# Patient Record
Sex: FEMALE | Employment: UNEMPLOYED | URBAN - METROPOLITAN AREA
[De-identification: names, ages, dates, MRNs, and addresses within clinical notes are randomized per-mention and may not be internally consistent; named-entity substitution may affect disease eponyms.]

---

## 2017-08-14 PROBLEM — N17.9 AKI (ACUTE KIDNEY INJURY) (HCC): Status: ACTIVE | Noted: 2017-08-14

## 2019-04-11 ENCOUNTER — HOSPITAL ENCOUNTER (EMERGENCY)
Age: 39
Discharge: HOME | End: 2019-04-12
Attending: EMERGENCY MEDICINE
Payer: COMMERCIAL

## 2019-04-11 VITALS
RESPIRATION RATE: 16 BRPM | TEMPERATURE: 98 F | DIASTOLIC BLOOD PRESSURE: 95 MMHG | SYSTOLIC BLOOD PRESSURE: 168 MMHG | BODY MASS INDEX: 22.97 KG/M2 | WEIGHT: 117 LBS | OXYGEN SATURATION: 95 % | HEIGHT: 60 IN | HEART RATE: 85 BPM

## 2019-04-11 DIAGNOSIS — R00.2 PALPITATIONS: Primary | ICD-10-CM

## 2019-04-11 DIAGNOSIS — R20.2 PARESTHESIAS: ICD-10-CM

## 2019-04-11 LAB
ALBUMIN SERPL-MCNC: 4.4 G/DL (ref 3.5–5.2)
ALP BLD-CCNC: 156 U/L (ref 35–104)
ALT SERPL-CCNC: 32 U/L (ref 0–32)
ANION GAP SERPL CALCULATED.3IONS-SCNC: 10 MMOL/L (ref 7–16)
AST SERPL-CCNC: 63 U/L (ref 0–31)
BACTERIA: ABNORMAL /HPF
BASOPHILS ABSOLUTE: 0.03 E9/L (ref 0–0.2)
BASOPHILS RELATIVE PERCENT: 0.6 % (ref 0–2)
BILIRUB SERPL-MCNC: 0.7 MG/DL (ref 0–1.2)
BILIRUBIN URINE: NEGATIVE
BLOOD, URINE: ABNORMAL
BUN BLDV-MCNC: 11 MG/DL (ref 6–20)
CALCIUM SERPL-MCNC: 9 MG/DL (ref 8.6–10.2)
CHLORIDE BLD-SCNC: 102 MMOL/L (ref 98–107)
CLARITY: CLEAR
CO2: 26 MMOL/L (ref 22–29)
COLOR: YELLOW
CREAT SERPL-MCNC: 0.5 MG/DL (ref 0.5–1)
EOSINOPHILS ABSOLUTE: 0.06 E9/L (ref 0.05–0.5)
EOSINOPHILS RELATIVE PERCENT: 1.1 % (ref 0–6)
GFR AFRICAN AMERICAN: >60
GFR NON-AFRICAN AMERICAN: >60 ML/MIN/1.73
GLUCOSE BLD-MCNC: 103 MG/DL (ref 74–99)
GLUCOSE URINE: NEGATIVE MG/DL
HCG, URINE, POC: NEGATIVE
HCT VFR BLD CALC: 35.6 % (ref 34–48)
HEMOGLOBIN: 11.6 G/DL (ref 11.5–15.5)
IMMATURE GRANULOCYTES #: 0.02 E9/L
IMMATURE GRANULOCYTES %: 0.4 % (ref 0–5)
KETONES, URINE: NEGATIVE MG/DL
LACTIC ACID: 0.9 MMOL/L (ref 0.5–2.2)
LEUKOCYTE ESTERASE, URINE: NEGATIVE
LYMPHOCYTES ABSOLUTE: 1.38 E9/L (ref 1.5–4)
LYMPHOCYTES RELATIVE PERCENT: 25.6 % (ref 20–42)
Lab: NORMAL
MCH RBC QN AUTO: 29.4 PG (ref 26–35)
MCHC RBC AUTO-ENTMCNC: 32.6 % (ref 32–34.5)
MCV RBC AUTO: 90.1 FL (ref 80–99.9)
MONOCYTES ABSOLUTE: 0.74 E9/L (ref 0.1–0.95)
MONOCYTES RELATIVE PERCENT: 13.7 % (ref 2–12)
NEGATIVE QC PASS/FAIL: NORMAL
NEUTROPHILS ABSOLUTE: 3.16 E9/L (ref 1.8–7.3)
NEUTROPHILS RELATIVE PERCENT: 58.6 % (ref 43–80)
NITRITE, URINE: NEGATIVE
PDW BLD-RTO: 18.6 FL (ref 11.5–15)
PH UA: 6 (ref 5–9)
PLATELET # BLD: 187 E9/L (ref 130–450)
PMV BLD AUTO: 10.6 FL (ref 7–12)
POSITIVE QC PASS/FAIL: NORMAL
POTASSIUM SERPL-SCNC: 3.6 MMOL/L (ref 3.5–5)
PROTEIN UA: ABNORMAL MG/DL
RBC # BLD: 3.95 E12/L (ref 3.5–5.5)
RBC UA: ABNORMAL /HPF (ref 0–2)
SODIUM BLD-SCNC: 138 MMOL/L (ref 132–146)
SPECIFIC GRAVITY UA: >=1.03 (ref 1–1.03)
TOTAL PROTEIN: 7.9 G/DL (ref 6.4–8.3)
TROPONIN: <0.01 NG/ML (ref 0–0.03)
TSH SERPL DL<=0.05 MIU/L-ACNC: 1 UIU/ML (ref 0.27–4.2)
UROBILINOGEN, URINE: 0.2 E.U./DL
WBC # BLD: 5.4 E9/L (ref 4.5–11.5)
WBC UA: ABNORMAL /HPF (ref 0–5)

## 2019-04-11 PROCEDURE — 36415 COLL VENOUS BLD VENIPUNCTURE: CPT

## 2019-04-11 PROCEDURE — 81001 URINALYSIS AUTO W/SCOPE: CPT

## 2019-04-11 PROCEDURE — 84443 ASSAY THYROID STIM HORMONE: CPT

## 2019-04-11 PROCEDURE — 96374 THER/PROPH/DIAG INJ IV PUSH: CPT

## 2019-04-11 PROCEDURE — 83605 ASSAY OF LACTIC ACID: CPT

## 2019-04-11 PROCEDURE — 93005 ELECTROCARDIOGRAM TRACING: CPT | Performed by: PHYSICIAN ASSISTANT

## 2019-04-11 PROCEDURE — 99284 EMERGENCY DEPT VISIT MOD MDM: CPT

## 2019-04-11 PROCEDURE — 2580000003 HC RX 258: Performed by: EMERGENCY MEDICINE

## 2019-04-11 PROCEDURE — 85025 COMPLETE CBC W/AUTO DIFF WBC: CPT

## 2019-04-11 PROCEDURE — 2500000003 HC RX 250 WO HCPCS: Performed by: EMERGENCY MEDICINE

## 2019-04-11 PROCEDURE — 80053 COMPREHEN METABOLIC PANEL: CPT

## 2019-04-11 PROCEDURE — 84484 ASSAY OF TROPONIN QUANT: CPT

## 2019-04-11 RX ORDER — 0.9 % SODIUM CHLORIDE 0.9 %
500 INTRAVENOUS SOLUTION INTRAVENOUS ONCE
Status: COMPLETED | OUTPATIENT
Start: 2019-04-11 | End: 2019-04-11

## 2019-04-11 RX ORDER — LABETALOL HYDROCHLORIDE 5 MG/ML
10 INJECTION, SOLUTION INTRAVENOUS ONCE
Status: COMPLETED | OUTPATIENT
Start: 2019-04-11 | End: 2019-04-11

## 2019-04-11 RX ADMIN — LABETALOL HYDROCHLORIDE 10 MG: 5 INJECTION INTRAVENOUS at 20:42

## 2019-04-11 RX ADMIN — SODIUM CHLORIDE 500 ML: 9 INJECTION, SOLUTION INTRAVENOUS at 20:38

## 2019-04-11 NOTE — ED PROVIDER NOTES
Department of Emergency Medicine   ED  Provider Note  Admit Date/RoomTime: 4/11/2019  6:22 PM  ED Room: [facility]YS              4/11/19  8:29 PM      HISTORY OF PRESENT ILLNESS:  (Nurses Notes Reviewed)    Chief Complaint:   Dizziness (starting on 4/6 patient has been having episodes of feeling as if she is going to pass out.)      Source of history provided by:  patient. History/Exam Limitations: none. Golden Guidry is a 44 y.o. old female presenting to the emergency department for complaints of sudden onset Lightheaded and Palpitations which began several day(s) prior to arrival.  Since recognized her symptoms have been resolved. She has associated symptoms of headaches, weakness, numbness, tingling and nausea and denies any no other pertinent symptoms. The symptoms are aggravated by none. The patient has a past history of: No other pertinent PMH. There has been no history of recent trauma. Patient reports 2 episodes the 1st on the 6th and the 2nd yesterday of sudden onset headedness, palpitations, bilateral hand and foot numbness, tingling, weakness. She reports these episodes last no longer than 8 minutes and resolve on their own. Denies any other symptoms or history. She denies any symptoms since yesterday, at the time she was afraid to drive herself here as the 2nd episode happened in the car that she waited till today for her family member to drive her here. Vertebrobasilar CVA Symptoms:    Vertigo: no (0). Diplopia: no (0). Decreased Vision: no (0). Oscillopsia: no (0). Ataxia: no (0). Precipitated by moving one upper extremity with  Decreased BP (subclavian steal syndrome):       no (0). Total:    0.          Review of Systems:   Pertinent positives and negatives are stated within HPI, all other systems reviewed and are negative.          --------------------------------------------- PAST HISTORY ---------------------------------------------  Past Medical History:  has a past medical history of Heart burn and Hypertension. Past Surgical History:  has no past surgical history on file. Social History:  reports that she has been smoking. She has been smoking about 0.50 packs per day. She does not have any smokeless tobacco history on file. She reports that she does not drink alcohol or use drugs. Family History: family history is not on file. The patients home medications have been reviewed. Allergies: Patient has no known allergies. ---------------------------------------------------PHYSICAL EXAM--------------------------------------    Constitutional/General: Alert and oriented x3, well appearing, non toxic in NAD  Head: Normocephalic and atraumatic  Eyes: PERRL, EOMI  Mouth: Oropharynx clear, handling secretions, no trismus  Neck: Supple, full ROM, non tender to palpation in the midline, no stridor, no crepitus, no meningeal signs  Pulmonary: Lungs clear to auscultation bilaterally, no wheezes, rales, or rhonchi. Not in respiratory distress  Cardiovascular:  Tachycardic rate. Regular rhythm. No murmurs, gallops, or rubs. 2+ distal pulses  Chest: no chest wall tenderness  Abdomen: Soft. Non tender. Non distended. +BS. No rebound, guarding, or rigidity. No pulsatile masses appreciated. Musculoskeletal: Moves all extremities x 4. Warm and well perfused, no clubbing, cyanosis, or edema. Capillary refill <3 seconds  Skin: warm and dry. No rashes. Neurologic: GCS 15, CN 2-12 grossly intact, no focal deficits, symmetric strength 5/5 in the upper and lower extremities bilaterally. No ataxia. Normal finger to nose. Psych: Normal Affect        -------------------------------------------------- RESULTS -------------------------------------------------  I have personally reviewed all laboratory and imaging results for this patient. Results are listed below.      LABS:  Results for orders placed or performed during the hospital encounter of 04/11/19   CBC Auto Differential   Result Value Ref Range    WBC 5.4 4.5 - 11.5 E9/L    RBC 3.95 3.50 - 5.50 E12/L    Hemoglobin 11.6 11.5 - 15.5 g/dL    Hematocrit 35.6 34.0 - 48.0 %    MCV 90.1 80.0 - 99.9 fL    MCH 29.4 26.0 - 35.0 pg    MCHC 32.6 32.0 - 34.5 %    RDW 18.6 (H) 11.5 - 15.0 fL    Platelets 265 271 - 629 E9/L    MPV 10.6 7.0 - 12.0 fL    Neutrophils % 58.6 43.0 - 80.0 %    Immature Granulocytes % 0.4 0.0 - 5.0 %    Lymphocytes % 25.6 20.0 - 42.0 %    Monocytes % 13.7 (H) 2.0 - 12.0 %    Eosinophils % 1.1 0.0 - 6.0 %    Basophils % 0.6 0.0 - 2.0 %    Neutrophils # 3.16 1.80 - 7.30 E9/L    Immature Granulocytes # 0.02 E9/L    Lymphocytes # 1.38 (L) 1.50 - 4.00 E9/L    Monocytes # 0.74 0.10 - 0.95 E9/L    Eosinophils # 0.06 0.05 - 0.50 E9/L    Basophils # 0.03 0.00 - 0.20 E9/L   Comprehensive Metabolic Panel   Result Value Ref Range    Sodium 138 132 - 146 mmol/L    Potassium 3.6 3.5 - 5.0 mmol/L    Chloride 102 98 - 107 mmol/L    CO2 26 22 - 29 mmol/L    Anion Gap 10 7 - 16 mmol/L    Glucose 103 (H) 74 - 99 mg/dL    BUN 11 6 - 20 mg/dL    CREATININE 0.5 0.5 - 1.0 mg/dL    GFR Non-African American >60 >=60 mL/min/1.73    GFR African American >60     Calcium 9.0 8.6 - 10.2 mg/dL    Total Protein 7.9 6.4 - 8.3 g/dL    Alb 4.4 3.5 - 5.2 g/dL    Total Bilirubin 0.7 0.0 - 1.2 mg/dL    Alkaline Phosphatase 156 (H) 35 - 104 U/L    ALT 32 0 - 32 U/L    AST 63 (H) 0 - 31 U/L   Lactic Acid, Plasma   Result Value Ref Range    Lactic Acid 0.9 0.5 - 2.2 mmol/L   Troponin   Result Value Ref Range    Troponin <0.01 0.00 - 0.03 ng/mL   Urinalysis   Result Value Ref Range    Color, UA Yellow Straw/Yellow    Clarity, UA Clear Clear    Glucose, Ur Negative Negative mg/dL    Bilirubin Urine Negative Negative    Ketones, Urine Negative Negative mg/dL    Specific Gravity, UA >=1.030 1.005 - 1.030    Blood, Urine LARGE (A) Negative    pH, UA 6.0 5.0 - 9.0    Protein, UA TRACE Negative mg/dL    Urobilinogen, Urine 0.2 <2.0 E.U./dL    Nitrite, Urine Negative Negative    Leukocyte Esterase, Urine Negative Negative   Microscopic Urinalysis   Result Value Ref Range    WBC, UA 1-3 0 - 5 /HPF    RBC, UA 5-10 (A) 0 - 2 /HPF    Bacteria, UA FEW (A) /HPF   TSH without Reflex   Result Value Ref Range    TSH 0.996 0.270 - 4.200 uIU/mL   POC Pregnancy Urine Qual   Result Value Ref Range    HCG, Urine, POC Negative Negative    Lot Number [id number]     Positive QC Pass/Fail Pass     Negative QC Pass/Fail Pass        RADIOLOGY:  Interpreted by Radiologist.  No orders to display         EKG: This EKG is signed by emergency department physician. Rate: 84  Rhythm: Sinus  Interpretation: No acute changes. Comparison: stable as compared to patient's most recent EKG 8/14/17              ------------------------- NURSING NOTES AND VITALS REVIEWED ---------------------------   The nursing notes within the ED encounter and vital signs as below have been reviewed by myself. BP (!) 168/95   Pulse 85   Temp 98 °F (36.7 °C)   Resp 16   Ht 5' (1.524 m)   Wt 117 lb (53.1 kg)   SpO2 95%   BMI 22.85 kg/m²   Oxygen Saturation Interpretation: Normal    The patients available past medical records and past encounters were reviewed. ------------------------------ ED COURSE/MEDICAL DECISION MAKING----------------------  Medications   labetalol (NORMODYNE;TRANDATE) injection 10 mg (10 mg Intravenous Given 4/11/19 2042)   0.9 % sodium chloride bolus (0 mLs Intravenous Stopped 4/11/19 2121)             Medical Decision Making:    Patient presents for 2 episodes over the last week of palpitations associated with lightheadedness, bilateral hand and foot numbness and generalized weakness. No arrhythmias on EKG, or on monitor during ED stay, normal labs including TSH. Patient is too follow up with pcp and cardiology for further evaluation or return if symptoms worsen.  She does report her pcp is currently attempting to control her bp and just increased her lisinopril to bid last week.         This patient's ED course included: re-evaluation prior to disposition, IV medications, cardiac monitoring, continuous pulse oximetry, complex medical decision making and emergency management and a personal history and physicial eaxmination    This patient has remained hemodynamically stable during their ED course. Re-Evaluations:             Re-evaluation. Patients symptoms are improving        Counseling: The emergency provider has spoken with the patient and discussed todays results, in addition to providing specific details for the plan of care and counseling regarding the diagnosis and prognosis. Questions are answered at this time and they are agreeable with the plan.       --------------------------------- IMPRESSION AND DISPOSITION ---------------------------------    IMPRESSION  1. Palpitations    2.  Paresthesias        DISPOSITION  Disposition: Discharge to home  Patient condition is stable           Taye Miller DO  Resident  04/11/19 8682

## 2019-04-11 NOTE — ED NOTES
FIRST PROVIDER CONTACT ASSESSMENT NOTE      Department of Emergency Medicine   4/11/19  5:28 PM    Chief Complaint: Other      History of Present Illness:    Gerald Ramirez is a 44 y.o. female who presents to the ED by private car for blood pressure issues. States in the past 2 weeks has had 2 episodes of palpitations, near syncope, dizziness, arm numbness. States currently she feels shakey  Focused Screening Exam:  Constitutional:  Alert, appears stated age and is in no distress. *ALLERGIES*     Patient has no known allergies.      ED Triage Vitals [04/11/19 1703]   BP Temp Temp src Pulse Resp SpO2 Height Weight   -- -- -- 117 -- 98 % -- --        Initial Plan of Care:  Initiate Treatment-Testing, Proceed toTreatment Area When Bed Available for ED Attending/MLP to Continue Care    -----------------END OF FIRST PROVIDER CONTACT ASSESSMENT NOTE--------------  Electronically signed by LILIAN Danielle   DD: 4/11/19         LILIAN Danielle  04/11/19 3803

## 2019-04-12 LAB
EKG ATRIAL RATE: 84 BPM
EKG P AXIS: 55 DEGREES
EKG P-R INTERVAL: 120 MS
EKG Q-T INTERVAL: 358 MS
EKG QRS DURATION: 78 MS
EKG QTC CALCULATION (BAZETT): 423 MS
EKG R AXIS: 57 DEGREES
EKG T AXIS: 91 DEGREES
EKG VENTRICULAR RATE: 84 BPM

## 2020-02-25 ENCOUNTER — HOSPITAL ENCOUNTER (OUTPATIENT)
Age: 40
Discharge: HOME | End: 2020-02-25
Payer: COMMERCIAL

## 2020-02-25 LAB
ALBUMIN SERPL-MCNC: 4.4 G/DL (ref 3.5–5.2)
ALP BLD-CCNC: 144 U/L (ref 35–104)
ALT SERPL-CCNC: 29 U/L (ref 0–32)
ANION GAP SERPL CALCULATED.3IONS-SCNC: 13 MMOL/L (ref 7–16)
AST SERPL-CCNC: 47 U/L (ref 0–31)
BILIRUB SERPL-MCNC: 0.3 MG/DL (ref 0–1.2)
BUN BLDV-MCNC: 6 MG/DL (ref 6–20)
CALCIUM SERPL-MCNC: 8.9 MG/DL (ref 8.6–10.2)
CHLORIDE BLD-SCNC: 105 MMOL/L (ref 98–107)
CHOLESTEROL, TOTAL: 199 MG/DL (ref 0–199)
CO2: 27 MMOL/L (ref 22–29)
CREAT SERPL-MCNC: 0.4 MG/DL (ref 0.5–1)
GFR AFRICAN AMERICAN: >60
GFR NON-AFRICAN AMERICAN: >60 ML/MIN/1.73
GLUCOSE BLD-MCNC: 85 MG/DL (ref 74–99)
HCT VFR BLD CALC: 36 % (ref 34–48)
HDLC SERPL-MCNC: 97 MG/DL
HEMOGLOBIN: 11 G/DL (ref 11.5–15.5)
LDL CHOLESTEROL CALCULATED: 62 MG/DL (ref 0–99)
MCH RBC QN AUTO: 27.2 PG (ref 26–35)
MCHC RBC AUTO-ENTMCNC: 30.6 % (ref 32–34.5)
MCV RBC AUTO: 88.9 FL (ref 80–99.9)
PDW BLD-RTO: 19.7 FL (ref 11.5–15)
PLATELET # BLD: 189 E9/L (ref 130–450)
PMV BLD AUTO: 10 FL (ref 7–12)
POTASSIUM SERPL-SCNC: 3.4 MMOL/L (ref 3.5–5)
RBC # BLD: 4.05 E12/L (ref 3.5–5.5)
SODIUM BLD-SCNC: 145 MMOL/L (ref 132–146)
TOTAL PROTEIN: 7.5 G/DL (ref 6.4–8.3)
TRIGL SERPL-MCNC: 201 MG/DL (ref 0–149)
VITAMIN D 25-HYDROXY: 40 NG/ML (ref 30–100)
VLDLC SERPL CALC-MCNC: 40 MG/DL
WBC # BLD: 4.3 E9/L (ref 4.5–11.5)

## 2020-02-25 PROCEDURE — 80061 LIPID PANEL: CPT

## 2020-02-25 PROCEDURE — 85027 COMPLETE CBC AUTOMATED: CPT

## 2020-02-25 PROCEDURE — 36415 COLL VENOUS BLD VENIPUNCTURE: CPT

## 2020-02-25 PROCEDURE — 82306 VITAMIN D 25 HYDROXY: CPT

## 2020-02-25 PROCEDURE — 80053 COMPREHEN METABOLIC PANEL: CPT

## 2020-05-13 ENCOUNTER — HOSPITAL ENCOUNTER (OUTPATIENT)
Age: 40
Discharge: HOME | End: 2020-05-15
Payer: COMMERCIAL

## 2020-05-13 ENCOUNTER — NURSE ONLY (OUTPATIENT)
Dept: PRIMARY CARE CLINIC | Age: 40
End: 2020-05-13

## 2020-05-13 PROCEDURE — U0003 INFECTIOUS AGENT DETECTION BY NUCLEIC ACID (DNA OR RNA); SEVERE ACUTE RESPIRATORY SYNDROME CORONAVIRUS 2 (SARS-COV-2) (CORONAVIRUS DISEASE [COVID-19]), AMPLIFIED PROBE TECHNIQUE, MAKING USE OF HIGH THROUGHPUT TECHNOLOGIES AS DESCRIBED BY CMS-2020-01-R: HCPCS

## 2020-05-16 LAB
SARS-COV-2: NOT DETECTED
SOURCE: NORMAL

## 2020-09-29 ENCOUNTER — HOSPITAL ENCOUNTER (OUTPATIENT)
Age: 40
Discharge: HOME | End: 2020-10-01
Payer: COMMERCIAL

## 2020-09-29 PROCEDURE — U0003 INFECTIOUS AGENT DETECTION BY NUCLEIC ACID (DNA OR RNA); SEVERE ACUTE RESPIRATORY SYNDROME CORONAVIRUS 2 (SARS-COV-2) (CORONAVIRUS DISEASE [COVID-19]), AMPLIFIED PROBE TECHNIQUE, MAKING USE OF HIGH THROUGHPUT TECHNOLOGIES AS DESCRIBED BY CMS-2020-01-R: HCPCS

## 2020-10-01 LAB
SARS-COV-2: NOT DETECTED
SOURCE: NORMAL

## 2020-10-08 ENCOUNTER — HOSPITAL ENCOUNTER (OUTPATIENT)
Age: 40
Discharge: HOME | End: 2020-10-10
Payer: COMMERCIAL

## 2020-10-08 PROCEDURE — U0003 INFECTIOUS AGENT DETECTION BY NUCLEIC ACID (DNA OR RNA); SEVERE ACUTE RESPIRATORY SYNDROME CORONAVIRUS 2 (SARS-COV-2) (CORONAVIRUS DISEASE [COVID-19]), AMPLIFIED PROBE TECHNIQUE, MAKING USE OF HIGH THROUGHPUT TECHNOLOGIES AS DESCRIBED BY CMS-2020-01-R: HCPCS

## 2020-10-10 LAB
SARS-COV-2: NOT DETECTED
SOURCE: NORMAL

## 2020-10-13 ENCOUNTER — HOSPITAL ENCOUNTER (OUTPATIENT)
Age: 40
Discharge: HOME | End: 2020-10-15
Payer: COMMERCIAL

## 2020-10-13 PROCEDURE — U0003 INFECTIOUS AGENT DETECTION BY NUCLEIC ACID (DNA OR RNA); SEVERE ACUTE RESPIRATORY SYNDROME CORONAVIRUS 2 (SARS-COV-2) (CORONAVIRUS DISEASE [COVID-19]), AMPLIFIED PROBE TECHNIQUE, MAKING USE OF HIGH THROUGHPUT TECHNOLOGIES AS DESCRIBED BY CMS-2020-01-R: HCPCS

## 2020-10-15 LAB
SARS-COV-2: NOT DETECTED
SOURCE: NORMAL

## 2020-10-17 ENCOUNTER — HOSPITAL ENCOUNTER (OUTPATIENT)
Age: 40
Discharge: HOME | End: 2020-10-19
Payer: COMMERCIAL

## 2020-10-17 PROCEDURE — U0003 INFECTIOUS AGENT DETECTION BY NUCLEIC ACID (DNA OR RNA); SEVERE ACUTE RESPIRATORY SYNDROME CORONAVIRUS 2 (SARS-COV-2) (CORONAVIRUS DISEASE [COVID-19]), AMPLIFIED PROBE TECHNIQUE, MAKING USE OF HIGH THROUGHPUT TECHNOLOGIES AS DESCRIBED BY CMS-2020-01-R: HCPCS

## 2020-10-18 LAB
SARS-COV-2: NOT DETECTED
SOURCE: NORMAL

## 2020-10-21 ENCOUNTER — HOSPITAL ENCOUNTER (OUTPATIENT)
Age: 40
Discharge: HOME | End: 2020-10-23
Payer: COMMERCIAL

## 2020-10-21 PROCEDURE — U0003 INFECTIOUS AGENT DETECTION BY NUCLEIC ACID (DNA OR RNA); SEVERE ACUTE RESPIRATORY SYNDROME CORONAVIRUS 2 (SARS-COV-2) (CORONAVIRUS DISEASE [COVID-19]), AMPLIFIED PROBE TECHNIQUE, MAKING USE OF HIGH THROUGHPUT TECHNOLOGIES AS DESCRIBED BY CMS-2020-01-R: HCPCS

## 2020-10-23 LAB
SARS-COV-2: NOT DETECTED
SOURCE: NORMAL

## 2020-10-29 ENCOUNTER — HOSPITAL ENCOUNTER (OUTPATIENT)
Age: 40
Discharge: HOME | End: 2020-10-31
Payer: COMMERCIAL

## 2020-10-29 ENCOUNTER — NURSE ONLY (OUTPATIENT)
Dept: PRIMARY CARE CLINIC | Age: 40
End: 2020-10-29

## 2020-10-29 PROCEDURE — U0003 INFECTIOUS AGENT DETECTION BY NUCLEIC ACID (DNA OR RNA); SEVERE ACUTE RESPIRATORY SYNDROME CORONAVIRUS 2 (SARS-COV-2) (CORONAVIRUS DISEASE [COVID-19]), AMPLIFIED PROBE TECHNIQUE, MAKING USE OF HIGH THROUGHPUT TECHNOLOGIES AS DESCRIBED BY CMS-2020-01-R: HCPCS

## 2020-10-31 LAB
SARS-COV-2: NOT DETECTED
SOURCE: NORMAL

## 2020-11-07 LAB
SARS-COV-2: NOT DETECTED
SOURCE: NORMAL

## 2020-11-12 ENCOUNTER — NURSE ONLY (OUTPATIENT)
Dept: PRIMARY CARE CLINIC | Age: 40
End: 2020-11-12

## 2020-11-12 DIAGNOSIS — Z20.822 EXPOSURE TO COVID-19 VIRUS: ICD-10-CM

## 2020-11-13 LAB — SARS-COV-2, PCR: NOT DETECTED

## 2020-11-20 LAB
SARS-COV-2: NOT DETECTED
SOURCE: NORMAL

## 2020-11-24 LAB
SARS-COV-2: NOT DETECTED
SOURCE: NORMAL

## 2020-11-28 LAB
SARS-COV-2: NOT DETECTED
SOURCE: NORMAL

## 2020-12-02 LAB
SARS-COV-2: NOT DETECTED
SOURCE: NORMAL

## 2020-12-06 LAB
SARS-COV-2: NOT DETECTED
SOURCE: NORMAL

## 2020-12-08 LAB
SARS-COV-2: NOT DETECTED
SOURCE: NORMAL

## 2020-12-13 LAB
SARS-COV-2: NOT DETECTED
SOURCE: NORMAL

## 2020-12-16 LAB
SARS-COV-2: NOT DETECTED
SOURCE: NORMAL

## 2020-12-20 LAB
SARS-COV-2: NOT DETECTED
SOURCE: NORMAL

## 2020-12-24 LAB
SARS-COV-2: NOT DETECTED
SOURCE: NORMAL

## 2020-12-28 ENCOUNTER — NURSE ONLY (OUTPATIENT)
Dept: PRIMARY CARE CLINIC | Age: 40
End: 2020-12-28

## 2020-12-28 DIAGNOSIS — Z20.822 SUSPECTED COVID-19 VIRUS INFECTION: ICD-10-CM

## 2020-12-30 LAB
SARS-COV-2: NOT DETECTED
SOURCE: NORMAL

## 2021-01-01 LAB
SARS-COV-2: NOT DETECTED
SOURCE: NORMAL

## 2021-01-06 LAB
SARS-COV-2: NOT DETECTED
SOURCE: NORMAL

## 2021-01-11 LAB
SARS-COV-2: NOT DETECTED
SOURCE: NORMAL

## 2021-01-13 LAB
SARS-COV-2: NOT DETECTED
SOURCE: NORMAL

## 2021-01-16 LAB — SOURCE: NORMAL

## 2021-01-17 LAB — SARS-COV-2, PCR: NOT DETECTED

## 2021-01-20 LAB
SARS-COV-2: NOT DETECTED
SOURCE: NORMAL

## 2021-01-24 LAB
SARS-COV-2: NOT DETECTED
SOURCE: NORMAL

## 2021-01-27 LAB
SARS-COV-2: NOT DETECTED
SOURCE: NORMAL

## 2021-01-31 LAB
SARS-COV-2: NOT DETECTED
SOURCE: NORMAL

## 2021-02-03 LAB
SARS-COV-2: NOT DETECTED
SOURCE: NORMAL

## 2021-02-10 LAB
SARS-COV-2: NOT DETECTED
SOURCE: NORMAL

## 2021-02-14 LAB
SARS-COV-2: NOT DETECTED
SOURCE: NORMAL

## 2021-02-17 LAB
SARS-COV-2: NOT DETECTED
SOURCE: NORMAL

## 2021-02-21 LAB
SARS-COV-2: NOT DETECTED
SOURCE: NORMAL

## 2021-02-24 LAB
SARS-COV-2: NOT DETECTED
SOURCE: NORMAL

## 2021-02-28 LAB
SARS-COV-2: NOT DETECTED
SOURCE: NORMAL

## 2021-03-03 LAB
SARS-COV-2: NOT DETECTED
SOURCE: NORMAL

## 2021-03-10 LAB
SARS-COV-2: NOT DETECTED
SOURCE: NORMAL

## 2021-03-16 LAB
SARS-COV-2: NOT DETECTED
SOURCE: NORMAL

## 2021-03-17 LAB
SARS-COV-2: NOT DETECTED
SOURCE: NORMAL

## 2021-03-22 LAB
SARS-COV-2: NOT DETECTED
SOURCE: NORMAL

## 2021-03-24 LAB
SARS-COV-2: NOT DETECTED
SOURCE: NORMAL

## 2021-03-29 LAB
SARS-COV-2: NOT DETECTED
SOURCE: NORMAL

## 2021-04-01 LAB
SARS-COV-2: NORMAL
SOURCE: NORMAL

## 2021-04-03 LAB
SARS-COV-2: NOT DETECTED
SOURCE: NORMAL

## 2021-04-09 LAB
SARS-COV-2: NORMAL
SOURCE: NORMAL

## 2021-04-11 LAB
SARS-COV-2: NOT DETECTED
SOURCE: NORMAL

## 2024-02-12 ENCOUNTER — HOSPITAL ENCOUNTER (INPATIENT)
Age: 44
LOS: 6 days | Discharge: HOME | DRG: 280 | End: 2024-02-18
Attending: EMERGENCY MEDICINE | Admitting: INTERNAL MEDICINE
Payer: COMMERCIAL

## 2024-02-12 ENCOUNTER — APPOINTMENT (OUTPATIENT)
Dept: CT IMAGING | Age: 44
DRG: 280 | End: 2024-02-12
Payer: COMMERCIAL

## 2024-02-12 ENCOUNTER — APPOINTMENT (OUTPATIENT)
Dept: ULTRASOUND IMAGING | Age: 44
DRG: 280 | End: 2024-02-12
Payer: COMMERCIAL

## 2024-02-12 DIAGNOSIS — D64.9 ANEMIA, UNSPECIFIED TYPE: ICD-10-CM

## 2024-02-12 DIAGNOSIS — E83.42 HYPOMAGNESEMIA: ICD-10-CM

## 2024-02-12 DIAGNOSIS — K70.31 ALCOHOLIC CIRRHOSIS OF LIVER WITH ASCITES (HCC): Primary | ICD-10-CM

## 2024-02-12 DIAGNOSIS — E87.6 HYPOKALEMIA: ICD-10-CM

## 2024-02-12 DIAGNOSIS — R17 ELEVATED BILIRUBIN: ICD-10-CM

## 2024-02-12 DIAGNOSIS — R79.89 ABNORMAL LFTS: ICD-10-CM

## 2024-02-12 DIAGNOSIS — F10.10 CHRONIC ALCOHOL ABUSE: ICD-10-CM

## 2024-02-12 PROBLEM — F10.139 ALCOHOL ABUSE WITH WITHDRAWAL (HCC): Status: ACTIVE | Noted: 2024-02-12

## 2024-02-12 PROBLEM — K74.60 CIRRHOSIS OF LIVER (HCC): Status: ACTIVE | Noted: 2024-02-12

## 2024-02-12 PROBLEM — N30.00 ACUTE CYSTITIS: Status: ACTIVE | Noted: 2024-02-12

## 2024-02-12 LAB
ALBUMIN SERPL-MCNC: 3.4 G/DL (ref 3.5–5.2)
ALP SERPL-CCNC: 269 U/L (ref 35–104)
ALT SERPL-CCNC: 45 U/L (ref 0–32)
AMMONIA PLAS-SCNC: 43 UMOL/L (ref 11–51)
AMPHET UR QL SCN: NEGATIVE
ANION GAP SERPL CALCULATED.3IONS-SCNC: 16 MMOL/L (ref 7–16)
AST SERPL-CCNC: 219 U/L (ref 0–31)
ATYPICAL LYMPHOCYTE ABSOLUTE COUNT: 0.16 K/UL (ref 0–0.46)
ATYPICAL LYMPHOCYTES: 2 % (ref 0–4)
BACTERIA URNS QL MICRO: ABNORMAL
BARBITURATES UR QL SCN: NEGATIVE
BASOPHILS # BLD: 0 K/UL (ref 0–0.2)
BASOPHILS NFR BLD: 0 % (ref 0–2)
BENZODIAZ UR QL: NEGATIVE
BILIRUB SERPL-MCNC: 9 MG/DL (ref 0–1.2)
BILIRUB UR QL STRIP: ABNORMAL
BUN SERPL-MCNC: 13 MG/DL (ref 6–20)
BUPRENORPHINE UR QL: NEGATIVE
CALCIUM SERPL-MCNC: 7 MG/DL (ref 8.6–10.2)
CANNABINOIDS UR QL SCN: POSITIVE
CHLORIDE SERPL-SCNC: 96 MMOL/L (ref 98–107)
CLARITY UR: ABNORMAL
CO2 SERPL-SCNC: 23 MMOL/L (ref 22–29)
COCAINE UR QL SCN: NEGATIVE
COLOR UR: ABNORMAL
CREAT SERPL-MCNC: 0.7 MG/DL (ref 0.5–1)
EOSINOPHIL # BLD: 0 K/UL (ref 0.05–0.5)
EOSINOPHILS RELATIVE PERCENT: 0 % (ref 0–6)
EPI CELLS #/AREA URNS HPF: ABNORMAL /HPF
ERYTHROCYTE [DISTWIDTH] IN BLOOD BY AUTOMATED COUNT: 21.2 % (ref 11.5–15)
ETHANOLAMINE SERPL-MCNC: 69 MG/DL
FENTANYL UR QL: NEGATIVE
GFR SERPL CREATININE-BSD FRML MDRD: >60 ML/MIN/1.73M2
GLUCOSE SERPL-MCNC: 101 MG/DL (ref 74–99)
GLUCOSE UR STRIP-MCNC: 100 MG/DL
HCG, URINE, POC: NEGATIVE
HCT VFR BLD AUTO: 22.4 % (ref 34–48)
HGB BLD-MCNC: 8.4 G/DL (ref 11.5–15.5)
HGB UR QL STRIP.AUTO: NEGATIVE
INR PPP: 1.5
KETONES UR STRIP-MCNC: ABNORMAL MG/DL
LACTATE BLDV-SCNC: 2.8 MMOL/L (ref 0.5–2.2)
LEUKOCYTE ESTERASE UR QL STRIP: ABNORMAL
LIPASE SERPL-CCNC: 54 U/L (ref 13–60)
LYMPHOCYTES NFR BLD: 0.47 K/UL (ref 1.5–4)
LYMPHOCYTES RELATIVE PERCENT: 5 % (ref 20–42)
Lab: NORMAL
MAGNESIUM SERPL-MCNC: 0.8 MG/DL (ref 1.6–2.6)
MCH RBC QN AUTO: 38.4 PG (ref 26–35)
MCHC RBC AUTO-ENTMCNC: 37.5 G/DL (ref 32–34.5)
MCV RBC AUTO: 102.3 FL (ref 80–99.9)
METHADONE UR QL: NEGATIVE
MONOCYTES NFR BLD: 0.16 K/UL (ref 0.1–0.95)
MONOCYTES NFR BLD: 2 % (ref 2–12)
NEGATIVE QC PASS/FAIL: NORMAL
NEUTROPHILS NFR BLD: 91 % (ref 43–80)
NEUTS SEG NFR BLD: 8.21 K/UL (ref 1.8–7.3)
NITRITE UR QL STRIP: POSITIVE
OPIATES UR QL SCN: NEGATIVE
OXYCODONE UR QL SCN: NEGATIVE
PCP UR QL SCN: NEGATIVE
PH UR STRIP: 5.5 [PH] (ref 5–9)
PLATELET # BLD AUTO: 78 K/UL (ref 130–450)
PLATELET CONFIRMATION: NORMAL
PMV BLD AUTO: 11 FL (ref 7–12)
POSITIVE QC PASS/FAIL: NORMAL
POTASSIUM SERPL-SCNC: 3 MMOL/L (ref 3.5–5)
PROT SERPL-MCNC: 7.7 G/DL (ref 6.4–8.3)
PROT UR STRIP-MCNC: 30 MG/DL
PROTHROMBIN TIME: 16.8 SEC (ref 9.3–12.4)
RBC # BLD AUTO: 2.19 M/UL (ref 3.5–5.5)
RBC # BLD: ABNORMAL 10*6/UL
RBC #/AREA URNS HPF: ABNORMAL /HPF
SODIUM SERPL-SCNC: 135 MMOL/L (ref 132–146)
SP GR UR STRIP: 1.01 (ref 1–1.03)
TEST INFORMATION: ABNORMAL
UROBILINOGEN UR STRIP-ACNC: 4 EU/DL (ref 0–1)
WBC #/AREA URNS HPF: ABNORMAL /HPF
WBC OTHER # BLD: 9 K/UL (ref 4.5–11.5)

## 2024-02-12 PROCEDURE — 83690 ASSAY OF LIPASE: CPT

## 2024-02-12 PROCEDURE — 80307 DRUG TEST PRSMV CHEM ANLYZR: CPT

## 2024-02-12 PROCEDURE — 76705 ECHO EXAM OF ABDOMEN: CPT

## 2024-02-12 PROCEDURE — 6370000000 HC RX 637 (ALT 250 FOR IP): Performed by: NURSE PRACTITIONER

## 2024-02-12 PROCEDURE — 96365 THER/PROPH/DIAG IV INF INIT: CPT

## 2024-02-12 PROCEDURE — 2580000003 HC RX 258: Performed by: NURSE PRACTITIONER

## 2024-02-12 PROCEDURE — 6360000002 HC RX W HCPCS: Performed by: NURSE PRACTITIONER

## 2024-02-12 PROCEDURE — 6360000002 HC RX W HCPCS: Performed by: EMERGENCY MEDICINE

## 2024-02-12 PROCEDURE — 85610 PROTHROMBIN TIME: CPT

## 2024-02-12 PROCEDURE — 96366 THER/PROPH/DIAG IV INF ADDON: CPT

## 2024-02-12 PROCEDURE — 74177 CT ABD & PELVIS W/CONTRAST: CPT

## 2024-02-12 PROCEDURE — 80053 COMPREHEN METABOLIC PANEL: CPT

## 2024-02-12 PROCEDURE — 87088 URINE BACTERIA CULTURE: CPT

## 2024-02-12 PROCEDURE — 2060000000 HC ICU INTERMEDIATE R&B

## 2024-02-12 PROCEDURE — 82140 ASSAY OF AMMONIA: CPT

## 2024-02-12 PROCEDURE — 83735 ASSAY OF MAGNESIUM: CPT

## 2024-02-12 PROCEDURE — 81001 URINALYSIS AUTO W/SCOPE: CPT

## 2024-02-12 PROCEDURE — 85025 COMPLETE CBC W/AUTO DIFF WBC: CPT

## 2024-02-12 PROCEDURE — 99223 1ST HOSP IP/OBS HIGH 75: CPT | Performed by: INTERNAL MEDICINE

## 2024-02-12 PROCEDURE — 6360000004 HC RX CONTRAST MEDICATION: Performed by: RADIOLOGY

## 2024-02-12 PROCEDURE — 2580000003 HC RX 258: Performed by: EMERGENCY MEDICINE

## 2024-02-12 PROCEDURE — 87086 URINE CULTURE/COLONY COUNT: CPT

## 2024-02-12 PROCEDURE — 96375 TX/PRO/DX INJ NEW DRUG ADDON: CPT

## 2024-02-12 PROCEDURE — 6370000000 HC RX 637 (ALT 250 FOR IP): Performed by: EMERGENCY MEDICINE

## 2024-02-12 PROCEDURE — G0480 DRUG TEST DEF 1-7 CLASSES: HCPCS

## 2024-02-12 PROCEDURE — 83605 ASSAY OF LACTIC ACID: CPT

## 2024-02-12 PROCEDURE — 99285 EMERGENCY DEPT VISIT HI MDM: CPT

## 2024-02-12 PROCEDURE — APPSS60 APP SPLIT SHARED TIME 46-60 MINUTES: Performed by: NURSE PRACTITIONER

## 2024-02-12 RX ORDER — LORAZEPAM 1 MG/1
3 TABLET ORAL
Status: DISCONTINUED | OUTPATIENT
Start: 2024-02-12 | End: 2024-02-18 | Stop reason: HOSPADM

## 2024-02-12 RX ORDER — SODIUM CHLORIDE 0.9 % (FLUSH) 0.9 %
5-40 SYRINGE (ML) INJECTION PRN
Status: DISCONTINUED | OUTPATIENT
Start: 2024-02-12 | End: 2024-02-18 | Stop reason: HOSPADM

## 2024-02-12 RX ORDER — M-VIT,TX,IRON,MINS/CALC/FOLIC 27MG-0.4MG
1 TABLET ORAL DAILY
Status: DISCONTINUED | OUTPATIENT
Start: 2024-02-12 | End: 2024-02-18 | Stop reason: HOSPADM

## 2024-02-12 RX ORDER — LORAZEPAM 2 MG/ML
2 INJECTION INTRAMUSCULAR
Status: DISCONTINUED | OUTPATIENT
Start: 2024-02-12 | End: 2024-02-18 | Stop reason: HOSPADM

## 2024-02-12 RX ORDER — NICOTINE 21 MG/24HR
1 PATCH, TRANSDERMAL 24 HOURS TRANSDERMAL DAILY
Status: DISCONTINUED | OUTPATIENT
Start: 2024-02-12 | End: 2024-02-18 | Stop reason: HOSPADM

## 2024-02-12 RX ORDER — ACETAMINOPHEN 650 MG/1
650 SUPPOSITORY RECTAL EVERY 6 HOURS PRN
Status: DISCONTINUED | OUTPATIENT
Start: 2024-02-12 | End: 2024-02-18 | Stop reason: HOSPADM

## 2024-02-12 RX ORDER — SODIUM CHLORIDE 9 MG/ML
INJECTION, SOLUTION INTRAVENOUS CONTINUOUS
Status: ACTIVE | OUTPATIENT
Start: 2024-02-12 | End: 2024-02-14

## 2024-02-12 RX ORDER — MAGNESIUM SULFATE IN WATER 40 MG/ML
2000 INJECTION, SOLUTION INTRAVENOUS PRN
Status: DISCONTINUED | OUTPATIENT
Start: 2024-02-12 | End: 2024-02-18 | Stop reason: HOSPADM

## 2024-02-12 RX ORDER — ONDANSETRON 2 MG/ML
4 INJECTION INTRAMUSCULAR; INTRAVENOUS EVERY 6 HOURS PRN
Status: DISCONTINUED | OUTPATIENT
Start: 2024-02-12 | End: 2024-02-18 | Stop reason: HOSPADM

## 2024-02-12 RX ORDER — LANOLIN ALCOHOL/MO/W.PET/CERES
100 CREAM (GRAM) TOPICAL DAILY
Status: DISCONTINUED | OUTPATIENT
Start: 2024-02-12 | End: 2024-02-18 | Stop reason: HOSPADM

## 2024-02-12 RX ORDER — POTASSIUM CHLORIDE 20 MEQ/1
40 TABLET, EXTENDED RELEASE ORAL PRN
Status: DISCONTINUED | OUTPATIENT
Start: 2024-02-12 | End: 2024-02-18 | Stop reason: HOSPADM

## 2024-02-12 RX ORDER — LORAZEPAM 1 MG/1
1 TABLET ORAL
Status: DISCONTINUED | OUTPATIENT
Start: 2024-02-12 | End: 2024-02-18 | Stop reason: HOSPADM

## 2024-02-12 RX ORDER — SODIUM CHLORIDE 9 MG/ML
INJECTION, SOLUTION INTRAVENOUS PRN
Status: DISCONTINUED | OUTPATIENT
Start: 2024-02-12 | End: 2024-02-18 | Stop reason: HOSPADM

## 2024-02-12 RX ORDER — LORAZEPAM 1 MG/1
2 TABLET ORAL
Status: DISCONTINUED | OUTPATIENT
Start: 2024-02-12 | End: 2024-02-18 | Stop reason: HOSPADM

## 2024-02-12 RX ORDER — POTASSIUM CHLORIDE 7.45 MG/ML
10 INJECTION INTRAVENOUS ONCE
Status: COMPLETED | OUTPATIENT
Start: 2024-02-12 | End: 2024-02-12

## 2024-02-12 RX ORDER — ONDANSETRON 2 MG/ML
4 INJECTION INTRAMUSCULAR; INTRAVENOUS ONCE
Status: COMPLETED | OUTPATIENT
Start: 2024-02-12 | End: 2024-02-12

## 2024-02-12 RX ORDER — LORAZEPAM 2 MG/ML
1 INJECTION INTRAMUSCULAR
Status: DISCONTINUED | OUTPATIENT
Start: 2024-02-12 | End: 2024-02-18 | Stop reason: HOSPADM

## 2024-02-12 RX ORDER — LORAZEPAM 1 MG/1
4 TABLET ORAL
Status: DISCONTINUED | OUTPATIENT
Start: 2024-02-12 | End: 2024-02-18 | Stop reason: HOSPADM

## 2024-02-12 RX ORDER — MAGNESIUM SULFATE IN WATER 40 MG/ML
4000 INJECTION, SOLUTION INTRAVENOUS ONCE
Status: COMPLETED | OUTPATIENT
Start: 2024-02-12 | End: 2024-02-12

## 2024-02-12 RX ORDER — POTASSIUM CHLORIDE 20 MEQ/1
40 TABLET, EXTENDED RELEASE ORAL ONCE
Status: COMPLETED | OUTPATIENT
Start: 2024-02-12 | End: 2024-02-12

## 2024-02-12 RX ORDER — POLYETHYLENE GLYCOL 3350 17 G/17G
17 POWDER, FOR SOLUTION ORAL DAILY PRN
Status: DISCONTINUED | OUTPATIENT
Start: 2024-02-12 | End: 2024-02-18 | Stop reason: HOSPADM

## 2024-02-12 RX ORDER — SODIUM CHLORIDE 0.9 % (FLUSH) 0.9 %
5-40 SYRINGE (ML) INJECTION EVERY 12 HOURS SCHEDULED
Status: DISCONTINUED | OUTPATIENT
Start: 2024-02-12 | End: 2024-02-18 | Stop reason: HOSPADM

## 2024-02-12 RX ORDER — 0.9 % SODIUM CHLORIDE 0.9 %
1000 INTRAVENOUS SOLUTION INTRAVENOUS ONCE
Status: COMPLETED | OUTPATIENT
Start: 2024-02-12 | End: 2024-02-12

## 2024-02-12 RX ORDER — ACETAMINOPHEN 325 MG/1
650 TABLET ORAL EVERY 6 HOURS PRN
Status: DISCONTINUED | OUTPATIENT
Start: 2024-02-12 | End: 2024-02-18 | Stop reason: HOSPADM

## 2024-02-12 RX ORDER — LORAZEPAM 2 MG/ML
3 INJECTION INTRAMUSCULAR
Status: DISCONTINUED | OUTPATIENT
Start: 2024-02-12 | End: 2024-02-18 | Stop reason: HOSPADM

## 2024-02-12 RX ORDER — POTASSIUM CHLORIDE 7.45 MG/ML
10 INJECTION INTRAVENOUS PRN
Status: DISCONTINUED | OUTPATIENT
Start: 2024-02-12 | End: 2024-02-18 | Stop reason: HOSPADM

## 2024-02-12 RX ORDER — ONDANSETRON 4 MG/1
4 TABLET, ORALLY DISINTEGRATING ORAL EVERY 8 HOURS PRN
Status: DISCONTINUED | OUTPATIENT
Start: 2024-02-12 | End: 2024-02-18 | Stop reason: HOSPADM

## 2024-02-12 RX ORDER — LORAZEPAM 2 MG/ML
4 INJECTION INTRAMUSCULAR
Status: DISCONTINUED | OUTPATIENT
Start: 2024-02-12 | End: 2024-02-18 | Stop reason: HOSPADM

## 2024-02-12 RX ADMIN — LORAZEPAM 3 MG: 2 INJECTION INTRAMUSCULAR; INTRAVENOUS at 21:17

## 2024-02-12 RX ADMIN — SODIUM CHLORIDE 1000 ML: 9 INJECTION, SOLUTION INTRAVENOUS at 16:35

## 2024-02-12 RX ADMIN — ONDANSETRON 4 MG: 2 INJECTION INTRAMUSCULAR; INTRAVENOUS at 16:36

## 2024-02-12 RX ADMIN — IOPAMIDOL 75 ML: 755 INJECTION, SOLUTION INTRAVENOUS at 17:17

## 2024-02-12 RX ADMIN — SODIUM CHLORIDE: 9 INJECTION, SOLUTION INTRAVENOUS at 20:57

## 2024-02-12 RX ADMIN — MAGNESIUM SULFATE HEPTAHYDRATE 4000 MG: 40 INJECTION, SOLUTION INTRAVENOUS at 19:51

## 2024-02-12 RX ADMIN — LORAZEPAM 3 MG: 2 INJECTION INTRAMUSCULAR; INTRAVENOUS at 22:38

## 2024-02-12 RX ADMIN — Medication 10 ML: at 22:39

## 2024-02-12 RX ADMIN — WATER 1000 MG: 1 INJECTION INTRAMUSCULAR; INTRAVENOUS; SUBCUTANEOUS at 19:40

## 2024-02-12 RX ADMIN — Medication 1 TABLET: at 21:17

## 2024-02-12 RX ADMIN — POTASSIUM CHLORIDE 40 MEQ: 1500 TABLET, EXTENDED RELEASE ORAL at 17:44

## 2024-02-12 RX ADMIN — Medication 100 MG: at 21:17

## 2024-02-12 RX ADMIN — POTASSIUM CHLORIDE 10 MEQ: 7.46 INJECTION, SOLUTION INTRAVENOUS at 17:45

## 2024-02-12 ASSESSMENT — PAIN DESCRIPTION - LOCATION: LOCATION: ABDOMEN

## 2024-02-12 ASSESSMENT — LIFESTYLE VARIABLES
HOW MANY STANDARD DRINKS CONTAINING ALCOHOL DO YOU HAVE ON A TYPICAL DAY: 7 TO 9
HOW OFTEN DO YOU HAVE A DRINK CONTAINING ALCOHOL: 4 OR MORE TIMES A WEEK

## 2024-02-12 ASSESSMENT — PAIN DESCRIPTION - FREQUENCY: FREQUENCY: CONTINUOUS

## 2024-02-12 ASSESSMENT — PAIN SCALES - GENERAL
PAINLEVEL_OUTOF10: 0
PAINLEVEL_OUTOF10: 7

## 2024-02-12 ASSESSMENT — PAIN DESCRIPTION - ORIENTATION: ORIENTATION: RIGHT

## 2024-02-12 ASSESSMENT — PAIN - FUNCTIONAL ASSESSMENT
PAIN_FUNCTIONAL_ASSESSMENT: NONE - DENIES PAIN
PAIN_FUNCTIONAL_ASSESSMENT: 0-10

## 2024-02-12 ASSESSMENT — PAIN DESCRIPTION - PAIN TYPE: TYPE: ACUTE PAIN

## 2024-02-12 ASSESSMENT — PAIN DESCRIPTION - DESCRIPTORS: DESCRIPTORS: SHARP

## 2024-02-12 NOTE — ED NOTES
Department of Emergency Medicine  FIRST PROVIDER TRIAGE NOTE             Independent MLP           2/12/24  2:13 PM EST    Date of Encounter: 2/12/24   MRN: [id number]      HPI: Jaylene Kapoor is a 43 y.o. female who presents to the ED for Abdominal Pain (Right sided pain through abdomen. Swelling to abdomen. +emesis. Dr. Lee sent in. Jaundice to sclera.)       ROS: Negative for cp or sob.    PE: Gen Appearance/Constitutional: alert  HEENT: NC/NT. PERRLA,  Airway patent.     Initial Plan of Care: All treatment areas with department are currently occupied. Plan to order/Initiate the following while awaiting opening in ED.  Initiate Treatment-Testing, Proceed toTreatment Area When Bed Available for ED Attending/MLP to Continue Care    Electronically signed by ROBERTO CARLOS Cheung CNP   DD: 2/12/24      Cristhian Rosales APRN - CNP  02/12/24 0296

## 2024-02-12 NOTE — ED PROVIDER NOTES
BMI 24.41 kg/m²     Jaylene Kapoor is a 43 y.o. female who presents to the ED for chronic heavy alcohol abuse for many years and noticing yellowing of her eyes and sclera for the past 2 weeks.  She is also noted poor appetite intermittent nausea and vomiting  for the past 2 weeks, as well.no blood in her vomitus no diarrhea.  She denies any bloating.  She reports some mild right-sided right upper quadrant abdominal pain.  No previous abdominal surgeries.  No fevers or chills no flank pain no chest pain or shortness of breath.      Physical exam findings: Positive scleral icterus bilaterally.  Mild right mid and upper abdominal tenderness but no Claudio sign no McBurney's point tenderness.  Stable vital signs no fever.      Differential diagnosis (includes but not limited to):  Cirrhosis alcohol abuse liver failure cholecystitis cholangitis biliary mass ascites      Chronic conditions:  has a past medical history of Heart burn and Hypertension.          ED Course Summary:(labs and imaging reviewed, interventions, reassessment, consults,shared decision making with patient, disposition)    POCT pregnancy used to determine pregnancy status of the patient. CBC is ordered to evaluate for any signs of infection or inflammation by obtaining a WBC count, or any signs of acute anemia by interpreting hemoglobin. CMP for any electrolyte imbalances, kidney function, hepatic injury or any elevations in anion gap. Urinalysis to evaluate for a UTI. Lipase to evaluate for pancreatitis. Lactic acid as a marker of hypoperfusion or ischemia. Hepatic function panel to assess for hepatitis, liver failure or other biliary disease. CT abdomen for, but without limitation to, ureterolithiasis, nephrolithiasis, constipation, hollow organ perforation, small bowel obstruction, bowel ischemia, pneumoperitoneum, diverticulitis, cholecystitis, appendicitis, intra-abdominal abscess, or malignancy. RUQ U/S to rule out cholecystitis or other biliary

## 2024-02-13 ENCOUNTER — ANESTHESIA EVENT (OUTPATIENT)
Dept: ENDOSCOPY | Age: 44
DRG: 280 | End: 2024-02-13
Payer: COMMERCIAL

## 2024-02-13 LAB
ALBUMIN SERPL-MCNC: 2.9 G/DL (ref 3.5–5.2)
ALP SERPL-CCNC: 221 U/L (ref 35–104)
ALT SERPL-CCNC: 39 U/L (ref 0–32)
ANION GAP SERPL CALCULATED.3IONS-SCNC: 12 MMOL/L (ref 7–16)
AST SERPL-CCNC: 195 U/L (ref 0–31)
BASOPHILS # BLD: 0.03 K/UL (ref 0–0.2)
BASOPHILS NFR BLD: 0 % (ref 0–2)
BILIRUB SERPL-MCNC: 7.9 MG/DL (ref 0–1.2)
BUN SERPL-MCNC: 11 MG/DL (ref 6–20)
CA-I BLD-SCNC: 0.91 MMOL/L (ref 1.15–1.33)
CALCIUM SERPL-MCNC: 6.5 MG/DL (ref 8.6–10.2)
CHLORIDE SERPL-SCNC: 99 MMOL/L (ref 98–107)
CO2 SERPL-SCNC: 22 MMOL/L (ref 22–29)
CREAT SERPL-MCNC: 0.8 MG/DL (ref 0.5–1)
EOSINOPHIL # BLD: 0.11 K/UL (ref 0.05–0.5)
EOSINOPHILS RELATIVE PERCENT: 2 % (ref 0–6)
ERYTHROCYTE [DISTWIDTH] IN BLOOD BY AUTOMATED COUNT: 21.6 % (ref 11.5–15)
FERRITIN SERPL-MCNC: 1342 NG/ML
GFR SERPL CREATININE-BSD FRML MDRD: >60 ML/MIN/1.73M2
GLUCOSE SERPL-MCNC: 124 MG/DL (ref 74–99)
HBA1C MFR BLD: 4.4 % (ref 4–5.6)
HCT VFR BLD AUTO: 16.7 % (ref 34–48)
HCT VFR BLD AUTO: 18.2 % (ref 34–48)
HCT VFR BLD AUTO: 24.1 % (ref 34–48)
HGB BLD-MCNC: 6.3 G/DL (ref 11.5–15.5)
HGB BLD-MCNC: 6.7 G/DL (ref 11.5–15.5)
HGB BLD-MCNC: 8.6 G/DL (ref 11.5–15.5)
IMM GRANULOCYTES # BLD AUTO: 0.07 K/UL (ref 0–0.58)
IMM GRANULOCYTES NFR BLD: 1 % (ref 0–5)
IRON SATN MFR SERPL: NORMAL % (ref 15–50)
IRON SERPL-MCNC: 138 UG/DL (ref 37–145)
LACTATE BLDV-SCNC: 1.3 MMOL/L (ref 0.5–2.2)
LYMPHOCYTES NFR BLD: 1.44 K/UL (ref 1.5–4)
LYMPHOCYTES RELATIVE PERCENT: 20 % (ref 20–42)
MAGNESIUM SERPL-MCNC: 1.7 MG/DL (ref 1.6–2.6)
MCH RBC QN AUTO: 38.1 PG (ref 26–35)
MCHC RBC AUTO-ENTMCNC: 36.8 G/DL (ref 32–34.5)
MCV RBC AUTO: 103.4 FL (ref 80–99.9)
MONOCYTES NFR BLD: 0.71 K/UL (ref 0.1–0.95)
MONOCYTES NFR BLD: 10 % (ref 2–12)
NEUTROPHILS NFR BLD: 67 % (ref 43–80)
NEUTS SEG NFR BLD: 4.71 K/UL (ref 1.8–7.3)
PHOSPHATE SERPL-MCNC: 1 MG/DL (ref 2.5–4.5)
PLATELET CONFIRMATION: NORMAL
PLATELET, FLUORESCENCE: 63 K/UL (ref 130–450)
PMV BLD AUTO: 11.3 FL (ref 7–12)
POTASSIUM SERPL-SCNC: 3.2 MMOL/L (ref 3.5–5)
PROT SERPL-MCNC: 6.3 G/DL (ref 6.4–8.3)
RBC # BLD AUTO: 1.76 M/UL (ref 3.5–5.5)
RBC # BLD: ABNORMAL 10*6/UL
SODIUM SERPL-SCNC: 133 MMOL/L (ref 132–146)
TIBC SERPL-MCNC: NORMAL UG/DL (ref 250–450)
TSH SERPL DL<=0.05 MIU/L-ACNC: 3.6 UIU/ML (ref 0.27–4.2)
VIT B12 SERPL-MCNC: 869 PG/ML (ref 211–946)
WBC OTHER # BLD: 7.1 K/UL (ref 4.5–11.5)

## 2024-02-13 PROCEDURE — 82103 ALPHA-1-ANTITRYPSIN TOTAL: CPT

## 2024-02-13 PROCEDURE — 80053 COMPREHEN METABOLIC PANEL: CPT

## 2024-02-13 PROCEDURE — 99233 SBSQ HOSP IP/OBS HIGH 50: CPT | Performed by: INTERNAL MEDICINE

## 2024-02-13 PROCEDURE — 36415 COLL VENOUS BLD VENIPUNCTURE: CPT

## 2024-02-13 PROCEDURE — 82728 ASSAY OF FERRITIN: CPT

## 2024-02-13 PROCEDURE — 84520 ASSAY OF UREA NITROGEN: CPT

## 2024-02-13 PROCEDURE — 82607 VITAMIN B-12: CPT

## 2024-02-13 PROCEDURE — 84460 ALANINE AMINO (ALT) (SGPT): CPT

## 2024-02-13 PROCEDURE — 86255 FLUORESCENT ANTIBODY SCREEN: CPT

## 2024-02-13 PROCEDURE — 86901 BLOOD TYPING SEROLOGIC RH(D): CPT

## 2024-02-13 PROCEDURE — 80074 ACUTE HEPATITIS PANEL: CPT

## 2024-02-13 PROCEDURE — 2580000003 HC RX 258: Performed by: INTERNAL MEDICINE

## 2024-02-13 PROCEDURE — 36430 TRANSFUSION BLD/BLD COMPNT: CPT

## 2024-02-13 PROCEDURE — 83605 ASSAY OF LACTIC ACID: CPT

## 2024-02-13 PROCEDURE — 86038 ANTINUCLEAR ANTIBODIES: CPT

## 2024-02-13 PROCEDURE — 86850 RBC ANTIBODY SCREEN: CPT

## 2024-02-13 PROCEDURE — 83550 IRON BINDING TEST: CPT

## 2024-02-13 PROCEDURE — 83883 ASSAY NEPHELOMETRY NOT SPEC: CPT

## 2024-02-13 PROCEDURE — C9113 INJ PANTOPRAZOLE SODIUM, VIA: HCPCS | Performed by: INTERNAL MEDICINE

## 2024-02-13 PROCEDURE — 86900 BLOOD TYPING SEROLOGIC ABO: CPT

## 2024-02-13 PROCEDURE — 83735 ASSAY OF MAGNESIUM: CPT

## 2024-02-13 PROCEDURE — P9016 RBC LEUKOCYTES REDUCED: HCPCS

## 2024-02-13 PROCEDURE — HZ2ZZZZ DETOXIFICATION SERVICES FOR SUBSTANCE ABUSE TREATMENT: ICD-10-PCS | Performed by: INTERNAL MEDICINE

## 2024-02-13 PROCEDURE — 84100 ASSAY OF PHOSPHORUS: CPT

## 2024-02-13 PROCEDURE — 85018 HEMOGLOBIN: CPT

## 2024-02-13 PROCEDURE — 6370000000 HC RX 637 (ALT 250 FOR IP): Performed by: NURSE PRACTITIONER

## 2024-02-13 PROCEDURE — 85014 HEMATOCRIT: CPT

## 2024-02-13 PROCEDURE — 86923 COMPATIBILITY TEST ELECTRIC: CPT

## 2024-02-13 PROCEDURE — 82977 ASSAY OF GGT: CPT

## 2024-02-13 PROCEDURE — 82330 ASSAY OF CALCIUM: CPT

## 2024-02-13 PROCEDURE — 6360000002 HC RX W HCPCS: Performed by: NURSE PRACTITIONER

## 2024-02-13 PROCEDURE — 82787 IGG 1 2 3 OR 4 EACH: CPT

## 2024-02-13 PROCEDURE — 85025 COMPLETE CBC W/AUTO DIFF WBC: CPT

## 2024-02-13 PROCEDURE — 82784 ASSAY IGA/IGD/IGG/IGM EACH: CPT

## 2024-02-13 PROCEDURE — 2500000003 HC RX 250 WO HCPCS: Performed by: INTERNAL MEDICINE

## 2024-02-13 PROCEDURE — 86039 ANTINUCLEAR ANTIBODIES (ANA): CPT

## 2024-02-13 PROCEDURE — 84443 ASSAY THYROID STIM HORMONE: CPT

## 2024-02-13 PROCEDURE — 2060000000 HC ICU INTERMEDIATE R&B

## 2024-02-13 PROCEDURE — 2580000003 HC RX 258: Performed by: NURSE PRACTITIONER

## 2024-02-13 PROCEDURE — 6360000002 HC RX W HCPCS: Performed by: INTERNAL MEDICINE

## 2024-02-13 PROCEDURE — 83036 HEMOGLOBIN GLYCOSYLATED A1C: CPT

## 2024-02-13 PROCEDURE — 83540 ASSAY OF IRON: CPT

## 2024-02-13 PROCEDURE — 84450 TRANSFERASE (AST) (SGOT): CPT

## 2024-02-13 RX ORDER — SODIUM CHLORIDE 9 MG/ML
INJECTION, SOLUTION INTRAVENOUS PRN
Status: DISCONTINUED | OUTPATIENT
Start: 2024-02-13 | End: 2024-02-18 | Stop reason: HOSPADM

## 2024-02-13 RX ORDER — PANTOPRAZOLE SODIUM 40 MG/10ML
40 INJECTION, POWDER, LYOPHILIZED, FOR SOLUTION INTRAVENOUS
Status: DISCONTINUED | OUTPATIENT
Start: 2024-02-13 | End: 2024-02-18 | Stop reason: HOSPADM

## 2024-02-13 RX ADMIN — LORAZEPAM 1 MG: 2 INJECTION INTRAMUSCULAR; INTRAVENOUS at 17:06

## 2024-02-13 RX ADMIN — LORAZEPAM 1 MG: 2 INJECTION INTRAMUSCULAR; INTRAVENOUS at 09:32

## 2024-02-13 RX ADMIN — SODIUM CHLORIDE: 9 INJECTION, SOLUTION INTRAVENOUS at 22:33

## 2024-02-13 RX ADMIN — LORAZEPAM 1 MG: 2 INJECTION INTRAMUSCULAR; INTRAVENOUS at 20:12

## 2024-02-13 RX ADMIN — ONDANSETRON 4 MG: 2 INJECTION INTRAMUSCULAR; INTRAVENOUS at 09:31

## 2024-02-13 RX ADMIN — LORAZEPAM 3 MG: 2 INJECTION INTRAMUSCULAR; INTRAVENOUS at 01:59

## 2024-02-13 RX ADMIN — LORAZEPAM 2 MG: 2 INJECTION INTRAMUSCULAR; INTRAVENOUS at 22:29

## 2024-02-13 RX ADMIN — Medication 1 TABLET: at 09:31

## 2024-02-13 RX ADMIN — LORAZEPAM 2 MG: 2 INJECTION INTRAMUSCULAR; INTRAVENOUS at 07:01

## 2024-02-13 RX ADMIN — POTASSIUM PHOSPHATE, MONOBASIC POTASSIUM PHOSPHATE, DIBASIC 30 MMOL: 224; 236 INJECTION, SOLUTION, CONCENTRATE INTRAVENOUS at 19:56

## 2024-02-13 RX ADMIN — Medication 10 ML: at 20:12

## 2024-02-13 RX ADMIN — Medication 100 MG: at 09:31

## 2024-02-13 RX ADMIN — PANTOPRAZOLE SODIUM 40 MG: 40 INJECTION, POWDER, FOR SOLUTION INTRAVENOUS at 16:55

## 2024-02-13 RX ADMIN — WATER 1000 MG: 1 INJECTION INTRAMUSCULAR; INTRAVENOUS; SUBCUTANEOUS at 20:12

## 2024-02-13 NOTE — CARE COORDINATION
Transition of Care-Patient was off the unit-spoke to her significant other Pierre for initial assessment. Patient is independent, lives in a one story home with her significant other Pierre. PCP is Dr. Logan Lee III, preferred pharmacy is [facility] . [facility] to see patient tomorrow. GI consulted, await plan. ELOS 48-72 hrs.    Bhavya TRIMBLE, RN  [facility]

## 2024-02-13 NOTE — ACP (ADVANCE CARE PLANNING)
Advance Care Planning   Healthcare Decision Maker:    Primary Decision Maker: Katty Joshua - [place] - [phone]    Secondary Decision Maker: Pierre Lo - Laura - [phone]    Click here to complete Healthcare Decision Makers including selection of the Healthcare Decision Maker Relationship (ie \"Primary\").

## 2024-02-13 NOTE — H&P
infiltration.  Recommend correlation with   liver function tests.      Portal hypertension manifesting as collateral vessels and trace amount of   ascites.         US ABDOMEN LIMITED   Final Result   Fatty infiltration of the liver.      Cholecystectomy.  Normal common bile duct.             EKG: NA    ASSESSMENT:      Principal Problem:    Cirrhosis of liver (HCC)  Active Problems:    Acute cystitis    Hypomagnesemia    Hypokalemia    Alcohol abuse with withdrawal (HCC)  Resolved Problems:    * No resolved hospital problems. *      PLAN:    1.  Cirrhosis of liver with ascites-new.  Likely alcoholic.  Patient drinks 2 pints of liquor daily.ammonia 43, albumin 3.4, alkaline phos 269, ALT 45, , total bili 9, PT 16.8.  CT scan showing remodeling of the liver.  Check hepatitis panel.  Consult GI for input.  2.  Acute cystitis-Rocephin given.  Urine culture pending.  Continue Rocephin for now.  3.  Hypokalemia-potassium 3.0.  Given 50 mEq in ED.  Monitor and replace as needed.  4.  Hypomagnesemia-magnesium 0.8.  Given 4 g IV in ED.  Monitor and replace as needed.  5.  Alcohol abuse with withdrawal-alcohol level 69.  Drinks 2 pints of liquor daily.  CIWA scale with Ativan ordered.  6.  HTN-monitor BP.  Continue BP medications.  7.  Lactic acidosis-LA 2.8.  Likely due to dehydration due to N/V and alcohol abuse. Given 1 L saline bolus in ED. Continue saline at 75/hour for gentle hydration due to liver status.     Code Status: Full code  DVT prophylaxis: Bleeding risk- SCDs    48 minutes or more spent reviewing patient chart, assessing patient, discussing plan of care with patient and family, discussing plan of care with collaborating physician, and documentation.         NOTE: This report was transcribed using voice recognition software. Every effort was made to ensure accuracy; however, inadvertent computerized transcription errors may be present.  Electronically signed by ROBERTO CARLOS Roberts CNP on 2/12/2024

## 2024-02-13 NOTE — CONSULTS
discuss rehab options   Continue to monitor    Note: This report was completed utilizing computer voice recognition software. Every effort has been made to ensure accuracy, however; inadvertent computerized transcription errors may be present.     Thank you very much for your consultation. We will follow closely with you.    Discussed with Dr. Simms  Treatment plan developed by Dr. Mendez Damian, APRN-NP-C 2/13/2024 1:52 PM for Dr. Simms

## 2024-02-13 NOTE — ED NOTES
..ED to Inpatient Handoff Report    Notified Subha that electronic handoff available and patient ready for transport to room 615.    Safety Risks: None identified    Patient in Restraints: no    Constant Observer or Patient : no    Telemetry Monitoring Ordered: Yes          Order to transfer to unit without monitor: NO    Last MEWS: 2 Time completed: 1941    Deterioration Index: 21.33    Vitals:    02/12/24 1812 02/12/24 1936 02/12/24 1937 02/12/24 1941   BP: 129/81 118/72 118/72 118/72   Pulse: 89 (!) 106 (!) 106 (!) 102   Resp: 18 16  14   Temp:  98.8 °F (37.1 °C)     TempSrc:  Oral     SpO2: 98% 97%  97%   Weight:       Height:           Opportunity for questions and clarification was provided.

## 2024-02-14 ENCOUNTER — ANESTHESIA (OUTPATIENT)
Dept: ENDOSCOPY | Age: 44
DRG: 280 | End: 2024-02-14
Payer: COMMERCIAL

## 2024-02-14 LAB
ABO/RH: NORMAL
ALBUMIN SERPL-MCNC: 2.8 G/DL (ref 3.5–5.2)
ALP SERPL-CCNC: 216 U/L (ref 35–104)
ALT SERPL-CCNC: 35 U/L (ref 0–32)
AMMONIA PLAS-SCNC: 64 UMOL/L (ref 11–51)
ANION GAP SERPL CALCULATED.3IONS-SCNC: 14 MMOL/L (ref 7–16)
ANTIBODY SCREEN: NEGATIVE
ARM BAND NUMBER: NORMAL
AST SERPL-CCNC: 167 U/L (ref 0–31)
BASOPHILS # BLD: 0.04 K/UL (ref 0–0.2)
BASOPHILS NFR BLD: 1 % (ref 0–2)
BILIRUB DIRECT SERPL-MCNC: 5.8 MG/DL (ref 0–0.3)
BILIRUB INDIRECT SERPL-MCNC: 1.8 MG/DL (ref 0–1)
BILIRUB SERPL-MCNC: 7.6 MG/DL (ref 0–1.2)
BILIRUB SERPL-MCNC: 7.6 MG/DL (ref 0–1.2)
BLOOD BANK BLOOD PRODUCT EXPIRATION DATE: NORMAL
BLOOD BANK DISPENSE STATUS: NORMAL
BLOOD BANK ISBT PRODUCT BLOOD TYPE: 5100
BLOOD BANK PRODUCT CODE: NORMAL
BLOOD BANK SAMPLE EXPIRATION: NORMAL
BLOOD BANK UNIT TYPE AND RH: NORMAL
BPU ID: NORMAL
BUN SERPL-MCNC: 7 MG/DL (ref 6–20)
CA-I BLD-SCNC: 0.91 MMOL/L (ref 1.15–1.33)
CALCIUM SERPL-MCNC: 6.3 MG/DL (ref 8.6–10.2)
CHLORIDE SERPL-SCNC: 105 MMOL/L (ref 98–107)
CHOLEST SERPL-MCNC: 131 MG/DL
CO2 SERPL-SCNC: 19 MMOL/L (ref 22–29)
COMPONENT: NORMAL
CREAT SERPL-MCNC: 0.6 MG/DL (ref 0.5–1)
CROSSMATCH RESULT: NORMAL
EOSINOPHIL # BLD: 0.14 K/UL (ref 0.05–0.5)
EOSINOPHILS RELATIVE PERCENT: 2 % (ref 0–6)
ERYTHROCYTE [DISTWIDTH] IN BLOOD BY AUTOMATED COUNT: 22.9 % (ref 11.5–15)
GFR SERPL CREATININE-BSD FRML MDRD: >60 ML/MIN/1.73M2
GLUCOSE SERPL-MCNC: 86 MG/DL (ref 74–99)
HAV IGM SERPL QL IA: NONREACTIVE
HBV CORE IGM SERPL QL IA: NONREACTIVE
HBV SURFACE AG SERPL QL IA: NONREACTIVE
HCT VFR BLD AUTO: 22.5 % (ref 34–48)
HCT VFR BLD AUTO: 23.5 % (ref 34–48)
HCT VFR BLD AUTO: 23.9 % (ref 34–48)
HCV AB SERPL QL IA: NONREACTIVE
HDLC SERPL-MCNC: 14 MG/DL
HGB BLD-MCNC: 8 G/DL (ref 11.5–15.5)
HGB BLD-MCNC: 8.4 G/DL (ref 11.5–15.5)
HGB BLD-MCNC: 8.6 G/DL (ref 11.5–15.5)
IGG SERPL-MCNC: 1100 MG/DL (ref 700–1600)
IGM SERPL-MCNC: 203 MG/DL (ref 40–230)
IMM GRANULOCYTES # BLD AUTO: 0.06 K/UL (ref 0–0.58)
IMM GRANULOCYTES NFR BLD: 1 % (ref 0–5)
INR PPP: 1.9
LDLC SERPL CALC-MCNC: 88 MG/DL
LIPASE SERPL-CCNC: 77 U/L (ref 13–60)
LYMPHOCYTES NFR BLD: 1.62 K/UL (ref 1.5–4)
LYMPHOCYTES RELATIVE PERCENT: 21 % (ref 20–42)
MAGNESIUM SERPL-MCNC: 0.8 MG/DL (ref 1.6–2.6)
MCH RBC QN AUTO: 34.7 PG (ref 26–35)
MCHC RBC AUTO-ENTMCNC: 35.1 G/DL (ref 32–34.5)
MCV RBC AUTO: 98.8 FL (ref 80–99.9)
MONOCYTES NFR BLD: 0.69 K/UL (ref 0.1–0.95)
MONOCYTES NFR BLD: 9 % (ref 2–12)
NEUTROPHILS NFR BLD: 67 % (ref 43–80)
NEUTS SEG NFR BLD: 5.09 K/UL (ref 1.8–7.3)
PHOSPHATE SERPL-MCNC: 2.9 MG/DL (ref 2.5–4.5)
PLATELET # BLD AUTO: 63 K/UL (ref 130–450)
PLATELET CONFIRMATION: NORMAL
PMV BLD AUTO: 10.9 FL (ref 7–12)
POTASSIUM SERPL-SCNC: 3.7 MMOL/L (ref 3.5–5)
PROT SERPL-MCNC: 6 G/DL (ref 6.4–8.3)
PROTHROMBIN TIME: 20.4 SEC (ref 9.3–12.4)
RBC # BLD AUTO: 2.42 M/UL (ref 3.5–5.5)
RBC # BLD: ABNORMAL 10*6/UL
SMA IGG SER-ACNC: NEGATIVE
SODIUM SERPL-SCNC: 138 MMOL/L (ref 132–146)
TRANSFUSION STATUS: NORMAL
TRIGL SERPL-MCNC: 144 MG/DL
UNIT DIVISION: 0
UNIT ISSUE DATE/TIME: NORMAL
VLDLC SERPL CALC-MCNC: 29 MG/DL
WBC OTHER # BLD: 7.6 K/UL (ref 4.5–11.5)

## 2024-02-14 PROCEDURE — 85018 HEMOGLOBIN: CPT

## 2024-02-14 PROCEDURE — 82330 ASSAY OF CALCIUM: CPT

## 2024-02-14 PROCEDURE — 83735 ASSAY OF MAGNESIUM: CPT

## 2024-02-14 PROCEDURE — 82248 BILIRUBIN DIRECT: CPT

## 2024-02-14 PROCEDURE — 2500000003 HC RX 250 WO HCPCS: Performed by: INTERNAL MEDICINE

## 2024-02-14 PROCEDURE — 2060000000 HC ICU INTERMEDIATE R&B

## 2024-02-14 PROCEDURE — 2580000003 HC RX 258: Performed by: NURSE PRACTITIONER

## 2024-02-14 PROCEDURE — 85025 COMPLETE CBC W/AUTO DIFF WBC: CPT

## 2024-02-14 PROCEDURE — 6370000000 HC RX 637 (ALT 250 FOR IP): Performed by: NURSE PRACTITIONER

## 2024-02-14 PROCEDURE — 83690 ASSAY OF LIPASE: CPT

## 2024-02-14 PROCEDURE — 80053 COMPREHEN METABOLIC PANEL: CPT

## 2024-02-14 PROCEDURE — 80061 LIPID PANEL: CPT

## 2024-02-14 PROCEDURE — C9113 INJ PANTOPRAZOLE SODIUM, VIA: HCPCS | Performed by: INTERNAL MEDICINE

## 2024-02-14 PROCEDURE — 36415 COLL VENOUS BLD VENIPUNCTURE: CPT

## 2024-02-14 PROCEDURE — 85610 PROTHROMBIN TIME: CPT

## 2024-02-14 PROCEDURE — 82140 ASSAY OF AMMONIA: CPT

## 2024-02-14 PROCEDURE — 6360000002 HC RX W HCPCS: Performed by: NURSE PRACTITIONER

## 2024-02-14 PROCEDURE — 6360000002 HC RX W HCPCS: Performed by: INTERNAL MEDICINE

## 2024-02-14 PROCEDURE — 85014 HEMATOCRIT: CPT

## 2024-02-14 PROCEDURE — 99232 SBSQ HOSP IP/OBS MODERATE 35: CPT | Performed by: INTERNAL MEDICINE

## 2024-02-14 PROCEDURE — 84100 ASSAY OF PHOSPHORUS: CPT

## 2024-02-14 RX ORDER — CALCIUM GLUCONATE 20 MG/ML
1000 INJECTION, SOLUTION INTRAVENOUS ONCE
Status: COMPLETED | OUTPATIENT
Start: 2024-02-14 | End: 2024-02-14

## 2024-02-14 RX ADMIN — LORAZEPAM 1 MG: 2 INJECTION INTRAMUSCULAR; INTRAVENOUS at 19:37

## 2024-02-14 RX ADMIN — MAGNESIUM SULFATE HEPTAHYDRATE 2000 MG: 40 INJECTION, SOLUTION INTRAVENOUS at 06:20

## 2024-02-14 RX ADMIN — LORAZEPAM 1 MG: 2 INJECTION INTRAMUSCULAR; INTRAVENOUS at 13:39

## 2024-02-14 RX ADMIN — WATER 1000 MG: 1 INJECTION INTRAMUSCULAR; INTRAVENOUS; SUBCUTANEOUS at 19:36

## 2024-02-14 RX ADMIN — LORAZEPAM 1 MG: 2 INJECTION INTRAMUSCULAR; INTRAVENOUS at 03:07

## 2024-02-14 RX ADMIN — Medication 100 MG: at 09:51

## 2024-02-14 RX ADMIN — PANTOPRAZOLE SODIUM 40 MG: 40 INJECTION, POWDER, FOR SOLUTION INTRAVENOUS at 05:31

## 2024-02-14 RX ADMIN — MAGNESIUM SULFATE HEPTAHYDRATE 2000 MG: 40 INJECTION, SOLUTION INTRAVENOUS at 08:00

## 2024-02-14 RX ADMIN — PANTOPRAZOLE SODIUM 40 MG: 40 INJECTION, POWDER, FOR SOLUTION INTRAVENOUS at 15:21

## 2024-02-14 RX ADMIN — LORAZEPAM 2 MG: 2 INJECTION INTRAMUSCULAR; INTRAVENOUS at 21:42

## 2024-02-14 RX ADMIN — Medication 1 TABLET: at 09:51

## 2024-02-14 RX ADMIN — LORAZEPAM 1 MG: 2 INJECTION INTRAMUSCULAR; INTRAVENOUS at 09:51

## 2024-02-14 RX ADMIN — LORAZEPAM 1 MG: 2 INJECTION INTRAMUSCULAR; INTRAVENOUS at 08:47

## 2024-02-14 RX ADMIN — Medication 10 ML: at 19:37

## 2024-02-14 RX ADMIN — Medication 10 ML: at 08:49

## 2024-02-14 RX ADMIN — CALCIUM GLUCONATE 1000 MG: 20 INJECTION, SOLUTION INTRAVENOUS at 10:00

## 2024-02-14 ASSESSMENT — PAIN SCALES - GENERAL: PAINLEVEL_OUTOF10: 0

## 2024-02-14 NOTE — ANESTHESIA PRE PROCEDURE
smoker          Patient did not smoke on day of surgery.                 Cardiovascular:    (+) hypertension:        Rhythm: regular                      Neuro/Psych:   (+) psychiatric history:             ROS comment: Alcohol abuse with withdrawal GI/Hepatic/Renal:   (+) GERD:, liver disease (cirrhosis):         ROS comment: Acute alcoholic hepatitis with cirrhosis.      Elevated LFTs and bilirubin    ascites .   Endo/Other:    (+) blood dyscrasia: anemia and thrombocytopenia:., electrolyte abnormalities.                 Abdominal:             Vascular: negative vascular ROS.         Other Findings:             Anesthesia Plan      MAC     ASA 4       Induction: intravenous.    MIPS: Prophylactic antiemetics administered.  Anesthetic plan and risks discussed with patient.      Plan discussed with CRNA.                  CARRIE DAVIDSON, DO   2/16/2024

## 2024-02-14 NOTE — CARE COORDINATION
Transition of Care-Plan for EGD 2/16. GI following. [facility] met with patient, provided information. Anticipate discharge in the next 48-72hr. Plan is for home, family to transport.    Bhavya TRIMBLE, RN  [facility]

## 2024-02-15 LAB
A1AT SERPL-MCNC: 200 MG/DL (ref 90–200)
ALBUMIN SERPL-MCNC: 2.6 G/DL (ref 3.5–5.2)
ALP SERPL-CCNC: 175 U/L (ref 35–104)
ALT SERPL-CCNC: 33 U/L (ref 0–32)
AMMONIA PLAS-SCNC: 76 UMOL/L (ref 11–51)
ANA PAT SER IF-IMP: ABNORMAL
ANA SER QL IA: POSITIVE
ANA TITER: ABNORMAL
ANION GAP SERPL CALCULATED.3IONS-SCNC: 8 MMOL/L (ref 7–16)
AST SERPL-CCNC: 134 U/L (ref 0–31)
ATYPICAL LYMPHOCYTE ABSOLUTE COUNT: 0.26 K/UL (ref 0–0.46)
ATYPICAL LYMPHOCYTES: 4 % (ref 0–4)
BASOPHILS # BLD: 0 K/UL (ref 0–0.2)
BASOPHILS NFR BLD: 0 % (ref 0–2)
BILIRUB SERPL-MCNC: 7.4 MG/DL (ref 0–1.2)
BUN SERPL-MCNC: 7 MG/DL (ref 6–20)
CALCIUM SERPL-MCNC: 6.6 MG/DL (ref 8.6–10.2)
CHLORIDE SERPL-SCNC: 107 MMOL/L (ref 98–107)
CO2 SERPL-SCNC: 21 MMOL/L (ref 22–29)
CREAT SERPL-MCNC: 0.6 MG/DL (ref 0.5–1)
EOSINOPHIL # BLD: 0.2 K/UL (ref 0.05–0.5)
EOSINOPHILS RELATIVE PERCENT: 3 % (ref 0–6)
ERYTHROCYTE [DISTWIDTH] IN BLOOD BY AUTOMATED COUNT: 23.8 % (ref 11.5–15)
GFR SERPL CREATININE-BSD FRML MDRD: >60 ML/MIN/1.73M2
GLUCOSE SERPL-MCNC: 96 MG/DL (ref 74–99)
HCT VFR BLD AUTO: 22.1 % (ref 34–48)
HCT VFR BLD AUTO: 22.1 % (ref 34–48)
HCT VFR BLD AUTO: 24.1 % (ref 34–48)
HCT VFR BLD AUTO: 24.6 % (ref 34–48)
HGB BLD-MCNC: 7.9 G/DL (ref 11.5–15.5)
HGB BLD-MCNC: 8 G/DL (ref 11.5–15.5)
HGB BLD-MCNC: 8.5 G/DL (ref 11.5–15.5)
HGB BLD-MCNC: 8.6 G/DL (ref 11.5–15.5)
INR PPP: 2.1
LYMPHOCYTES NFR BLD: 0.93 K/UL (ref 1.5–4)
LYMPHOCYTES RELATIVE PERCENT: 12 % (ref 20–42)
MAGNESIUM SERPL-MCNC: 0.9 MG/DL (ref 1.6–2.6)
MCH RBC QN AUTO: 35.4 PG (ref 26–35)
MCHC RBC AUTO-ENTMCNC: 35.7 G/DL (ref 32–34.5)
MCV RBC AUTO: 99.1 FL (ref 80–99.9)
MICROORGANISM SPEC CULT: ABNORMAL
MITOCHONDRIA AB SER QL: NEGATIVE
MONOCYTES NFR BLD: 0.26 K/UL (ref 0.1–0.95)
MONOCYTES NFR BLD: 4 % (ref 2–12)
NEUTROPHILS NFR BLD: 77 % (ref 43–80)
NEUTS SEG NFR BLD: 5.82 K/UL (ref 1.8–7.3)
PHOSPHATE SERPL-MCNC: 1.6 MG/DL (ref 2.5–4.5)
PLATELET CONFIRMATION: NORMAL
PLATELET, FLUORESCENCE: 72 K/UL (ref 130–450)
PMV BLD AUTO: 11.3 FL (ref 7–12)
POTASSIUM SERPL-SCNC: 3.6 MMOL/L (ref 3.5–5)
PROMYELOCYTES ABSOLUTE COUNT: 0.13 K/UL
PROMYELOCYTES: 2 %
PROT SERPL-MCNC: 5.6 G/DL (ref 6.4–8.3)
PROTHROMBIN TIME: 22.8 SEC (ref 9.3–12.4)
RBC # BLD AUTO: 2.23 M/UL (ref 3.5–5.5)
RBC # BLD: ABNORMAL 10*6/UL
SODIUM SERPL-SCNC: 136 MMOL/L (ref 132–146)
SPECIMEN DESCRIPTION: ABNORMAL
WBC OTHER # BLD: 7.6 K/UL (ref 4.5–11.5)

## 2024-02-15 PROCEDURE — 2060000000 HC ICU INTERMEDIATE R&B

## 2024-02-15 PROCEDURE — 82140 ASSAY OF AMMONIA: CPT

## 2024-02-15 PROCEDURE — 2580000003 HC RX 258: Performed by: INTERNAL MEDICINE

## 2024-02-15 PROCEDURE — 85613 RUSSELL VIPER VENOM DILUTED: CPT

## 2024-02-15 PROCEDURE — 6370000000 HC RX 637 (ALT 250 FOR IP): Performed by: NURSE PRACTITIONER

## 2024-02-15 PROCEDURE — 6360000002 HC RX W HCPCS: Performed by: NURSE PRACTITIONER

## 2024-02-15 PROCEDURE — 85610 PROTHROMBIN TIME: CPT

## 2024-02-15 PROCEDURE — 83735 ASSAY OF MAGNESIUM: CPT

## 2024-02-15 PROCEDURE — 6360000002 HC RX W HCPCS: Performed by: INTERNAL MEDICINE

## 2024-02-15 PROCEDURE — 36415 COLL VENOUS BLD VENIPUNCTURE: CPT

## 2024-02-15 PROCEDURE — 85025 COMPLETE CBC W/AUTO DIFF WBC: CPT

## 2024-02-15 PROCEDURE — C9113 INJ PANTOPRAZOLE SODIUM, VIA: HCPCS | Performed by: INTERNAL MEDICINE

## 2024-02-15 PROCEDURE — 85018 HEMOGLOBIN: CPT

## 2024-02-15 PROCEDURE — 85014 HEMATOCRIT: CPT

## 2024-02-15 PROCEDURE — 80053 COMPREHEN METABOLIC PANEL: CPT

## 2024-02-15 PROCEDURE — 2580000003 HC RX 258: Performed by: NURSE PRACTITIONER

## 2024-02-15 PROCEDURE — 2500000003 HC RX 250 WO HCPCS: Performed by: INTERNAL MEDICINE

## 2024-02-15 PROCEDURE — 99232 SBSQ HOSP IP/OBS MODERATE 35: CPT | Performed by: INTERNAL MEDICINE

## 2024-02-15 PROCEDURE — 84100 ASSAY OF PHOSPHORUS: CPT

## 2024-02-15 RX ORDER — MAGNESIUM SULFATE IN WATER 40 MG/ML
4000 INJECTION, SOLUTION INTRAVENOUS ONCE
Status: COMPLETED | OUTPATIENT
Start: 2024-02-15 | End: 2024-02-15

## 2024-02-15 RX ORDER — LANOLIN ALCOHOL/MO/W.PET/CERES
400 CREAM (GRAM) TOPICAL DAILY
Status: DISCONTINUED | OUTPATIENT
Start: 2024-02-16 | End: 2024-02-18 | Stop reason: HOSPADM

## 2024-02-15 RX ORDER — LACTULOSE 10 G/15ML
20 SOLUTION ORAL 3 TIMES DAILY
Status: DISCONTINUED | OUTPATIENT
Start: 2024-02-15 | End: 2024-02-18 | Stop reason: HOSPADM

## 2024-02-15 RX ADMIN — PANTOPRAZOLE SODIUM 40 MG: 40 INJECTION, POWDER, FOR SOLUTION INTRAVENOUS at 06:39

## 2024-02-15 RX ADMIN — LORAZEPAM 1 MG: 2 INJECTION INTRAMUSCULAR; INTRAVENOUS at 18:38

## 2024-02-15 RX ADMIN — Medication 100 MG: at 09:19

## 2024-02-15 RX ADMIN — Medication 10 ML: at 20:15

## 2024-02-15 RX ADMIN — WATER 1000 MG: 1 INJECTION INTRAMUSCULAR; INTRAVENOUS; SUBCUTANEOUS at 18:38

## 2024-02-15 RX ADMIN — LORAZEPAM 1 MG: 2 INJECTION INTRAMUSCULAR; INTRAVENOUS at 09:19

## 2024-02-15 RX ADMIN — Medication 1 TABLET: at 09:19

## 2024-02-15 RX ADMIN — POTASSIUM PHOSPHATE, MONOBASIC AND POTASSIUM PHOSPHATE, DIBASIC 30 MMOL: 224; 236 INJECTION, SOLUTION, CONCENTRATE INTRAVENOUS at 12:30

## 2024-02-15 RX ADMIN — LORAZEPAM 1 MG: 1 TABLET ORAL at 23:59

## 2024-02-15 RX ADMIN — LORAZEPAM 1 MG: 2 INJECTION INTRAMUSCULAR; INTRAVENOUS at 14:26

## 2024-02-15 RX ADMIN — LORAZEPAM 1 MG: 2 INJECTION INTRAMUSCULAR; INTRAVENOUS at 12:00

## 2024-02-15 RX ADMIN — ACETAMINOPHEN 650 MG: 325 TABLET ORAL at 09:19

## 2024-02-15 RX ADMIN — LORAZEPAM 2 MG: 2 INJECTION INTRAMUSCULAR; INTRAVENOUS at 05:01

## 2024-02-15 RX ADMIN — LACTULOSE 20 G: 10 SOLUTION ORAL at 14:30

## 2024-02-15 RX ADMIN — PANTOPRAZOLE SODIUM 40 MG: 40 INJECTION, POWDER, FOR SOLUTION INTRAVENOUS at 15:27

## 2024-02-15 RX ADMIN — LACTULOSE 20 G: 10 SOLUTION ORAL at 11:53

## 2024-02-15 RX ADMIN — Medication 10 ML: at 12:19

## 2024-02-15 RX ADMIN — MAGNESIUM SULFATE IN WATER 4000 MG: 40 INJECTION, SOLUTION INTRAVENOUS at 10:08

## 2024-02-15 RX ADMIN — LORAZEPAM 1 MG: 2 INJECTION INTRAMUSCULAR; INTRAVENOUS at 06:46

## 2024-02-15 RX ADMIN — MAGNESIUM SULFATE HEPTAHYDRATE 2000 MG: 40 INJECTION, SOLUTION INTRAVENOUS at 09:55

## 2024-02-15 RX ADMIN — SODIUM CHLORIDE, PRESERVATIVE FREE 10 ML: 5 INJECTION INTRAVENOUS at 06:39

## 2024-02-15 RX ADMIN — LORAZEPAM 1 MG: 2 INJECTION INTRAMUSCULAR; INTRAVENOUS at 20:15

## 2024-02-15 RX ADMIN — LORAZEPAM 1 MG: 2 INJECTION INTRAMUSCULAR; INTRAVENOUS at 01:00

## 2024-02-15 RX ADMIN — LORAZEPAM 2 MG: 2 INJECTION INTRAMUSCULAR; INTRAVENOUS at 17:28

## 2024-02-15 ASSESSMENT — PAIN SCALES - GENERAL: PAINLEVEL_OUTOF10: 7

## 2024-02-15 ASSESSMENT — PAIN DESCRIPTION - LOCATION: LOCATION: HEAD

## 2024-02-15 NOTE — CARE COORDINATION
Transition of Care-EGD tomorrow-anticipate discharge afterwards. No anticipated needs-she was seen by [facility], resource information provided. No plans for inpatient rehab at this time.    Bhavya TRIMBLE, RN  [facility]

## 2024-02-16 LAB
ALBUMIN SERPL-MCNC: 2.8 G/DL (ref 3.5–5.2)
ALP SERPL-CCNC: 186 U/L (ref 35–104)
ALT SERPL-CCNC: 36 U/L (ref 0–32)
AMMONIA PLAS-SCNC: 45 UMOL/L (ref 11–51)
ANION GAP SERPL CALCULATED.3IONS-SCNC: 11 MMOL/L (ref 7–16)
AST SERPL-CCNC: 135 U/L (ref 0–31)
BASOPHILS # BLD: 0.04 K/UL (ref 0–0.2)
BASOPHILS NFR BLD: 1 % (ref 0–2)
BILIRUB SERPL-MCNC: 8.6 MG/DL (ref 0–1.2)
BUN SERPL-MCNC: 8 MG/DL (ref 6–20)
CALCIUM SERPL-MCNC: 6.8 MG/DL (ref 8.6–10.2)
CHLORIDE SERPL-SCNC: 106 MMOL/L (ref 98–107)
CO2 SERPL-SCNC: 20 MMOL/L (ref 22–29)
CREAT SERPL-MCNC: 0.6 MG/DL (ref 0.5–1)
DILUTE RUSSELL VIPER VENOM TIME: NEGATIVE
EOSINOPHIL # BLD: 0.14 K/UL (ref 0.05–0.5)
EOSINOPHILS RELATIVE PERCENT: 2 % (ref 0–6)
ERYTHROCYTE [DISTWIDTH] IN BLOOD BY AUTOMATED COUNT: 24.6 % (ref 11.5–15)
GFR SERPL CREATININE-BSD FRML MDRD: >60 ML/MIN/1.73M2
GLUCOSE SERPL-MCNC: 99 MG/DL (ref 74–99)
HCT VFR BLD AUTO: 23.8 % (ref 34–48)
HGB BLD-MCNC: 8.3 G/DL (ref 11.5–15.5)
IMM GRANULOCYTES # BLD AUTO: 0.04 K/UL (ref 0–0.58)
IMM GRANULOCYTES NFR BLD: 1 % (ref 0–5)
INR PPP: 1.8
LYMPHOCYTES NFR BLD: 1.63 K/UL (ref 1.5–4)
LYMPHOCYTES RELATIVE PERCENT: 20 % (ref 20–42)
MAGNESIUM SERPL-MCNC: 1.5 MG/DL (ref 1.6–2.6)
MCH RBC QN AUTO: 34.9 PG (ref 26–35)
MCHC RBC AUTO-ENTMCNC: 34.9 G/DL (ref 32–34.5)
MCV RBC AUTO: 100 FL (ref 80–99.9)
MONOCYTES NFR BLD: 0.82 K/UL (ref 0.1–0.95)
MONOCYTES NFR BLD: 10 % (ref 2–12)
NEUTROPHILS NFR BLD: 68 % (ref 43–80)
NEUTS SEG NFR BLD: 5.61 K/UL (ref 1.8–7.3)
PHOSPHATE SERPL-MCNC: 2.1 MG/DL (ref 2.5–4.5)
PLATELET CONFIRMATION: NORMAL
PLATELET ESTIMATE: ABNORMAL
PLATELET, FLUORESCENCE: 88 K/UL (ref 130–450)
PMV BLD AUTO: 11.3 FL (ref 7–12)
POTASSIUM SERPL-SCNC: 4.5 MMOL/L (ref 3.5–5)
PROT SERPL-MCNC: 6.1 G/DL (ref 6.4–8.3)
PROTHROMBIN TIME: 19.8 SEC (ref 9.3–12.4)
RBC # BLD AUTO: 2.38 M/UL (ref 3.5–5.5)
SODIUM SERPL-SCNC: 137 MMOL/L (ref 132–146)
WBC OTHER # BLD: 8.3 K/UL (ref 4.5–11.5)

## 2024-02-16 PROCEDURE — 6360000002 HC RX W HCPCS: Performed by: NURSE PRACTITIONER

## 2024-02-16 PROCEDURE — 6360000002 HC RX W HCPCS: Performed by: NURSE ANESTHETIST, CERTIFIED REGISTERED

## 2024-02-16 PROCEDURE — 3700000001 HC ADD 15 MINUTES (ANESTHESIA): Performed by: INTERNAL MEDICINE

## 2024-02-16 PROCEDURE — 2580000003 HC RX 258: Performed by: NURSE ANESTHETIST, CERTIFIED REGISTERED

## 2024-02-16 PROCEDURE — 3700000000 HC ANESTHESIA ATTENDED CARE: Performed by: INTERNAL MEDICINE

## 2024-02-16 PROCEDURE — 84100 ASSAY OF PHOSPHORUS: CPT

## 2024-02-16 PROCEDURE — C9113 INJ PANTOPRAZOLE SODIUM, VIA: HCPCS | Performed by: INTERNAL MEDICINE

## 2024-02-16 PROCEDURE — 99232 SBSQ HOSP IP/OBS MODERATE 35: CPT | Performed by: INTERNAL MEDICINE

## 2024-02-16 PROCEDURE — 6370000000 HC RX 637 (ALT 250 FOR IP): Performed by: NURSE PRACTITIONER

## 2024-02-16 PROCEDURE — 85025 COMPLETE CBC W/AUTO DIFF WBC: CPT

## 2024-02-16 PROCEDURE — 82140 ASSAY OF AMMONIA: CPT

## 2024-02-16 PROCEDURE — 0DJ08ZZ INSPECTION OF UPPER INTESTINAL TRACT, VIA NATURAL OR ARTIFICIAL OPENING ENDOSCOPIC: ICD-10-PCS | Performed by: INTERNAL MEDICINE

## 2024-02-16 PROCEDURE — 85610 PROTHROMBIN TIME: CPT

## 2024-02-16 PROCEDURE — 2580000003 HC RX 258: Performed by: INTERNAL MEDICINE

## 2024-02-16 PROCEDURE — 2060000000 HC ICU INTERMEDIATE R&B

## 2024-02-16 PROCEDURE — 83735 ASSAY OF MAGNESIUM: CPT

## 2024-02-16 PROCEDURE — 2709999900 HC NON-CHARGEABLE SUPPLY: Performed by: INTERNAL MEDICINE

## 2024-02-16 PROCEDURE — 3609017100 HC EGD: Performed by: INTERNAL MEDICINE

## 2024-02-16 PROCEDURE — 2580000003 HC RX 258: Performed by: NURSE PRACTITIONER

## 2024-02-16 PROCEDURE — 6370000000 HC RX 637 (ALT 250 FOR IP): Performed by: INTERNAL MEDICINE

## 2024-02-16 PROCEDURE — 7100000000 HC PACU RECOVERY - FIRST 15 MIN: Performed by: INTERNAL MEDICINE

## 2024-02-16 PROCEDURE — 80053 COMPREHEN METABOLIC PANEL: CPT

## 2024-02-16 PROCEDURE — 6360000002 HC RX W HCPCS: Performed by: INTERNAL MEDICINE

## 2024-02-16 PROCEDURE — 2500000003 HC RX 250 WO HCPCS: Performed by: INTERNAL MEDICINE

## 2024-02-16 PROCEDURE — 7100000001 HC PACU RECOVERY - ADDTL 15 MIN: Performed by: INTERNAL MEDICINE

## 2024-02-16 RX ORDER — PROPOFOL 10 MG/ML
INJECTION, EMULSION INTRAVENOUS PRN
Status: DISCONTINUED | OUTPATIENT
Start: 2024-02-16 | End: 2024-02-16 | Stop reason: SDUPTHER

## 2024-02-16 RX ORDER — SODIUM CHLORIDE 9 MG/ML
INJECTION, SOLUTION INTRAVENOUS CONTINUOUS PRN
Status: DISCONTINUED | OUTPATIENT
Start: 2024-02-16 | End: 2024-02-16 | Stop reason: SDUPTHER

## 2024-02-16 RX ORDER — MAGNESIUM SULFATE IN WATER 40 MG/ML
2000 INJECTION, SOLUTION INTRAVENOUS ONCE
Status: COMPLETED | OUTPATIENT
Start: 2024-02-16 | End: 2024-02-16

## 2024-02-16 RX ADMIN — ONDANSETRON 4 MG: 4 TABLET, ORALLY DISINTEGRATING ORAL at 23:14

## 2024-02-16 RX ADMIN — LORAZEPAM 1 MG: 1 TABLET ORAL at 10:17

## 2024-02-16 RX ADMIN — Medication 10 ML: at 10:19

## 2024-02-16 RX ADMIN — LORAZEPAM 1 MG: 1 TABLET ORAL at 14:12

## 2024-02-16 RX ADMIN — Medication 100 MG: at 08:33

## 2024-02-16 RX ADMIN — LORAZEPAM 1 MG: 1 TABLET ORAL at 08:31

## 2024-02-16 RX ADMIN — PROPOFOL 150 MG: 10 INJECTION, EMULSION INTRAVENOUS at 12:29

## 2024-02-16 RX ADMIN — PANTOPRAZOLE SODIUM 40 MG: 40 INJECTION, POWDER, FOR SOLUTION INTRAVENOUS at 08:36

## 2024-02-16 RX ADMIN — LORAZEPAM 1 MG: 1 TABLET ORAL at 18:12

## 2024-02-16 RX ADMIN — PANTOPRAZOLE SODIUM 40 MG: 40 INJECTION, POWDER, FOR SOLUTION INTRAVENOUS at 16:28

## 2024-02-16 RX ADMIN — Medication 400 MG: at 10:13

## 2024-02-16 RX ADMIN — WATER 1000 MG: 1 INJECTION INTRAMUSCULAR; INTRAVENOUS; SUBCUTANEOUS at 18:13

## 2024-02-16 RX ADMIN — Medication 10 ML: at 22:06

## 2024-02-16 RX ADMIN — MAGNESIUM SULFATE HEPTAHYDRATE 2000 MG: 40 INJECTION, SOLUTION INTRAVENOUS at 08:44

## 2024-02-16 RX ADMIN — LORAZEPAM 1 MG: 1 TABLET ORAL at 03:38

## 2024-02-16 RX ADMIN — Medication 1 TABLET: at 08:33

## 2024-02-16 RX ADMIN — SODIUM PHOSPHATE, MONOBASIC, MONOHYDRATE AND SODIUM PHOSPHATE, DIBASIC, ANHYDROUS 15 MMOL: 142; 276 INJECTION, SOLUTION INTRAVENOUS at 10:16

## 2024-02-16 RX ADMIN — LORAZEPAM 2 MG: 2 INJECTION INTRAMUSCULAR; INTRAVENOUS at 11:37

## 2024-02-16 RX ADMIN — LACTULOSE 20 G: 10 SOLUTION ORAL at 22:06

## 2024-02-16 RX ADMIN — LACTULOSE 20 G: 10 SOLUTION ORAL at 14:12

## 2024-02-16 RX ADMIN — SODIUM CHLORIDE: 9 INJECTION, SOLUTION INTRAVENOUS at 12:27

## 2024-02-16 RX ADMIN — LORAZEPAM 1 MG: 1 TABLET ORAL at 23:12

## 2024-02-16 RX ADMIN — LORAZEPAM 1 MG: 1 TABLET ORAL at 16:28

## 2024-02-16 ASSESSMENT — PAIN - FUNCTIONAL ASSESSMENT
PAIN_FUNCTIONAL_ASSESSMENT: 0-10
PAIN_FUNCTIONAL_ASSESSMENT: 0-10

## 2024-02-16 NOTE — CARE COORDINATION
Social work / Discharge Planning:         Per IDR, possible EGD today.   Patient is NPO.     [facility] planning to see patient this afternoon.     Electronically signed by MARIUSZ Roberson on 2/16/2024 at 10:28 AM

## 2024-02-16 NOTE — PROCEDURES
Procedure:    Esophagogastroduodenoscopy    Indication:    Cirrhosis  Anemia    Consent:   Informed consent was obtained from the patient including and not limited to risk of perforation, aspiration of gastric contents or teeth, bleeding, infection, dental breakage, ileus, need for surgery, or worst case death.    Sedation  Endoscope was advanced easily through mouth to second portion of duodenum    Oropharynx:    views are limited but grossly normal.    Esophagus:   Mucosa is normal. No appreciable gastric varices    Stomach:   Antrum is with mild erythema    Gastric body views were limited    Retroflexed views showed normal fundus and cardia, limited in some areas given food, however no varices noted. PHG was present    Copious amount of food throughout the stomach, I was able to move some however unable to suction/wash.     Duodenum: Bulb is normal.     Second portion of duodenum is normal.  No fresh of old blood.    EBL - none    IMPRESSION AND PLAN:   1.  Copious solid food in the stomach - unable to wash and suction, it did limit views of mucosa, however no fresh or old blood noted on exam   2. No appreciable GEV  3. PHG in fundus and cardia    Ok to advance diet. BID PPI, May consider repeat EGD if anemia worsens or overt bleeding occurs. I  would likely recommend a repeat as outpt given the lack of visibility from solid food. Stool antigen to be ordered for H.pylori. Gi will continue to monitor while in the hospital.     Dwayne Quinones,   2/16/2024  12:26 PM

## 2024-02-16 NOTE — CARE COORDINATION
[facility] Support Note    Name: Jaylene Kapoor  Date: 2/16/2024    Chief Complaint   Patient presents with    Abdominal Pain     Right sided pain through abdomen. Swelling to abdomen. +emesis. Dr. Lee sent in. Jaundice to sclera.       [facility] met with patient.  [x] Support and education provided  [x] Resources provided   [] Treatment referral:   [] Other:   [] Patient declined peer recovery services     Referred By: PARIS MARTIN    Notes:     Signed: Susy Graves, 2/16/2024

## 2024-02-17 LAB
ALBUMIN SERPL-MCNC: 2.5 G/DL (ref 3.5–5.2)
ALP SERPL-CCNC: 150 U/L (ref 35–104)
ALT SERPL-CCNC: 34 U/L (ref 0–32)
ANION GAP SERPL CALCULATED.3IONS-SCNC: 8 MMOL/L (ref 7–16)
AST SERPL-CCNC: 120 U/L (ref 0–31)
BASOPHILS # BLD: 0 K/UL (ref 0–0.2)
BASOPHILS NFR BLD: 0 % (ref 0–2)
BILIRUB SERPL-MCNC: 8.2 MG/DL (ref 0–1.2)
BUN SERPL-MCNC: 8 MG/DL (ref 6–20)
CALCIUM SERPL-MCNC: 7.6 MG/DL (ref 8.6–10.2)
CHLORIDE SERPL-SCNC: 109 MMOL/L (ref 98–107)
CO2 SERPL-SCNC: 18 MMOL/L (ref 22–29)
CREAT SERPL-MCNC: 0.6 MG/DL (ref 0.5–1)
EOSINOPHIL # BLD: 0.07 K/UL (ref 0.05–0.5)
EOSINOPHILS RELATIVE PERCENT: 1 % (ref 0–6)
ERYTHROCYTE [DISTWIDTH] IN BLOOD BY AUTOMATED COUNT: 25 % (ref 11.5–15)
GFR SERPL CREATININE-BSD FRML MDRD: >60 ML/MIN/1.73M2
GLUCOSE SERPL-MCNC: 96 MG/DL (ref 74–99)
HCT VFR BLD AUTO: 20.9 % (ref 34–48)
HGB BLD-MCNC: 7.4 G/DL (ref 11.5–15.5)
IGG4 SER-MCNC: 76 MG/DL (ref 1–123)
LYMPHOCYTES NFR BLD: 1.24 K/UL (ref 1.5–4)
LYMPHOCYTES RELATIVE PERCENT: 16 % (ref 20–42)
MAGNESIUM SERPL-MCNC: 1.5 MG/DL (ref 1.6–2.6)
MCH RBC QN AUTO: 36.8 PG (ref 26–35)
MCHC RBC AUTO-ENTMCNC: 35.4 G/DL (ref 32–34.5)
MCV RBC AUTO: 104 FL (ref 80–99.9)
MONOCYTES NFR BLD: 0.89 K/UL (ref 0.1–0.95)
MONOCYTES NFR BLD: 11 % (ref 2–12)
NEUTROPHILS NFR BLD: 72 % (ref 43–80)
NEUTS SEG NFR BLD: 5.7 K/UL (ref 1.8–7.3)
PHOSPHATE SERPL-MCNC: 2 MG/DL (ref 2.5–4.5)
PLATELET CONFIRMATION: NORMAL
PLATELET, FLUORESCENCE: 91 K/UL (ref 130–450)
PMV BLD AUTO: 11.6 FL (ref 7–12)
POTASSIUM SERPL-SCNC: 4.1 MMOL/L (ref 3.5–5)
PROT SERPL-MCNC: 5.4 G/DL (ref 6.4–8.3)
RBC # BLD AUTO: 2.01 M/UL (ref 3.5–5.5)
RBC # BLD: ABNORMAL 10*6/UL
SODIUM SERPL-SCNC: 135 MMOL/L (ref 132–146)
WBC OTHER # BLD: 7.9 K/UL (ref 4.5–11.5)

## 2024-02-17 PROCEDURE — 99231 SBSQ HOSP IP/OBS SF/LOW 25: CPT | Performed by: INTERNAL MEDICINE

## 2024-02-17 PROCEDURE — 80053 COMPREHEN METABOLIC PANEL: CPT

## 2024-02-17 PROCEDURE — 2060000000 HC ICU INTERMEDIATE R&B

## 2024-02-17 PROCEDURE — 84100 ASSAY OF PHOSPHORUS: CPT

## 2024-02-17 PROCEDURE — 83735 ASSAY OF MAGNESIUM: CPT

## 2024-02-17 PROCEDURE — 36415 COLL VENOUS BLD VENIPUNCTURE: CPT

## 2024-02-17 PROCEDURE — 6360000002 HC RX W HCPCS: Performed by: NURSE PRACTITIONER

## 2024-02-17 PROCEDURE — 6360000002 HC RX W HCPCS: Performed by: INTERNAL MEDICINE

## 2024-02-17 PROCEDURE — C9113 INJ PANTOPRAZOLE SODIUM, VIA: HCPCS | Performed by: INTERNAL MEDICINE

## 2024-02-17 PROCEDURE — 85025 COMPLETE CBC W/AUTO DIFF WBC: CPT

## 2024-02-17 PROCEDURE — 6370000000 HC RX 637 (ALT 250 FOR IP): Performed by: INTERNAL MEDICINE

## 2024-02-17 PROCEDURE — 30233N1 TRANSFUSION OF NONAUTOLOGOUS RED BLOOD CELLS INTO PERIPHERAL VEIN, PERCUTANEOUS APPROACH: ICD-10-PCS | Performed by: INTERNAL MEDICINE

## 2024-02-17 PROCEDURE — 6370000000 HC RX 637 (ALT 250 FOR IP): Performed by: NURSE PRACTITIONER

## 2024-02-17 PROCEDURE — 2580000003 HC RX 258: Performed by: NURSE PRACTITIONER

## 2024-02-17 RX ORDER — MAGNESIUM SULFATE 1 G/100ML
1000 INJECTION INTRAVENOUS ONCE
Status: COMPLETED | OUTPATIENT
Start: 2024-02-17 | End: 2024-02-17

## 2024-02-17 RX ADMIN — Medication 1 TABLET: at 10:21

## 2024-02-17 RX ADMIN — POTASSIUM & SODIUM PHOSPHATES POWDER PACK 280-160-250 MG 250 MG: 280-160-250 PACK at 17:00

## 2024-02-17 RX ADMIN — Medication 400 MG: at 10:21

## 2024-02-17 RX ADMIN — Medication 10 ML: at 10:34

## 2024-02-17 RX ADMIN — LORAZEPAM 2 MG: 2 INJECTION INTRAMUSCULAR; INTRAVENOUS at 18:54

## 2024-02-17 RX ADMIN — PANTOPRAZOLE SODIUM 40 MG: 40 INJECTION, POWDER, FOR SOLUTION INTRAVENOUS at 17:44

## 2024-02-17 RX ADMIN — Medication 100 MG: at 10:21

## 2024-02-17 RX ADMIN — LACTULOSE 20 G: 10 SOLUTION ORAL at 10:20

## 2024-02-17 RX ADMIN — LORAZEPAM 2 MG: 2 INJECTION INTRAMUSCULAR; INTRAVENOUS at 20:00

## 2024-02-17 RX ADMIN — MAGNESIUM SULFATE HEPTAHYDRATE 1000 MG: 1 INJECTION, SOLUTION INTRAVENOUS at 10:21

## 2024-02-17 RX ADMIN — LACTULOSE 20 G: 10 SOLUTION ORAL at 19:44

## 2024-02-17 RX ADMIN — POTASSIUM & SODIUM PHOSPHATES POWDER PACK 280-160-250 MG 250 MG: 280-160-250 PACK at 19:44

## 2024-02-17 RX ADMIN — LORAZEPAM 1 MG: 2 INJECTION INTRAMUSCULAR; INTRAVENOUS at 10:23

## 2024-02-17 RX ADMIN — PANTOPRAZOLE SODIUM 40 MG: 40 INJECTION, POWDER, FOR SOLUTION INTRAVENOUS at 05:54

## 2024-02-17 ASSESSMENT — PAIN SCALES - GENERAL: PAINLEVEL_OUTOF10: 0

## 2024-02-17 NOTE — PLAN OF CARE
Problem: Discharge Planning  Goal: Discharge to home or other facility with appropriate resources  Outcome: Progressing     Problem: Safety - Adult  Goal: Free from fall injury  Outcome: Progressing     Problem: Skin/Tissue Integrity  Goal: Absence of new skin breakdown  Description: 1.  Monitor for areas of redness and/or skin breakdown  2.  Assess vascular access sites hourly  3.  Every 4-6 hours minimum:  Change oxygen saturation probe site  4.  Every 4-6 hours:  If on nasal continuous positive airway pressure, respiratory therapy assess nares and determine need for appliance change or resting period.  Outcome: Progressing     Problem: Confusion  Goal: Confusion, delirium, dementia, or psychosis is improved or at baseline  Description: INTERVENTIONS:  1. Assess for possible contributors to thought disturbance, including medications, impaired vision or hearing, underlying metabolic abnormalities, dehydration, psychiatric diagnoses, and notify attending LIP  2. [place] high risk fall precautions, as indicated  3. Provide frequent short contacts to provide reality reorientation, refocusing and direction  4. Decrease environmental stimuli, including noise as appropriate  5. Monitor and intervene to maintain adequate nutrition, hydration, elimination, sleep and activity  6. If unable to ensure safety without constant attention obtain sitter and review sitter guidelines with assigned personnel  7. Initiate Psychosocial CNS and Spiritual Care consult, as indicated  Outcome: Progressing     Problem: Pain  Goal: Verbalizes/displays adequate comfort level or baseline comfort level  Outcome: Progressing

## 2024-02-18 VITALS
DIASTOLIC BLOOD PRESSURE: 76 MMHG | WEIGHT: 147.71 LBS | TEMPERATURE: 98.2 F | HEIGHT: 60 IN | RESPIRATION RATE: 18 BRPM | BODY MASS INDEX: 29 KG/M2 | SYSTOLIC BLOOD PRESSURE: 116 MMHG | OXYGEN SATURATION: 97 % | HEART RATE: 81 BPM

## 2024-02-18 LAB
ALBUMIN SERPL-MCNC: 2.4 G/DL (ref 3.5–5.2)
ALP SERPL-CCNC: 154 U/L (ref 35–104)
ALT SERPL-CCNC: 36 U/L (ref 0–32)
ANION GAP SERPL CALCULATED.3IONS-SCNC: 10 MMOL/L (ref 7–16)
AST SERPL-CCNC: 111 U/L (ref 0–31)
BASOPHILS # BLD: 0 K/UL (ref 0–0.2)
BASOPHILS NFR BLD: 0 % (ref 0–2)
BILIRUB SERPL-MCNC: 9.1 MG/DL (ref 0–1.2)
BUN SERPL-MCNC: 9 MG/DL (ref 6–20)
CALCIUM SERPL-MCNC: 7.7 MG/DL (ref 8.6–10.2)
CHLORIDE SERPL-SCNC: 105 MMOL/L (ref 98–107)
CO2 SERPL-SCNC: 19 MMOL/L (ref 22–29)
CREAT SERPL-MCNC: 0.6 MG/DL (ref 0.5–1)
EOSINOPHIL # BLD: 0.13 K/UL (ref 0.05–0.5)
EOSINOPHILS RELATIVE PERCENT: 2 % (ref 0–6)
ERYTHROCYTE [DISTWIDTH] IN BLOOD BY AUTOMATED COUNT: 25.1 % (ref 11.5–15)
GFR SERPL CREATININE-BSD FRML MDRD: >60 ML/MIN/1.73M2
GLUCOSE SERPL-MCNC: 98 MG/DL (ref 74–99)
HCT VFR BLD AUTO: 22.4 % (ref 34–48)
HGB BLD-MCNC: 7.7 G/DL (ref 11.5–15.5)
INR PPP: 1.8
LYMPHOCYTES NFR BLD: 0.98 K/UL (ref 1.5–4)
LYMPHOCYTES RELATIVE PERCENT: 13 % (ref 20–42)
MAGNESIUM SERPL-MCNC: 1.5 MG/DL (ref 1.6–2.6)
MCH RBC QN AUTO: 36.2 PG (ref 26–35)
MCHC RBC AUTO-ENTMCNC: 34.4 G/DL (ref 32–34.5)
MCV RBC AUTO: 105.2 FL (ref 80–99.9)
MONOCYTES NFR BLD: 0.52 K/UL (ref 0.1–0.95)
MONOCYTES NFR BLD: 7 % (ref 2–12)
NEUTROPHILS NFR BLD: 78 % (ref 43–80)
NEUTS SEG NFR BLD: 5.87 K/UL (ref 1.8–7.3)
PHOSPHATE SERPL-MCNC: 2.5 MG/DL (ref 2.5–4.5)
PLATELET, FLUORESCENCE: 102 K/UL (ref 130–450)
PMV BLD AUTO: 11.1 FL (ref 7–12)
POTASSIUM SERPL-SCNC: 4.6 MMOL/L (ref 3.5–5)
PROT SERPL-MCNC: 5.4 G/DL (ref 6.4–8.3)
PROTHROMBIN TIME: 20.5 SEC (ref 9.3–12.4)
RBC # BLD AUTO: 2.13 M/UL (ref 3.5–5.5)
RBC # BLD: ABNORMAL 10*6/UL
SODIUM SERPL-SCNC: 134 MMOL/L (ref 132–146)
WBC OTHER # BLD: 7.5 K/UL (ref 4.5–11.5)

## 2024-02-18 PROCEDURE — 6360000002 HC RX W HCPCS: Performed by: INTERNAL MEDICINE

## 2024-02-18 PROCEDURE — 85610 PROTHROMBIN TIME: CPT

## 2024-02-18 PROCEDURE — C9113 INJ PANTOPRAZOLE SODIUM, VIA: HCPCS | Performed by: INTERNAL MEDICINE

## 2024-02-18 PROCEDURE — 6370000000 HC RX 637 (ALT 250 FOR IP): Performed by: NURSE PRACTITIONER

## 2024-02-18 PROCEDURE — 83735 ASSAY OF MAGNESIUM: CPT

## 2024-02-18 PROCEDURE — 80053 COMPREHEN METABOLIC PANEL: CPT

## 2024-02-18 PROCEDURE — 2580000003 HC RX 258: Performed by: NURSE PRACTITIONER

## 2024-02-18 PROCEDURE — 99239 HOSP IP/OBS DSCHRG MGMT >30: CPT | Performed by: INTERNAL MEDICINE

## 2024-02-18 PROCEDURE — 6370000000 HC RX 637 (ALT 250 FOR IP): Performed by: INTERNAL MEDICINE

## 2024-02-18 PROCEDURE — 84100 ASSAY OF PHOSPHORUS: CPT

## 2024-02-18 PROCEDURE — 85025 COMPLETE CBC W/AUTO DIFF WBC: CPT

## 2024-02-18 PROCEDURE — 36415 COLL VENOUS BLD VENIPUNCTURE: CPT

## 2024-02-18 RX ORDER — PREDNISONE 20 MG/1
TABLET ORAL
Qty: 29 TABLET | Refills: 0 | Status: SHIPPED | OUTPATIENT
Start: 2024-02-19 | End: 2024-03-19

## 2024-02-18 RX ORDER — LACTULOSE 10 G/15ML
20 SOLUTION ORAL 3 TIMES DAILY
Qty: 2700 ML | Refills: 2 | Status: SHIPPED | OUTPATIENT
Start: 2024-02-18 | End: 2024-03-19

## 2024-02-18 RX ORDER — MAGNESIUM SULFATE IN WATER 40 MG/ML
2000 INJECTION, SOLUTION INTRAVENOUS ONCE
Status: COMPLETED | OUTPATIENT
Start: 2024-02-18 | End: 2024-02-18

## 2024-02-18 RX ORDER — PANTOPRAZOLE SODIUM 40 MG/1
40 TABLET, DELAYED RELEASE ORAL
Qty: 60 TABLET | Refills: 1 | Status: SHIPPED | OUTPATIENT
Start: 2024-02-18

## 2024-02-18 RX ADMIN — Medication 100 MG: at 10:52

## 2024-02-18 RX ADMIN — Medication 1 TABLET: at 10:52

## 2024-02-18 RX ADMIN — PANTOPRAZOLE SODIUM 40 MG: 40 INJECTION, POWDER, FOR SOLUTION INTRAVENOUS at 07:05

## 2024-02-18 RX ADMIN — PREDNISONE 30 MG: 20 TABLET ORAL at 10:52

## 2024-02-18 RX ADMIN — LACTULOSE 20 G: 10 SOLUTION ORAL at 10:50

## 2024-02-18 RX ADMIN — Medication 10 ML: at 10:53

## 2024-02-18 RX ADMIN — Medication 400 MG: at 10:52

## 2024-02-18 RX ADMIN — MAGNESIUM SULFATE HEPTAHYDRATE 2000 MG: 40 INJECTION, SOLUTION INTRAVENOUS at 10:51

## 2024-02-18 RX ADMIN — SODIUM CHLORIDE, PRESERVATIVE FREE 10 ML: 5 INJECTION INTRAVENOUS at 11:39

## 2024-02-18 ASSESSMENT — PAIN SCALES - GENERAL: PAINLEVEL_OUTOF10: 0

## 2024-02-18 NOTE — DISCHARGE INSTRUCTIONS
Take prednisone taper as instructed  Start taking pantoprazole 40 mg twice daily before meals  Start taking lactulose 30 mL by mouth 3 times daily

## 2024-02-18 NOTE — PROGRESS NOTES
[facility] Hospitalist Progress Note    Admitting Date and Time: 2/12/2024  4:10 PM  Admit Dx: Hypokalemia [E87.6]  Hypomagnesemia [E83.42]  Cirrhosis of liver (HCC) [K74.60]  Chronic alcohol abuse [F10.10]  Alcoholic cirrhosis of liver with ascites (HCC) [K70.31]  Elevated bilirubin [R17]    Subjective:  Patient is being followed for Hypokalemia [E87.6]  Hypomagnesemia [E83.42]  Cirrhosis of liver (HCC) [K74.60]  Chronic alcohol abuse [F10.10]  Alcoholic cirrhosis of liver with ascites (HCC) [K70.31]  Elevated bilirubin [R17]   Pt seen and examined this morning  No acute events overnight  Sleepy this morning, denies any acute complaints.    ROS: denies fever, chills, cp, sob, HA unless stated above.      sodium phosphate IVPB (PERIPHERAL line)  15 mmol IntraVENous Once    lactulose  20 g Oral TID    magnesium oxide  400 mg Oral Daily    pantoprazole  40 mg IntraVENous BID AC    sodium chloride flush  5-40 mL IntraVENous 2 times per day    thiamine  100 mg Oral Daily    multivitamin  1 tablet Oral Daily    nicotine  1 patch TransDERmal Daily    cefTRIAXone (ROCEPHIN) IV  1,000 mg IntraVENous Q24H     sodium chloride, , PRN  sodium chloride flush, 5-40 mL, PRN  sodium chloride, , PRN  potassium chloride, 40 mEq, PRN   Or  potassium alternative oral replacement, 40 mEq, PRN   Or  potassium chloride, 10 mEq, PRN  magnesium sulfate, 2,000 mg, PRN  ondansetron, 4 mg, Q8H PRN   Or  ondansetron, 4 mg, Q6H PRN  polyethylene glycol, 17 g, Daily PRN  acetaminophen, 650 mg, Q6H PRN   Or  acetaminophen, 650 mg, Q6H PRN  LORazepam, 1 mg, Q1H PRN   Or  LORazepam, 1 mg, Q1H PRN   Or  LORazepam, 2 mg, Q1H PRN   Or  LORazepam, 2 mg, Q1H PRN   Or  LORazepam, 3 mg, Q1H PRN   Or  LORazepam, 3 mg, Q1H PRN   Or  LORazepam, 4 mg, Q1H PRN   Or  LORazepam, 4 mg, Q1H PRN         Objective:    BP (!) 92/50   Pulse 89   Temp 97 °F (36.1 °C) (Temporal)   Resp 18   Ht 1.524 m (5')   Wt 65.7 kg (144 lb 13.5 oz)   SpO2 97%   BMI 
       [facility] Hospitalist Progress Note    Admitting Date and Time: 2/12/2024  4:10 PM  Admit Dx: Hypokalemia [E87.6]  Hypomagnesemia [E83.42]  Cirrhosis of liver (HCC) [K74.60]  Chronic alcohol abuse [F10.10]  Alcoholic cirrhosis of liver with ascites (HCC) [K70.31]  Elevated bilirubin [R17]    Subjective:  Patient is being followed for Hypokalemia [E87.6]  Hypomagnesemia [E83.42]  Cirrhosis of liver (HCC) [K74.60]  Chronic alcohol abuse [F10.10]  Alcoholic cirrhosis of liver with ascites (HCC) [K70.31]  Elevated bilirubin [R17]   Pt seen and examined this morning  No acute events overnight  States she feels slightly better today. Sleepy     ROS: denies fever, chills, cp, sob, HA unless stated above.      pantoprazole  40 mg IntraVENous BID AC    sodium chloride flush  5-40 mL IntraVENous 2 times per day    thiamine  100 mg Oral Daily    multivitamin  1 tablet Oral Daily    nicotine  1 patch TransDERmal Daily    cefTRIAXone (ROCEPHIN) IV  1,000 mg IntraVENous Q24H     sodium chloride, , PRN  sodium chloride flush, 5-40 mL, PRN  sodium chloride, , PRN  potassium chloride, 40 mEq, PRN   Or  potassium alternative oral replacement, 40 mEq, PRN   Or  potassium chloride, 10 mEq, PRN  magnesium sulfate, 2,000 mg, PRN  ondansetron, 4 mg, Q8H PRN   Or  ondansetron, 4 mg, Q6H PRN  polyethylene glycol, 17 g, Daily PRN  acetaminophen, 650 mg, Q6H PRN   Or  acetaminophen, 650 mg, Q6H PRN  LORazepam, 1 mg, Q1H PRN   Or  LORazepam, 1 mg, Q1H PRN   Or  LORazepam, 2 mg, Q1H PRN   Or  LORazepam, 2 mg, Q1H PRN   Or  LORazepam, 3 mg, Q1H PRN   Or  LORazepam, 3 mg, Q1H PRN   Or  LORazepam, 4 mg, Q1H PRN   Or  LORazepam, 4 mg, Q1H PRN         Objective:    BP (!) 103/96   Pulse 95   Temp 98.6 °F (37 °C) (Oral)   Resp 17   Ht 1.524 m (5')   Wt 56.7 kg (125 lb)   SpO2 96%   BMI 24.41 kg/m²     General Appearance: alert and awake, in no acute distress  Skin: warm and dry  Head: normocephalic and atraumatic  Eyes: pupils equal, 
       [facility] Hospitalist Progress Note    Admitting Date and Time: 2/12/2024  4:10 PM  Admit Dx: Hypokalemia [E87.6]  Hypomagnesemia [E83.42]  Cirrhosis of liver (HCC) [K74.60]  Chronic alcohol abuse [F10.10]  Alcoholic cirrhosis of liver with ascites (HCC) [K70.31]  Elevated bilirubin [R17]    Subjective:  Patient is being followed for Hypokalemia [E87.6]  Hypomagnesemia [E83.42]  Cirrhosis of liver (HCC) [K74.60]  Chronic alcohol abuse [F10.10]  Alcoholic cirrhosis of liver with ascites (HCC) [K70.31]  Elevated bilirubin [R17]   Pt seen and examined this morning  No acute events overnight  Sleepy this morning, denies any acute complaints.    ROS: denies fever, chills, cp, sob, HA unless stated above.      potassium & sodium phosphates  1 packet Oral 4x Daily    [START ON 2/18/2024] predniSONE  30 mg Oral Daily    lactulose  20 g Oral TID    magnesium oxide  400 mg Oral Daily    pantoprazole  40 mg IntraVENous BID AC    sodium chloride flush  5-40 mL IntraVENous 2 times per day    thiamine  100 mg Oral Daily    multivitamin  1 tablet Oral Daily    nicotine  1 patch TransDERmal Daily     sodium chloride, , PRN  sodium chloride flush, 5-40 mL, PRN  sodium chloride, , PRN  potassium chloride, 40 mEq, PRN   Or  potassium alternative oral replacement, 40 mEq, PRN   Or  potassium chloride, 10 mEq, PRN  magnesium sulfate, 2,000 mg, PRN  ondansetron, 4 mg, Q8H PRN   Or  ondansetron, 4 mg, Q6H PRN  polyethylene glycol, 17 g, Daily PRN  acetaminophen, 650 mg, Q6H PRN   Or  acetaminophen, 650 mg, Q6H PRN  LORazepam, 1 mg, Q1H PRN   Or  LORazepam, 1 mg, Q1H PRN   Or  LORazepam, 2 mg, Q1H PRN   Or  LORazepam, 2 mg, Q1H PRN   Or  LORazepam, 3 mg, Q1H PRN   Or  LORazepam, 3 mg, Q1H PRN   Or  LORazepam, 4 mg, Q1H PRN   Or  LORazepam, 4 mg, Q1H PRN         Objective:    /75   Pulse 96   Temp 98.1 °F (36.7 °C)   Resp 16   Ht 1.524 m (5')   Wt 67 kg (147 lb 11.3 oz)   SpO2 97%   BMI 28.85 kg/m²     General 
4 Eyes Skin Assessment     NAME:  Jaylene Kapoor  YOB: 1980  MEDICAL RECORD NUMBER:  [id number]    The patient is being assessed for  Admission    I agree that at least one RN has performed a thorough Head to Toe Skin Assessment on the patient. ALL assessment sites listed below have been assessed.      Areas assessed by both nurses:    Head, Face, Ears, Shoulders, Back, Chest, Arms, Elbows, Hands, Sacrum. Buttock, Coccyx, Ischium, and Legs. Feet and Heels        Does the Patient have a Wound? No noted wound(s)       Julio Prevention initiated by RN: No  Wound Care Orders initiated by RN: No    Pressure Injury (Stage 3,4, Unstageable, DTI, NWPT, and Complex wounds) if present, place Wound referral order by RN under : No    New Ostomies, if present place, Ostomy referral order under : No     Nurse 1 eSignature: Electronically signed by Geena Weber RN on 2/13/24 at 5:49 AM EST    **SHARE this note so that the co-signing nurse can place an eSignature**    Nurse 2 eSignature: Electronically signed by aFustina Palmoino RN on 2/13/24 at 5:50 AM EST    
Dr. Mendez selby served for new consult.  
Entering the room this  found the patient lying in bed, eyes closed and appeared peaceful and quiet. This  engaged the patient and visitors. Words of empathy, support, and comfort were offered as well as prayer said.  remains available for support.  
Nutrition Assessment     Type and Reason for Visit: Initial, Positive Nutrition Screen    Nutrition Recommendations/Plan:   Will send [place] diet per orders.  Informed her  of current [place] diet order. Written & verbal diet instructions given to him w/stated understanding.   Recommend Low sodium, lowfat diet.   Will send HP, LC ONS to optimize nutrient intake qd.   Will monitor        Nutrition Assessment:  ADM: New Cirrhosis of the liver w/ascites. s/p EGD w/dilation. PMH: Current Tobacco Use, chronic ETOH abuse. Pt was asleep at lunch. Informed her  of current [place] diet order. Written & verbal diet instructions given to him w/stated understanding. Recommend Low sodium, lowfat diet. Will send HP, LC ONS to optimize nutrient intake qd.   Will monitor    Nutrition Related Findings:   A& O, Impulsive,  round abd, active BS, no edema, Juandice, IVF, Nicoderm, Thiamine, M/vits, elevated LFT's, NH3 64, Low Mg, HDL 14 Wound Type: None    Current Nutrition Therapies:    ADULT DIET; Regular; GI [place] (GERD/Peptic Ulcer)  ADULT ORAL NUTRITION SUPPLEMENT; Breakfast; Low Calorie/High Protein Oral Supplement    Anthropometric Measures:  Height: 152.4 cm (5')  Current Body Wt: 65.7 kg (144 lb 13.5 oz)   BMI: 28.3    Nutrition Diagnosis:   No nutrition diagnosis at this time     Nutrition Interventions:   Food and/or Nutrient Delivery: Continue Current Diet, Start Oral Nutrition Supplement  Nutrition Education/Counseling: Education completed  Coordination of Nutrition Care: Continue to monitor while inpatient       Goals:     Goals: Meet at least 75% of estimated needs, by next RD assessment       Nutrition Monitoring and Evaluation:      Food/Nutrient Intake Outcomes: Food and Nutrient Intake, Supplement Intake  Physical Signs/Symptoms Outcomes: Biochemical Data, Nutrition Focused Physical Findings, Skin, Weight, Fluid Status or Edema    Discharge Planning:    Continue current diet (diet order at [place] 
PROGRESS NOTE        Patient Presents with/Seen in Consultation For      *Reason for Consult: New cirrhosis of the liver     CHIEF COMPLAINT: Abdominal pain and yellow sclera   Subjective:     Patient seen asleep in bed, easily arouses. Family at BS. Feeling well. Still deeply jaundiced. Tolerating diet. Denies any N/V. Having soft orange stools. EGD findings reviewed with the patient including the medications she will continue at [place]. Short discussion about alcohol abstinence with the patient, became very quiet and quit interacting with me. POC reviewed with charge RN.    Review of Systems  Aside from what was mentioned in the PMH and HPI, essentially unremarkable, all others negative.    Objective:     BP (!) 112/55   Pulse 96   Temp 97.8 °F (36.6 °C)   Resp 16   Ht 1.524 m (5')   Wt 67 kg (147 lb 11.3 oz)   SpO2 96%   BMI 28.85 kg/m²     General appearance: laying in bed ill appearing, jaundiced   Eyes: conjunctiva pale, sclera anicteric. PERRL.  Lungs: clear to auscultation bilaterally  Heart: regular rate and rhythm, no murmur, 2+ pulses; no edema  Abdomen: softly-distended, non-tender; bowel sounds normal; no masses,  no organomegaly  Extremities: extremities without edema  Pulses: 2+ and symmetric  Skin: Skin color jaundiced, texture, turgor normal.   Neurologic: alert to self     potassium & sodium phosphates (PHOS-NAK) 280-160-250 MG packet 250 mg, 4x Daily  lactulose (CHRONULAC) 10 GM/15ML solution 20 g, TID  magnesium oxide (MAG-OX) tablet 400 mg, Daily  pantoprazole (PROTONIX) injection 40 mg, BID AC  0.9 % sodium chloride infusion, PRN  sodium chloride flush 0.9 % injection 5-40 mL, 2 times per day  sodium chloride flush 0.9 % injection 5-40 mL, PRN  0.9 % sodium chloride infusion, PRN  potassium chloride (KLOR-CON M) extended release tablet 40 mEq, PRN   Or  potassium bicarb-citric acid (EFFER-K) effervescent tablet 40 mEq, PRN   Or  potassium chloride 10 mEq/100 mL IVPB (Peripheral Line), 
PROGRESS NOTE        Patient Presents with/Seen in Consultation For      *Reason for Consult: New cirrhosis of the liver     CHIEF COMPLAINT: Abdominal pain and yellow sclera   Subjective:     Patient seen confusion and hallucinations noted. On CIWA receiving ativan. No overt signs of bleeding reported. Has mild abdominal discomfort no nausea or vomiting. POC d/w pt, nursing and family at bedside.     Review of Systems  Aside from what was mentioned in the PMH and HPI, essentially unremarkable, all others negative.    Objective:     /60   Pulse (!) 105   Temp 98.1 °F (36.7 °C) (Infrared)   Resp 17   Ht 1.524 m (5')   Wt 56.7 kg (125 lb)   SpO2 96%   BMI 24.41 kg/m²     General appearance: alert, awake, laying in bed fatigued and ill appearing having hallucinations   Eyes: conjunctiva pale, sclera anicteric. PERRL.  Lungs: clear to auscultation bilaterally  Heart: regular rate and rhythm, no murmur, 2+ pulses; no edema  Abdomen: softly-distended, non-tender; bowel sounds normal; no masses,  no organomegaly  Extremities: extremities without edema  Pulses: 2+ and symmetric  Skin: Skin color jaundiced, texture, turgor normal.   Neurologic: alert to self     calcium gluconate 1,000 mg in sodium chloride 50 mL, Once  pantoprazole (PROTONIX) injection 40 mg, BID AC  0.9 % sodium chloride infusion, PRN  sodium chloride flush 0.9 % injection 5-40 mL, 2 times per day  sodium chloride flush 0.9 % injection 5-40 mL, PRN  0.9 % sodium chloride infusion, PRN  potassium chloride (KLOR-CON M) extended release tablet 40 mEq, PRN   Or  potassium bicarb-citric acid (EFFER-K) effervescent tablet 40 mEq, PRN   Or  potassium chloride 10 mEq/100 mL IVPB (Peripheral Line), PRN  magnesium sulfate 2000 mg in 50 mL IVPB premix, PRN  ondansetron (ZOFRAN-ODT) disintegrating tablet 4 mg, Q8H PRN   Or  ondansetron (ZOFRAN) injection 4 mg, Q6H PRN  polyethylene glycol (GLYCOLAX) packet 17 g, Daily PRN  acetaminophen (TYLENOL) tablet 
PROGRESS NOTE        Patient Presents with/Seen in Consultation For      *Reason for Consult: New cirrhosis of the liver     CHIEF COMPLAINT: Abdominal pain and yellow sclera   Subjective:     Patient seen laying in bed, in NAD. Feeling OK. States she was having nightmares last night, scared her. Denies any N/V, or abdominal pain. Tolerating diet. POC reviewed with the patient including the need for continued abstinence.    Review of Systems  Aside from what was mentioned in the PMH and HPI, essentially unremarkable, all others negative.    Objective:     /76   Pulse 81   Temp 98.2 °F (36.8 °C) (Oral)   Resp 18   Ht 1.524 m (5')   Wt 67 kg (147 lb 11.3 oz)   SpO2 97%   BMI 28.85 kg/m²     General appearance: laying in bed ill appearing, jaundiced   Eyes: conjunctiva pale, sclera anicteric. PERRL.  Lungs: clear to auscultation bilaterally  Heart: regular rate and rhythm, no murmur, 2+ pulses; no edema  Abdomen: softly-distended, non-tender; bowel sounds normal; no masses,  no organomegaly  Extremities: extremities without edema  Pulses: 2+ and symmetric  Skin: Skin color jaundiced, texture, turgor normal.   Neurologic: alert to self     predniSONE (DELTASONE) tablet 30 mg, Daily  lactulose (CHRONULAC) 10 GM/15ML solution 20 g, TID  magnesium oxide (MAG-OX) tablet 400 mg, Daily  pantoprazole (PROTONIX) injection 40 mg, BID AC  0.9 % sodium chloride infusion, PRN  sodium chloride flush 0.9 % injection 5-40 mL, 2 times per day  sodium chloride flush 0.9 % injection 5-40 mL, PRN  0.9 % sodium chloride infusion, PRN  potassium chloride (KLOR-CON M) extended release tablet 40 mEq, PRN   Or  potassium bicarb-citric acid (EFFER-K) effervescent tablet 40 mEq, PRN   Or  potassium chloride 10 mEq/100 mL IVPB (Peripheral Line), PRN  magnesium sulfate 2000 mg in 50 mL IVPB premix, PRN  ondansetron (ZOFRAN-ODT) disintegrating tablet 4 mg, Q8H PRN   Or  ondansetron (ZOFRAN) injection 4 mg, Q6H PRN  polyethylene glycol 
PT RECEIVED  IN PACU POST EGD/ PT DROWSY REPORT RECEIVED ASSESSMENT HASSAN , PT AROUSABLE DENIESS PAIN  
Patient for EGD today with Dr. Quinones. Has been NPO. Procedure for today reviewed with the patient, all questions answered. Labs reviewed. OK to proceed.  
REPORT CALLED TO DONALDO WALTER AT THIS TIME AND UPDATED ON PROCEDURE AND PT CURRENT STATUS AT THIS TIME  
Reported repeat Hgb of 6.3 to Dr. Nuñez. New orders to come.  
SPIRITUAL HEALTH SERVICES - [facility] Encounter    Name: Jaylene Kapoor                  Referral: Routine Visit    Sacraments  Anointed ([place]): Yes  Apostolic [place]: No  Confession: No  Communion: No     Assessment:  Patient receptive to  visit.      Intervention:   provided spiritual support and sacramental ministry for patient.     Outcome:  Patient expressed gratitude for visit.    Plan:  Chaplains will remain available to offer spiritual and emotional support as needed.      Electronically signed by Chaplain Leonardo, on 2/13/2024 at 7:54 PM.  Spiritual Care Department  [facility]  [phone]   
Tory Morales NP notified of AM labs.   
    General Appearance: alert and awake, in mild distress  Skin: warm and dry  Head: normocephalic and atraumatic  Eyes: pupils equal, round, and reactive to light, extraocular eye movements intact, conjunctivae normal  Neck: neck supple and non tender without mass   Pulmonary/Chest: clear to auscultation bilaterally- no wheezes, rales or rhonchi, normal air movement, no respiratory distress  Cardiovascular: normal rate, normal S1 and S2 and no carotid bruits  Abdomen: soft, tender, non-distended, normal bowel sounds, no masses or organomegaly  Extremities: no cyanosis, no clubbing and no edema  Neurologic: no cranial nerve deficit and speech normal        Recent Labs     02/12/24  1607 02/13/24  0720    133   K 3.0* 3.2*   CL 96* 99   CO2 23 22   BUN 13 11   CREATININE 0.7 0.8   GLUCOSE 101* 124*   CALCIUM 7.0* 6.5*       Recent Labs     02/12/24  1607 02/13/24  0720 02/13/24  1023   WBC 9.0 7.1  --    RBC 2.19* 1.76*  --    HGB 8.4* 6.7* 6.3*   HCT 22.4* 18.2* 16.7*   .3* 103.4*  --    MCH 38.4* 38.1*  --    MCHC 37.5* 36.8*  --    RDW 21.2* 21.6*  --    PLT 78*  --   --    MPV 11.0 11.3  --          Assessment and Plan:     Principal Problem:    Cirrhosis of liver (HCC)  Active Problems:    Acute cystitis    Hypomagnesemia    Hypokalemia    Alcohol abuse with withdrawal (HCC)  Resolved Problems:    * No resolved hospital problems. *    Acute alcoholic hepatitis with cirrhosis.  Elevated LFTs and bilirubin.  US showed fatty liver CT abdomen showed acute hepatitis with cirrhosis.  With ascites noted on exam.  Ammonia 43.  INR 1.5.  GI following  Acute anemia.  Likely GI bleed. ?  Varices. Hemoglobin down to 6.3 today from 8.4 on admission.  1 unit ordered.  CLD. PPI BID. Appreciate GI input   Alcohol abuse.  States she drinks 2 pints or more of liquor daily.  Last drink night prior to admission.  Ethanol level 69 on admission.  Continue CIWA protocol  Nausea and vomiting.  Likely 2/2 the above.  
(144 lb 13.5 oz)   SpO2 97%   BMI 28.29 kg/m²     General Appearance: alert and awake, in no acute distress  Skin: warm and dry, jaundiced  Head: normocephalic and atraumatic  Eyes: pupils equal, round, and reactive to light, extraocular eye movements intact, conjunctivae icteric  Neck: neck supple and non tender without mass   Pulmonary/Chest: clear to auscultation bilaterally- no wheezes, rales or rhonchi, normal air movement, no respiratory distress  Cardiovascular: normal rate, normal S1 and S2 and no carotid bruits  Abdomen: soft, tender, distended, normal bowel sounds, no masses or organomegaly  Extremities: no cyanosis, no clubbing and no edema  Neurologic: no cranial nerve deficit and speech normal        Recent Labs     02/13/24  0720 02/14/24  0448 02/15/24  0633    138 136   K 3.2* 3.7 3.6   CL 99 105 107   CO2 22 19* 21*   BUN 11 7 7   CREATININE 0.8 0.6 0.6   GLUCOSE 124* 86 96   CALCIUM 6.5* 6.3* 6.6*       Recent Labs     02/12/24  1607 02/13/24  0720 02/13/24  1023 02/14/24  0448 02/14/24  1330 02/15/24  0633 02/15/24  1216   WBC 9.0 7.1  --  7.6  --  7.6  --    RBC 2.19* 1.76*  --  2.42*  --  2.23*  --    HGB 8.4* 6.7*   < > 8.4*  8.6* 8.0* 7.9*  8.0* 8.5*   HCT 22.4* 18.2*   < > 23.9*  23.5* 22.5* 22.1*  22.1* 24.1*   .3* 103.4*  --  98.8  --  99.1  --    MCH 38.4* 38.1*  --  34.7  --  35.4*  --    MCHC 37.5* 36.8*  --  35.1*  --  35.7*  --    RDW 21.2* 21.6*  --  22.9*  --  23.8*  --    PLT 78*  --   --  63*  --   --   --    MPV 11.0 11.3  --  10.9  --  11.3  --     < > = values in this interval not displayed.         Assessment and Plan:     Principal Problem:    Cirrhosis of liver (HCC)  Active Problems:    Acute cystitis    Hypomagnesemia    Hypokalemia    Alcohol abuse with withdrawal (HCC)  Resolved Problems:    * No resolved hospital problems. *    Acute alcoholic hepatitis with cirrhosis.  Elevated LFTs and bilirubin.  US showed fatty liver CT abdomen showed acute 
  Component Value Date    HDL 14 (L) 02/14/2024    HDL 97 02/25/2020       Lab Results   Component Value Date    LDLCALC 62 02/25/2020     No components found for: \"LABVLDL\"   PT/INR:    Lab Results   Component Value Date/Time    PROTIME 20.4 02/14/2024 04:48 AM    INR 1.9 02/14/2024 04:48 AM     IRON:    Lab Results   Component Value Date/Time    IRON 138 02/13/2024 04:41 PM     Iron Saturation:  No components found for: \"PERCENTFE\"  FERRITIN:    Lab Results   Component Value Date/Time    FERRITIN 1,342 02/13/2024 04:41 PM         Assessment:     Acute alcoholic hepatitis  DI 29  MELD 21  Alcoholic cirrhosis  EtOH  Fatigue   Anemia, suspected GIB, hx black stools, none currently per sister at bedside    Thrombocytopenia  Elevated LFTs  Hyperbilirubinemia  Electrolyte abnormalities  Abdominal bloating    Plan:   Waiting am labs   Liver serology pending   Acute hepatitis panel negative   Serial H&H, transfuse per primary  Occult stool x 1, please document in chart  Pantoprazole 40 mg every 12  Diet as tolerated  Lactulose 20 g TID ordered   Electrolyte replacement per primary   EGD planned for tomorrow with Dr. Quinones pending clinical course and withdrawal symptoms  NPO after midnight for possible procedure    CIWA per primary  Trend labs  Supportive care  Alcohol cessation, when able will discuss rehab options   Continue to monitor      Note: This report was completed utilizing computer voice recognition software. Every effort has been made to ensure accuracy, however; inadvertent computerized transcription errors may be present.     Discussed with Dr. Quinones  Plan per Dr. Joni Damian APRN-NP-C 2/15/2024  5:36 AM For Dr. Quinones    GI attending addendum:  The patient case was reviewed and discussed with the GI midlevel team.     Dwayne Quinones DO

## 2024-02-18 NOTE — DISCHARGE SUMMARY
[facility] Hospitalist Physician Discharge Summary       Logan Lee III, DO  [address]  [facility] [address]  [phone]    Follow up in 1 week(s)  hospital follow up    Terry Simms MD  [address] [facility] [address] [phone]    Follow up in 4 week(s)  hospital follow up      Activity level: As tolerated     Dispo: Home      Condition on discharge: Stable     Patient ID:  Jaylene Kapoor  [id number]  43 y.o.  1980    Admit date: 2/12/2024    Discharge date and time:  2/18/2024  1:22 PM    Admission Diagnoses: Principal Problem:    Cirrhosis of liver (HCC)  Active Problems:    Acute cystitis    Hypomagnesemia    Hypokalemia    Alcohol abuse with withdrawal (HCC)  Resolved Problems:    * No resolved hospital problems. *      Discharge Diagnoses: Principal Problem:    Cirrhosis of liver (HCC)  Active Problems:    Acute cystitis    Hypomagnesemia    Hypokalemia    Alcohol abuse with withdrawal (HCC)  Resolved Problems:    * No resolved hospital problems. *      Consults:  IP CONSULT TO GI  IP CONSULT TO SOCIAL WORK  IP CONSULT TO IV TEAM  IP CONSULT TO IV TEAM  IP CONSULT TO IV TEAM    Hospital Course:   Patient Jaylene Kapoor is a 43 y.o. presented with Hypokalemia [E87.6]  Hypomagnesemia [E83.42]  Cirrhosis of liver (HCC) [K74.60]  Chronic alcohol abuse [F10.10]  Alcoholic cirrhosis of liver with ascites (HCC) [K70.31]  Elevated bilirubin [R17]    Patient is a 43-year-old alcoholic female who was admitted due to acute alcoholic hepatitis with cirrhosis.  MELD 21.  Hemoglobin dropped on admission to 6.3 with concerns for GI bleed/varices, 1 unit transfused and placed on Protonix twice daily.  Underwent an EGD on 2/16 but no varices were appreciated, no fresh or old blood noted.  Hemoglobin remained stable after that.  Placed on CIWA protocol.  Met with peer recovery but refused admission.  Symptoms improved but LFTs and bilirubin remain elevated.  Patient was started

## 2024-02-20 NOTE — ANESTHESIA POSTPROCEDURE EVALUATION
Department of Anesthesiology  Postprocedure Note    Patient: Jaylene Kapoor  MRN: [id number]  YOB: 1980  Date of evaluation: 2/20/2024    Procedure Summary     Date: 02/16/24 Room / Location: [place] [address] / [facility]    Anesthesia Start: 1227 Anesthesia Stop: 1239    Procedure: EGD ESOPHAGOGASTRODUODENOSCOPY WITH DILATION Diagnosis:       Dysphagia, unspecified type      Anemia, unspecified type      (Dysphagia, unspecified type [R13.10])      (Anemia, unspecified type [D64.9])    Surgeons: Dwayne Quinones DO Responsible Provider: Natalya Alva DO    Anesthesia Type: MAC ASA Status: 4          Anesthesia Type: MAC    Miguel Phase I:      Miguel Phase II: Miguel Score: 10    Anesthesia Post Evaluation    Patient location during evaluation: PACU  Patient participation: complete - patient participated  Level of consciousness: awake and alert  Airway patency: patent  Nausea & Vomiting: no nausea and no vomiting  Cardiovascular status: hemodynamically stable  Respiratory status: acceptable  Hydration status: euvolemic  Pain management: adequate        No notable events documented.

## 2024-02-26 ENCOUNTER — HOSPITAL ENCOUNTER (INPATIENT)
Age: 44
LOS: 1 days | Discharge: HOME | DRG: 280 | End: 2024-02-28
Attending: EMERGENCY MEDICINE | Admitting: INTERNAL MEDICINE
Payer: COMMERCIAL

## 2024-02-26 ENCOUNTER — APPOINTMENT (OUTPATIENT)
Dept: CT IMAGING | Age: 44
DRG: 280 | End: 2024-02-26
Attending: EMERGENCY MEDICINE
Payer: COMMERCIAL

## 2024-02-26 ENCOUNTER — APPOINTMENT (OUTPATIENT)
Dept: GENERAL RADIOLOGY | Age: 44
DRG: 280 | End: 2024-02-26
Payer: COMMERCIAL

## 2024-02-26 DIAGNOSIS — K70.11 ASCITES DUE TO ALCOHOLIC HEPATITIS: ICD-10-CM

## 2024-02-26 DIAGNOSIS — K70.31 ALCOHOLIC CIRRHOSIS OF LIVER WITH ASCITES (HCC): ICD-10-CM

## 2024-02-26 DIAGNOSIS — E80.6 HYPERBILIRUBINEMIA: ICD-10-CM

## 2024-02-26 DIAGNOSIS — K56.600 PARTIAL SMALL BOWEL OBSTRUCTION (HCC): Primary | ICD-10-CM

## 2024-02-26 DIAGNOSIS — R60.1 ANASARCA: ICD-10-CM

## 2024-02-26 DIAGNOSIS — E87.6 HYPOKALEMIA: ICD-10-CM

## 2024-02-26 LAB
ALBUMIN SERPL-MCNC: 2.9 G/DL (ref 3.5–5.2)
ALP SERPL-CCNC: 178 U/L (ref 35–104)
ALT SERPL-CCNC: 98 U/L (ref 0–32)
ANION GAP SERPL CALCULATED.3IONS-SCNC: 13 MMOL/L (ref 7–16)
AST SERPL-CCNC: 163 U/L (ref 0–31)
BACTERIA URNS QL MICRO: ABNORMAL
BASOPHILS # BLD: 0 K/UL (ref 0–0.2)
BASOPHILS NFR BLD: 0 % (ref 0–2)
BILIRUB DIRECT SERPL-MCNC: 6 MG/DL (ref 0–0.3)
BILIRUB INDIRECT SERPL-MCNC: 3.4 MG/DL (ref 0–1)
BILIRUB SERPL-MCNC: 9.4 MG/DL (ref 0–1.2)
BILIRUB UR QL STRIP: ABNORMAL
BNP SERPL-MCNC: 791 PG/ML (ref 0–125)
BUN SERPL-MCNC: 16 MG/DL (ref 6–20)
CALCIUM SERPL-MCNC: 8.3 MG/DL (ref 8.6–10.2)
CHLORIDE SERPL-SCNC: 106 MMOL/L (ref 98–107)
CLARITY UR: CLEAR
CO2 SERPL-SCNC: 16 MMOL/L (ref 22–29)
COLOR UR: YELLOW
CREAT SERPL-MCNC: 0.8 MG/DL (ref 0.5–1)
EOSINOPHIL # BLD: 0 K/UL (ref 0.05–0.5)
EOSINOPHILS RELATIVE PERCENT: 0 % (ref 0–6)
ERYTHROCYTE [DISTWIDTH] IN BLOOD BY AUTOMATED COUNT: ABNORMAL % (ref 11.5–15)
GFR SERPL CREATININE-BSD FRML MDRD: >60 ML/MIN/1.73M2
GLUCOSE SERPL-MCNC: 154 MG/DL (ref 74–99)
GLUCOSE UR STRIP-MCNC: NEGATIVE MG/DL
HCT VFR BLD AUTO: 24.7 % (ref 34–48)
HGB BLD-MCNC: 8.4 G/DL (ref 11.5–15.5)
HGB UR QL STRIP.AUTO: NEGATIVE
INR PPP: 1.6
KETONES UR STRIP-MCNC: NEGATIVE MG/DL
LACTATE BLDV-SCNC: 1.8 MMOL/L (ref 0.5–2.2)
LEUKOCYTE ESTERASE UR QL STRIP: NEGATIVE
LIPASE SERPL-CCNC: 63 U/L (ref 13–60)
LYMPHOCYTES NFR BLD: 0.11 K/UL (ref 1.5–4)
LYMPHOCYTES RELATIVE PERCENT: 1 % (ref 20–42)
MAGNESIUM SERPL-MCNC: 1.5 MG/DL (ref 1.6–2.6)
MCH RBC QN AUTO: 35.4 PG (ref 26–35)
MCHC RBC AUTO-ENTMCNC: 34 G/DL (ref 32–34.5)
MCV RBC AUTO: 104.2 FL (ref 80–99.9)
METAMYELOCYTES ABSOLUTE COUNT: 0.11 K/UL (ref 0–0.12)
METAMYELOCYTES: 1 % (ref 0–1)
MONOCYTES NFR BLD: 0.44 K/UL (ref 0.1–0.95)
MONOCYTES NFR BLD: 4 % (ref 2–12)
NEUTROPHILS NFR BLD: 95 % (ref 43–80)
NEUTS SEG NFR BLD: 11.94 K/UL (ref 1.8–7.3)
NITRITE UR QL STRIP: NEGATIVE
PH UR STRIP: 6 [PH] (ref 5–9)
PLATELET # BLD AUTO: 176 K/UL (ref 130–450)
PMV BLD AUTO: 12.6 FL (ref 7–12)
POTASSIUM SERPL-SCNC: 5.1 MMOL/L (ref 3.5–5)
PROT SERPL-MCNC: 6.4 G/DL (ref 6.4–8.3)
PROT UR STRIP-MCNC: NEGATIVE MG/DL
PROTHROMBIN TIME: 17.6 SEC (ref 9.3–12.4)
RBC # BLD AUTO: 2.37 M/UL (ref 3.5–5.5)
RBC # BLD: ABNORMAL 10*6/UL
RBC #/AREA URNS HPF: ABNORMAL /HPF
SODIUM SERPL-SCNC: 135 MMOL/L (ref 132–146)
SP GR UR STRIP: 1.01 (ref 1–1.03)
UROBILINOGEN UR STRIP-ACNC: 0.2 EU/DL (ref 0–1)
WBC # BLD: ABNORMAL 10*3/UL
WBC #/AREA URNS HPF: ABNORMAL /HPF
WBC OTHER # BLD: 12.6 K/UL (ref 4.5–11.5)

## 2024-02-26 PROCEDURE — 99285 EMERGENCY DEPT VISIT HI MDM: CPT

## 2024-02-26 PROCEDURE — 93005 ELECTROCARDIOGRAM TRACING: CPT | Performed by: EMERGENCY MEDICINE

## 2024-02-26 PROCEDURE — 82248 BILIRUBIN DIRECT: CPT

## 2024-02-26 PROCEDURE — 71045 X-RAY EXAM CHEST 1 VIEW: CPT

## 2024-02-26 PROCEDURE — 6360000004 HC RX CONTRAST MEDICATION: Performed by: RADIOLOGY

## 2024-02-26 PROCEDURE — 99222 1ST HOSP IP/OBS MODERATE 55: CPT | Performed by: INTERNAL MEDICINE

## 2024-02-26 PROCEDURE — 74177 CT ABD & PELVIS W/CONTRAST: CPT

## 2024-02-26 PROCEDURE — 83880 ASSAY OF NATRIURETIC PEPTIDE: CPT

## 2024-02-26 PROCEDURE — 83690 ASSAY OF LIPASE: CPT

## 2024-02-26 PROCEDURE — 83735 ASSAY OF MAGNESIUM: CPT

## 2024-02-26 PROCEDURE — 85025 COMPLETE CBC W/AUTO DIFF WBC: CPT

## 2024-02-26 PROCEDURE — 81001 URINALYSIS AUTO W/SCOPE: CPT

## 2024-02-26 PROCEDURE — 83605 ASSAY OF LACTIC ACID: CPT

## 2024-02-26 PROCEDURE — 85610 PROTHROMBIN TIME: CPT

## 2024-02-26 PROCEDURE — 80053 COMPREHEN METABOLIC PANEL: CPT

## 2024-02-26 RX ORDER — MAGNESIUM SULFATE IN WATER 40 MG/ML
2000 INJECTION, SOLUTION INTRAVENOUS ONCE
Status: COMPLETED | OUTPATIENT
Start: 2024-02-26 | End: 2024-02-27

## 2024-02-26 RX ADMIN — IOPAMIDOL 75 ML: 755 INJECTION, SOLUTION INTRAVENOUS at 22:12

## 2024-02-26 ASSESSMENT — PAIN - FUNCTIONAL ASSESSMENT: PAIN_FUNCTIONAL_ASSESSMENT: NONE - DENIES PAIN

## 2024-02-27 PROBLEM — K70.10 ALCOHOLIC HEPATITIS: Status: ACTIVE | Noted: 2024-02-27

## 2024-02-27 PROBLEM — R60.1 ANASARCA: Status: ACTIVE | Noted: 2024-02-27

## 2024-02-27 PROBLEM — K70.11 ASCITES DUE TO ALCOHOLIC HEPATITIS: Status: ACTIVE | Noted: 2024-02-27

## 2024-02-27 PROBLEM — F10.90 ALCOHOL USE DISORDER: Status: ACTIVE | Noted: 2024-02-27

## 2024-02-27 LAB
EKG ATRIAL RATE: 83 BPM
EKG P AXIS: 26 DEGREES
EKG P-R INTERVAL: 120 MS
EKG Q-T INTERVAL: 368 MS
EKG QRS DURATION: 66 MS
EKG QTC CALCULATION (BAZETT): 432 MS
EKG R AXIS: 19 DEGREES
EKG T AXIS: 24 DEGREES
EKG VENTRICULAR RATE: 83 BPM
POTASSIUM SERPL-SCNC: 5.1 MMOL/L (ref 3.5–5)

## 2024-02-27 PROCEDURE — 6370000000 HC RX 637 (ALT 250 FOR IP): Performed by: INTERNAL MEDICINE

## 2024-02-27 PROCEDURE — 1200000000 HC SEMI PRIVATE

## 2024-02-27 PROCEDURE — 93010 ELECTROCARDIOGRAM REPORT: CPT | Performed by: INTERNAL MEDICINE

## 2024-02-27 PROCEDURE — 6370000000 HC RX 637 (ALT 250 FOR IP)

## 2024-02-27 PROCEDURE — 6360000002 HC RX W HCPCS

## 2024-02-27 PROCEDURE — P9047 ALBUMIN (HUMAN), 25%, 50ML: HCPCS | Performed by: INTERNAL MEDICINE

## 2024-02-27 PROCEDURE — 99233 SBSQ HOSP IP/OBS HIGH 50: CPT | Performed by: INTERNAL MEDICINE

## 2024-02-27 PROCEDURE — 6360000002 HC RX W HCPCS: Performed by: INTERNAL MEDICINE

## 2024-02-27 PROCEDURE — 6370000000 HC RX 637 (ALT 250 FOR IP): Performed by: NURSE PRACTITIONER

## 2024-02-27 PROCEDURE — 2580000003 HC RX 258: Performed by: INTERNAL MEDICINE

## 2024-02-27 PROCEDURE — 6360000002 HC RX W HCPCS: Performed by: EMERGENCY MEDICINE

## 2024-02-27 PROCEDURE — 84132 ASSAY OF SERUM POTASSIUM: CPT

## 2024-02-27 RX ORDER — SENNOSIDES A AND B 8.6 MG/1
1 TABLET, FILM COATED ORAL DAILY PRN
Status: DISCONTINUED | OUTPATIENT
Start: 2024-02-27 | End: 2024-02-28 | Stop reason: HOSPADM

## 2024-02-27 RX ORDER — SODIUM CHLORIDE 0.9 % (FLUSH) 0.9 %
5-40 SYRINGE (ML) INJECTION EVERY 12 HOURS SCHEDULED
Status: DISCONTINUED | OUTPATIENT
Start: 2024-02-27 | End: 2024-02-28 | Stop reason: HOSPADM

## 2024-02-27 RX ORDER — HYDROXYZINE PAMOATE 25 MG/1
25 CAPSULE ORAL 3 TIMES DAILY PRN
Status: DISCONTINUED | OUTPATIENT
Start: 2024-02-27 | End: 2024-02-28 | Stop reason: HOSPADM

## 2024-02-27 RX ORDER — SODIUM CHLORIDE 0.9 % (FLUSH) 0.9 %
5-40 SYRINGE (ML) INJECTION PRN
Status: DISCONTINUED | OUTPATIENT
Start: 2024-02-27 | End: 2024-02-28 | Stop reason: HOSPADM

## 2024-02-27 RX ORDER — PREDNISOLONE SODIUM PHOSPHATE 15 MG/5ML
30 SOLUTION ORAL DAILY
Status: DISCONTINUED | OUTPATIENT
Start: 2024-02-27 | End: 2024-02-28 | Stop reason: HOSPADM

## 2024-02-27 RX ORDER — SODIUM CHLORIDE 9 MG/ML
INJECTION, SOLUTION INTRAVENOUS PRN
Status: DISCONTINUED | OUTPATIENT
Start: 2024-02-27 | End: 2024-02-28 | Stop reason: HOSPADM

## 2024-02-27 RX ORDER — NICOTINE 21 MG/24HR
1 PATCH, TRANSDERMAL 24 HOURS TRANSDERMAL DAILY
Status: DISCONTINUED | OUTPATIENT
Start: 2024-02-27 | End: 2024-02-27

## 2024-02-27 RX ORDER — BENZONATATE 100 MG/1
100 CAPSULE ORAL 3 TIMES DAILY PRN
Status: DISCONTINUED | OUTPATIENT
Start: 2024-02-27 | End: 2024-02-28 | Stop reason: HOSPADM

## 2024-02-27 RX ORDER — DEXTROSE MONOHYDRATE 100 MG/ML
INJECTION, SOLUTION INTRAVENOUS CONTINUOUS PRN
Status: DISCONTINUED | OUTPATIENT
Start: 2024-02-27 | End: 2024-02-28 | Stop reason: HOSPADM

## 2024-02-27 RX ORDER — GLUCAGON 1 MG/ML
1 KIT INJECTION PRN
Status: DISCONTINUED | OUTPATIENT
Start: 2024-02-27 | End: 2024-02-28 | Stop reason: HOSPADM

## 2024-02-27 RX ORDER — MECOBALAMIN 5000 MCG
5 TABLET,DISINTEGRATING ORAL EVERY EVENING
Status: DISCONTINUED | OUTPATIENT
Start: 2024-02-27 | End: 2024-02-28 | Stop reason: HOSPADM

## 2024-02-27 RX ORDER — MECOBALAMIN 5000 MCG
5 TABLET,DISINTEGRATING ORAL NIGHTLY PRN
Status: DISCONTINUED | OUTPATIENT
Start: 2024-02-27 | End: 2024-02-28 | Stop reason: HOSPADM

## 2024-02-27 RX ORDER — ONDANSETRON 4 MG/1
4 TABLET, ORALLY DISINTEGRATING ORAL EVERY 8 HOURS PRN
Status: DISCONTINUED | OUTPATIENT
Start: 2024-02-27 | End: 2024-02-28 | Stop reason: HOSPADM

## 2024-02-27 RX ORDER — HYDRALAZINE HYDROCHLORIDE 20 MG/ML
10 INJECTION INTRAMUSCULAR; INTRAVENOUS EVERY 6 HOURS PRN
Status: DISCONTINUED | OUTPATIENT
Start: 2024-02-27 | End: 2024-02-28 | Stop reason: HOSPADM

## 2024-02-27 RX ORDER — ONDANSETRON 2 MG/ML
4 INJECTION INTRAMUSCULAR; INTRAVENOUS EVERY 6 HOURS PRN
Status: DISCONTINUED | OUTPATIENT
Start: 2024-02-27 | End: 2024-02-28 | Stop reason: HOSPADM

## 2024-02-27 RX ORDER — LACTULOSE 10 G/15ML
20 SOLUTION ORAL 3 TIMES DAILY
Status: DISCONTINUED | OUTPATIENT
Start: 2024-02-27 | End: 2024-02-28 | Stop reason: HOSPADM

## 2024-02-27 RX ORDER — LANOLIN ALCOHOL/MO/W.PET/CERES
100 CREAM (GRAM) TOPICAL DAILY
Status: DISCONTINUED | OUTPATIENT
Start: 2024-02-27 | End: 2024-02-28 | Stop reason: HOSPADM

## 2024-02-27 RX ORDER — SPIRONOLACTONE 25 MG/1
50 TABLET ORAL DAILY
Status: DISCONTINUED | OUTPATIENT
Start: 2024-02-27 | End: 2024-02-28 | Stop reason: HOSPADM

## 2024-02-27 RX ORDER — ACETAMINOPHEN 650 MG/1
650 SUPPOSITORY RECTAL EVERY 6 HOURS PRN
Status: DISCONTINUED | OUTPATIENT
Start: 2024-02-27 | End: 2024-02-28 | Stop reason: HOSPADM

## 2024-02-27 RX ORDER — KETOROLAC TROMETHAMINE 30 MG/ML
30 INJECTION, SOLUTION INTRAMUSCULAR; INTRAVENOUS ONCE
Status: COMPLETED | OUTPATIENT
Start: 2024-02-27 | End: 2024-02-27

## 2024-02-27 RX ORDER — ACETAMINOPHEN 325 MG/1
650 TABLET ORAL EVERY 6 HOURS PRN
Status: DISCONTINUED | OUTPATIENT
Start: 2024-02-27 | End: 2024-02-28 | Stop reason: HOSPADM

## 2024-02-27 RX ORDER — MAGNESIUM HYDROXIDE/ALUMINUM HYDROXICE/SIMETHICONE 120; 1200; 1200 MG/30ML; MG/30ML; MG/30ML
30 SUSPENSION ORAL EVERY 6 HOURS PRN
Status: DISCONTINUED | OUTPATIENT
Start: 2024-02-27 | End: 2024-02-28 | Stop reason: HOSPADM

## 2024-02-27 RX ORDER — POTASSIUM CHLORIDE 20 MEQ/1
40 TABLET, EXTENDED RELEASE ORAL PRN
Status: DISCONTINUED | OUTPATIENT
Start: 2024-02-27 | End: 2024-02-28 | Stop reason: HOSPADM

## 2024-02-27 RX ORDER — PANTOPRAZOLE SODIUM 40 MG/1
40 TABLET, DELAYED RELEASE ORAL
Status: DISCONTINUED | OUTPATIENT
Start: 2024-02-27 | End: 2024-02-28 | Stop reason: HOSPADM

## 2024-02-27 RX ORDER — POTASSIUM CHLORIDE 7.45 MG/ML
10 INJECTION INTRAVENOUS PRN
Status: DISCONTINUED | OUTPATIENT
Start: 2024-02-27 | End: 2024-02-28 | Stop reason: HOSPADM

## 2024-02-27 RX ORDER — ALBUMIN (HUMAN) 12.5 G/50ML
25 SOLUTION INTRAVENOUS EVERY 8 HOURS
Status: COMPLETED | OUTPATIENT
Start: 2024-02-27 | End: 2024-02-28

## 2024-02-27 RX ORDER — MAGNESIUM SULFATE IN WATER 40 MG/ML
2000 INJECTION, SOLUTION INTRAVENOUS PRN
Status: DISCONTINUED | OUTPATIENT
Start: 2024-02-27 | End: 2024-02-28 | Stop reason: HOSPADM

## 2024-02-27 RX ORDER — FUROSEMIDE 40 MG/1
40 TABLET ORAL DAILY
Status: DISCONTINUED | OUTPATIENT
Start: 2024-02-27 | End: 2024-02-28 | Stop reason: HOSPADM

## 2024-02-27 RX ORDER — NICOTINE 21 MG/24HR
1 PATCH, TRANSDERMAL 24 HOURS TRANSDERMAL DAILY
Status: DISCONTINUED | OUTPATIENT
Start: 2024-02-27 | End: 2024-02-28 | Stop reason: HOSPADM

## 2024-02-27 RX ORDER — CALCIUM CARBONATE 500 MG/1
500 TABLET, CHEWABLE ORAL 3 TIMES DAILY PRN
Status: DISCONTINUED | OUTPATIENT
Start: 2024-02-27 | End: 2024-02-28 | Stop reason: HOSPADM

## 2024-02-27 RX ADMIN — PANTOPRAZOLE SODIUM 40 MG: 40 TABLET, DELAYED RELEASE ORAL at 09:09

## 2024-02-27 RX ADMIN — LACTULOSE 20 G: 20 SOLUTION ORAL at 19:32

## 2024-02-27 RX ADMIN — PREDNISOLONE SODIUM PHOSPHATE 30 MG: 15 SOLUTION ORAL at 15:40

## 2024-02-27 RX ADMIN — PANTOPRAZOLE SODIUM 40 MG: 40 TABLET, DELAYED RELEASE ORAL at 17:11

## 2024-02-27 RX ADMIN — Medication 100 MG: at 10:02

## 2024-02-27 RX ADMIN — MAGNESIUM SULFATE IN WATER 2000 MG: 40 INJECTION, SOLUTION INTRAVENOUS at 00:20

## 2024-02-27 RX ADMIN — ALBUMIN (HUMAN) 25 G: 0.25 INJECTION, SOLUTION INTRAVENOUS at 17:11

## 2024-02-27 RX ADMIN — LACTULOSE 20 G: 20 SOLUTION ORAL at 12:39

## 2024-02-27 RX ADMIN — KETOROLAC TROMETHAMINE 30 MG: 30 INJECTION, SOLUTION INTRAMUSCULAR at 03:30

## 2024-02-27 RX ADMIN — ACETAMINOPHEN 650 MG: 325 TABLET ORAL at 19:32

## 2024-02-27 RX ADMIN — LACTULOSE 20 G: 20 SOLUTION ORAL at 10:02

## 2024-02-27 RX ADMIN — ALBUMIN (HUMAN) 25 G: 0.25 INJECTION, SOLUTION INTRAVENOUS at 10:33

## 2024-02-27 RX ADMIN — FUROSEMIDE 40 MG: 40 TABLET ORAL at 10:08

## 2024-02-27 RX ADMIN — HYDROXYZINE PAMOATE 25 MG: 25 CAPSULE ORAL at 23:41

## 2024-02-27 RX ADMIN — SODIUM CHLORIDE, PRESERVATIVE FREE 10 ML: 5 INJECTION INTRAVENOUS at 10:04

## 2024-02-27 RX ADMIN — SODIUM CHLORIDE, PRESERVATIVE FREE 10 ML: 5 INJECTION INTRAVENOUS at 19:40

## 2024-02-27 RX ADMIN — SPIRONOLACTONE 50 MG: 25 TABLET ORAL at 10:08

## 2024-02-27 RX ADMIN — Medication 5 MG: at 19:39

## 2024-02-27 ASSESSMENT — PAIN SCALES - GENERAL
PAINLEVEL_OUTOF10: 5
PAINLEVEL_OUTOF10: 5
PAINLEVEL_OUTOF10: 3

## 2024-02-27 ASSESSMENT — PAIN DESCRIPTION - LOCATION
LOCATION: ABDOMEN
LOCATION: ABDOMEN;HEAD
LOCATION: ABDOMEN;FOOT;LEG

## 2024-02-27 ASSESSMENT — PAIN DESCRIPTION - DESCRIPTORS
DESCRIPTORS: HEAVINESS;PRESSURE
DESCRIPTORS: PRESSURE

## 2024-02-27 ASSESSMENT — PAIN - FUNCTIONAL ASSESSMENT: PAIN_FUNCTIONAL_ASSESSMENT: 0-10

## 2024-02-27 NOTE — CONSULTS
GENERAL SURGERY  CONSULT NOTE  2024    Physician Consulted: Dr. Johnson   Reason for Consult: pSBO   Referring Physician: Dr. Eduardo COYLE  Jaylene Kapoor is a 43 y.o. female with recently diagnosed EtOH cirrhosis who presented to the ED from [facility] for evaluation of bilateral lower extremity edema and abdominal distention. ED workup included CTAP showing mild small bowel dilation and general surgery was consulted for possible pSBO. The patient states she has been at [facility] since she was discharged from [facility]. They had noticed worsening scleral icterus and lower extremity swelling thus sent her to the ED for evaluation. During her admission at [facility] - she was seen by GI for acute alcoholic hepatitis and GIB. She underwent EGD with no source of GIB and was started on lactulose and predisone taper. Currently she reports some abdominal distention and nausea due to not eating all day. She states she has continued to take lactulose as prescribed and last had a bowel movement in the morning. She states she is still passing some flatus. Denies any episodes of emesis.     Hx of cholecystectomy multiple years ago and  section. Denies any AC or AP use. Was unable to quantify how much alcohol she used to drink. She states she has not had any alcohol since her admission at [facility].     VSS. LA 1.8. Tbili 9.4 w direct 6. Last bilirubin 9.1 on . Plt 95. Hgb 8.4. WBC 12.6. MELD 23. CTAP showing interval increased of ascites. Mild small bowel dilation and decompressed stomach.       Past Medical History:   Diagnosis Date    Heart burn     Hypertension        Past Surgical History:   Procedure Laterality Date    UPPER GASTROINTESTINAL ENDOSCOPY N/A 2024    EGD ESOPHAGOGASTRODUODENOSCOPY WITH DILATION performed by Dwayne Quinones DO at [facility] ENDOSCOPY       Medications Prior to Admission:    Prior to Admission medications    Medication Sig Start Date End Date Taking? Authorizing Provider

## 2024-02-27 NOTE — H&P
adjustment of the mA/kV was utilized to reduce the radiation dose to as low as reasonably achievable. COMPARISON: Right upper quadrant ultrasound 02/12/2024. HISTORY: ORDERING SYSTEM PROVIDED HISTORY: jaundice, alcohol abuse, vomiting TECHNOLOGIST PROVIDED HISTORY: Additional Contrast?->None Reason for exam:->jaundice, alcohol abuse, vomiting Decision Support Exception - unselect if not a suspected or confirmed emergency medical condition->Emergency Medical Condition (MA) FINDINGS: Lower Chest:  Visualized portion of the lower chest demonstrates no acute abnormality. Organs: Liver demonstrates diffusely decreased and mildly heterogeneous enhancement with hypertrophy of the caudate lobe and left lateral segment as well as a slightly nodular surface suggestive of fatty infiltration and early cirrhosis.  A recanalized paraumbilical vein is noted.  Portal vein is patent.  No concerning focal hepatic mass visualized.  There has been a cholecystectomy.  No biliary ductal dilatation. The spleen, pancreas and adrenal glands are unremarkable. The kidneys enhance symmetrically without evidence of hydronephrosis. GI/Bowel: Stomach and duodenal sweep demonstrate no acute abnormality. There is no evidence of bowel obstruction.  No evidence of abnormal bowel wall thickening or distension. The appendix is visualized and is unremarkable.  No evidence of acute appendicitis. Pelvis:  Bladder is unremarkable in appearance.  The uterus and adnexa are without acute abnormality. Peritoneum/Retroperitoneum: A trace amount of ascites is identified. Multiple collateral vessels are seen in the upper abdomen.  No free air is noted.  No evidence of lymphadenopathy.  Aorta is normal in caliber. Bones/Soft Tissues:  No acute abnormality of the visualized osseous structures.     Mottled enhancement of the liver suggestive of acute hepatitis with underlying cirrhosis with fatty infiltration.  Recommend correlation with liver function tests. Portal

## 2024-02-27 NOTE — ED PROVIDER NOTES
[facility] EMERGENCY DEPARTMENT  EMERGENCY DEPARTMENT ENCOUNTER        Pt Name: Jaylene Kapoor  MRN: [id number]  Birthdate 1980  Date of evaluation: 2/26/2024  Provider: Meagan Clark DO  PCP: Logan Lee III, DO  Note Started: 11:28 PM EST 2/26/24    CHIEF COMPLAINT       Chief Complaint   Patient presents with    Jaundice     Patient c/o bilateral LE edema and jaundice to eyes. Sent from [facility]       HISTORY OF PRESENT ILLNESS: 1 or more Elements        Limitations to history : None    Jaylene Kapoor is a 43 y.o. female who presents for evaluation of bilateral lower extremity edema, abdominal distention and jaundice.  She was recently admitted to the hospital for the same but feels like all of her symptoms have been gradually worsening.  She was recently admitted to [facility] for alcohol detox.  She has not had a drink in 2 weeks.  She has mild abdominal tenderness.  Denies any fever or chills.  Denies any nausea or vomiting.  Unknown last bowel movement    Nursing Notes were all reviewed and agreed with or any disagreements were addressed in the HPI.      REVIEW OF EXTERNAL NOTE :    Reviewed previous admission on 2/12/2024      Chart Review/External Note Review    Last Echo reviewed by Me:  No results found for: \"LVEF\", \"LVEFMODE\"          Controlled Substance Monitoring:    Acute and Chronic Pain Monitoring:        No data to display                    REVIEW OF SYSTEMS :      Positives and Pertinent negatives as per HPI.     SURGICAL HISTORY     Past Surgical History:   Procedure Laterality Date    UPPER GASTROINTESTINAL ENDOSCOPY N/A 2/16/2024    EGD ESOPHAGOGASTRODUODENOSCOPY WITH DILATION performed by Dwayne Quinones DO at [facility] ENDOSCOPY       CURRENTMEDICATIONS       Previous Medications    LACTULOSE (CHRONULAC) 10 GM/15ML SOLUTION    Take 30 mLs by mouth 3 times daily    PANTOPRAZOLE (PROTONIX) 40 MG TABLET    Take 1 tablet by mouth 2

## 2024-02-27 NOTE — PLAN OF CARE
Problem: Pain  Goal: Verbalizes/displays adequate comfort level or baseline comfort level  2/27/2024 1759 by Ashley Holloway, RN  Outcome: Progressing  2/27/2024 1756 by Ashley Holloway, RN  Outcome: Progressing     Problem: Safety - Adult  Goal: Free from fall injury  2/27/2024 1759 by Ashley oHlloway, RN  Outcome: Progressing  2/27/2024 1756 by Ashley Holloway, RN  Outcome: Progressing

## 2024-02-28 VITALS
DIASTOLIC BLOOD PRESSURE: 68 MMHG | BODY MASS INDEX: 27.01 KG/M2 | WEIGHT: 137.57 LBS | TEMPERATURE: 98.1 F | HEIGHT: 60 IN | OXYGEN SATURATION: 97 % | HEART RATE: 79 BPM | RESPIRATION RATE: 18 BRPM | SYSTOLIC BLOOD PRESSURE: 115 MMHG

## 2024-02-28 LAB
ALBUMIN SERPL-MCNC: 3.7 G/DL (ref 3.5–5.2)
ALP SERPL-CCNC: 147 U/L (ref 35–104)
ALT SERPL-CCNC: 85 U/L (ref 0–32)
ANION GAP SERPL CALCULATED.3IONS-SCNC: 17 MMOL/L (ref 7–16)
AST SERPL-CCNC: 139 U/L (ref 0–31)
BASOPHILS # BLD: 0 K/UL (ref 0–0.2)
BASOPHILS NFR BLD: 0 % (ref 0–2)
BILIRUB SERPL-MCNC: 10.6 MG/DL (ref 0–1.2)
BUN SERPL-MCNC: 20 MG/DL (ref 6–20)
CALCIUM SERPL-MCNC: 8.8 MG/DL (ref 8.6–10.2)
CHLORIDE SERPL-SCNC: 110 MMOL/L (ref 98–107)
CO2 SERPL-SCNC: 16 MMOL/L (ref 22–29)
CREAT SERPL-MCNC: 0.9 MG/DL (ref 0.5–1)
EOSINOPHIL # BLD: 0 K/UL (ref 0.05–0.5)
EOSINOPHILS RELATIVE PERCENT: 0 % (ref 0–6)
ERYTHROCYTE [DISTWIDTH] IN BLOOD BY AUTOMATED COUNT: 23.8 % (ref 11.5–15)
GFR SERPL CREATININE-BSD FRML MDRD: >60 ML/MIN/1.73M2
GLUCOSE SERPL-MCNC: 115 MG/DL (ref 74–99)
HCT VFR BLD AUTO: 24.9 % (ref 34–48)
HGB BLD-MCNC: 8.4 G/DL (ref 11.5–15.5)
LYMPHOCYTES NFR BLD: 0.42 K/UL (ref 1.5–4)
LYMPHOCYTES RELATIVE PERCENT: 4 % (ref 20–42)
MCH RBC QN AUTO: 36.1 PG (ref 26–35)
MCHC RBC AUTO-ENTMCNC: 33.7 G/DL (ref 32–34.5)
MCV RBC AUTO: 106.9 FL (ref 80–99.9)
MONOCYTES NFR BLD: 0.84 K/UL (ref 0.1–0.95)
MONOCYTES NFR BLD: 7 % (ref 2–12)
MYELOCYTES ABSOLUTE COUNT: 0.1 K/UL
MYELOCYTES: 1 %
NEUTROPHILS NFR BLD: 89 % (ref 43–80)
NEUTS SEG NFR BLD: 10.54 K/UL (ref 1.8–7.3)
NUCLEATED RED BLOOD CELLS: 1 PER 100 WBC
PLATELET # BLD AUTO: 134 K/UL (ref 130–450)
PMV BLD AUTO: 12.8 FL (ref 7–12)
POTASSIUM SERPL-SCNC: 4.6 MMOL/L (ref 3.5–5)
PROT SERPL-MCNC: 6.3 G/DL (ref 6.4–8.3)
RBC # BLD AUTO: 2.33 M/UL (ref 3.5–5.5)
RBC # BLD: ABNORMAL 10*6/UL
SODIUM SERPL-SCNC: 143 MMOL/L (ref 132–146)
WBC OTHER # BLD: 11.9 K/UL (ref 4.5–11.5)

## 2024-02-28 PROCEDURE — 36415 COLL VENOUS BLD VENIPUNCTURE: CPT

## 2024-02-28 PROCEDURE — 6370000000 HC RX 637 (ALT 250 FOR IP): Performed by: INTERNAL MEDICINE

## 2024-02-28 PROCEDURE — 99232 SBSQ HOSP IP/OBS MODERATE 35: CPT | Performed by: INTERNAL MEDICINE

## 2024-02-28 PROCEDURE — 80053 COMPREHEN METABOLIC PANEL: CPT

## 2024-02-28 PROCEDURE — 2580000003 HC RX 258: Performed by: INTERNAL MEDICINE

## 2024-02-28 PROCEDURE — P9047 ALBUMIN (HUMAN), 25%, 50ML: HCPCS | Performed by: INTERNAL MEDICINE

## 2024-02-28 PROCEDURE — 85025 COMPLETE CBC W/AUTO DIFF WBC: CPT

## 2024-02-28 PROCEDURE — 6360000002 HC RX W HCPCS: Performed by: INTERNAL MEDICINE

## 2024-02-28 PROCEDURE — 6370000000 HC RX 637 (ALT 250 FOR IP)

## 2024-02-28 RX ORDER — SPIRONOLACTONE 50 MG/1
50 TABLET, FILM COATED ORAL DAILY
Qty: 30 TABLET | Refills: 1 | Status: SHIPPED | OUTPATIENT
Start: 2024-02-29

## 2024-02-28 RX ORDER — FUROSEMIDE 40 MG/1
40 TABLET ORAL DAILY
Qty: 60 TABLET | Refills: 1 | Status: SHIPPED | OUTPATIENT
Start: 2024-02-29

## 2024-02-28 RX ADMIN — Medication 100 MG: at 09:09

## 2024-02-28 RX ADMIN — PREDNISOLONE SODIUM PHOSPHATE 30 MG: 15 SOLUTION ORAL at 09:10

## 2024-02-28 RX ADMIN — PANTOPRAZOLE SODIUM 40 MG: 40 TABLET, DELAYED RELEASE ORAL at 14:38

## 2024-02-28 RX ADMIN — FUROSEMIDE 40 MG: 40 TABLET ORAL at 09:09

## 2024-02-28 RX ADMIN — LACTULOSE 20 G: 20 SOLUTION ORAL at 14:38

## 2024-02-28 RX ADMIN — LACTULOSE 20 G: 20 SOLUTION ORAL at 09:09

## 2024-02-28 RX ADMIN — SODIUM CHLORIDE, PRESERVATIVE FREE 10 ML: 5 INJECTION INTRAVENOUS at 09:13

## 2024-02-28 RX ADMIN — ALBUMIN (HUMAN) 25 G: 0.25 INJECTION, SOLUTION INTRAVENOUS at 00:07

## 2024-02-28 RX ADMIN — SPIRONOLACTONE 50 MG: 25 TABLET ORAL at 09:09

## 2024-02-28 RX ADMIN — PANTOPRAZOLE SODIUM 40 MG: 40 TABLET, DELAYED RELEASE ORAL at 05:06

## 2024-02-28 ASSESSMENT — PAIN SCALES - GENERAL: PAINLEVEL_OUTOF10: 5

## 2024-02-28 NOTE — DISCHARGE SUMMARY
Physician Discharge Summary     Patient ID:  Jaylene Kapoor  [id number]  43 y.o.  1980    Admit date: 2/26/2024    Discharge date and time:  2/28/2024    Discharge Diagnoses: Principal Problem:    Anasarca  Active Problems:    Ascites due to alcoholic hepatitis    Alcohol use disorder    Alcoholic hepatitis  Resolved Problems:    * No resolved hospital problems. *      Consults: IP CONSULT TO GENERAL SURGERY  IP CONSULT TO INTERNAL MEDICINE  IP CONSULT TO SOCIAL WORK    Procedures: See below    Hospital Course: Patient admitted on 2/26/2024  43 y.o. year old female  who  has a past medical history of Heart burn and Hypertension. presents with Jaundice (Patient c/o bilateral LE edema and jaundice to eyes. Sent from [facility])  , recently discharged with alcoholic hepatitis and EGD done for anemia showed no varices but received 1 unit of prbc. Return from [facility] to ER wih leg edema and ascites, no fever or chills. No nausea or vomiting. General surgery consulted for possible ileus in CT abdomen. Hasn't had a drink in couple weeks.  Patient admitted for further evaluation and treatment             Principal Problem:    Anasarca  Active Problems:    Ascites due to alcoholic hepatitis    Alcohol use disorder    Alcoholic hepatitis  Resolved Problems:    * No resolved hospital problems. *          Alcoholic hepatitis, ascites, cirrhosis with anasarca  Treated IV albumin replacement  Continue oral Lasix and spironolactone at discharge  Follow-up BMP 1 week  Follow-up in GI office  Monitor I's and O's closely  DF 39  Treated with steroids-continue changed to prednisolone, continue taper     Alcohol use disorder  Patient reports no alcohol in 2 weeks  Admitted from rehab  Recommend discharge to rehab  Counseled patient and family member extensively on importance of 100% abstinence and avoiding further liver injury and reasoning behind establish sobriety in the case of need for liver transplantation.  Recommending

## 2024-02-28 NOTE — PROGRESS NOTES
Hospitalist Progress Note      Synopsis: Patient admitted on 2/26/2024  43 y.o. year old female  who  has a past medical history of Heart burn and Hypertension. presents with Jaundice (Patient c/o bilateral LE edema and jaundice to eyes. Sent from [facility])  , recently discharged with alcoholic hepatitis and EGD done for anemia showed no varices but received 1 unit of prbc. Return from [facility] to ER wih leg edema and ascites, no fever or chills. No nausea or vomiting. General surgery consulted for possible ileus in CT abdomen. Hasn't had a drink in couple weeks.  Patient admitted for further evaluation and treatment       ASSESSMENT:    Principal Problem:    Anasarca  Active Problems:    Ascites due to alcoholic hepatitis    Alcohol use disorder    Alcoholic hepatitis  Resolved Problems:    * No resolved hospital problems. *       PLAN:  Alcoholic hepatitis, ascites, cirrhosis with anasarca  Start IV albumin replacement  Start oral Lasix and spironolactone  Monitor I's and O's closely  DF 39  Treated with steroids-continue changed to prednisolone, continue taper    Alcohol use disorder  Patient reports no alcohol in 2 weeks  Admitted from rehab  Recommend discharge to rehab  Counseled patient and family member extensively on importance of 100% abstinence and avoiding further liver injury and reasoning behind establish sobriety in the case of need for liver transplantation.  Recommending continued inpatient rehabilitation with subsequent attendance of peer recovery meetings such as AA, NA, CA    Hypomagnesemia replace and monitor    Leukocytosis-likely secondary to steroids  Monitor for evidence of infection    Diet: ADULT DIET; Regular; Low Sodium (2 gm)  Code Status: Full Code  PT/OT Eval Status:     DVT Prophylaxis:     Recommended disposition at discharge: Plan discharge to rehab today    Subjective    Feeling better no complaints swelling improving  No CP or SOB  No fever or chills   No uncontrolled 
02/13/2024       Radiology:  Imaging studies reviewed today.        +++++++++++++++++++++++++++++++++++++++++++++++++  Christopher Ervin, MD   [facility] [place].  +++++++++++++++++++++++++++++++++++++++++++++++++  NOTE: This report was transcribed using voice recognition software. Every effort was made to ensure accuracy; however, inadvertent computerized transcription errors may be present.

## 2024-02-28 NOTE — CARE COORDINATION
OLE transition of care.  Pt presented to [facility] secondary to jaundice from [facility].  Admitted inpatient with ascites due to alcoholic hepatitis.  D/c order noted.  Met with patient.  She reports she plans to return to [facility].  Message left with nursing at [facility] to confirm if patient can return today.  Spoke with Eveline at [facility].  Pt can return.  Nurse will need to call report to [phone] ext [phone].  RN updated.  Await return call from [facility] to see if they will transport.  CM/[place]IS to follow.  Vic Romero RN  [phone].        Case Management Assessment  Initial Evaluation    Date/Time of Evaluation: 2/28/2024 3:02 PM  Assessment Completed by: Mai Romero RN    If patient is discharged prior to next notation, then this note serves as note for discharge by case management.    Patient Name: Jaylene Kapoor                   YOB: 1980  Diagnosis: Hyperbilirubinemia [E80.6]  Anasarca [R60.1]  Partial small bowel obstruction (HCC) [K56.600]  Alcoholic cirrhosis of liver with ascites (HCC) [K70.31]  Ascites due to alcoholic hepatitis [K70.11]                   Date / Time: 2/26/2024  5:41 PM    Patient Admission Status: Inpatient   Readmission Risk (Low < 19, Mod (19-27), High > 27): Readmission Risk Score: 16    Current PCP: Logan Lee III, DO  PCP verified by ? Yes    Chart Reviewed: Yes      History Provided by: Patient  Patient Orientation: Alert and Oriented    Patient Cognition: Alert    Hospitalization in the last 30 days (Readmission):  Yes    If yes, Readmission Assessment in  Navigator will be completed.    Readmission Assessment  Number of Days since last admission?: 1-7 days  Previous Disposition: Home Alone ([facility])  Who is being Interviewed: Patient  What was the patient's/caregiver's perception as to why they think they needed to return back to the hospital?: Other (Comment) (sent by [facility] for jaundice)  Did you visit your Primary Care  Quadrants Reporting?: 0 Cheiloplasty (Complex) Text: A decision was made to reconstruct the defect with a  cheiloplasty. The defect was undermined extensively. Additional obicularis oris muscle was excised with a 15 blade scalpel. The defect was converted into a full thickness wedge to facilite a better cosmetic result. Small vessels were then tied off with 5-0 monocyrl. The obicularis oris, superficial fascia, adipose and dermis were then reapproximated. After the deeper layers were approximated the epidermis was reapproximated with particular care given to realign the vermillion border. Lazy S Intermediate Repair Preamble Text (Leave Blank If You Do Not Want): Undermining was performed with blunt dissection. Medical Necessity Statement: Based on my medical judgement; after reviewing the pertinent pathology report, physical exam findings and the various treatment options for skin cancer treatment; standard excision and/or destruction technique methods are not clinically sufficient treatments options. To prevent the risk of compromising surgical cure and reconstruction; prompt microscopic examination of the surgical margins is necessary. Based on the given indication(s), maximum conservation of healthy tissue, and high cure rate, Mohs surgery is the most appropriate treatment for this cancer. Scc Poorly Differentiated Histology Text: There are nests of squamous epithelial cells arising from the epidermis and extending into the dermis. The malignant cells are poorly differentiated. Deep Sutures: 4-0 Monocryl Burow's Advancement Flap Text: The defect edges were debeveled with a #15 scalpel blade. Given the location of the defect and the proximity to free margins a Burow's advancement flap was deemed most appropriate. Using a sterile surgical marker, the appropriate advancement flap was drawn incorporating the defect and placing the expected incisions within the relaxed skin tension lines where possible. The area thus outlined was incised deep to adipose tissue with a #15 scalpel blade. The skin margins were undermined to an appropriate distance in all directions utilizing iris scissors. Anesthesia Volume In Cc: 3 Body Location Override (Optional - Billing Will Still Be Based On Selected Body Map Location If Applicable): LEFT chest Quadrant Reporting?: no Stage 13: Additional Anesthesia Type: 1% lidocaine with epinephrine Repair Hemostasis (Optional): Electrodesiccation Eye Protection Verbiage: Before proceeding with the stage, a plastic scleral shield was inserted. The globe was anesthetized with a few drops of 1% lidocaine with 1:100,000 epinephrine. Then, an appropriate sized scleral shield was chosen and coated with lacrilube ointment. The shield was gently inserted and left in place for the duration of each stage. After the stage was completed, the shield was gently removed. Mohs Rapid Report Verbiage: The area of clinically evident tumor was marked with skin marking ink and appropriately hatched. The initial incision was made following the Mohs approach through the skin. The specimen was taken to the lab, divided into the necessary number of pieces, chromacoded and processed according to the Mohs protocol. This was repeated in successive stages until a tumor free defect was achieved. Show Surgical Defect Variables In The Stage Tabs: Yes Area M Indication Text: Tumors in this location are included in Area M (cheek, forehead, scalp, neck, jawline and pretibial skin). Mohs surgery is indicated for tumors in these anatomic locations. Stage 3: Additional Anesthesia Type: 1% lidocaine with epinephrine and a 1:10 solution of 8.4% sodium bicarbonate Localized Dermabrasion Text: The patient was draped in routine manner. Localized dermabrasion using 3 x 17 mm wire brush was performed in routine manner to papillary dermis. This spot dermabrasion is being performed to complete skin cancer reconstruction. It also will eliminate the other sun damaged precancerous cells that are known to be part of the regional effect of a lifetime's worth of sun exposure. This localized dermabrasion is therapeutic and should not be considered cosmetic in any regard. Advancement Flap (Single) Text: After a lengthy discussion with the patient regarding the wound treatment reconstruction options available including but not limited to simple repair, intermediate repair, complex repair, adjacent tissue transfer, tissue graft as well as allowing the lesion to repair by secondary intention. It was determined that due to the defect location, complexity, and given indications; an adjacent tissue transfer (advancement flap) would be the most appropriate means for repair of the surgical defect as the defect extended through the skin into the subcutaneous tissues, and required rearrangement or transfer of adjacent tissue to repair the surgical wound. \\n\\n\\n\\n\\n\\nThe defect edges were debeveled with a #15 scalpel blade. Given the location of the defect and the proximity to free margins a single advancement flap was deemed most appropriate. Using a sterile surgical marker, an appropriate advancement flap was drawn incorporating the defect and placing the expected incisions within the relaxed skin tension lines where possible. The area thus outlined was incised deep to adipose tissue with a #15 scalpel blade. The skin margins were undermined to an appropriate distance in all directions utilizing iris scissors. Simple / Intermediate / Complex Repair - Final Wound Length In Cm: 4 W Plasty Text: The lesion was extirpated to the level of the fat with a #15 scalpel blade. Given the location of the defect, shape of the defect and the proximity to free margins a W-plasty was deemed most appropriate for repair. Using a sterile surgical marker, the appropriate transposition arms of the W-plasty were drawn incorporating the defect and placing the expected incisions within the relaxed skin tension lines where possible. The area thus outlined was incised deep to adipose tissue with a #15 scalpel blade. The skin margins were undermined to an appropriate distance in all directions utilizing iris scissors. The opposing transposition arms were then transposed into place in opposite direction and anchored with interrupted buried subcutaneous sutures. Previous Accession (Optional): [id number] Consent (Nose)/Introductory Paragraph: The rationale for Mohs was explained to the patient and consent was obtained. The risks, benefits and alternatives to therapy were discussed in detail. Specifically, the risks of nasal deformity, changes in the flow of air through the nose, infection, scarring, bleeding, prolonged wound healing, incomplete removal, allergy to anesthesia, nerve injury and recurrence were addressed. Prior to the procedure, the treatment site was clearly identified and confirmed by the patient. All components of Universal Protocol/PAUSE Rule completed. Mucosal Advancement Flap Text: Given the location of the defect, shape of the defect and the proximity to free margins a mucosal advancement flap was deemed most appropriate. Incisions were made with a 15 blade scalpel in the appropriate fashion along the cutaneous vermillion border and the mucosal lip. The remaining actinically damaged mucosal tissue was excised. The mucosal advancement flap was then elevated to the gingival sulcus with care taken to preserve the neurovascular structures and advanced into the primary defect. Care was taken to ensure that precise realignment of the vermillion border was achieved. Consent (Near Eyelid Margin)/Introductory Paragraph: The rationale for Mohs was explained to the patient and consent was obtained. The risks, benefits and alternatives to therapy were discussed in detail. Specifically, the risks of ectropion or eyelid deformity, infection, scarring, bleeding, prolonged wound healing, incomplete removal, allergy to anesthesia, nerve injury and recurrence were addressed. Prior to the procedure, the treatment site was clearly identified and confirmed by the patient. All components of Universal Protocol/PAUSE Rule completed. Spiral Flap Text: The defect edges were debeveled with a #15 scalpel blade. Given the location of the defect, shape of the defect and the proximity to free margins a spiral flap was deemed most appropriate. Using a sterile surgical marker, an appropriate rotation flap was drawn incorporating the defect and placing the expected incisions within the relaxed skin tension lines where possible. The area thus outlined was incised deep to adipose tissue with a #15 scalpel blade. The skin margins were undermined to an appropriate distance in all directions utilizing iris scissors. Bcc Infundibulocystic Histology Text: Beneath an irregular epidermis, there are small nodular masses  of basaloid neoplastic cells aggregating in a cystic formation with nuclear pleomorphism attached to the basal layer and surrounded by a loose fibrous stroma and mild inflammation in the dermis. The findings are typical for a basal cell carcinoma. Melolabial Interpolation Flap Text: A decision was made to reconstruct the defect utilizing an interpolation axial flap and a staged reconstruction. A telfa template was made of the defect. This telfa template was then used to outline the melolabial interpolation flap. The donor area for the pedicle flap was then injected with anesthesia. The flap was excised through the skin and subcutaneous tissue down to the layer of the underlying musculature. The pedicle flap was carefully excised within this deep plane to maintain its blood supply. The edges of the donor site were undermined. The donor site was closed in a primary fashion. The pedicle was then rotated into position and sutured. Once the tube was sutured into place, adequate blood supply was confirmed with blanching and refill. The pedicle was then wrapped with xeroform gauze and dressed appropriately with a telfa and gauze bandage to ensure continued blood supply and protect the attached pedicle. Suture Removal: 14 days Mohs Case Number: [id number]. 1500 [facility] Island Pedicle Flap-Requiring Vessel Identification Text: The defect edges were debeveled with a #15 scalpel blade. Given the location of the defect, shape of the defect and the proximity to free margins an island pedicle advancement flap was deemed most appropriate. Using a sterile surgical marker, an appropriate advancement flap was drawn, based on the axial vessel mentioned above, incorporating the defect, outlining the appropriate donor tissue and placing the expected incisions within the relaxed skin tension lines where possible. The area thus outlined was incised deep to adipose tissue with a #15 scalpel blade. The skin margins were undermined to an appropriate distance in all directions around the primary defect and laterally outward around the island pedicle utilizing iris scissors. There was minimal undermining beneath the pedicle flap. O-T Advancement Flap Text: The defect edges were debeveled with a #15 scalpel blade. Given the location of the defect, shape of the defect and the proximity to free margins an O-T advancement flap was deemed most appropriate. Using a sterile surgical marker, an appropriate advancement flap was drawn incorporating the defect and placing the expected incisions within the relaxed skin tension lines where possible. The area thus outlined was incised deep to adipose tissue with a #15 scalpel blade. The skin margins were undermined to an appropriate distance in all directions utilizing iris scissors. Bcc  Nodulocystic Histology Text: Beneath an irregular epidermis, there are small nodular masses of basaloid neoplastic cells aggregating in a cystic formation with nuclear pleomorphism attached to the basal layer and surrounded by a loose fibrous stroma and mild inflammation in the dermis. The findings are typical for a basal cell carcinoma. Tumor Debulked?: scalpel Tissue Cultured Epidermal Autograft Text: The defect edges were debeveled with a #15 scalpel blade. Given the location of the defect, shape of the defect and the proximity to free margins a tissue cultured epidermal autograft was deemed most appropriate. The graft was then trimmed to fit the size of the defect. The graft was then placed in the primary defect and oriented appropriately. Consent 1/Introductory Paragraph: Various treatment options for skin cancer treatment; including but not limited to no treatment, cryosurgery or cryotherapy, excision, radiation therapy, electrodessication and curettage, topical therapeutic agents and light therapy were discussed in depth with the patient. The rationale for Mohs was explained to the patient and consent was obtained. The risks, benefits and alternatives to therapy were discussed in detail. Specifically, the risks of infection, scarring, bleeding, prolonged wound healing, incomplete removal, allergy to anesthesia, nerve injury and recurrence were addressed. Prior to the procedure, the treatment site was clearly identified and confirmed by the patient and the patient agreed that Mohs surgery is the best treatment option for their lesion. No guarantees were made or implied; close follow-up was stressed out to the patient. All components of Universal Protocol/PAUSE Rule completed. Manual Repair Warning Statement: We plan on removing the manually selected variable below in favor of our much easier automatic structured text blocks found in the previous tab. We decided to do this to help make the flow better and give you the full power of structured data. Manual selection is never going to be ideal in our platform and I would encourage you to avoid using manual selection from this point on, especially since I will be sunsetting this feature. It is important that you do one of two things with the customized text below. First, you can save all of the text in a word file so you can have it for future reference. Second, transfer the text to the appropriate area in the Library tab. Lastly, if there is a flap or graft type which we do not have you need to let us know right away so I can add it in before the variable is hidden. No need to panic, we plan to give you roughly 6 months to make the change. Scc Well Differentiated Histology Text: There are nests of squamous epithelial cells arising from the epidermis and extending into the dermis. The malignant cells are large with abundant eosinophilic cytoplasm and a large vesicular nucleus. Surgeon: Mike Montgomery M.D. Anesthesia Volume In Cc: 6 Rhombic Flap Text: The defect edges were debeveled with a #15 scalpel blade. Given the location of the defect and the proximity to free margins a rhombic flap was deemed most appropriate. Using a sterile surgical marker, an appropriate rhombic flap was drawn incorporating the defect. The area thus outlined was incised deep to adipose tissue with a #15 scalpel blade. The skin margins were undermined to an appropriate distance in all directions utilizing iris scissors. Cheiloplasty (Less Than 50%) Text: A decision was made to reconstruct the defect with a  cheiloplasty. The defect was undermined extensively. Additional obicularis oris muscle was excised with a 15 blade scalpel. The defect was converted into a full thickness wedge, of less than 50% of the vertical height of the lip, to facilite a better cosmetic result. Small vessels were then tied off with 5-0 monocyrl. The obicularis oris, superficial fascia, adipose and dermis were then reapproximated. After the deeper layers were approximated the epidermis was reapproximated with particular care given to realign the vermillion border. Dermal Autograft Text: The defect edges were debeveled with a #15 scalpel blade. Given the location of the defect, shape of the defect and the proximity to free margins a dermal autograft was deemed most appropriate. Using a sterile surgical marker, the primary defect shape was transferred to the donor site. The area thus outlined was incised deep to adipose tissue with a #15 scalpel blade. The harvested graft was then trimmed of adipose and epidermal tissue until only dermis was left. The skin graft was then placed in the primary defect and oriented appropriately. Complex Repair And Flap Additional Text (Will Appearing After The Standard Complex Repair Text): The complex repair was not sufficient to completely close the primary defect. The remaining additional defect was repaired with the flap mentioned below. Closure 2 Information: This tab is for additional flaps and grafts, including complex repair and grafts and complex repair and flaps. You can also specify a different location for the additional defect, if the location is the same you do not need to select a new one. We will insert the automated text for the repair you select below just as we do for solitary flaps and grafts. Please note that at this time if you select a location with a different insurance zone you will need to override the ICD10 and CPT if appropriate. Mauc Instructions: By selecting yes to the question below the [facility] number will be added into the note. This will be calculated automatically based on the diagnosis chosen, the size entered, the body zone selected (H,M,L) and the specific indications you chose. You will also have the option to override the Mohs AUC if you disagree with the automatically calculated number and this option is found in the Case Summary tab. Referred To Otolaryngology For Closure Text (Leave Blank If You Do Not Want): After obtaining clear surgical margins the patient was sent to otolaryngology for surgical repair. The patient understands they will receive post-surgical care and follow-up from the referring physician's office. Perineural Invasion (For Histology - Be Specific If Possible): absent Purse String (Intermediate) Text: Given the location of the defect and the characteristics of the surrounding skin a pursestring intermediate closure was deemed most appropriate. Undermining was performed circumfirentially around the surgical defect. A purstring suture was then placed and tightened. Helical Rim Advancement Flap Text: The defect edges were debeveled with a #15 blade scalpel. Given the location of the defect and the proximity to free margins (helical rim) a double helical rim advancement flap was deemed most appropriate. Using a sterile surgical marker, the appropriate advancement flaps were drawn incorporating the defect and placing the expected incisions between the helical rim and antihelix where possible. The area thus outlined was incised through and through with a #15 scalpel blade. With a skin hook and iris scissors, the flaps were gently and sharply undermined and freed up. Crescentic Complex Repair Preamble Text (Leave Blank If You Do Not Want): Extensive wide undermining was performed. Full Thickness Lip Wedge Repair (Flap) Text: Given the location of the defect and the proximity to free margins a full thickness wedge repair was deemed most appropriate. Using a sterile surgical marker, the appropriate repair was drawn incorporating the defect and placing the expected incisions perpendicular to the vermillion border. The vermillion border was also meticulously outlined to ensure appropriate reapproximation during the repair. The area thus outlined was incised through and through with a #15 scalpel blade. The muscularis and dermis were reaproximated with deep sutures following hemostasis. Care was taken to realign the vermillion border before proceeding with the superficial closure. Once the vermillion was realigned the superfical and mucosal closure was finished. Interpolation Flap Text: A decision was made to reconstruct the defect utilizing an interpolation axial flap and a staged reconstruction. A telfa template was made of the defect. This telfa template was then used to outline the interpolation flap. The donor area for the pedicle flap was then injected with anesthesia. The flap was excised through the skin and subcutaneous tissue down to the layer of the underlying musculature. The interpolation flap was carefully excised within this deep plane to maintain its blood supply. The edges of the donor site were undermined. The donor site was closed in a primary fashion. The pedicle was then rotated into position and sutured. Once the tube was sutured into place, adequate blood supply was confirmed with blanching and refill. The pedicle was then wrapped with xeroform gauze and dressed appropriately with a telfa and gauze bandage to ensure continued blood supply and protect the attached pedicle. Trilobed Flap Text: The defect edges were debeveled with a #15 scalpel blade. Given the location of the defect and the proximity to free margins a trilobed flap was deemed most appropriate. Using a sterile surgical marker, an appropriate trilobed flap drawn around the defect. The area thus outlined was incised deep to adipose tissue with a #15 scalpel blade. The skin margins were undermined to an appropriate distance in all directions utilizing iris scissors. Surgeon Performing Repair (Optional): Dr Leila Sprague Muscle Hinge Flap Text: The defect edges were debeveled with a #15 scalpel blade. Given the size, depth and location of the defect and the proximity to free margins a muscle hinge flap was deemed most appropriate. Using a sterile surgical marker, an appropriate hinge flap was drawn incorporating the defect. The area thus outlined was incised with a #15 scalpel blade. The skin margins were undermined to an appropriate distance in all directions utilizing iris scissors. Bilobed Flap Text: The defect edges were debeveled with a #15 scalpel blade. Given the location of the defect and the proximity to free margins a bilobe flap was deemed most appropriate. Using a sterile surgical marker, an appropriate bilobe flap drawn around the defect. The area thus outlined was incised deep to adipose tissue with a #15 scalpel blade. The skin margins were undermined to an appropriate distance in all directions utilizing iris scissors. V-Y Flap Text: The defect edges were debeveled with a #15 scalpel blade. Given the location of the defect, shape of the defect and the proximity to free margins a V-Y flap was deemed most appropriate. Using a sterile surgical marker, an appropriate advancement flap was drawn incorporating the defect and placing the expected incisions within the relaxed skin tension lines where possible. The area thus outlined was incised deep to adipose tissue with a #15 scalpel blade. The skin margins were undermined to an appropriate distance in all directions utilizing iris scissors. Alar Island Pedicle Flap Text: The defect edges were debeveled with a #15 scalpel blade. Given the location of the defect, shape of the defect and the proximity to the alar rim an island pedicle advancement flap was deemed most appropriate. Using a sterile surgical marker, an appropriate advancement flap was drawn incorporating the defect, outlining the appropriate donor tissue and placing the expected incisions within the nasal ala running parallel to the alar rim. The area thus outlined was incised with a #15 scalpel blade. The skin margins were undermined minimally to an appropriate distance in all directions around the primary defect and laterally outward around the island pedicle utilizing iris scissors. There was minimal undermining beneath the pedicle flap. Area L Indication Text: Tumors in this location are included in Area L (trunk and extremities). Mohs surgery is indicated for larger tumors, or tumors with aggressive histologic features, in these anatomic locations. Bcc Histology Text: Beneath an irregular epidermis, there are small nodular masses of basaloid neoplastic cells with nuclear pleomorphism attached to the basal layer and surrounded by a loose fibrous stroma and mild inflammation in the dermis. The findings are typical for a basal cell carcinoma. Surgeon/Pathologist Verbiage (Will Incorporate Name Of Surgeon From Intro If Not Blank): operated in two distinct and integrated capacities as the surgeon and pathologist. Hatchet Flap Text: The defect edges were debeveled with a #15 scalpel blade. Given the location of the defect, shape of the defect and the proximity to free margins a hatchet flap was deemed most appropriate. Using a sterile surgical marker, an appropriate hatchet flap was drawn incorporating the defect and placing the expected incisions within the relaxed skin tension lines where possible. The area thus outlined was incised deep to adipose tissue with a #15 scalpel blade. The skin margins were undermined to an appropriate distance in all directions utilizing iris scissors. Repair Anesthesia Method: local infiltration Wound Care: Vaseline Consent 2/Introductory Paragraph: Mohs surgery was explained to the patient and consent was obtained. The risks, benefits and alternatives to therapy were discussed in detail. Specifically, the risks of infection, scarring, bleeding, prolonged wound healing, incomplete removal, allergy to anesthesia, nerve injury and recurrence were addressed. Prior to the procedure, the treatment site was clearly identified and confirmed by the patient. All components of Universal Protocol/PAUSE Rule completed. H Plasty Text: Given the location of the defect, shape of the defect and the proximity to free margins a H-plasty was deemed most appropriate for repair. Using a sterile surgical marker, the appropriate advancement arms of the H-plasty were drawn incorporating the defect and placing the expected incisions within the relaxed skin tension lines where possible. The area thus outlined was incised deep to adipose tissue with a #15 scalpel blade. The skin margins were undermined to an appropriate distance in all directions utilizing iris scissors. The opposing advancement arms were then advanced into place in opposite direction and anchored with interrupted buried subcutaneous sutures. Afx Histology Text: Bizarre multinucleated tumor cells in hypercellular, spindly stroma with frequent mitotic figures. There are also smaller fibroblastic cells with pleomorphism and angulated nuclei confined to the dermis. Consent (Scalp)/Introductory Paragraph: The rationale for Mohs was explained to the patient and consent was obtained. The risks, benefits and alternatives to therapy were discussed in detail. Specifically, the risks of changes in hair growth pattern secondary to repair, infection, scarring, bleeding, prolonged wound healing, incomplete removal, allergy to anesthesia, nerve injury and recurrence were addressed. Prior to the procedure, the treatment site was clearly identified and confirmed by the patient. All components of Universal Protocol/PAUSE Rule completed. Epidermal Closure: running Consent (Spinal Accessory)/Introductory Paragraph: The rationale for Mohs was explained to the patient and consent was obtained. The risks, benefits and alternatives to therapy were discussed in detail. Specifically, the risks of damage to the spinal accessory nerve, infection, scarring, bleeding, prolonged wound healing, incomplete removal, allergy to anesthesia, and recurrence were addressed. Prior to the procedure, the treatment site was clearly identified and confirmed by the patient. All components of Universal Protocol/PAUSE Rule completed. Graft Donor Site Dermal Sutures (Optional): 5-0 Monocryl Surgical Defect Width In Cm (Optional): 2.2 Posterior Auricular Interpolation Flap Text: A decision was made to reconstruct the defect utilizing an interpolation axial flap and a staged reconstruction. A telfa template was made of the defect. This telfa template was then used to outline the posterior auricular interpolation flap. The donor area for the pedicle flap was then injected with anesthesia. The flap was excised through the skin and subcutaneous tissue down to the layer of the underlying musculature. The pedicle flap was carefully excised within this deep plane to maintain its blood supply. The edges of the donor site were undermined. The donor site was closed in a primary fashion. The pedicle was then rotated into position and sutured. Once the tube was sutured into place, adequate blood supply was confirmed with blanching and refill. The pedicle was then wrapped with xeroform gauze and dressed appropriately with a telfa and gauze bandage to ensure continued blood supply and protect the attached pedicle. Cheek Interpolation Flap Text: A decision was made to reconstruct the defect utilizing an interpolation axial flap and a staged reconstruction. A telfa template was made of the defect. This telfa template was then used to outline the Cheek Interpolation flap. The donor area for the pedicle flap was then injected with anesthesia. The flap was excised through the skin and subcutaneous tissue down to the layer of the underlying musculature. The interpolation flap was carefully excised within this deep plane to maintain its blood supply. The edges of the donor site were undermined. The donor site was closed in a primary fashion. The pedicle was then rotated into position and sutured. Once the tube was sutured into place, adequate blood supply was confirmed with blanching and refill. The pedicle was then wrapped with xeroform gauze and dressed appropriately with a telfa and gauze bandage to ensure continued blood supply and protect the attached pedicle. Composite Graft Text: The defect edges were debeveled with a #15 scalpel blade. Given the location of the defect, shape of the defect, the proximity to free margins and the fact the defect was full thickness a composite graft was deemed most appropriate. The defect was outline and then transferred to the donor site. A full thickness graft was then excised from the donor site. The graft was then placed in the primary defect, oriented appropriately and then sutured into place. The secondary defect was then repaired using a primary closure. Home Suture Removal Text: Patient was provided instructions on removing sutures and will remove their sutures at home. If they have any questions or difficulties they will call the office. Dressing: pressure dressing with telfa Postop Diagnosis: same No Residual Tumor Seen Histology Text: There were no malignant cells seen in the sections examined. Modified Advancement Flap Text: The defect edges were debeveled with a #15 scalpel blade. Given the location of the defect, shape of the defect and the proximity to free margins a modified advancement flap was deemed most appropriate. Using a sterile surgical marker, an appropriate advancement flap was drawn incorporating the defect and placing the expected incisions within the relaxed skin tension lines where possible. The area thus outlined was incised deep to adipose tissue with a #15 scalpel blade. The skin margins were undermined to an appropriate distance in all directions utilizing iris scissors. Island Pedicle Flap Text: The defect edges were debeveled with a #15 scalpel blade. Given the location of the defect, shape of the defect and the proximity to free margins an island pedicle advancement flap was deemed most appropriate. Using a sterile surgical marker, an appropriate advancement flap was drawn incorporating the defect, outlining the appropriate donor tissue and placing the expected incisions within the relaxed skin tension lines where possible. The area thus outlined was incised deep to adipose tissue with a #15 scalpel blade. The skin margins were undermined to an appropriate distance in all directions around the primary defect and laterally outward around the island pedicle utilizing iris scissors. There was minimal undermining beneath the pedicle flap. S Plasty Text: Given the location and shape of the defect, and the orientation of relaxed skin tension lines, an S-plasty was deemed most appropriate for repair. Using a sterile surgical marker, the appropriate outline of the S-plasty was drawn, incorporating the defect and placing the expected incisions within the relaxed skin tension lines where possible. The area thus outlined was incised deep to adipose tissue with a #15 scalpel blade. The skin margins were undermined to an appropriate distance in all directions utilizing iris scissors. The skin flaps were advanced over the defect. The opposing margins were then approximated with interrupted buried subcutaneous sutures. Split-Thickness Skin Graft Text: The defect edges were debeveled with a #15 scalpel blade. Given the location of the defect, shape of the defect and the proximity to free margins a split thickness skin graft was deemed most appropriate. Using a sterile surgical marker, the primary defect shape was transferred to the donor site. The split thickness graft was then harvested. The skin graft was then placed in the primary defect and oriented appropriately. Mixed Nodular And Infiltrative Bcc Histology Text: There are narrow strands of spiky, irregular basal cell carcinoma cells infiltrating between collagen bundles with mucin-rich stroma Bcc  Morpheaform/Sclerosing Histology Text: Beneath an irregular epidermis, there are bizarrely shaped masses of basaloid neoplastic cells with nuclear pleomorphism attached to the basal layer and surrounded by a rapidly forming scar and mild inflammation in the dermis. The findings are typical for a basal cell carcinoma. Partial Purse String (Intermediate) Text: Given the location of the defect and the characteristics of the surrounding skin an intermediate purse string closure was deemed most appropriate. Undermining was performed circumfirentially around the surgical defect. A purse string suture was then placed and tightened. Wound tension only allowed a partial closure of the circular defect. Referred To Asc For Closure Text (Leave Blank If You Do Not Want): After obtaining clear surgical margins the patient was sent to an [facility] for surgical repair. The patient understands they will receive post-surgical care and follow-up from the [facility] physician. Consent (Temporal Branch)/Introductory Paragraph: The rationale for Mohs was explained to the patient and consent was obtained. The risks, benefits and alternatives to therapy were discussed in detail. Specifically, the risks of damage to the temporal branch of the facial nerve, infection, scarring, bleeding, prolonged wound healing, incomplete removal, allergy to anesthesia, and recurrence were addressed. Prior to the procedure, the treatment site was clearly identified and confirmed by the patient. All components of Universal Protocol/PAUSE Rule completed. Xenograft Text: The defect edges were debeveled with a #15 scalpel blade. Given the location of the defect, shape of the defect and the proximity to free margins a xenograft was deemed most appropriate. The graft was then trimmed to fit the size of the defect. The graft was then placed in the primary defect and oriented appropriately. O-Z Plasty Text: The defect edges were debeveled with a #15 scalpel blade. Given the location of the defect, shape of the defect and the proximity to free margins an O-Z plasty (double transposition flap) was deemed most appropriate. Using a sterile surgical marker, the appropriate transposition flaps were drawn incorporating the defect and placing the expected incisions within the relaxed skin tension lines where possible. The area thus outlined was incised deep to adipose tissue with a #15 scalpel blade. The skin margins were undermined to an appropriate distance in all directions utilizing iris scissors. Hemostasis was achieved with electrocautery. The flaps were then transposed into place, one clockwise and the other counterclockwise, and anchored with interrupted buried subcutaneous sutures. Consent (Ear)/Introductory Paragraph: The rationale for Mohs was explained to the patient and consent was obtained. The risks, benefits and alternatives to therapy were discussed in detail. Specifically, the risks of ear deformity, infection, scarring, bleeding, prolonged wound healing, incomplete removal, allergy to anesthesia, nerve injury and recurrence were addressed. Prior to the procedure, the treatment site was clearly identified and confirmed by the patient. All components of Universal Protocol/PAUSE Rule completed. O-L Flap Text: The defect edges were debeveled with a #15 scalpel blade. Given the location of the defect, shape of the defect and the proximity to free margins an O-L flap was deemed most appropriate. Using a sterile surgical marker, an appropriate advancement flap was drawn incorporating the defect and placing the expected incisions within the relaxed skin tension lines where possible. The area thus outlined was incised deep to adipose tissue with a #15 scalpel blade. The skin margins were undermined to an appropriate distance in all directions utilizing iris scissors. Skin Substitute Text: The defect edges were debeveled with a #15 scalpel blade. Given the location of the defect, shape of the defect and the proximity to free margins a skin substitute graft was deemed most appropriate. The graft material was trimmed to fit the size of the defect. The graft was then placed in the primary defect and oriented appropriately. Double Island Pedicle Flap Text: The defect edges were debeveled with a #15 scalpel blade. Given the location of the defect, shape of the defect and the proximity to free margins a double island pedicle advancement flap was deemed most appropriate. Using a sterile surgical marker, an appropriate advancement flap was drawn incorporating the defect, outlining the appropriate donor tissue and placing the expected incisions within the relaxed skin tension lines where possible. The area thus outlined was incised deep to adipose tissue with a #15 scalpel blade. The skin margins were undermined to an appropriate distance in all directions around the primary defect and laterally outward around the island pedicle utilizing iris scissors. There was minimal undermining beneath the pedicle flap. Bcc  Nodular Histology Text: Nodular aggregates of basaloid cells with large, hyperchromatic, oval nuclei and little cytoplasm aligning densely in a palisade pattern at the periphery of the nest Graft Basting Suture (Optional): 5-0 Fast Absorbing Gut Z Plasty Text: The lesion was extirpated to the level of the fat with a #15 scalpel blade. Given the location of the defect, shape of the defect and the proximity to free margins a Z-plasty was deemed most appropriate for repair. Using a sterile surgical marker, the appropriate transposition arms of the Z-plasty were drawn incorporating the defect and placing the expected incisions within the relaxed skin tension lines where possible. The area thus outlined was incised deep to adipose tissue with a #15 scalpel blade. The skin margins were undermined to an appropriate distance in all directions utilizing iris scissors. The opposing transposition arms were then transposed into place in opposite direction and anchored with interrupted buried subcutaneous sutures. Referred To Oculoplastics For Closure Text (Leave Blank If You Do Not Want): After obtaining clear surgical margins the patient was sent to oculoplastics for surgical repair. The patient understands they will receive post-surgical care and follow-up from the referring physician's office. Bilateral Helical Rim Advancement Flap Text: The defect edges were debeveled with a #15 blade scalpel. Given the location of the defect and the proximity to free margins (helical rim) a bilateral helical rim advancement flap was deemed most appropriate. Using a sterile surgical marker, the appropriate advancement flaps were drawn incorporating the defect and placing the expected incisions between the helical rim and antihelix where possible. The area thus outlined was incised through and through with a #15 scalpel blade. With a skin hook and iris scissors, the flaps were gently and sharply undermined and freed up. Undermining Location (Optional): in the deep fat Secondary Intention Text (Leave Blank If You Do Not Want): The defect will heal with secondary intention. Bi-Rhombic Flap Text: The defect edges were debeveled with a #15 scalpel blade. Given the location of the defect and the proximity to free margins a bi-rhombic flap was deemed most appropriate. Using a sterile surgical marker, an appropriate rhombic flap was drawn incorporating the defect. The area thus outlined was incised deep to adipose tissue with a #15 scalpel blade. The skin margins were undermined to an appropriate distance in all directions utilizing iris scissors. Advancement Flap (Double) Text: The defect edges were debeveled with a #15 scalpel blade. Given the location of the defect and the proximity to free margins a double advancement flap was deemed most appropriate. Using a sterile surgical marker, the appropriate advancement flaps were drawn incorporating the defect and placing the expected incisions within the relaxed skin tension lines where possible. The area thus outlined was incised deep to adipose tissue with a #15 scalpel blade. The skin margins were undermined to an appropriate distance in all directions utilizing iris scissors. Paramedian Forehead Flap Text: A decision was made to reconstruct the defect utilizing an interpolation axial flap and a staged reconstruction. A telfa template was made of the defect. This telfa template was then used to outline the paramedian forehead pedicle flap. The donor area for the pedicle flap was then injected with anesthesia. The flap was excised through the skin and subcutaneous tissue down to the layer of the underlying musculature. The pedicle flap was carefully excised within this deep plane to maintain its blood supply. The edges of the donor site were undermined. The donor site was closed in a primary fashion. The pedicle was then rotated into position and sutured. Once the tube was sutured into place, adequate blood supply was confirmed with blanching and refill. The pedicle was then wrapped with xeroform gauze and dressed appropriately with a telfa and gauze bandage to ensure continued blood supply and protect the attached pedicle. Number Of Stages: 1 Star Wedge Flap Text: The defect edges were debeveled with a #15 scalpel blade. Given the location of the defect, shape of the defect and the proximity to free margins a star wedge flap was deemed most appropriate. Using a sterile surgical marker, an appropriate rotation flap was drawn incorporating the defect and placing the expected incisions within the relaxed skin tension lines where possible. The area thus outlined was incised deep to adipose tissue with a #15 scalpel blade. The skin margins were undermined to an appropriate distance in all directions utilizing iris scissors. Bcc Superficial Histology Text: On the basal layer of an irregular epidermis, there are small nodular masses of basaloid neoplastic cells with nuclear pleomorphism attached to the basal layer and surrounded by a loose fibrous stroma and mild inflammation in the dermis. The findings are typical for a basal cell carcinoma. Ear Wedge Repair Text: A wedge excision was completed by carrying down an excision through the full thickness of the ear and cartilage with an inward facing Burow's triangle. The wound was then closed in a layered fashion. Subsequent Stages Histo Method Verbiage: Using a similar technique to that described above, a thin layer of tissue was removed from all areas where tumor was visible on the previous stage. The tissue was again oriented, mapped, dyed, and processed as above. Scc Acantholytic Histology Text: There are nests of squamous epithelial cells arising from the epidermis and extending into the dermis.  The malignant cells are demonstratic acantholysis Tarsorrhaphy Text: A tarsorrhaphy was performed using Frost sutures. Scc Moderately Differentiated Histology Text: There are nests of squamous epithelial cells arising from the epidermis and extending into the dermis. The malignant cells are large with abundant eosinophilic cytoplasm and a large nucleus. Keratin pearls are also present. Date Of Previous Biopsy (Optional): 9-5-17 Alternatives Discussed Intro (Do Not Add Period): I discussed alternative treatments to Mohs surgery and specifically discussed the risks and benefits of Keystone Flap Text: The defect edges were debeveled with a #15 scalpel blade. Given the location of the defect, shape of the defect a keystone flap was deemed most appropriate. Using a sterile surgical marker, an appropriate keystone flap was drawn incorporating the defect, outlining the appropriate donor tissue and placing the expected incisions within the relaxed skin tension lines where possible. The area thus outlined was incised deep to adipose tissue with a #15 scalpel blade. The skin margins were undermined to an appropriate distance in all directions around the primary defect and laterally outward around the flap utilizing iris scissors. Melolabial Transposition Flap Text: The defect edges were debeveled with a #15 scalpel blade. Given the location of the defect and the proximity to free margins a melolabial flap was deemed most appropriate. Using a sterile surgical marker, an appropriate melolabial transposition flap was drawn incorporating the defect. The area thus outlined was incised deep to adipose tissue with a #15 scalpel blade. The skin margins were undermined to an appropriate distance in all directions utilizing iris scissors. Consent Type: Consent 1 (Standard) Hidradenocarcinoma Histology Text: On histologic exam, there are nodular, epithelioid, basaloid tumor cells with irregular infiltration of the surrounding dermis and foci of duct formation. Same Histology In Subsequent Stages Text: The pattern and morphology of the tumor is as described in the first stage. Bcc Infiltrative Histology Text: There were numerous aggregates of basaloid cells demonstrating an infiltrative pattern. Crescentic Advancement Flap Text: The defect edges were debeveled with a #15 scalpel blade. Given the location of the defect and the proximity to free margins a crescentic advancement flap was deemed most appropriate. Using a sterile surgical marker, the appropriate advancement flap was drawn incorporating the defect and placing the expected incisions within the relaxed skin tension lines where possible. The area thus outlined was incised deep to adipose tissue with a #15 scalpel blade. The skin margins were undermined to an appropriate distance in all directions utilizing iris scissors. Rotation Flap Text: After a lengthy discussion with the patient regarding the wound treatment reconstruction options available including but not limited to simple repair, intermediate repair, complex repair, adjacent tissue transfer, tissue graft as well as allowing the lesion to repair by secondary intention. It was determined that due to the defect location, complexity, and given indications; an adjacent tissue transfer (rotation flap) would be the most appropriate means for repair of the surgical defect as the defect extended through the skin into the subcutaneous tissues, and required rearrangement or transfer of adjacent tissue to repair the surgical wound. \\n\\n\\n\\n\\nThe defect edges were debeveled with a #15 scalpel blade. Given the location of the defect, shape of the defect and the proximity to free margins a rotation flap was deemed most appropriate. Using a sterile surgical marker, an appropriate rotation flap was drawn incorporating the defect and placing the expected incisions within the relaxed skin tension lines where possible. The area thus outlined was incised deep to adipose tissue with a #15 scalpel blade. The skin margins were undermined to an appropriate distance in all directions utilizing iris scissors. Repair Type: Complex Repair Repair Performed By Another Provider Text (Leave Blank If You Do Not Want): After obtaining clear surgical margins the defect was repaired by another provider. A-T Advancement Flap Text: The defect edges were debeveled with a #15 scalpel blade. Given the location of the defect, shape of the defect and the proximity to free margins an A-T advancement flap was deemed most appropriate. Using a sterile surgical marker, an appropriate advancement flap was drawn incorporating the defect and placing the expected incisions within the relaxed skin tension lines where possible. The area thus outlined was incised deep to adipose tissue with a #15 scalpel blade. The skin margins were undermined to an appropriate distance in all directions utilizing iris scissors. Closure 4 Information: This tab is for additional flaps and grafts above and beyond our usual structured repairs. Please note if you enter information here it will not currently bill and you will need to add the billing information manually. Bcc Micronodular Histology Text: Beneath an irregular epidermis, there are extremely small but infiltrative nodular masses of basaloid neoplastic cells with nuclear pleomorphism attached to the basal layer and surrounded by a loose fibrous stroma and mild inflammation in the dermis. The findings are typical for a basal cell carcinoma. Transposition Flap Text: After a lengthy discussion with the patient regarding the wound treatment reconstruction options available including but not limited to simple repair, intermediate repair, complex repair, adjacent tissue transfer, tissue graft as well as allowing the lesion to repair by secondary intention. It was determined that due to the defect location, complexity, and given indications; an adjacent tissue transfer (transposition flap) would be the most appropriate means for repair of the surgical defect as the defect extended through the skin into the subcutaneous tissues, and required rearrangement or transfer of adjacent tissue to repair the surgical wound. \\n\\n\\n\\n\\nThe defect edges were debeveled with a #15 scalpel blade. Given the location of the defect and the proximity to free margins a transposition flap was deemed most appropriate. Using a sterile surgical marker, an appropriate transposition flap was drawn incorporating the defect. The area thus outlined was incised deep to adipose tissue with a #15 scalpel blade. The skin margins were undermined to an appropriate distance in all directions utilizing iris scissors. Inflammation Suggestive Of Cancer Camouflage Histology Text: There was a dense lymphocytic infiltrate which prevented adequate histologic evaluation of adjacent structures. Bcc Pigmented Histology Text: Functional melanocytes are scattered through the basal cell carcinoma tumor islands and there are numerous melanophages in the stroma No Repair - Repaired With Adjacent Surgical Defect Text (Leave Blank If You Do Not Want): After obtaining clear surgical margins the defect was repaired concurrently with another surgical defect which was in close approximation. Epidermal Autograft Text: The defect edges were debeveled with a #15 scalpel blade. Given the location of the defect, shape of the defect and the proximity to free margins an epidermal autograft was deemed most appropriate. Using a sterile surgical marker, the primary defect shape was transferred to the donor site. The epidermal graft was then harvested. The skin graft was then placed in the primary defect and oriented appropriately. Hemostasis: Electrocautery Mixed Superficial And Nodular Bcc Histology Text: On the basal layer of and beneath an irregular epidermis, there are small nodular masses of basaloid neoplastic cells with nuclear pleomorphism attached to the basal layer and surrounded by a loose fibrous stroma and mild inflammation in the dermis. The findings are typical for a basal cell carcinoma. Mixed Nodular And Micronodular Bcc Histology Text: Beneath an irregular epidermis, there are varying sizes of nodular masses of basaloid neoplastic cells with nuclear pleomorphism attached to the basal layer and surrounded by a loose fibrous stroma and mild inflammation in the dermis. The findings are typical for a basal cell carcinoma. Partial Purse String (Simple) Text: Given the location of the defect and the characteristics of the surrounding skin a simple purse string closure was deemed most appropriate. Undermining was performed circumfirentially around the surgical defect. A purse string suture was then placed and tightened. Wound tension only allowed a partial closure of the circular defect. Area H Indication Text: Tumors in this location are included in Area H (eyelids, eyebrows, nose, lips, chin, ear, pre-auricular, post-auricular, temple, genitalia, hands, feet, ankles and areola). Tissue conservation is critical in these anatomic locations. V-Y Plasty Text: The defect edges were debeveled with a #15 scalpel blade. Given the location of the defect, shape of the defect and the proximity to free margins an V-Y advancement flap was deemed most appropriate. Using a sterile surgical marker, an appropriate advancement flap was drawn incorporating the defect and placing the expected incisions within the relaxed skin tension lines where possible. The area thus outlined was incised deep to adipose tissue with a #15 scalpel blade. The skin margins were undermined to an appropriate distance in all directions utilizing iris scissors. Ftsg Text: The defect edges were debeveled with a #15 scalpel blade. Given the location of the defect, shape of the defect and the proximity to free margins a full thickness skin graft was deemed most appropriate. Using a sterile surgical marker, the primary defect shape was transferred to the donor site. The area thus outlined was incised deep to adipose tissue with a #15 scalpel blade. The harvested graft was then trimmed of adipose tissue until only dermis and epidermis was left. The skin margins of the secondary defect were undermined to an appropriate distance in all directions utilizing iris scissors. The secondary defect was closed with interrupted buried subcutaneous sutures. The skin edges were then re-apposed with running  sutures. The skin graft was then placed in the primary defect and oriented appropriately. Referred To Plastics For Closure Text (Leave Blank If You Do Not Want): After obtaining clear surgical margins the patient was sent to plastics for surgical repair. The patient understands they will receive post-surgical care and follow-up from the referring physician's office. O-T Plasty Text: The defect edges were debeveled with a #15 scalpel blade. Given the location of the defect, shape of the defect and the proximity to free margins an O-T plasty was deemed most appropriate. Using a sterile surgical marker, an appropriate O-T plasty was drawn incorporating the defect and placing the expected incisions within the relaxed skin tension lines where possible. The area thus outlined was incised deep to adipose tissue with a #15 scalpel blade. The skin margins were undermined to an appropriate distance in all directions utilizing iris scissors. Epidermal Sutures: 4-0 Prolene Purse String (Simple) Text: Given the location of the defect and the characteristics of the surrounding skin a pursestring closure was deemed most appropriate. Undermining was performed circumfirentially around the surgical defect. A purstring suture was then placed and tightened. Bilobed Transposition Flap Text: The defect edges were debeveled with a #15 scalpel blade. Given the location of the defect and the proximity to free margins a bilobed transposition flap was deemed most appropriate. Using a sterile surgical marker, an appropriate bilobe flap drawn around the defect. The area thus outlined was incised deep to adipose tissue with a #15 scalpel blade. The skin margins were undermined to an appropriate distance in all directions utilizing iris scissors. Scc In Situ Histology Text: Atypia of the keratinocytes across the full thickness of the epidermis with loss of the granular layer and overlying zones of parakeratosis Graft Donor Site Epidermal Sutures (Optional): 6-0 Prolene Post-Care Instructions: WOUND CARE INSTRUCTIONS\\n\\nI reviewed the post-care instructions with the patient in detail. \\n\\nKeep our initial bandage on and dry for 48-72 hours as directed. After 48-72 hours,\\n\\n1. Cleanse the wound with peroxide gently. If there is a lot of crusting/scabbing, soak the site with peroxide to soften. \\n\\n2. Apply a generous amount of Vaseline to the surgical site. DO NOT use Neosporin. \\n\\n3. Cover the wound with a dressing. You can use a non-stick pad with paper tape or regular bandages. \\n\\n4. Repeat twice daily, once in the morning, once in the evening. \\n\\n\\nPAIN [id number]\\H\\W8. It is common to experience swelling, bruising, and drainage after surgery. To decrease pain, use extra strength Tylenol as directed on the bottle. Please do not use ibuprofen, Aleve, Motrin, or Excedrin as they may worsen bleeding. If Tylenol does not help with your pain, please call our office. Certain pain medications may require you to come to the office to  an actual paper prescription. Other intermediate (non-narcotic) strength pain medications may be called in for you if necessary. \\n\\n2. To decrease pain, patients can also try using an ice pack, a bag of frozen green peas, or a bag of frozen marshmellows. Place the ice pack on top of the bandage for 20-30 minutes, then take a break for 20-30 minutes (place the ice pack back in the freezer to get it cold again). Repeat as many times as necessary. \\n\\n\\nBLEEDING CONTROL\\n\\nIf bleeding should occur, apply firm direct pressure to the site for 30 minutes without easing up. If bleeding continues after 30 minutes, please call the office at any time. In rare instances, it may be necessary to go to the nearest emergency room. \\n\\n\\nMISCELLANEOUS INFORMATION\\n\\nThings to avoid while healing:\\n - NO heavy lifting, exercise, or swimming for the next 14 days. \\n - NO exposure to tap water for the next 48-72 hours. \\n - NO exposure to hot tub, swimming pool, or ocean water for the next 14 days. \\n\\n\\nCONTACT INFORMATION\\n\\nShould you have any questions or concerns, please call:\\n\\n1. [address] [facility] Location = \\n - During business hours, Monday to Friday, 8AM to 5PM = [phone], please ask for Mary Landry or a member of Dr. Fadia Carlton surgical team\\n - All other times (outside of business hours, weekends, holidays) = [phone] \\n\\n2.  [facility] Location = \\n - During business hours, Monday to Friday, 8AM to 5PM = [phone], please ask for [address] Joaquin Silva or a member of Dr. Fadia Carlton surgical team\\n - All other times (outside of business hours, weekends, holidays) = [phone] Referred To Mid-Level For Closure Text (Leave Blank If You Do Not Want): After obtaining clear surgical margins the patient was sent to a mid-level provider for surgical repair. The patient understands they will receive post-surgical care and follow-up from the mid-level provider. Mohs Histo Method Verbiage: Each section was then chromacoded and processed in the Mohs lab using the Mohs protocol and submitted for frozen section. Consent 3/Introductory Paragraph: I gave the patient a chance to ask questions they had about the procedure. Following this I explained the Mohs procedure and consent was obtained. The risks, benefits and alternatives to therapy were discussed in detail. Specifically, the risks of infection, scarring, bleeding, prolonged wound healing, incomplete removal, allergy to anesthesia, nerve injury and recurrence were addressed. Prior to the procedure, the treatment site was clearly identified and confirmed by the patient. All components of Universal Protocol/PAUSE Rule completed. Melanoma In Situ Histology Text: Histologically, the lesion is asymmetrical, poorly circumscribed with architectural disturbance and marked cytological atypia Detail Level: Detailed Mohs Method Verbiage: An incision at a 45 degree angle following the standard Mohs approach was done and the specimen was harvested as a microscopic controlled layer. Cheek-To-Nose Interpolation Flap Text: A decision was made to reconstruct the defect utilizing an interpolation axial flap and a staged reconstruction. A telfa template was made of the defect. This telfa template was then used to outline the Cheek-To-Nose Interpolation flap. The donor area for the pedicle flap was then injected with anesthesia. The flap was excised through the skin and subcutaneous tissue down to the layer of the underlying musculature. The interpolation flap was carefully excised within this deep plane to maintain its blood supply. The edges of the donor site were undermined. The donor site was closed in a primary fashion. The pedicle was then rotated into position and sutured. Once the tube was sutured into place, adequate blood supply was confirmed with blanching and refill. The pedicle was then wrapped with xeroform gauze and dressed appropriately with a telfa and gauze bandage to ensure continued blood supply and protect the attached pedicle. Referring Physician (Optional): Dr. Destiny Dumont Closure 3 Information: This tab is for additional flaps and grafts above and beyond our usual structured repairs.  Please note if you enter information here it will not currently bill and you will need to add the billing information manually. Advancement-Rotation Flap Text: The defect edges were debeveled with a #15 scalpel blade. Given the location of the defect, shape of the defect and the proximity to free margins an advancement-rotation flap was deemed most appropriate. Using a sterile surgical marker, an appropriate flap was drawn incorporating the defect and placing the expected incisions within the relaxed skin tension lines where possible. The area thus outlined was incised deep to adipose tissue with a #15 scalpel blade. The skin margins were undermined to an appropriate distance in all directions utilizing iris scissors. Consent (Marginal Mandibular)/Introductory Paragraph: The rationale for Mohs was explained to the patient and consent was obtained. The risks, benefits and alternatives to therapy were discussed in detail. Specifically, the risks of damage to the marginal mandibular branch of the facial nerve, infection, scarring, bleeding, prolonged wound healing, incomplete removal, allergy to anesthesia, and recurrence were addressed. Prior to the procedure, the treatment site was clearly identified and confirmed by the patient. All components of Universal Protocol/PAUSE Rule completed. Island Pedicle Flap With Canthal Suspension Text: The defect edges were debeveled with a #15 scalpel blade. Given the location of the defect, shape of the defect and the proximity to free margins an island pedicle advancement flap was deemed most appropriate. Using a sterile surgical marker, an appropriate advancement flap was drawn incorporating the defect, outlining the appropriate donor tissue and placing the expected incisions within the relaxed skin tension lines where possible. The area thus outlined was incised deep to adipose tissue with a #15 scalpel blade. The skin margins were undermined to an appropriate distance in all directions around the primary defect and laterally outward around the island pedicle utilizing iris scissors. There was minimal undermining beneath the pedicle flap. A suspension suture was placed in the canthal tendon to prevent tension and prevent ectropion. Complex Repair And Graft Additional Text (Will Appearing After The Standard Complex Repair Text): The complex repair was not sufficient to completely close the primary defect. The remaining additional defect was repaired with the graft mentioned below. Consent (Lip)/Introductory Paragraph: The rationale for Mohs was explained to the patient and consent was obtained. The risks, benefits and alternatives to therapy were discussed in detail. Specifically, the risks of lip deformity, changes in the oral aperture, infection, scarring, bleeding, prolonged wound healing, incomplete removal, allergy to anesthesia, nerve injury and recurrence were addressed. Prior to the procedure, the treatment site was clearly identified and confirmed by the patient. All components of Universal Protocol/PAUSE Rule completed. Mastoid Interpolation Flap Text: A decision was made to reconstruct the defect utilizing an interpolation axial flap and a staged reconstruction. A telfa template was made of the defect. This telfa template was then used to outline the mastoid interpolation flap. The donor area for the pedicle flap was then injected with anesthesia. The flap was excised through the skin and subcutaneous tissue down to the layer of the underlying musculature. The pedicle flap was carefully excised within this deep plane to maintain its blood supply. The edges of the donor site were undermined. The donor site was closed in a primary fashion. The pedicle was then rotated into position and sutured. Once the tube was sutured into place, adequate blood supply was confirmed with blanching and refill. The pedicle was then wrapped with xeroform gauze and dressed appropriately with a telfa and gauze bandage to ensure continued blood supply and protect the attached pedicle. Cartilage Graft Text: The defect edges were debeveled with a #15 scalpel blade. Given the location of the defect, shape of the defect, the fact the defect involved a full thickness cartilage defect a cartilage graft was deemed most appropriate. An appropriate donor site was identified, cleansed, and anesthetized. The cartilage graft was then harvested and transferred to the recipient site, oriented appropriately and then sutured into place. The secondary defect was then repaired using a primary closure. Dorsal Nasal Flap Text: The defect edges were debeveled with a #15 scalpel blade. Given the location of the defect and the proximity to free margins a dorsal nasal flap was deemed most appropriate. Using a sterile surgical marker, an appropriate dorsal nasal flap was drawn around the defect. The area thus outlined was incised deep to adipose tissue with a #15 scalpel blade. The skin margins were undermined to an appropriate distance in all directions utilizing iris scissors. Location Indication Override (Is Already Calculated Based On Selected Body Location): Area L Scc Ka Subtype Histology Text: There is well-differentiated squamous epithelium with pleomorphism and masses of keratin. Unna Boot Text: An Unna boot was placed to help immobilize the limb and facilitate more rapid healing. Estimated Blood Loss (Cc): minimal Ear Star Wedge Flap Text: The defect edges were debeveled with a #15 blade scalpel. Given the location of the defect and the proximity to free margins (helical rim) an ear star wedge flap was deemed most appropriate. Using a sterile surgical marker, the appropriate flap was drawn incorporating the defect and placing the expected incisions between the helical rim and antihelix where possible. The area thus outlined was incised through and through with a #15 scalpel blade. Initial Size Of Lesion: 2.1

## 2024-03-05 LAB
EKG ATRIAL RATE: 77 BPM
EKG P AXIS: 30 DEGREES
EKG P-R INTERVAL: 124 MS
EKG Q-T INTERVAL: 390 MS
EKG QRS DURATION: 64 MS
EKG QTC CALCULATION (BAZETT): 441 MS
EKG R AXIS: 37 DEGREES
EKG T AXIS: 32 DEGREES
EKG VENTRICULAR RATE: 77 BPM

## 2024-03-25 ENCOUNTER — HOSPITAL ENCOUNTER (OUTPATIENT)
Age: 44
Discharge: HOME | End: 2024-03-25
Payer: COMMERCIAL

## 2024-03-25 LAB
ALBUMIN SERPL-MCNC: 2.8 G/DL (ref 3.5–5.2)
ALP SERPL-CCNC: 300 U/L (ref 35–104)
ALT SERPL-CCNC: 49 U/L (ref 0–32)
ANION GAP SERPL CALCULATED.3IONS-SCNC: 17 MMOL/L (ref 7–16)
AST SERPL-CCNC: 97 U/L (ref 0–31)
BASOPHILS # BLD: 0.05 K/UL (ref 0–0.2)
BASOPHILS NFR BLD: 0 % (ref 0–2)
BILIRUB SERPL-MCNC: 17.2 MG/DL (ref 0–1.2)
BUN SERPL-MCNC: 47 MG/DL (ref 6–20)
CALCIUM SERPL-MCNC: 8.5 MG/DL (ref 8.6–10.2)
CHLORIDE SERPL-SCNC: 96 MMOL/L (ref 98–107)
CO2 SERPL-SCNC: 15 MMOL/L (ref 22–29)
CREAT SERPL-MCNC: 2 MG/DL (ref 0.5–1)
EOSINOPHIL # BLD: 0.18 K/UL (ref 0.05–0.5)
EOSINOPHILS RELATIVE PERCENT: 1 % (ref 0–6)
ERYTHROCYTE [DISTWIDTH] IN BLOOD BY AUTOMATED COUNT: 16.5 % (ref 11.5–15)
GFR SERPL CREATININE-BSD FRML MDRD: 30 ML/MIN/1.73M2
GLUCOSE SERPL-MCNC: 120 MG/DL (ref 74–99)
HCT VFR BLD AUTO: 27.2 % (ref 34–48)
HGB BLD-MCNC: 9.1 G/DL (ref 11.5–15.5)
IMM GRANULOCYTES # BLD AUTO: 0.3 K/UL (ref 0–0.58)
IMM GRANULOCYTES NFR BLD: 2 % (ref 0–5)
INR PPP: 1.7
LYMPHOCYTES NFR BLD: 1.08 K/UL (ref 1.5–4)
LYMPHOCYTES RELATIVE PERCENT: 6 % (ref 20–42)
MCH RBC QN AUTO: 33 PG (ref 26–35)
MCHC RBC AUTO-ENTMCNC: 33.5 G/DL (ref 32–34.5)
MCV RBC AUTO: 98.6 FL (ref 80–99.9)
MONOCYTES NFR BLD: 1.67 K/UL (ref 0.1–0.95)
MONOCYTES NFR BLD: 9 % (ref 2–12)
NEUTROPHILS NFR BLD: 82 % (ref 43–80)
NEUTS SEG NFR BLD: 15.01 K/UL (ref 1.8–7.3)
PLATELET CONFIRMATION: NORMAL
PLATELET, FLUORESCENCE: 91 K/UL (ref 130–450)
PMV BLD AUTO: 11.5 FL (ref 7–12)
POTASSIUM SERPL-SCNC: 5.2 MMOL/L (ref 3.5–5)
PROT SERPL-MCNC: 6.3 G/DL (ref 6.4–8.3)
PROTHROMBIN TIME: 19.3 SEC (ref 9.3–12.4)
RBC # BLD AUTO: 2.76 M/UL (ref 3.5–5.5)
RBC # BLD: ABNORMAL 10*6/UL
SODIUM SERPL-SCNC: 128 MMOL/L (ref 132–146)
WBC OTHER # BLD: 18.3 K/UL (ref 4.5–11.5)

## 2024-03-25 PROCEDURE — 82105 ALPHA-FETOPROTEIN SERUM: CPT

## 2024-03-25 PROCEDURE — 80053 COMPREHEN METABOLIC PANEL: CPT

## 2024-03-25 PROCEDURE — 85025 COMPLETE CBC W/AUTO DIFF WBC: CPT

## 2024-03-25 PROCEDURE — 81256 HFE GENE: CPT

## 2024-03-25 PROCEDURE — 36415 COLL VENOUS BLD VENIPUNCTURE: CPT

## 2024-03-25 PROCEDURE — 85610 PROTHROMBIN TIME: CPT

## 2024-03-27 LAB — AFP SERPL-MCNC: <1.8 UG/L

## 2024-03-30 LAB
C282Y HEMOCHROMATOSIS MUT: NEGATIVE
H63D HEMOCHROMATOSIS MUT: NEGATIVE
HEMOCHROMATOSIS MUTATION INT: NORMAL
HEMOCHROMATOSIS SPECIMEN: NORMAL
S65C HEMOCHROMATOSIS MUT: NEGATIVE

## 2024-04-09 ENCOUNTER — HOSPITAL ENCOUNTER (INPATIENT)
Age: 44
LOS: 10 days | Discharge: NURSING, REHAB OR LONG-TERM CARE | End: 2024-04-19
Attending: EMERGENCY MEDICINE | Admitting: INTERNAL MEDICINE
Payer: COMMERCIAL

## 2024-04-09 ENCOUNTER — APPOINTMENT (OUTPATIENT)
Dept: GENERAL RADIOLOGY | Age: 44
End: 2024-04-09
Payer: COMMERCIAL

## 2024-04-09 ENCOUNTER — APPOINTMENT (OUTPATIENT)
Dept: ULTRASOUND IMAGING | Age: 44
End: 2024-04-09
Payer: COMMERCIAL

## 2024-04-09 DIAGNOSIS — L03.90 CELLULITIS, UNSPECIFIED CELLULITIS SITE: ICD-10-CM

## 2024-04-09 DIAGNOSIS — R18.8 CIRRHOSIS OF LIVER WITH ASCITES, UNSPECIFIED HEPATIC CIRRHOSIS TYPE (HCC): ICD-10-CM

## 2024-04-09 DIAGNOSIS — S91.105A OPEN WOUND OF FIFTH TOE OF LEFT FOOT, INITIAL ENCOUNTER: Primary | ICD-10-CM

## 2024-04-09 DIAGNOSIS — K74.60 CIRRHOSIS OF LIVER WITH ASCITES, UNSPECIFIED HEPATIC CIRRHOSIS TYPE (HCC): ICD-10-CM

## 2024-04-09 DIAGNOSIS — L02.619 FOOT ABSCESS: ICD-10-CM

## 2024-04-09 PROBLEM — I10 PRIMARY HYPERTENSION: Status: ACTIVE | Noted: 2024-04-09

## 2024-04-09 PROBLEM — D69.6 THROMBOCYTOPENIA (HCC): Status: ACTIVE | Noted: 2024-04-09

## 2024-04-09 PROBLEM — D72.829 LEUKOCYTOSIS: Status: ACTIVE | Noted: 2024-04-09

## 2024-04-09 PROBLEM — E87.20 ACIDOSIS: Status: ACTIVE | Noted: 2024-04-09

## 2024-04-09 PROBLEM — A41.9 SEPSIS (HCC): Status: ACTIVE | Noted: 2024-04-09

## 2024-04-09 LAB
ALBUMIN SERPL-MCNC: 2.6 G/DL (ref 3.5–5.2)
ALP SERPL-CCNC: 278 U/L (ref 35–104)
ALT SERPL-CCNC: 27 U/L (ref 0–32)
AMMONIA PLAS-SCNC: 39 UMOL/L (ref 11–51)
AMPHET UR QL SCN: NEGATIVE
ANION GAP SERPL CALCULATED.3IONS-SCNC: 12 MMOL/L (ref 7–16)
APAP SERPL-MCNC: 12 UG/ML (ref 10–30)
AST SERPL-CCNC: 62 U/L (ref 0–31)
BACTERIA URNS QL MICRO: ABNORMAL
BARBITURATES UR QL SCN: NEGATIVE
BASOPHILS # BLD: 0.06 K/UL (ref 0–0.2)
BASOPHILS NFR BLD: 1 % (ref 0–2)
BENZODIAZ UR QL: NEGATIVE
BILIRUB DIRECT SERPL-MCNC: 4.3 MG/DL (ref 0–0.3)
BILIRUB INDIRECT SERPL-MCNC: 2.5 MG/DL (ref 0–1)
BILIRUB SERPL-MCNC: 6.8 MG/DL (ref 0–1.2)
BILIRUB UR QL STRIP: ABNORMAL
BUN SERPL-MCNC: 19 MG/DL (ref 6–20)
BUPRENORPHINE UR QL: NEGATIVE
CALCIUM SERPL-MCNC: 8.4 MG/DL (ref 8.6–10.2)
CANNABINOIDS UR QL SCN: NEGATIVE
CHLORIDE SERPL-SCNC: 107 MMOL/L (ref 98–107)
CLARITY UR: CLEAR
CO2 SERPL-SCNC: 15 MMOL/L (ref 22–29)
COCAINE UR QL SCN: NEGATIVE
COLOR UR: YELLOW
CREAT SERPL-MCNC: 1.2 MG/DL (ref 0.5–1)
CRP SERPL HS-MCNC: 29 MG/L (ref 0–5)
EOSINOPHIL # BLD: 0.15 K/UL (ref 0.05–0.5)
EOSINOPHILS RELATIVE PERCENT: 1 % (ref 0–6)
ERYTHROCYTE [DISTWIDTH] IN BLOOD BY AUTOMATED COUNT: 17 % (ref 11.5–15)
ERYTHROCYTE [SEDIMENTATION RATE] IN BLOOD BY WESTERGREN METHOD: 87 MM/HR (ref 0–20)
ETHANOLAMINE SERPL-MCNC: <10 MG/DL
FENTANYL UR QL: NEGATIVE
GFR SERPL CREATININE-BSD FRML MDRD: 56 ML/MIN/1.73M2
GLUCOSE SERPL-MCNC: 114 MG/DL (ref 74–99)
GLUCOSE UR STRIP-MCNC: NEGATIVE MG/DL
HCT VFR BLD AUTO: 27.3 % (ref 34–48)
HGB BLD-MCNC: 8.6 G/DL (ref 11.5–15.5)
HGB UR QL STRIP.AUTO: NEGATIVE
IMM GRANULOCYTES # BLD AUTO: 0.17 K/UL (ref 0–0.58)
IMM GRANULOCYTES NFR BLD: 1 % (ref 0–5)
INR PPP: 1.5
KETONES UR STRIP-MCNC: NEGATIVE MG/DL
LACTATE BLDV-SCNC: 2.2 MMOL/L (ref 0.5–1.9)
LEUKOCYTE ESTERASE UR QL STRIP: ABNORMAL
LYMPHOCYTES NFR BLD: 1.48 K/UL (ref 1.5–4)
LYMPHOCYTES RELATIVE PERCENT: 12 % (ref 20–42)
MCH RBC QN AUTO: 31.5 PG (ref 26–35)
MCHC RBC AUTO-ENTMCNC: 31.5 G/DL (ref 32–34.5)
MCV RBC AUTO: 100 FL (ref 80–99.9)
METHADONE UR QL: NEGATIVE
MONOCYTES NFR BLD: 1.02 K/UL (ref 0.1–0.95)
MONOCYTES NFR BLD: 8 % (ref 2–12)
NEUTROPHILS NFR BLD: 77 % (ref 43–80)
NEUTS SEG NFR BLD: 9.79 K/UL (ref 1.8–7.3)
NITRITE UR QL STRIP: NEGATIVE
OPIATES UR QL SCN: NEGATIVE
OXYCODONE UR QL SCN: NEGATIVE
PARTIAL THROMBOPLASTIN TIME: 36.2 SEC (ref 24.5–35.1)
PCP UR QL SCN: NEGATIVE
PH UR STRIP: 5.5 [PH] (ref 5–9)
PLATELET, FLUORESCENCE: 117 K/UL (ref 130–450)
PMV BLD AUTO: 11.5 FL (ref 7–12)
POTASSIUM SERPL-SCNC: 4.8 MMOL/L (ref 3.5–5)
PROCALCITONIN SERPL-MCNC: 0.74 NG/ML (ref 0–0.08)
PROT SERPL-MCNC: 7 G/DL (ref 6.4–8.3)
PROT UR STRIP-MCNC: NEGATIVE MG/DL
PROTHROMBIN TIME: 16.5 SEC (ref 9.3–12.4)
RBC # BLD AUTO: 2.73 M/UL (ref 3.5–5.5)
RBC #/AREA URNS HPF: ABNORMAL /HPF
SALICYLATES SERPL-MCNC: <0.3 MG/DL (ref 0–30)
SODIUM SERPL-SCNC: 134 MMOL/L (ref 132–146)
SP GR UR STRIP: 1.01 (ref 1–1.03)
TEST INFORMATION: NORMAL
TOXIC TRICYCLIC SC,BLOOD: NEGATIVE
UROBILINOGEN UR STRIP-ACNC: 0.2 EU/DL (ref 0–1)
WBC #/AREA URNS HPF: ABNORMAL /HPF
WBC OTHER # BLD: 12.7 K/UL (ref 4.5–11.5)

## 2024-04-09 PROCEDURE — 86140 C-REACTIVE PROTEIN: CPT

## 2024-04-09 PROCEDURE — 80143 DRUG ASSAY ACETAMINOPHEN: CPT

## 2024-04-09 PROCEDURE — 85652 RBC SED RATE AUTOMATED: CPT

## 2024-04-09 PROCEDURE — 80307 DRUG TEST PRSMV CHEM ANLYZR: CPT

## 2024-04-09 PROCEDURE — 87077 CULTURE AEROBIC IDENTIFY: CPT

## 2024-04-09 PROCEDURE — 2580000003 HC RX 258: Performed by: INTERNAL MEDICINE

## 2024-04-09 PROCEDURE — 96365 THER/PROPH/DIAG IV INF INIT: CPT

## 2024-04-09 PROCEDURE — 2060000000 HC ICU INTERMEDIATE R&B

## 2024-04-09 PROCEDURE — 87205 SMEAR GRAM STAIN: CPT

## 2024-04-09 PROCEDURE — G0480 DRUG TEST DEF 1-7 CLASSES: HCPCS

## 2024-04-09 PROCEDURE — 87070 CULTURE OTHR SPECIMN AEROBIC: CPT

## 2024-04-09 PROCEDURE — 85610 PROTHROMBIN TIME: CPT

## 2024-04-09 PROCEDURE — 99285 EMERGENCY DEPT VISIT HI MDM: CPT

## 2024-04-09 PROCEDURE — 81001 URINALYSIS AUTO W/SCOPE: CPT

## 2024-04-09 PROCEDURE — 87154 CUL TYP ID BLD PTHGN 6+ TRGT: CPT

## 2024-04-09 PROCEDURE — 73610 X-RAY EXAM OF ANKLE: CPT

## 2024-04-09 PROCEDURE — 84145 PROCALCITONIN (PCT): CPT

## 2024-04-09 PROCEDURE — 73630 X-RAY EXAM OF FOOT: CPT

## 2024-04-09 PROCEDURE — 2580000003 HC RX 258: Performed by: EMERGENCY MEDICINE

## 2024-04-09 PROCEDURE — 80053 COMPREHEN METABOLIC PANEL: CPT

## 2024-04-09 PROCEDURE — 93970 EXTREMITY STUDY: CPT

## 2024-04-09 PROCEDURE — 82140 ASSAY OF AMMONIA: CPT

## 2024-04-09 PROCEDURE — 87040 BLOOD CULTURE FOR BACTERIA: CPT

## 2024-04-09 PROCEDURE — 83605 ASSAY OF LACTIC ACID: CPT

## 2024-04-09 PROCEDURE — 85730 THROMBOPLASTIN TIME PARTIAL: CPT

## 2024-04-09 PROCEDURE — 6360000002 HC RX W HCPCS: Performed by: EMERGENCY MEDICINE

## 2024-04-09 PROCEDURE — 93005 ELECTROCARDIOGRAM TRACING: CPT | Performed by: EMERGENCY MEDICINE

## 2024-04-09 PROCEDURE — 82248 BILIRUBIN DIRECT: CPT

## 2024-04-09 PROCEDURE — 99223 1ST HOSP IP/OBS HIGH 75: CPT | Performed by: INTERNAL MEDICINE

## 2024-04-09 PROCEDURE — 85025 COMPLETE CBC W/AUTO DIFF WBC: CPT

## 2024-04-09 PROCEDURE — 80179 DRUG ASSAY SALICYLATE: CPT

## 2024-04-09 PROCEDURE — 96375 TX/PRO/DX INJ NEW DRUG ADDON: CPT

## 2024-04-09 PROCEDURE — 6360000002 HC RX W HCPCS: Performed by: INTERNAL MEDICINE

## 2024-04-09 PROCEDURE — 6370000000 HC RX 637 (ALT 250 FOR IP): Performed by: INTERNAL MEDICINE

## 2024-04-09 RX ORDER — 0.9 % SODIUM CHLORIDE 0.9 %
1000 INTRAVENOUS SOLUTION INTRAVENOUS ONCE
Status: COMPLETED | OUTPATIENT
Start: 2024-04-09 | End: 2024-04-09

## 2024-04-09 RX ORDER — PANTOPRAZOLE SODIUM 40 MG/1
40 TABLET, DELAYED RELEASE ORAL
Status: DISCONTINUED | OUTPATIENT
Start: 2024-04-10 | End: 2024-04-19 | Stop reason: HOSPADM

## 2024-04-09 RX ORDER — MORPHINE SULFATE 4 MG/ML
4 INJECTION, SOLUTION INTRAMUSCULAR; INTRAVENOUS ONCE
Status: COMPLETED | OUTPATIENT
Start: 2024-04-09 | End: 2024-04-09

## 2024-04-09 RX ORDER — MONTELUKAST SODIUM 4 MG/1
1 TABLET, CHEWABLE ORAL 2 TIMES DAILY
COMMUNITY
Start: 2024-03-20

## 2024-04-09 RX ORDER — POLYETHYLENE GLYCOL 3350 17 G/17G
17 POWDER, FOR SOLUTION ORAL DAILY PRN
Status: DISCONTINUED | OUTPATIENT
Start: 2024-04-09 | End: 2024-04-19 | Stop reason: HOSPADM

## 2024-04-09 RX ORDER — HYDROCODONE BITARTRATE AND ACETAMINOPHEN 5; 325 MG/1; MG/1
1 TABLET ORAL EVERY 6 HOURS PRN
Status: DISCONTINUED | OUTPATIENT
Start: 2024-04-09 | End: 2024-04-19 | Stop reason: HOSPADM

## 2024-04-09 RX ORDER — ACETAMINOPHEN 650 MG/1
650 SUPPOSITORY RECTAL EVERY 6 HOURS PRN
Status: DISCONTINUED | OUTPATIENT
Start: 2024-04-09 | End: 2024-04-19 | Stop reason: HOSPADM

## 2024-04-09 RX ORDER — ENOXAPARIN SODIUM 100 MG/ML
40 INJECTION SUBCUTANEOUS DAILY
Status: DISCONTINUED | OUTPATIENT
Start: 2024-04-09 | End: 2024-04-19 | Stop reason: HOSPADM

## 2024-04-09 RX ORDER — MORPHINE SULFATE 2 MG/ML
2 INJECTION, SOLUTION INTRAMUSCULAR; INTRAVENOUS EVERY 4 HOURS PRN
Status: DISCONTINUED | OUTPATIENT
Start: 2024-04-09 | End: 2024-04-11

## 2024-04-09 RX ORDER — SODIUM CHLORIDE 0.9 % (FLUSH) 0.9 %
5-40 SYRINGE (ML) INJECTION PRN
Status: DISCONTINUED | OUTPATIENT
Start: 2024-04-09 | End: 2024-04-16 | Stop reason: SDUPTHER

## 2024-04-09 RX ORDER — CLONIDINE HYDROCHLORIDE 0.1 MG/1
0.1 TABLET ORAL 2 TIMES DAILY
Status: DISCONTINUED | OUTPATIENT
Start: 2024-04-09 | End: 2024-04-19 | Stop reason: HOSPADM

## 2024-04-09 RX ORDER — ACETAMINOPHEN 325 MG/1
650 TABLET ORAL EVERY 6 HOURS PRN
Status: DISCONTINUED | OUTPATIENT
Start: 2024-04-09 | End: 2024-04-19 | Stop reason: HOSPADM

## 2024-04-09 RX ORDER — SODIUM CHLORIDE 0.9 % (FLUSH) 0.9 %
10 SYRINGE (ML) INJECTION PRN
Status: DISCONTINUED | OUTPATIENT
Start: 2024-04-09 | End: 2024-04-19 | Stop reason: HOSPADM

## 2024-04-09 RX ORDER — ONDANSETRON 4 MG/1
4 TABLET, ORALLY DISINTEGRATING ORAL EVERY 8 HOURS PRN
Status: DISCONTINUED | OUTPATIENT
Start: 2024-04-09 | End: 2024-04-19 | Stop reason: HOSPADM

## 2024-04-09 RX ORDER — SODIUM CHLORIDE 9 MG/ML
INJECTION, SOLUTION INTRAVENOUS PRN
Status: DISCONTINUED | OUTPATIENT
Start: 2024-04-09 | End: 2024-04-16 | Stop reason: SDUPTHER

## 2024-04-09 RX ORDER — SODIUM CHLORIDE 0.9 % (FLUSH) 0.9 %
5-40 SYRINGE (ML) INJECTION EVERY 12 HOURS SCHEDULED
Status: DISCONTINUED | OUTPATIENT
Start: 2024-04-09 | End: 2024-04-16 | Stop reason: SDUPTHER

## 2024-04-09 RX ORDER — SODIUM CHLORIDE 9 MG/ML
INJECTION, SOLUTION INTRAVENOUS CONTINUOUS
Status: DISCONTINUED | OUTPATIENT
Start: 2024-04-09 | End: 2024-04-14

## 2024-04-09 RX ORDER — ONDANSETRON 2 MG/ML
4 INJECTION INTRAMUSCULAR; INTRAVENOUS EVERY 6 HOURS PRN
Status: DISCONTINUED | OUTPATIENT
Start: 2024-04-09 | End: 2024-04-19 | Stop reason: HOSPADM

## 2024-04-09 RX ORDER — SODIUM CHLORIDE 9 MG/ML
INJECTION, SOLUTION INTRAVENOUS CONTINUOUS
Status: DISCONTINUED | OUTPATIENT
Start: 2024-04-09 | End: 2024-04-09

## 2024-04-09 RX ORDER — CLONIDINE HYDROCHLORIDE 0.1 MG/1
0.1 TABLET ORAL 2 TIMES DAILY
Status: ON HOLD | COMMUNITY
Start: 2024-02-21 | End: 2024-04-19 | Stop reason: HOSPADM

## 2024-04-09 RX ORDER — CEPHALEXIN 500 MG/1
500 CAPSULE ORAL 3 TIMES DAILY
Status: ON HOLD | COMMUNITY
Start: 2024-03-27 | End: 2024-04-15 | Stop reason: HOSPADM

## 2024-04-09 RX ADMIN — MORPHINE SULFATE 4 MG: 4 INJECTION, SOLUTION INTRAMUSCULAR; INTRAVENOUS at 18:14

## 2024-04-09 RX ADMIN — MORPHINE SULFATE 2 MG: 2 INJECTION, SOLUTION INTRAMUSCULAR; INTRAVENOUS at 22:05

## 2024-04-09 RX ADMIN — CEFEPIME 2000 MG: 2 INJECTION, POWDER, FOR SOLUTION INTRAVENOUS at 15:11

## 2024-04-09 RX ADMIN — SODIUM CHLORIDE 1000 ML: 9 INJECTION, SOLUTION INTRAVENOUS at 20:00

## 2024-04-09 RX ADMIN — SODIUM CHLORIDE, PRESERVATIVE FREE 10 ML: 5 INJECTION INTRAVENOUS at 22:00

## 2024-04-09 RX ADMIN — VANCOMYCIN HYDROCHLORIDE 1250 MG: 10 INJECTION, POWDER, LYOPHILIZED, FOR SOLUTION INTRAVENOUS at 16:22

## 2024-04-09 RX ADMIN — CLONIDINE HYDROCHLORIDE 0.1 MG: 0.1 TABLET ORAL at 22:00

## 2024-04-09 RX ADMIN — SODIUM CHLORIDE 1000 ML: 9 INJECTION, SOLUTION INTRAVENOUS at 15:09

## 2024-04-09 RX ADMIN — SODIUM CHLORIDE: 9 INJECTION, SOLUTION INTRAVENOUS at 23:31

## 2024-04-09 ASSESSMENT — PAIN DESCRIPTION - FREQUENCY: FREQUENCY: CONTINUOUS

## 2024-04-09 ASSESSMENT — PAIN - FUNCTIONAL ASSESSMENT
PAIN_FUNCTIONAL_ASSESSMENT: 0-10
PAIN_FUNCTIONAL_ASSESSMENT: PREVENTS OR INTERFERES SOME ACTIVE ACTIVITIES AND ADLS

## 2024-04-09 ASSESSMENT — PAIN DESCRIPTION - ONSET: ONSET: ON-GOING

## 2024-04-09 ASSESSMENT — PAIN DESCRIPTION - LOCATION
LOCATION: FOOT
LOCATION: FOOT

## 2024-04-09 ASSESSMENT — PAIN DESCRIPTION - ORIENTATION
ORIENTATION: LEFT
ORIENTATION: LEFT;RIGHT

## 2024-04-09 ASSESSMENT — LIFESTYLE VARIABLES
HOW OFTEN DO YOU HAVE A DRINK CONTAINING ALCOHOL: NEVER
HOW MANY STANDARD DRINKS CONTAINING ALCOHOL DO YOU HAVE ON A TYPICAL DAY: PATIENT DOES NOT DRINK

## 2024-04-09 ASSESSMENT — PAIN DESCRIPTION - DESCRIPTORS
DESCRIPTORS: ACHING;STABBING;THROBBING
DESCRIPTORS: THROBBING

## 2024-04-09 ASSESSMENT — PAIN SCALES - GENERAL
PAINLEVEL_OUTOF10: 8
PAINLEVEL_OUTOF10: 10
PAINLEVEL_OUTOF10: 10

## 2024-04-09 ASSESSMENT — PAIN DESCRIPTION - PAIN TYPE: TYPE: ACUTE PAIN

## 2024-04-09 NOTE — ED PROVIDER NOTES
Urine NEGATIVE NEGATIVE    Opiates, Urine NEGATIVE NEGATIVE    Phencyclidine, Urine NEGATIVE NEGATIVE    Cannabinoid Scrn, Ur NEGATIVE NEGATIVE    Oxycodone Screen, Ur NEGATIVE NEGATIVE    Fentanyl, Ur NEGATIVE NEGATIVE    Buprenorphine Urine NEGATIVE NEGATIVE    Test Information       These drug screen results are for medical purposes only and should not be considered definitive or confirmed.   Sedimentation Rate   Result Value Ref Range    Sed Rate 87 (H) 0 - 20 mm/Hr   Microscopic Urinalysis   Result Value Ref Range    WBC, UA 6 TO 9 (A) 0 TO 5 /HPF    RBC, UA 0 TO 2 0 TO 2 /HPF    Bacteria, UA 1+ (A) None   EKG 12 Lead   Result Value Ref Range    Ventricular Rate 114 BPM    Atrial Rate 114 BPM    P-R Interval 118 ms    QRS Duration 58 ms    Q-T Interval 316 ms    QTc Calculation (Bazett) 435 ms    P Axis 22 degrees    R Axis 25 degrees    T Axis 26 degrees       RADIOLOGY:  Vascular duplex lower extremity venous bilateral    Result Date: 4/9/2024  EXAMINATION: DUPLEX VENOUS ULTRASOUND OF THE BILATERAL LOWER EXTREMITIES4/9/2024 4:04 pm TECHNIQUE: Duplex ultrasound using B-mode/gray scaled imaging and Doppler spectral analysis and color flow was obtained of the deep venous structures of the bilateral extremities. COMPARISON: None. HISTORY: ORDERING SYSTEM PROVIDED HISTORY: Pain left calf FINDINGS: Please note the study was difficult due to the patient's inability to cooperate. The visualized veins of the bilateral lower extremities are patent and free of echogenic thrombus. The veins demonstrate good compressibility with normal color flow study and spectral analysis.     No evidence of DVT in either lower extremity.     XR FOOT LEFT (MIN 3 VIEWS)    Result Date: 4/9/2024  EXAM: XR Left Foot Complete, 3 or More Views EXAM DATE/TIME: 4/9/2024 3:44 pm CLINICAL HISTORY: ORDERING SYSTEM PROVIDED HISTORY: pain  TECHNOLOGIST PROVIDED HISTORY: Reason for exam:->pain TECHNIQUE: Frontal, lateral and oblique views of the

## 2024-04-09 NOTE — PROGRESS NOTES
Database initiated pharmacy and medications verified with the patient.  She is A&O comes in from home with her children. She uses a cane and a walker. She is RA at baseline. She has wounds to her LLE her daughter has been helping her with ADL's and dressing changes. She takes lactulose and gets diarrhea often but has had none today.

## 2024-04-09 NOTE — H&P
wounds.   Neurologic: no cranial nerve deficit and speech normal    LABS:  Recent Labs     04/09/24  1445      K 4.8      CO2 15*   BUN 19   CREATININE 1.2*   GLUCOSE 114*   CALCIUM 8.4*       Recent Labs     04/09/24  1445   WBC 12.7*   RBC 2.73*   HGB 8.6*   HCT 27.3*   .0*   MCH 31.5   MCHC 31.5*   RDW 17.0*   MPV 11.5       No results for input(s): \"POCGLU\" in the last 72 hours.        Radiology: Vascular duplex lower extremity venous bilateral    Result Date: 4/9/2024  EXAMINATION: DUPLEX VENOUS ULTRASOUND OF THE BILATERAL LOWER EXTREMITIES4/9/2024 4:04 pm TECHNIQUE: Duplex ultrasound using B-mode/gray scaled imaging and Doppler spectral analysis and color flow was obtained of the deep venous structures of the bilateral extremities. COMPARISON: None. HISTORY: ORDERING SYSTEM PROVIDED HISTORY: Pain left calf FINDINGS: Please note the study was difficult due to the patient's inability to cooperate. The visualized veins of the bilateral lower extremities are patent and free of echogenic thrombus. The veins demonstrate good compressibility with normal color flow study and spectral analysis.     No evidence of DVT in either lower extremity.     XR FOOT LEFT (MIN 3 VIEWS)    Result Date: 4/9/2024  EXAM: XR Left Foot Complete, 3 or More Views EXAM DATE/TIME: 4/9/2024 3:44 pm CLINICAL HISTORY: ORDERING SYSTEM PROVIDED HISTORY: pain  TECHNOLOGIST PROVIDED HISTORY: Reason for exam:->pain TECHNIQUE: Frontal, lateral and oblique views of the left foot. COMPARISON: No relevant prior studies available. FINDINGS: Bones/joints:  No acute findings.  No acute fracture.  No dislocation. Soft tissues:  Soft tissue prominence lateral to the head of the 5th metatarsal neck could represent a soft tissue corn or focal soft tissue swelling.  No radiopaque foreign body.     Soft tissue prominence lateral to the head of the 5th metatarsal neck could represent a soft tissue corn or focal soft tissue swelling.  No  evidence of acute fracture or traumatic malalignment.       EKG: None    ASSESSMENT:      Active Problems:    * No active hospital problems. *  Resolved Problems:    * No resolved hospital problems. *      PLAN:    Sepsis- POA( tachycardia, Elevated Lactic Acid, Leukocytosis) Received Vancomycin/Ceftriaxone in ED course. Follow Cultures.   Leukocytosis- WBC 12.7 received Vancomycin/Ceftriaxone in ED course. CRP/ESR pending. Follow cultures. ID input appreciated.   Elevated Lactic Acid- Lactic Acid 2.2 received 2L NSS bolus in ED course. Follow.   Left foot wound- XR Left Foot Soft tissue prominence lateral to the head of the 5th metatarsal neck could represent a soft tissue corn or focal soft tissue swelling.  No evidence of acute fracture or traumatic malalignment. Podiatry/ID input appreciated.  MELINDA- BUN/Crt  19/1.2. received 2L NSS bolus in ED course. Continue IVF hydration. Avoid Nephrotoxic agents. Follow. Consider Nephrology consultation with worsening renal function.   Anemia- Hgb 8.6. No overt signs of bleeding noted. Continue to follow transfuse as needed  Liver Cirrhosis- Has seen Dr. Joni BRUNNER in recent past. Continue home regimen.   Thrombocytopenia- 2/2 Above. Plt 117. Monitor closely for bleeding.   Hx Alcohol Use Disorder- Recent cessation. Continues per Patient. Congratulated and encouraged on continue cessation.    Code Status: Full  DVT prophylaxis: Lovenox    NOTE: This report was transcribed using voice recognition software. Every effort was made to ensure accuracy; however, inadvertent computerized transcription errors may be present.     Electronically signed by ROBERTO CARLOS Carrasco CNP on 4/9/2024 at 6:02 PM  HOSPITALIST ATTENDING PHYSICIAN NOTE 4/9/2024 1949PM:    Details of the evaluation - subjective assessment (including medication profile, past medical, family and social history when applicable), examination, review of lab and test data, diagnostic impressions and medical decision

## 2024-04-10 ENCOUNTER — ANESTHESIA EVENT (OUTPATIENT)
Dept: OPERATING ROOM | Age: 44
End: 2024-04-10
Payer: COMMERCIAL

## 2024-04-10 ENCOUNTER — APPOINTMENT (OUTPATIENT)
Dept: CT IMAGING | Age: 44
End: 2024-04-10
Payer: COMMERCIAL

## 2024-04-10 PROBLEM — M86.9 OSTEOMYELITIS (HCC): Status: ACTIVE | Noted: 2024-04-10

## 2024-04-10 LAB
ALBUMIN SERPL-MCNC: 2.2 G/DL (ref 3.5–5.2)
ALP SERPL-CCNC: 218 U/L (ref 35–104)
ALT SERPL-CCNC: 22 U/L (ref 0–32)
ANION GAP SERPL CALCULATED.3IONS-SCNC: 6 MMOL/L (ref 7–16)
AST SERPL-CCNC: 53 U/L (ref 0–31)
BASOPHILS # BLD: 0.07 K/UL (ref 0–0.2)
BASOPHILS NFR BLD: 1 % (ref 0–2)
BILIRUB SERPL-MCNC: 5.5 MG/DL (ref 0–1.2)
BUN SERPL-MCNC: 17 MG/DL (ref 6–20)
CALCIUM SERPL-MCNC: 7.8 MG/DL (ref 8.6–10.2)
CHLORIDE SERPL-SCNC: 111 MMOL/L (ref 98–107)
CO2 SERPL-SCNC: 15 MMOL/L (ref 22–29)
CREAT SERPL-MCNC: 1.1 MG/DL (ref 0.5–1)
EKG ATRIAL RATE: 114 BPM
EKG P AXIS: 22 DEGREES
EKG P-R INTERVAL: 118 MS
EKG Q-T INTERVAL: 316 MS
EKG QRS DURATION: 58 MS
EKG QTC CALCULATION (BAZETT): 435 MS
EKG R AXIS: 25 DEGREES
EKG T AXIS: 26 DEGREES
EKG VENTRICULAR RATE: 114 BPM
EOSINOPHIL # BLD: 0.18 K/UL (ref 0.05–0.5)
EOSINOPHILS RELATIVE PERCENT: 2 % (ref 0–6)
ERYTHROCYTE [DISTWIDTH] IN BLOOD BY AUTOMATED COUNT: 16.9 % (ref 11.5–15)
GFR SERPL CREATININE-BSD FRML MDRD: 64 ML/MIN/1.73M2
GLUCOSE SERPL-MCNC: 116 MG/DL (ref 74–99)
HCT VFR BLD AUTO: 23.3 % (ref 34–48)
HGB BLD-MCNC: 7.2 G/DL (ref 11.5–15.5)
IMM GRANULOCYTES # BLD AUTO: 0.13 K/UL (ref 0–0.58)
IMM GRANULOCYTES NFR BLD: 1 % (ref 0–5)
LACTATE BLDV-SCNC: 1.5 MMOL/L (ref 0.5–2.2)
LYMPHOCYTES NFR BLD: 1.6 K/UL (ref 1.5–4)
LYMPHOCYTES RELATIVE PERCENT: 15 % (ref 20–42)
MAGNESIUM SERPL-MCNC: 1.7 MG/DL (ref 1.6–2.6)
MCH RBC QN AUTO: 31.3 PG (ref 26–35)
MCHC RBC AUTO-ENTMCNC: 30.9 G/DL (ref 32–34.5)
MCV RBC AUTO: 101.3 FL (ref 80–99.9)
MONOCYTES NFR BLD: 1.16 K/UL (ref 0.1–0.95)
MONOCYTES NFR BLD: 11 % (ref 2–12)
NEUTROPHILS NFR BLD: 71 % (ref 43–80)
NEUTS SEG NFR BLD: 7.6 K/UL (ref 1.8–7.3)
PHOSPHATE SERPL-MCNC: 4.2 MG/DL (ref 2.5–4.5)
PLATELET # BLD AUTO: 101 K/UL (ref 130–450)
PMV BLD AUTO: 11.2 FL (ref 7–12)
POTASSIUM SERPL-SCNC: 5 MMOL/L (ref 3.5–5)
PROT SERPL-MCNC: 5.5 G/DL (ref 6.4–8.3)
RBC # BLD AUTO: 2.3 M/UL (ref 3.5–5.5)
SODIUM SERPL-SCNC: 132 MMOL/L (ref 132–146)
WBC OTHER # BLD: 10.7 K/UL (ref 4.5–11.5)

## 2024-04-10 PROCEDURE — 6360000004 HC RX CONTRAST MEDICATION: Performed by: RADIOLOGY

## 2024-04-10 PROCEDURE — 6360000002 HC RX W HCPCS: Performed by: INTERNAL MEDICINE

## 2024-04-10 PROCEDURE — 1200000000 HC SEMI PRIVATE

## 2024-04-10 PROCEDURE — 80053 COMPREHEN METABOLIC PANEL: CPT

## 2024-04-10 PROCEDURE — 83735 ASSAY OF MAGNESIUM: CPT

## 2024-04-10 PROCEDURE — 6370000000 HC RX 637 (ALT 250 FOR IP): Performed by: INTERNAL MEDICINE

## 2024-04-10 PROCEDURE — 2580000003 HC RX 258: Performed by: STUDENT IN AN ORGANIZED HEALTH CARE EDUCATION/TRAINING PROGRAM

## 2024-04-10 PROCEDURE — 6360000002 HC RX W HCPCS: Performed by: STUDENT IN AN ORGANIZED HEALTH CARE EDUCATION/TRAINING PROGRAM

## 2024-04-10 PROCEDURE — 93010 ELECTROCARDIOGRAM REPORT: CPT | Performed by: INTERNAL MEDICINE

## 2024-04-10 PROCEDURE — 84100 ASSAY OF PHOSPHORUS: CPT

## 2024-04-10 PROCEDURE — 85025 COMPLETE CBC W/AUTO DIFF WBC: CPT

## 2024-04-10 PROCEDURE — 73701 CT LOWER EXTREMITY W/DYE: CPT

## 2024-04-10 PROCEDURE — 99232 SBSQ HOSP IP/OBS MODERATE 35: CPT | Performed by: INTERNAL MEDICINE

## 2024-04-10 PROCEDURE — 83605 ASSAY OF LACTIC ACID: CPT

## 2024-04-10 RX ADMIN — MORPHINE SULFATE 2 MG: 2 INJECTION, SOLUTION INTRAMUSCULAR; INTRAVENOUS at 16:12

## 2024-04-10 RX ADMIN — MEROPENEM 1000 MG: 1 INJECTION, POWDER, FOR SOLUTION INTRAVENOUS at 16:18

## 2024-04-10 RX ADMIN — MORPHINE SULFATE 2 MG: 2 INJECTION, SOLUTION INTRAMUSCULAR; INTRAVENOUS at 21:30

## 2024-04-10 RX ADMIN — MORPHINE SULFATE 2 MG: 2 INJECTION, SOLUTION INTRAMUSCULAR; INTRAVENOUS at 11:12

## 2024-04-10 RX ADMIN — PANTOPRAZOLE SODIUM 40 MG: 40 TABLET, DELAYED RELEASE ORAL at 16:14

## 2024-04-10 RX ADMIN — MEROPENEM 1000 MG: 1 INJECTION, POWDER, FOR SOLUTION INTRAVENOUS at 23:48

## 2024-04-10 RX ADMIN — CLONIDINE HYDROCHLORIDE 0.1 MG: 0.1 TABLET ORAL at 21:30

## 2024-04-10 RX ADMIN — IOPAMIDOL 75 ML: 755 INJECTION, SOLUTION INTRAVENOUS at 09:45

## 2024-04-10 RX ADMIN — MORPHINE SULFATE 2 MG: 2 INJECTION, SOLUTION INTRAMUSCULAR; INTRAVENOUS at 06:00

## 2024-04-10 RX ADMIN — CLONIDINE HYDROCHLORIDE 0.1 MG: 0.1 TABLET ORAL at 08:14

## 2024-04-10 RX ADMIN — HYDROCODONE BITARTRATE AND ACETAMINOPHEN 1 TABLET: 5; 325 TABLET ORAL at 13:25

## 2024-04-10 RX ADMIN — PANTOPRAZOLE SODIUM 40 MG: 40 TABLET, DELAYED RELEASE ORAL at 06:00

## 2024-04-10 ASSESSMENT — PAIN SCALES - GENERAL
PAINLEVEL_OUTOF10: 3
PAINLEVEL_OUTOF10: 7
PAINLEVEL_OUTOF10: 9
PAINLEVEL_OUTOF10: 0
PAINLEVEL_OUTOF10: 4
PAINLEVEL_OUTOF10: 9
PAINLEVEL_OUTOF10: 9
PAINLEVEL_OUTOF10: 0
PAINLEVEL_OUTOF10: 9

## 2024-04-10 ASSESSMENT — PAIN DESCRIPTION - ONSET
ONSET: ON-GOING

## 2024-04-10 ASSESSMENT — PAIN DESCRIPTION - ORIENTATION
ORIENTATION: LEFT

## 2024-04-10 ASSESSMENT — PAIN DESCRIPTION - PAIN TYPE
TYPE: ACUTE PAIN

## 2024-04-10 ASSESSMENT — PAIN DESCRIPTION - DESCRIPTORS
DESCRIPTORS: ACHING;SHOOTING;STABBING
DESCRIPTORS: ACHING;THROBBING
DESCRIPTORS: ACHING;STABBING;THROBBING
DESCRIPTORS: ACHING
DESCRIPTORS: ACHING;THROBBING

## 2024-04-10 ASSESSMENT — PAIN DESCRIPTION - FREQUENCY
FREQUENCY: CONTINUOUS

## 2024-04-10 ASSESSMENT — PAIN DESCRIPTION - LOCATION
LOCATION: FOOT

## 2024-04-10 ASSESSMENT — LIFESTYLE VARIABLES: SMOKING_STATUS: 1

## 2024-04-10 NOTE — PROGRESS NOTES
04/09/24 2200    sodium chloride flush 0.9 % injection 5-40 mL  5-40 mL IntraVENous PRN Hric, Santos, DO        0.9 % sodium chloride infusion   IntraVENous PRN Hric, Santos, DO        enoxaparin (LOVENOX) injection 40 mg  40 mg SubCUTAneous Daily Hric, Santos, DO        ondansetron (ZOFRAN-ODT) disintegrating tablet 4 mg  4 mg Oral Q8H PRN Hric, Santos, DO        Or    ondansetron (ZOFRAN) injection 4 mg  4 mg IntraVENous Q6H PRN Hric, Santos, DO        polyethylene glycol (GLYCOLAX) packet 17 g  17 g Oral Daily PRN Hric, Santos, DO        acetaminophen (TYLENOL) tablet 650 mg  650 mg Oral Q6H PRN Hric, Santos, DO        Or    acetaminophen (TYLENOL) suppository 650 mg  650 mg Rectal Q6H PRN Hric, Santos, DO        0.9 % sodium chloride infusion   IntraVENous Continuous Hric, Santos, DO 50 mL/hr at 04/10/24 0600 Rate Verify at 04/10/24 0600    HYDROcodone-acetaminophen (NORCO) 5-325 MG per tablet 1 tablet  1 tablet Oral Q6H PRN Mariluz Machuca, APRN - NP        morphine (PF) injection 2 mg  2 mg IntraVENous Q4H PRN Mariluz Machuca APRN - NP   2 mg at 04/10/24 0600        Lab Results   Component Value Date    WBC 10.7 04/10/2024    HCT 23.3 (L) 04/10/2024    HGB 7.2 (L) 04/10/2024     (L) 04/10/2024     04/10/2024    K 5.0 04/10/2024     (H) 04/10/2024    CO2 15 (L) 04/10/2024    BUN 17 04/10/2024    CREATININE 1.1 (H) 04/10/2024    GLUCOSE 116 (H) 04/10/2024    CRP 29.0 (H) 04/09/2024     ASSESSMENTS:   -Left foot and ankle infected wounds  -Sepsis  -Leukocytosis     PLAN:  Patient was examined and evaluated.  - Reviewed patient's recent lab results and pertinent diagnostic imaging.  - Reviewed ancillary service notes.  - ABX: Vancomycin and cefepime, would appreciate ID recommendations for antibiotic management  - Cultures : Wound cultures taken in the ED, results pending  -Left foot x-rays:  Soft tissue prominence lateral to the head of the 5th metatarsal neck could represent a

## 2024-04-10 NOTE — ANESTHESIA PRE PROCEDURE
ESOPHAGOGASTRODUODENOSCOPY WITH DILATION performed by Dwayne Quinones DO at [facility] ENDOSCOPY       Social History:    Social History     Tobacco Use    Smoking status: Every Day     Current packs/day: 0.50     Types: Cigarettes    Smokeless tobacco: Not on file   Substance Use Topics    Alcohol use: No                                Ready to quit: Not Answered  Counseling given: Not Answered      Vital Signs (Current):   Vitals:    04/10/24 1355 04/10/24 1505 04/10/24 1600 04/10/24 1612   BP:  (!) 90/57 112/61    Pulse:  84 98    Resp: 16 16  16   Temp:  98.1 °F (36.7 °C)     TempSrc:  Oral     SpO2:  100%     Weight:       Height:                                                  BP Readings from Last 3 Encounters:   04/10/24 112/61   02/28/24 115/68   02/18/24 116/76       NPO Status:                                                                                 BMI:   Wt Readings from Last 3 Encounters:   04/10/24 68 kg (150 lb)   02/28/24 62.4 kg (137 lb 9.1 oz)   02/17/24 67 kg (147 lb 11.3 oz)     Body mass index is 29.29 kg/m².    CBC:   Lab Results   Component Value Date/Time    WBC 10.7 04/10/2024 03:55 AM    RBC 2.30 04/10/2024 03:55 AM    HGB 7.2 04/10/2024 03:55 AM    HCT 23.3 04/10/2024 03:55 AM    .3 04/10/2024 03:55 AM    RDW 16.9 04/10/2024 03:55 AM     04/10/2024 03:55 AM       CMP:   Lab Results   Component Value Date/Time     04/10/2024 03:55 AM    K 5.0 04/10/2024 03:55 AM     04/10/2024 03:55 AM    CO2 15 04/10/2024 03:55 AM    BUN 17 04/10/2024 03:55 AM    CREATININE 1.1 04/10/2024 03:55 AM    GFRAA >60 02/25/2020 01:43 PM    LABGLOM 64 04/10/2024 03:55 AM    GLUCOSE 116 04/10/2024 03:55 AM    PROT 5.5 04/10/2024 03:55 AM    CALCIUM 7.8 04/10/2024 03:55 AM    BILITOT 5.5 04/10/2024 03:55 AM    ALKPHOS 218 04/10/2024 03:55 AM    AST 53 04/10/2024 03:55 AM    ALT 22 04/10/2024 03:55 AM       POC Tests: No results for input(s): \"POCGLU\", \"POCNA\", \"POCK\", \"POCCL\",

## 2024-04-10 NOTE — PROGRESS NOTES
HGB 8.6* 7.2*   HCT 27.3* 23.3*   .0* 101.3*   MCH 31.5 31.3   MCHC 31.5* 30.9*   RDW 17.0* 16.9*   PLT  --  101*   MPV 11.5 11.2       CBC with Differential:    Lab Results   Component Value Date/Time    WBC 10.7 04/10/2024 03:55 AM    RBC 2.30 04/10/2024 03:55 AM    HGB 7.2 04/10/2024 03:55 AM    HCT 23.3 04/10/2024 03:55 AM     04/10/2024 03:55 AM    .3 04/10/2024 03:55 AM    MCH 31.3 04/10/2024 03:55 AM    MCHC 30.9 04/10/2024 03:55 AM    RDW 16.9 04/10/2024 03:55 AM    NRBC 1 02/28/2024 05:10 AM    METASPCT 1 02/26/2024 07:03 PM    LYMPHOPCT 15 04/10/2024 03:55 AM    PROMYELOPCT 2 02/15/2024 06:33 AM    MONOPCT 11 04/10/2024 03:55 AM    MYELOPCT 1 02/28/2024 05:10 AM    BASOPCT 1 04/10/2024 03:55 AM    MONOSABS 1.16 04/10/2024 03:55 AM    LYMPHSABS 1.60 04/10/2024 03:55 AM    EOSABS 0.18 04/10/2024 03:55 AM    BASOSABS 0.07 04/10/2024 03:55 AM     CMP:    Lab Results   Component Value Date/Time     04/10/2024 03:55 AM    K 5.0 04/10/2024 03:55 AM     04/10/2024 03:55 AM    CO2 15 04/10/2024 03:55 AM    BUN 17 04/10/2024 03:55 AM    CREATININE 1.1 04/10/2024 03:55 AM    GFRAA >60 02/25/2020 01:43 PM    LABGLOM 64 04/10/2024 03:55 AM    GLUCOSE 116 04/10/2024 03:55 AM    PROT 5.5 04/10/2024 03:55 AM    LABALBU 2.2 04/10/2024 03:55 AM    CALCIUM 7.8 04/10/2024 03:55 AM    BILITOT 5.5 04/10/2024 03:55 AM    ALKPHOS 218 04/10/2024 03:55 AM    AST 53 04/10/2024 03:55 AM    ALT 22 04/10/2024 03:55 AM     Magnesium:    Lab Results   Component Value Date/Time    MG 1.7 04/10/2024 03:55 AM     Phosphorus:    Lab Results   Component Value Date/Time    PHOS 4.2 04/10/2024 03:55 AM        Radiology:   CT ANKLE LEFT W CONTRAST   Final Result   Osteomyelitis 5th metatarsal head with surrounding phlegmon.  No discrete   abscess or ulcer.      Edema and/or cellulitis left lower extremity, foot and ankle particularly   along the dorsal lateral aspect.         XR ANKLE LEFT (MIN 3 VIEWS)   Final  Result   No radiographic evidence of acute fracture or dislocation.  No obvious   evidence of osteomyelitis however radiographs lack sensitivity and consider   further evaluation with MRI.      Soft tissue swelling lateral ankle.  Also suspect soft tissue swelling dorsal   foot.         Vascular duplex lower extremity venous bilateral   Final Result   No evidence of DVT in either lower extremity.         XR FOOT LEFT (MIN 3 VIEWS)   Final Result      Soft tissue prominence lateral to the head of the 5th metatarsal neck could   represent a soft tissue corn or focal soft tissue swelling.  No evidence of   acute fracture or traumatic malalignment.             Assessment:    Principal Problem:    Sepsis (HCC)  Active Problems:    Leukocytosis    Acidosis    Thrombocytopenia (HCC)    Primary hypertension  Resolved Problems:    * No resolved hospital problems. *      Plan:  Sepsis(leukocytosis, tachycardia, infection)POA supportive care and treat underlying infection  left 5th metatarsal osteomyelitis with cellulitis of left foot  continue abx. ID following  Acidosis/elevated lactic acid level  monitor  Thrombocytopenia monitor  Poss Charlie  continue iv fluids for now  Cirrhosis monitor  Htn continue med        Electronically signed by Santos Stevens, DO on 4/10/2024 at 4:24 PM

## 2024-04-10 NOTE — PROGRESS NOTES
Spoke with CT regarding stat order for L Ankle, stated that they will try to get to it tonight but may not be able to be completed until tomorrow AM first thing.

## 2024-04-10 NOTE — ED NOTES
ED to Inpatient Handoff Report    Notified [facility] that electronic handoff available and patient ready for transport to room 432.    Safety Risks: None identified    Patient in Restraints: no    Constant Observer or Patient : no    Telemetry Monitoring Ordered: Yes          Order to transfer to unit without monitor: NA    Last MEWS:  Time completed:     Deterioration Index: 22.95    Vitals:    04/09/24 1730 04/09/24 1820 04/09/24 1845 04/09/24 1855   BP:    123/73   Pulse: 97 (!) 106 (!) 109 (!) 110   Resp: 28 12 15 16   Temp:       SpO2: 98% 100% 99% 99%   Weight:       Height:           Opportunity for questions and clarification was provided.

## 2024-04-10 NOTE — PROGRESS NOTES
Spoke with Mariluz NOBLE with Dr. Stevens regarding pt request for pain medication, states she will put orders in.

## 2024-04-10 NOTE — CONSULTS
Department of Podiatry   Consult Note        Reason for Consult:  Left infected foot and ankle wounds    CHIEF COMPLAINT:  Left infected foot and ankle wounds    HISTORY OF PRESENT ILLNESS:                The patient is a 44 y.o. female with significant past medical history of alcohol abuse, cirrhosis of the liver, alcoholic hepatitis, and hypertension.  Patient states that she has never had any wounds on her feet and ankles before.  She states that about a week ago she noticed that there was 2 openings, one in her ankle and 1 in her foot.  She states that her left foot and ankle became very red and swollen and both openings began to drain.  Patient states that she thought she could deal with the drainage and the openings on her own, but the drainage has continued to increase and the redness and pain has continued to increase as well.  She states that her left foot and ankle have been causing her significant pain that she rates a 10 out of 10 when its at its worst.  I explained to the patient that because her infection markers are elevated and she has purulent drainage coming from her wounds, we are going to take her to surgery tomorrow to clean out the wounds.  Patient understands and is agreeable to proceed with surgery.  Patient denies any N/V/D/F/C/SOB/CP and has no other pedal complaints at this time.     -Patient to go for surgery tomorrow, left lower extremity incision and drainage with bone biopsy  -Patient to be NPO at midnight    Past Medical History:        Diagnosis Date    Heart burn     History of alcohol abuse     Hypertension     Liver cirrhosis (HCC)        Past Surgical History:        Procedure Laterality Date    UPPER GASTROINTESTINAL ENDOSCOPY N/A 2/16/2024    EGD ESOPHAGOGASTRODUODENOSCOPY WITH DILATION performed by Dwayne Quinones DO at [facility] ENDOSCOPY       Medications Prior to Admission:    Not in a hospital admission.    Allergies:  Patient has no known allergies.    Social History:  tissue corn or focal soft tissue swelling.  No evidence of acute fracture or traumatic malalignment.  - Dressing: Xeroform DSD applied to the left foot and ankle, dressings to be left clean, dry and intact  -Left ankle x-rays ordered and pending  -Left ankle CT ordered and pending  -Patient to go for surgery tomorrow, left lower extremity incision and drainage with bone biopsy  -Patient to be NPO at midnight  - Discussed patient with Dr. Grover  - Will continue to follow patient while they are in-house.        Thank you for the opportunity to take part in the patient's care. Please do not hesitate to call for any questions or concerns.      Андрей Thibodeaux DPM PGY1  [phone]  04/09/24

## 2024-04-10 NOTE — PLAN OF CARE
Problem: Discharge Planning  Goal: Discharge to home or other facility with appropriate resources  Outcome: Progressing     Problem: Pain  Goal: Verbalizes/displays adequate comfort level or baseline comfort level  Outcome: Progressing     Problem: Safety - Adult  Goal: Free from fall injury  Outcome: Progressing     Problem: Skin/Tissue Integrity  Goal: Absence of new skin breakdown  Outcome: Progressing     Problem: ABCDS Injury Assessment  Goal: Absence of physical injury  Outcome: Progressing

## 2024-04-10 NOTE — PROGRESS NOTES
4 Eyes Skin Assessment     NAME:  Jaylene Kapoor  YOB: 1980  MEDICAL RECORD NUMBER:  [id number]    The patient is being assessed for  Admission    I agree that at least one RN has performed a thorough Head to Toe Skin Assessment on the patient. ALL assessment sites listed below have been assessed.      Areas assessed by both nurses:    Head, Face, Ears, Shoulders, Back, Chest, Arms, Elbows, Hands, Sacrum. Buttock, Coccyx, Ischium, Legs. Feet and Heels, and Under Medical Devices         Does the Patient have a Wound? Yes wound(s) were present on assessment. LDA wound assessment was Initiated and completed by RN       Julio Prevention initiated by RN: No  Wound Care Orders initiated by RN: No    Pressure Injury (Stage 3,4, Unstageable, DTI, NWPT, and Complex wounds) if present, place Wound referral order by RN under : No    New Ostomies, if present place, Ostomy referral order under : No     Nurse 1 eSignature: Electronically signed by Unique Beasley RN on 4/9/24 at 10:35 PM EDT    **SHARE this note so that the co-signing nurse can place an eSignature**    Nurse 2 eSignature: Electronically signed by Rocío Rao RN on 4/9/24 at 10:36 PM EDT

## 2024-04-10 NOTE — CONSULTS
[facility]  [facility]    Consultation Note     Admit Date: 4/9/2024  2:19 PM    Reason for Consult:   Sepsis    Attending Physician:  Santos Stevens DO     Chief Complaint: left foot wound    HISTORY OF PRESENT ILLNESS:   The patient is a 44 y.o.  female not known to the Infectious Diseases service. The patient presented to ER with foot wound. It started 2 weeks ago. She has been caring for it. She was here recently with Jaundice. She was on keflex oral for the past 2 weeks. No fever or chills or abdominal pain or diarrhea. Her abdomen feels distended but no pain.    Since admission pt is afebrile HS stable on RA. Labs showed white count of 12.7 improved to 10.7, hgb was 8.6 today is 7.2 PAXTON-9 negative. Cr was 1.2/Bun of 1.1. pct was 0.74. Lfts showed alk phos of 278, bilirubin was 6.8 today is 5.5 blood cx in process UA clean. Wound cx in process. CT left ankle showed Osteomyelitis 5th metatarsal head with surrounding phlegmon.  No discrete  abscess or ulcer.     Edema and/or cellulitis left lower extremity, foot and ankle particularly  along the dorsal lateral aspect.  Patient is on IV vancomycin /cefepime. Got one dose each in the er. And I got consulted for antibiotic management.    Past Medical History:        Diagnosis Date    Heart burn     History of alcohol abuse     Hypertension     Liver cirrhosis (HCC)      Past Surgical History:        Procedure Laterality Date    UPPER GASTROINTESTINAL ENDOSCOPY N/A 2/16/2024    EGD ESOPHAGOGASTRODUODENOSCOPY WITH DILATION performed by Dwayne Quinones DO at [facility] ENDOSCOPY     Current Medications:   Scheduled Meds:   nicotine  1 patch TransDERmal Daily    cloNIDine  0.1 mg Oral BID    pantoprazole  40 mg Oral BID AC    sodium chloride flush  5-40 mL IntraVENous 2 times per day    enoxaparin  40 mg SubCUTAneous Daily     Continuous Infusions:   sodium chloride      sodium chloride 50 mL/hr at 04/10/24 0600     PRN Meds:sodium chloride

## 2024-04-10 NOTE — CARE COORDINATION
CASE MANAGEMENT....... Met with patient at the bedside. Jaylene voices being independent from home with her family. [place]. Has been borrowing cane/rollator from friends/family. Has been having difficulty with ambulation since issue with her left foot/ankel began a few weeks ago. She has no dme of her own and no history with hhc/rehab. On ivf. Per Pod, plan for left lower ext  I+D and bone bx tomorrow. Await ID input today. Currently off antibiotics. Discharge needs/plans TBD. Will cont to follow along and assist accordingly.

## 2024-04-11 ENCOUNTER — ANESTHESIA (OUTPATIENT)
Dept: OPERATING ROOM | Age: 44
End: 2024-04-11
Payer: COMMERCIAL

## 2024-04-11 LAB
ALBUMIN SERPL-MCNC: 2.1 G/DL (ref 3.5–5.2)
ALP SERPL-CCNC: 228 U/L (ref 35–104)
ALT SERPL-CCNC: 20 U/L (ref 0–32)
ANION GAP SERPL CALCULATED.3IONS-SCNC: 7 MMOL/L (ref 7–16)
AST SERPL-CCNC: 50 U/L (ref 0–31)
BASOPHILS # BLD: 0.05 K/UL (ref 0–0.2)
BASOPHILS NFR BLD: 1 % (ref 0–2)
BILIRUB SERPL-MCNC: 4.3 MG/DL (ref 0–1.2)
BUN SERPL-MCNC: 18 MG/DL (ref 6–20)
CALCIUM SERPL-MCNC: 7.7 MG/DL (ref 8.6–10.2)
CHLORIDE SERPL-SCNC: 111 MMOL/L (ref 98–107)
CO2 SERPL-SCNC: 17 MMOL/L (ref 22–29)
CREAT SERPL-MCNC: 1.1 MG/DL (ref 0.5–1)
EOSINOPHIL # BLD: 0.17 K/UL (ref 0.05–0.5)
EOSINOPHILS RELATIVE PERCENT: 2 % (ref 0–6)
ERYTHROCYTE [DISTWIDTH] IN BLOOD BY AUTOMATED COUNT: 17 % (ref 11.5–15)
GFR SERPL CREATININE-BSD FRML MDRD: 67 ML/MIN/1.73M2
GLUCOSE SERPL-MCNC: 113 MG/DL (ref 74–99)
HCT VFR BLD AUTO: 20.9 % (ref 34–48)
HCT VFR BLD AUTO: 25.7 % (ref 34–48)
HGB BLD-MCNC: 6.7 G/DL (ref 11.5–15.5)
HGB BLD-MCNC: 8.4 G/DL (ref 11.5–15.5)
IMM GRANULOCYTES # BLD AUTO: 0.08 K/UL (ref 0–0.58)
IMM GRANULOCYTES NFR BLD: 1 % (ref 0–5)
LYMPHOCYTES NFR BLD: 1.45 K/UL (ref 1.5–4)
LYMPHOCYTES RELATIVE PERCENT: 17 % (ref 20–42)
MCH RBC QN AUTO: 31.6 PG (ref 26–35)
MCHC RBC AUTO-ENTMCNC: 32.1 G/DL (ref 32–34.5)
MCV RBC AUTO: 98.6 FL (ref 80–99.9)
MONOCYTES NFR BLD: 0.96 K/UL (ref 0.1–0.95)
MONOCYTES NFR BLD: 11 % (ref 2–12)
NEUTROPHILS NFR BLD: 69 % (ref 43–80)
NEUTS SEG NFR BLD: 5.91 K/UL (ref 1.8–7.3)
PLATELET CONFIRMATION: NORMAL
PLATELET, FLUORESCENCE: 88 K/UL (ref 130–450)
PMV BLD AUTO: 11.7 FL (ref 7–12)
POTASSIUM SERPL-SCNC: 4.9 MMOL/L (ref 3.5–5)
PROT SERPL-MCNC: 5.5 G/DL (ref 6.4–8.3)
RBC # BLD AUTO: 2.12 M/UL (ref 3.5–5.5)
SODIUM SERPL-SCNC: 135 MMOL/L (ref 132–146)
WBC OTHER # BLD: 8.6 K/UL (ref 4.5–11.5)

## 2024-04-11 PROCEDURE — 36415 COLL VENOUS BLD VENIPUNCTURE: CPT

## 2024-04-11 PROCEDURE — 87205 SMEAR GRAM STAIN: CPT

## 2024-04-11 PROCEDURE — 87206 SMEAR FLUORESCENT/ACID STAI: CPT

## 2024-04-11 PROCEDURE — 6370000000 HC RX 637 (ALT 250 FOR IP): Performed by: INTERNAL MEDICINE

## 2024-04-11 PROCEDURE — 6360000002 HC RX W HCPCS

## 2024-04-11 PROCEDURE — 3700000000 HC ANESTHESIA ATTENDED CARE: Performed by: PODIATRIST

## 2024-04-11 PROCEDURE — 86900 BLOOD TYPING SEROLOGIC ABO: CPT

## 2024-04-11 PROCEDURE — 3600000002 HC SURGERY LEVEL 2 BASE: Performed by: PODIATRIST

## 2024-04-11 PROCEDURE — 88307 TISSUE EXAM BY PATHOLOGIST: CPT

## 2024-04-11 PROCEDURE — 85018 HEMOGLOBIN: CPT

## 2024-04-11 PROCEDURE — 30233N1 TRANSFUSION OF NONAUTOLOGOUS RED BLOOD CELLS INTO PERIPHERAL VEIN, PERCUTANEOUS APPROACH: ICD-10-PCS | Performed by: STUDENT IN AN ORGANIZED HEALTH CARE EDUCATION/TRAINING PROGRAM

## 2024-04-11 PROCEDURE — 6360000002 HC RX W HCPCS: Performed by: STUDENT IN AN ORGANIZED HEALTH CARE EDUCATION/TRAINING PROGRAM

## 2024-04-11 PROCEDURE — 6360000002 HC RX W HCPCS: Performed by: INTERNAL MEDICINE

## 2024-04-11 PROCEDURE — 88311 DECALCIFY TISSUE: CPT

## 2024-04-11 PROCEDURE — 85025 COMPLETE CBC W/AUTO DIFF WBC: CPT

## 2024-04-11 PROCEDURE — 3700000001 HC ADD 15 MINUTES (ANESTHESIA): Performed by: PODIATRIST

## 2024-04-11 PROCEDURE — 2580000003 HC RX 258: Performed by: INTERNAL MEDICINE

## 2024-04-11 PROCEDURE — 85014 HEMATOCRIT: CPT

## 2024-04-11 PROCEDURE — 80053 COMPREHEN METABOLIC PANEL: CPT

## 2024-04-11 PROCEDURE — 99232 SBSQ HOSP IP/OBS MODERATE 35: CPT | Performed by: STUDENT IN AN ORGANIZED HEALTH CARE EDUCATION/TRAINING PROGRAM

## 2024-04-11 PROCEDURE — 6360000002 HC RX W HCPCS: Performed by: PODIATRIST

## 2024-04-11 PROCEDURE — P9016 RBC LEUKOCYTES REDUCED: HCPCS

## 2024-04-11 PROCEDURE — 0QBP0ZX EXCISION OF LEFT METATARSAL, OPEN APPROACH, DIAGNOSTIC: ICD-10-PCS | Performed by: PODIATRIST

## 2024-04-11 PROCEDURE — 3600000012 HC SURGERY LEVEL 2 ADDTL 15MIN: Performed by: PODIATRIST

## 2024-04-11 PROCEDURE — 36430 TRANSFUSION BLD/BLD COMPNT: CPT

## 2024-04-11 PROCEDURE — 2580000003 HC RX 258

## 2024-04-11 PROCEDURE — 87116 MYCOBACTERIA CULTURE: CPT

## 2024-04-11 PROCEDURE — 87077 CULTURE AEROBIC IDENTIFY: CPT

## 2024-04-11 PROCEDURE — 1200000000 HC SEMI PRIVATE

## 2024-04-11 PROCEDURE — 2709999900 HC NON-CHARGEABLE SUPPLY: Performed by: PODIATRIST

## 2024-04-11 PROCEDURE — 2580000003 HC RX 258: Performed by: STUDENT IN AN ORGANIZED HEALTH CARE EDUCATION/TRAINING PROGRAM

## 2024-04-11 PROCEDURE — 87075 CULTR BACTERIA EXCEPT BLOOD: CPT

## 2024-04-11 PROCEDURE — 0S9G0ZZ DRAINAGE OF LEFT ANKLE JOINT, OPEN APPROACH: ICD-10-PCS | Performed by: PODIATRIST

## 2024-04-11 PROCEDURE — 86923 COMPATIBILITY TEST ELECTRIC: CPT

## 2024-04-11 PROCEDURE — 7100000010 HC PHASE II RECOVERY - FIRST 15 MIN: Performed by: PODIATRIST

## 2024-04-11 PROCEDURE — 86901 BLOOD TYPING SEROLOGIC RH(D): CPT

## 2024-04-11 PROCEDURE — 87040 BLOOD CULTURE FOR BACTERIA: CPT

## 2024-04-11 PROCEDURE — 2500000003 HC RX 250 WO HCPCS

## 2024-04-11 PROCEDURE — 7100000011 HC PHASE II RECOVERY - ADDTL 15 MIN: Performed by: PODIATRIST

## 2024-04-11 PROCEDURE — 87102 FUNGUS ISOLATION CULTURE: CPT

## 2024-04-11 PROCEDURE — 87070 CULTURE OTHR SPECIMN AEROBIC: CPT

## 2024-04-11 PROCEDURE — 86850 RBC ANTIBODY SCREEN: CPT

## 2024-04-11 RX ORDER — MORPHINE SULFATE 2 MG/ML
2 INJECTION, SOLUTION INTRAMUSCULAR; INTRAVENOUS ONCE
Status: COMPLETED | OUTPATIENT
Start: 2024-04-11 | End: 2024-04-11

## 2024-04-11 RX ORDER — PROPOFOL 10 MG/ML
INJECTION, EMULSION INTRAVENOUS CONTINUOUS PRN
Status: DISCONTINUED | OUTPATIENT
Start: 2024-04-11 | End: 2024-04-11 | Stop reason: SDUPTHER

## 2024-04-11 RX ORDER — IPRATROPIUM BROMIDE AND ALBUTEROL SULFATE 2.5; .5 MG/3ML; MG/3ML
1 SOLUTION RESPIRATORY (INHALATION)
Status: DISCONTINUED | OUTPATIENT
Start: 2024-04-11 | End: 2024-04-11 | Stop reason: HOSPADM

## 2024-04-11 RX ORDER — ACETAMINOPHEN 650 MG
TABLET, EXTENDED RELEASE ORAL PRN
Status: DISCONTINUED | OUTPATIENT
Start: 2024-04-11 | End: 2024-04-11 | Stop reason: ALTCHOICE

## 2024-04-11 RX ORDER — MIDAZOLAM HYDROCHLORIDE 1 MG/ML
INJECTION INTRAMUSCULAR; INTRAVENOUS PRN
Status: DISCONTINUED | OUTPATIENT
Start: 2024-04-11 | End: 2024-04-11 | Stop reason: SDUPTHER

## 2024-04-11 RX ORDER — SODIUM CHLORIDE 9 MG/ML
INJECTION, SOLUTION INTRAVENOUS PRN
Status: DISCONTINUED | OUTPATIENT
Start: 2024-04-11 | End: 2024-04-16 | Stop reason: SDUPTHER

## 2024-04-11 RX ORDER — SODIUM CHLORIDE 9 MG/ML
INJECTION, SOLUTION INTRAVENOUS PRN
Status: DISCONTINUED | OUTPATIENT
Start: 2024-04-11 | End: 2024-04-19 | Stop reason: HOSPADM

## 2024-04-11 RX ORDER — MIDAZOLAM HYDROCHLORIDE 2 MG/2ML
2 INJECTION, SOLUTION INTRAMUSCULAR; INTRAVENOUS
Status: DISCONTINUED | OUTPATIENT
Start: 2024-04-11 | End: 2024-04-11 | Stop reason: HOSPADM

## 2024-04-11 RX ORDER — ONDANSETRON 2 MG/ML
4 INJECTION INTRAMUSCULAR; INTRAVENOUS
Status: DISCONTINUED | OUTPATIENT
Start: 2024-04-11 | End: 2024-04-11 | Stop reason: HOSPADM

## 2024-04-11 RX ORDER — KETAMINE HYDROCHLORIDE 10 MG/ML
INJECTION, SOLUTION INTRAMUSCULAR; INTRAVENOUS PRN
Status: DISCONTINUED | OUTPATIENT
Start: 2024-04-11 | End: 2024-04-11 | Stop reason: SDUPTHER

## 2024-04-11 RX ORDER — PROMETHAZINE HYDROCHLORIDE 25 MG/1
12.5 TABLET ORAL EVERY 6 HOURS PRN
Status: DISCONTINUED | OUTPATIENT
Start: 2024-04-11 | End: 2024-04-19 | Stop reason: HOSPADM

## 2024-04-11 RX ORDER — FENTANYL CITRATE 50 UG/ML
INJECTION, SOLUTION INTRAMUSCULAR; INTRAVENOUS PRN
Status: DISCONTINUED | OUTPATIENT
Start: 2024-04-11 | End: 2024-04-11 | Stop reason: SDUPTHER

## 2024-04-11 RX ORDER — SODIUM CHLORIDE 0.9 % (FLUSH) 0.9 %
5-40 SYRINGE (ML) INJECTION PRN
Status: DISCONTINUED | OUTPATIENT
Start: 2024-04-11 | End: 2024-04-11 | Stop reason: HOSPADM

## 2024-04-11 RX ORDER — SODIUM CHLORIDE 0.9 % (FLUSH) 0.9 %
5-40 SYRINGE (ML) INJECTION PRN
Status: DISCONTINUED | OUTPATIENT
Start: 2024-04-11 | End: 2024-04-16 | Stop reason: SDUPTHER

## 2024-04-11 RX ORDER — ONDANSETRON 2 MG/ML
4 INJECTION INTRAMUSCULAR; INTRAVENOUS EVERY 6 HOURS PRN
Status: DISCONTINUED | OUTPATIENT
Start: 2024-04-11 | End: 2024-04-15 | Stop reason: SDUPTHER

## 2024-04-11 RX ORDER — BUPIVACAINE HYDROCHLORIDE 5 MG/ML
INJECTION, SOLUTION EPIDURAL; INTRACAUDAL PRN
Status: DISCONTINUED | OUTPATIENT
Start: 2024-04-11 | End: 2024-04-11 | Stop reason: ALTCHOICE

## 2024-04-11 RX ORDER — SODIUM CHLORIDE 0.9 % (FLUSH) 0.9 %
5-40 SYRINGE (ML) INJECTION EVERY 12 HOURS SCHEDULED
Status: DISCONTINUED | OUTPATIENT
Start: 2024-04-11 | End: 2024-04-11 | Stop reason: HOSPADM

## 2024-04-11 RX ORDER — SODIUM CHLORIDE 9 MG/ML
INJECTION, SOLUTION INTRAVENOUS PRN
Status: DISCONTINUED | OUTPATIENT
Start: 2024-04-11 | End: 2024-04-11 | Stop reason: HOSPADM

## 2024-04-11 RX ORDER — LABETALOL HYDROCHLORIDE 5 MG/ML
10 INJECTION, SOLUTION INTRAVENOUS
Status: DISCONTINUED | OUTPATIENT
Start: 2024-04-11 | End: 2024-04-11 | Stop reason: HOSPADM

## 2024-04-11 RX ORDER — SODIUM CHLORIDE 0.9 % (FLUSH) 0.9 %
5-40 SYRINGE (ML) INJECTION EVERY 12 HOURS SCHEDULED
Status: DISCONTINUED | OUTPATIENT
Start: 2024-04-11 | End: 2024-04-16 | Stop reason: SDUPTHER

## 2024-04-11 RX ORDER — HYDRALAZINE HYDROCHLORIDE 20 MG/ML
10 INJECTION INTRAMUSCULAR; INTRAVENOUS
Status: DISCONTINUED | OUTPATIENT
Start: 2024-04-11 | End: 2024-04-11 | Stop reason: HOSPADM

## 2024-04-11 RX ORDER — NALOXONE HYDROCHLORIDE 0.4 MG/ML
INJECTION, SOLUTION INTRAMUSCULAR; INTRAVENOUS; SUBCUTANEOUS PRN
Status: DISCONTINUED | OUTPATIENT
Start: 2024-04-11 | End: 2024-04-11 | Stop reason: HOSPADM

## 2024-04-11 RX ORDER — MORPHINE SULFATE 4 MG/ML
4 INJECTION, SOLUTION INTRAMUSCULAR; INTRAVENOUS EVERY 4 HOURS PRN
Status: DISCONTINUED | OUTPATIENT
Start: 2024-04-11 | End: 2024-04-14

## 2024-04-11 RX ORDER — MORPHINE SULFATE 2 MG/ML
2 INJECTION, SOLUTION INTRAMUSCULAR; INTRAVENOUS ONCE
Status: DISCONTINUED | OUTPATIENT
Start: 2024-04-11 | End: 2024-04-11

## 2024-04-11 RX ADMIN — MORPHINE SULFATE 4 MG: 4 INJECTION, SOLUTION INTRAMUSCULAR; INTRAVENOUS at 22:19

## 2024-04-11 RX ADMIN — MEROPENEM 1000 MG: 1 INJECTION, POWDER, FOR SOLUTION INTRAVENOUS at 22:31

## 2024-04-11 RX ADMIN — KETAMINE HYDROCHLORIDE 25 MG: 10 INJECTION INTRAMUSCULAR; INTRAVENOUS at 06:57

## 2024-04-11 RX ADMIN — HYDROCODONE BITARTRATE AND ACETAMINOPHEN 1 TABLET: 5; 325 TABLET ORAL at 09:14

## 2024-04-11 RX ADMIN — CLONIDINE HYDROCHLORIDE 0.1 MG: 0.1 TABLET ORAL at 20:16

## 2024-04-11 RX ADMIN — SODIUM CHLORIDE: 9 INJECTION, SOLUTION INTRAVENOUS at 22:22

## 2024-04-11 RX ADMIN — MEROPENEM 1000 MG: 1 INJECTION, POWDER, FOR SOLUTION INTRAVENOUS at 15:57

## 2024-04-11 RX ADMIN — SODIUM CHLORIDE: 9 INJECTION, SOLUTION INTRAVENOUS at 05:46

## 2024-04-11 RX ADMIN — CLONIDINE HYDROCHLORIDE 0.1 MG: 0.1 TABLET ORAL at 09:05

## 2024-04-11 RX ADMIN — MORPHINE SULFATE 2 MG: 2 INJECTION, SOLUTION INTRAMUSCULAR; INTRAVENOUS at 01:30

## 2024-04-11 RX ADMIN — MORPHINE SULFATE 4 MG: 4 INJECTION, SOLUTION INTRAMUSCULAR; INTRAVENOUS at 17:25

## 2024-04-11 RX ADMIN — MORPHINE SULFATE 2 MG: 2 INJECTION, SOLUTION INTRAMUSCULAR; INTRAVENOUS at 11:02

## 2024-04-11 RX ADMIN — FENTANYL CITRATE 50 MCG: 50 INJECTION, SOLUTION INTRAMUSCULAR; INTRAVENOUS at 06:57

## 2024-04-11 RX ADMIN — ONDANSETRON 4 MG: 2 INJECTION INTRAMUSCULAR; INTRAVENOUS at 09:05

## 2024-04-11 RX ADMIN — MIDAZOLAM 1 MG: 1 INJECTION INTRAMUSCULAR; INTRAVENOUS at 06:58

## 2024-04-11 RX ADMIN — ENOXAPARIN SODIUM 40 MG: 100 INJECTION SUBCUTANEOUS at 20:16

## 2024-04-11 RX ADMIN — SODIUM CHLORIDE, PRESERVATIVE FREE 10 ML: 5 INJECTION INTRAVENOUS at 17:26

## 2024-04-11 RX ADMIN — PROPOFOL 100 MCG/KG/MIN: 10 INJECTION, EMULSION INTRAVENOUS at 06:58

## 2024-04-11 RX ADMIN — Medication 0.5 MG: at 08:16

## 2024-04-11 RX ADMIN — FENTANYL CITRATE 50 MCG: 50 INJECTION, SOLUTION INTRAMUSCULAR; INTRAVENOUS at 07:05

## 2024-04-11 RX ADMIN — PANTOPRAZOLE SODIUM 40 MG: 40 TABLET, DELAYED RELEASE ORAL at 05:40

## 2024-04-11 RX ADMIN — MORPHINE SULFATE 2 MG: 2 INJECTION, SOLUTION INTRAMUSCULAR; INTRAVENOUS at 13:12

## 2024-04-11 RX ADMIN — MEROPENEM 1000 MG: 1 INJECTION, POWDER, FOR SOLUTION INTRAVENOUS at 05:43

## 2024-04-11 RX ADMIN — MIDAZOLAM 1 MG: 1 INJECTION INTRAMUSCULAR; INTRAVENOUS at 06:52

## 2024-04-11 RX ADMIN — HYDROCODONE BITARTRATE AND ACETAMINOPHEN 1 TABLET: 5; 325 TABLET ORAL at 20:16

## 2024-04-11 RX ADMIN — HYDROMORPHONE HYDROCHLORIDE 0.5 MG: 1 INJECTION, SOLUTION INTRAMUSCULAR; INTRAVENOUS; SUBCUTANEOUS at 08:16

## 2024-04-11 RX ADMIN — MORPHINE SULFATE 2 MG: 2 INJECTION, SOLUTION INTRAMUSCULAR; INTRAVENOUS at 05:40

## 2024-04-11 RX ADMIN — PANTOPRAZOLE SODIUM 40 MG: 40 TABLET, DELAYED RELEASE ORAL at 15:58

## 2024-04-11 ASSESSMENT — PAIN DESCRIPTION - DESCRIPTORS
DESCRIPTORS: SHARP
DESCRIPTORS: ACHING;THROBBING;SHOOTING
DESCRIPTORS: DISCOMFORT
DESCRIPTORS: SHARP
DESCRIPTORS: DISCOMFORT;ACHING;THROBBING
DESCRIPTORS: CRUSHING
DESCRIPTORS: ACHING;STABBING;THROBBING
DESCRIPTORS: DISCOMFORT
DESCRIPTORS: ACHING;STABBING;THROBBING
DESCRIPTORS: ACHING;NAGGING;THROBBING

## 2024-04-11 ASSESSMENT — PAIN SCALES - GENERAL
PAINLEVEL_OUTOF10: 4
PAINLEVEL_OUTOF10: 9
PAINLEVEL_OUTOF10: 10
PAINLEVEL_OUTOF10: 7
PAINLEVEL_OUTOF10: 5
PAINLEVEL_OUTOF10: 10
PAINLEVEL_OUTOF10: 4
PAINLEVEL_OUTOF10: 10
PAINLEVEL_OUTOF10: 9
PAINLEVEL_OUTOF10: 6
PAINLEVEL_OUTOF10: 9
PAINLEVEL_OUTOF10: 10
PAINLEVEL_OUTOF10: 10

## 2024-04-11 ASSESSMENT — PAIN DESCRIPTION - FREQUENCY
FREQUENCY: CONTINUOUS

## 2024-04-11 ASSESSMENT — PAIN DESCRIPTION - LOCATION
LOCATION: FOOT

## 2024-04-11 ASSESSMENT — PAIN - FUNCTIONAL ASSESSMENT
PAIN_FUNCTIONAL_ASSESSMENT: PREVENTS OR INTERFERES SOME ACTIVE ACTIVITIES AND ADLS
PAIN_FUNCTIONAL_ASSESSMENT: 0-10
PAIN_FUNCTIONAL_ASSESSMENT: PREVENTS OR INTERFERES SOME ACTIVE ACTIVITIES AND ADLS
PAIN_FUNCTIONAL_ASSESSMENT: PREVENTS OR INTERFERES SOME ACTIVE ACTIVITIES AND ADLS
PAIN_FUNCTIONAL_ASSESSMENT: PREVENTS OR INTERFERES WITH ALL ACTIVE AND SOME PASSIVE ACTIVITIES
PAIN_FUNCTIONAL_ASSESSMENT: PREVENTS OR INTERFERES SOME ACTIVE ACTIVITIES AND ADLS
PAIN_FUNCTIONAL_ASSESSMENT: PREVENTS OR INTERFERES WITH MANY ACTIVE NOT PASSIVE ACTIVITIES
PAIN_FUNCTIONAL_ASSESSMENT: PREVENTS OR INTERFERES SOME ACTIVE ACTIVITIES AND ADLS
PAIN_FUNCTIONAL_ASSESSMENT: PREVENTS OR INTERFERES SOME ACTIVE ACTIVITIES AND ADLS

## 2024-04-11 ASSESSMENT — PAIN DESCRIPTION - ONSET
ONSET: PROGRESSIVE
ONSET: ON-GOING
ONSET: ON-GOING

## 2024-04-11 ASSESSMENT — PAIN DESCRIPTION - PAIN TYPE
TYPE: ACUTE PAIN

## 2024-04-11 ASSESSMENT — PAIN DESCRIPTION - ORIENTATION
ORIENTATION: LEFT

## 2024-04-11 NOTE — CARE COORDINATION
CASE MANAGEMENT.... POD 1. Left foot/ankle I+D with bone bx. ID following. Patient currently on iv merrem q8hrs. Bx/repeat blood cx pending.  Per podiatry, patient is to be on bedrest and nonwb. PT/OT on consult. Need to see when appropriate. Wanted to discuss dc plans/needs with patient, but she is sleeping. Left list of choices for hhc/[facility]/dme at the bedside. Will cont to follow along and assist accordingly.

## 2024-04-11 NOTE — BRIEF OP NOTE
Brief Postoperative Note      Patient: Jaylene Kapoor  YOB: 1980  MRN: [id number]    Date of Procedure: 4/11/2024    Pre-Op Diagnosis Codes:     * Foot abscess [L02.619]    Post-Op Diagnosis: Same       Procedure(s):  LEFT FOOT AND ANKLE INCISION AND DRAINAGE    Surgeon(s):  Florentin Grover DPM    Assistant:  Resident: Juli Duque DPM    Anesthesia: Monitor Anesthesia Care    Estimated Blood Loss (mL): Minimal    Complications: None    Specimens:   * No specimens in log *    Implants:  * No implants in log *      Drains: * No LDAs found *    Findings:  Infection Present At Time Of Surgery (PATOS) (choose all levels that have infection present):  - Deep Infection (muscle/fascia) present as evidenced by abscess  Other Findings: necrosis of soft tissue.  Over 10 cc purulent abscess evacuated.    Electronically signed by Florentin Grover DPM on 4/11/2024 at 7:12 AM

## 2024-04-11 NOTE — PROGRESS NOTES
marcescens  ID following, appreciate recommendations, on meropenem  Podiatry following, appreciate recommendations, s/p incision and drainage of the left ankle abscess along with left foot open fasciotomy and left fifth metatarsal bone biopsy on 4/11/2024  Follow surgical cultures  Continue IVF for MELINDA  Continue other home medications as able  Hemoglobin 6.7 this a.m., will give 1 unit pRBC    Code Status: Full code  DVT/GI ppx: Lovenox/Protonix  Dispo: Continue care    Time spent reviewing chart, clinical exam, discussing case and answering questions with staff/consultants/patient/family = 35 min    NOTE: This report was transcribed using voice recognition software. Every effort was made to ensure accuracy; however, inadvertent computerized transcription errors may be present.  Electronically signed by Eliel Tinajero MD on 4/11/2024 at 4:10 PM

## 2024-04-11 NOTE — CONSENT
Informed Consent for Blood Component Transfusion Note    I have discussed with the patient the rationale for blood component transfusion; its benefits in treating or preventing fatigue, organ damage, or death; and its risk which includes mild transfusion reactions, rare risk of blood borne infection, or more serious but rare reactions. I have discussed the alternatives to transfusion, including the risk and consequences of not receiving transfusion. The patient had an opportunity to ask questions and had agreed to proceed with transfusion of blood components.    Electronically signed by Eliel Tinajero MD on 4/11/24 at 7:38 AM EDT

## 2024-04-11 NOTE — PROGRESS NOTES
Infectious Disease  Progress Note  [facility]    Chief Complaint: Left foot infection    Subjective: She just came back from surgery.   at bedside.  Patient is complaining of left foot pain.  No fevers overnight.  Tolerating antibiotics well.    Scheduled Meds:   sodium chloride flush  5-40 mL IntraVENous 2 times per day    nicotine  1 patch TransDERmal Daily    meropenem  1,000 mg IntraVENous Q8H    cloNIDine  0.1 mg Oral BID    pantoprazole  40 mg Oral BID AC    sodium chloride flush  5-40 mL IntraVENous 2 times per day    enoxaparin  40 mg SubCUTAneous Daily     Continuous Infusions:   sodium chloride      sodium chloride      sodium chloride      sodium chloride 200 mL/hr at 04/11/24 0716     PRN Meds:sodium chloride flush, sodium chloride, promethazine **OR** ondansetron, sodium chloride, sodium chloride flush, sodium chloride flush, sodium chloride, ondansetron **OR** ondansetron, polyethylene glycol, acetaminophen **OR** acetaminophen, HYDROcodone 5 mg - acetaminophen, morphine    Prior to Admission medications    Medication Sig Start Date End Date Taking? Authorizing Provider   cloNIDine (CATAPRES) 0.1 MG tablet Take 1 tablet by mouth 2 times daily 2/21/24  Yes Provider, MD Daljit   colestipol (COLESTID) 1 g tablet Take 1 tablet by mouth 2 times daily 3/20/24  Yes Provider, MD Daljit   cephALEXin (KEFLEX) 500 MG capsule Take 1 capsule by mouth 3 times daily 3/27/24  Yes Provider, MD Daljit   furosemide (LASIX) 40 MG tablet Take 1 tablet by mouth daily 2/29/24   Grzegorz Ervin MD   spironolactone (ALDACTONE) 50 MG tablet Take 1 tablet by mouth daily 2/29/24   Grzegorz Ervin MD   pantoprazole (PROTONIX) 40 MG tablet Take 1 tablet by mouth 2 times daily (before meals) 2/18/24   Keena Nuñez MD        ROS:  As mentioned in subjective, all other systems negative      /72   Pulse (!) 104   Temp 97.6 °F (36.4 °C) (Oral)   Resp 18   Ht 1.524 m (5')   Wt 66.3 kg (146 lb

## 2024-04-11 NOTE — ANESTHESIA POSTPROCEDURE EVALUATION
Department of Anesthesiology  Postprocedure Note    Patient: Jaylene Kapoor  MRN: [id number]  YOB: 1980  Date of evaluation: 4/11/2024    Procedure Summary       Date: 04/11/24 Room / Location: [address]    Anesthesia Start: 0652 Anesthesia Stop: 0733    Procedure: LEFT FOOT AND ANKLE INCISION AND DRAINAGE (Left: Foot) Diagnosis:       Foot abscess      (Foot abscess [L02.619])    Surgeons: Florentin Grover DPM Responsible Provider: Honey Stock DO    Anesthesia Type: MAC ASA Status: 4            Anesthesia Type: MAC    Miguel Phase I: Miguel Score: 10    Miguel Phase II: Miguel Score: 10    Anesthesia Post Evaluation    Patient location during evaluation: PACU  Patient participation: complete - patient participated  Level of consciousness: awake  Airway patency: patent  Nausea & Vomiting: no nausea and no vomiting  Cardiovascular status: hemodynamically stable  Respiratory status: acceptable  Hydration status: stable  Pain management: adequate    No notable events documented.

## 2024-04-12 PROBLEM — M79.89 NECROTIZING SOFT TISSUE INFECTION: Status: ACTIVE | Noted: 2024-04-10

## 2024-04-12 LAB
ACB COMPLEX DNA BLD POS QL NAA+NON-PROBE: NOT DETECTED
ALBUMIN SERPL-MCNC: 2.1 G/DL (ref 3.5–5.2)
ALP SERPL-CCNC: 192 U/L (ref 35–104)
ALT SERPL-CCNC: 21 U/L (ref 0–32)
ANION GAP SERPL CALCULATED.3IONS-SCNC: 9 MMOL/L (ref 7–16)
ANION GAP SERPL CALCULATED.3IONS-SCNC: 9 MMOL/L (ref 7–16)
AST SERPL-CCNC: 63 U/L (ref 0–31)
B FRAGILIS DNA BLD POS QL NAA+NON-PROBE: NOT DETECTED
BASOPHILS # BLD: 0.08 K/UL (ref 0–0.2)
BASOPHILS NFR BLD: 1 % (ref 0–2)
BILIRUB SERPL-MCNC: 5.2 MG/DL (ref 0–1.2)
BIOFIRE TEST COMMENT: ABNORMAL
BLACTX-M ISLT/SPM QL: NOT DETECTED
BLAIMP ISLT/SPM QL: NOT DETECTED
BLAKPC ISLT/SPM QL: NOT DETECTED
BLAOXA-48-LIKE ISLT/SPM QL: NOT DETECTED
BLAVIM ISLT/SPM QL: NOT DETECTED
BUN SERPL-MCNC: 20 MG/DL (ref 6–20)
BUN SERPL-MCNC: 21 MG/DL (ref 6–20)
C ALBICANS DNA BLD POS QL NAA+NON-PROBE: NOT DETECTED
C AURIS DNA BLD POS QL NAA+NON-PROBE: NOT DETECTED
C GATTII+NEOFOR DNA BLD POS QL NAA+N-PRB: NOT DETECTED
C GLABRATA DNA BLD POS QL NAA+NON-PROBE: NOT DETECTED
C KRUSEI DNA BLD POS QL NAA+NON-PROBE: NOT DETECTED
C PARAP DNA BLD POS QL NAA+NON-PROBE: NOT DETECTED
C TROPICLS DNA BLD POS QL NAA+NON-PROBE: NOT DETECTED
CALCIUM SERPL-MCNC: 7.7 MG/DL (ref 8.6–10.2)
CALCIUM SERPL-MCNC: 8 MG/DL (ref 8.6–10.2)
CHLORIDE SERPL-SCNC: 105 MMOL/L (ref 98–107)
CHLORIDE SERPL-SCNC: 108 MMOL/L (ref 98–107)
CO2 SERPL-SCNC: 13 MMOL/L (ref 22–29)
CO2 SERPL-SCNC: 15 MMOL/L (ref 22–29)
CREAT SERPL-MCNC: 1.1 MG/DL (ref 0.5–1)
CREAT SERPL-MCNC: 1.2 MG/DL (ref 0.5–1)
E CLOAC COMP DNA BLD POS NAA+NON-PROBE: NOT DETECTED
E COLI DNA BLD POS QL NAA+NON-PROBE: NOT DETECTED
E FAECALIS DNA BLD POS QL NAA+NON-PROBE: NOT DETECTED
E FAECIUM DNA BLD POS QL NAA+NON-PROBE: NOT DETECTED
ENTEROBACTERALES DNA BLD POS NAA+N-PRB: DETECTED
EOSINOPHIL # BLD: 0.22 K/UL (ref 0.05–0.5)
EOSINOPHILS RELATIVE PERCENT: 2 % (ref 0–6)
ERYTHROCYTE [DISTWIDTH] IN BLOOD BY AUTOMATED COUNT: 18.6 % (ref 11.5–15)
FOLATE SERPL-MCNC: 6.2 NG/ML (ref 4.8–24.2)
GFR SERPL CREATININE-BSD FRML MDRD: 60 ML/MIN/1.73M2
GFR SERPL CREATININE-BSD FRML MDRD: 61 ML/MIN/1.73M2
GLUCOSE SERPL-MCNC: 102 MG/DL (ref 74–99)
GLUCOSE SERPL-MCNC: 95 MG/DL (ref 74–99)
GP B STREP DNA BLD POS QL NAA+NON-PROBE: NOT DETECTED
HAEM INFLU DNA BLD POS QL NAA+NON-PROBE: NOT DETECTED
HAPTOGLOB SERPL-MCNC: 71 MG/DL (ref 30–200)
HCT VFR BLD AUTO: 23.6 % (ref 34–48)
HGB BLD-MCNC: 7.3 G/DL (ref 11.5–15.5)
IMM GRANULOCYTES # BLD AUTO: 0.07 K/UL (ref 0–0.58)
IMM GRANULOCYTES NFR BLD: 1 % (ref 0–5)
K OXYTOCA DNA BLD POS QL NAA+NON-PROBE: NOT DETECTED
KLEBSIELLA SP DNA BLD POS QL NAA+NON-PRB: NOT DETECTED
KLEBSIELLA SP DNA BLD POS QL NAA+NON-PRB: NOT DETECTED
L MONOCYTOG DNA BLD POS QL NAA+NON-PROBE: NOT DETECTED
LACTATE BLDV-SCNC: 2 MMOL/L (ref 0.5–2.2)
LYMPHOCYTES NFR BLD: 1.82 K/UL (ref 1.5–4)
LYMPHOCYTES RELATIVE PERCENT: 18 % (ref 20–42)
MCH RBC QN AUTO: 31.2 PG (ref 26–35)
MCHC RBC AUTO-ENTMCNC: 30.9 G/DL (ref 32–34.5)
MCV RBC AUTO: 100.9 FL (ref 80–99.9)
MICROORGANISM SPEC CULT: ABNORMAL
MICROORGANISM SPEC CULT: ABNORMAL
MICROORGANISM/AGENT SPEC: ABNORMAL
MONOCYTES NFR BLD: 1.39 K/UL (ref 0.1–0.95)
MONOCYTES NFR BLD: 14 % (ref 2–12)
N MEN DNA BLD POS QL NAA+NON-PROBE: NOT DETECTED
NEUTROPHILS NFR BLD: 65 % (ref 43–80)
NEUTS SEG NFR BLD: 6.7 K/UL (ref 1.8–7.3)
P AERUGINOSA DNA BLD POS NAA+NON-PROBE: NOT DETECTED
PH VENOUS: 7.26 (ref 7.35–7.45)
PLATELET # BLD AUTO: 88 K/UL (ref 130–450)
PLATELET CONFIRMATION: NORMAL
PMV BLD AUTO: 11.1 FL (ref 7–12)
POTASSIUM SERPL-SCNC: 5 MMOL/L (ref 3.5–5)
POTASSIUM SERPL-SCNC: 5.5 MMOL/L (ref 3.5–5)
PROT SERPL-MCNC: 5.1 G/DL (ref 6.4–8.3)
PROTEUS SP DNA BLD POS QL NAA+NON-PROBE: NOT DETECTED
RBC # BLD AUTO: 2.34 M/UL (ref 3.5–5.5)
RBC # BLD: ABNORMAL 10*6/UL
RESISTANT GENE NDM BY PCR: NOT DETECTED
S AUREUS DNA BLD POS QL NAA+NON-PROBE: NOT DETECTED
S AUREUS+CONS DNA BLD POS NAA+NON-PROBE: NOT DETECTED
S EPIDERMIDIS DNA BLD POS QL NAA+NON-PRB: NOT DETECTED
S LUGDUNENSIS DNA BLD POS QL NAA+NON-PRB: NOT DETECTED
S MALTOPHILIA DNA BLD POS QL NAA+NON-PRB: NOT DETECTED
S MARCESCENS DNA BLD POS NAA+NON-PROBE: DETECTED
S PNEUM DNA BLD POS QL NAA+NON-PROBE: NOT DETECTED
S PYO DNA BLD POS QL NAA+NON-PROBE: NOT DETECTED
SALMONELLA DNA BLD POS QL NAA+NON-PROBE: NOT DETECTED
SERVICE CMNT-IMP: ABNORMAL
SODIUM SERPL-SCNC: 129 MMOL/L (ref 132–146)
SODIUM SERPL-SCNC: 130 MMOL/L (ref 132–146)
SPECIMEN DESCRIPTION: ABNORMAL
SPECIMEN DESCRIPTION: ABNORMAL
STREPTOCOCCUS DNA BLD POS NAA+NON-PROBE: NOT DETECTED
VIT B12 SERPL-MCNC: 1645 PG/ML (ref 211–946)
WBC OTHER # BLD: 10.3 K/UL (ref 4.5–11.5)

## 2024-04-12 PROCEDURE — 36415 COLL VENOUS BLD VENIPUNCTURE: CPT

## 2024-04-12 PROCEDURE — 80048 BASIC METABOLIC PNL TOTAL CA: CPT

## 2024-04-12 PROCEDURE — 97530 THERAPEUTIC ACTIVITIES: CPT

## 2024-04-12 PROCEDURE — 2580000003 HC RX 258: Performed by: INTERNAL MEDICINE

## 2024-04-12 PROCEDURE — 82746 ASSAY OF FOLIC ACID SERUM: CPT

## 2024-04-12 PROCEDURE — 2580000003 HC RX 258: Performed by: STUDENT IN AN ORGANIZED HEALTH CARE EDUCATION/TRAINING PROGRAM

## 2024-04-12 PROCEDURE — 82800 BLOOD PH: CPT

## 2024-04-12 PROCEDURE — 82607 VITAMIN B-12: CPT

## 2024-04-12 PROCEDURE — 85025 COMPLETE CBC W/AUTO DIFF WBC: CPT

## 2024-04-12 PROCEDURE — 80053 COMPREHEN METABOLIC PANEL: CPT

## 2024-04-12 PROCEDURE — 83010 ASSAY OF HAPTOGLOBIN QUANT: CPT

## 2024-04-12 PROCEDURE — 6370000000 HC RX 637 (ALT 250 FOR IP): Performed by: INTERNAL MEDICINE

## 2024-04-12 PROCEDURE — 97161 PT EVAL LOW COMPLEX 20 MIN: CPT

## 2024-04-12 PROCEDURE — 99232 SBSQ HOSP IP/OBS MODERATE 35: CPT | Performed by: STUDENT IN AN ORGANIZED HEALTH CARE EDUCATION/TRAINING PROGRAM

## 2024-04-12 PROCEDURE — A4216 STERILE WATER/SALINE, 10 ML: HCPCS | Performed by: STUDENT IN AN ORGANIZED HEALTH CARE EDUCATION/TRAINING PROGRAM

## 2024-04-12 PROCEDURE — 6360000002 HC RX W HCPCS: Performed by: STUDENT IN AN ORGANIZED HEALTH CARE EDUCATION/TRAINING PROGRAM

## 2024-04-12 PROCEDURE — 83605 ASSAY OF LACTIC ACID: CPT

## 2024-04-12 PROCEDURE — 1200000000 HC SEMI PRIVATE

## 2024-04-12 RX ORDER — ACETAMINOPHEN 650 MG
TABLET, EXTENDED RELEASE ORAL PRN
Status: DISCONTINUED | OUTPATIENT
Start: 2024-04-12 | End: 2024-04-19 | Stop reason: HOSPADM

## 2024-04-12 RX ADMIN — HYDROCODONE BITARTRATE AND ACETAMINOPHEN 1 TABLET: 5; 325 TABLET ORAL at 05:33

## 2024-04-12 RX ADMIN — HYDROCODONE BITARTRATE AND ACETAMINOPHEN 1 TABLET: 5; 325 TABLET ORAL at 11:32

## 2024-04-12 RX ADMIN — PANTOPRAZOLE SODIUM 40 MG: 40 TABLET, DELAYED RELEASE ORAL at 16:34

## 2024-04-12 RX ADMIN — HYDROCODONE BITARTRATE AND ACETAMINOPHEN 1 TABLET: 5; 325 TABLET ORAL at 17:34

## 2024-04-12 RX ADMIN — SODIUM ZIRCONIUM CYCLOSILICATE 10 G: 10 POWDER, FOR SUSPENSION ORAL at 06:38

## 2024-04-12 RX ADMIN — MORPHINE SULFATE 4 MG: 4 INJECTION, SOLUTION INTRAMUSCULAR; INTRAVENOUS at 08:28

## 2024-04-12 RX ADMIN — PANTOPRAZOLE SODIUM 40 MG: 40 TABLET, DELAYED RELEASE ORAL at 05:33

## 2024-04-12 RX ADMIN — MEROPENEM 1000 MG: 1 INJECTION, POWDER, FOR SOLUTION INTRAVENOUS at 06:40

## 2024-04-12 RX ADMIN — SODIUM CHLORIDE, PRESERVATIVE FREE 1000 MG: 5 INJECTION INTRAVENOUS at 13:44

## 2024-04-12 RX ADMIN — SODIUM CHLORIDE, PRESERVATIVE FREE 10 ML: 5 INJECTION INTRAVENOUS at 08:30

## 2024-04-12 RX ADMIN — SODIUM CHLORIDE, PRESERVATIVE FREE 10 ML: 5 INJECTION INTRAVENOUS at 13:44

## 2024-04-12 RX ADMIN — CLONIDINE HYDROCHLORIDE 0.1 MG: 0.1 TABLET ORAL at 08:28

## 2024-04-12 ASSESSMENT — PAIN DESCRIPTION - DESCRIPTORS
DESCRIPTORS: SHARP
DESCRIPTORS: SHARP
DESCRIPTORS: ACHING;DISCOMFORT;THROBBING
DESCRIPTORS: SHARP
DESCRIPTORS: SHARP
DESCRIPTORS: THROBBING;ACHING;DISCOMFORT
DESCRIPTORS: ACHING;THROBBING
DESCRIPTORS: ACHING;DISCOMFORT;THROBBING

## 2024-04-12 ASSESSMENT — PAIN SCALES - GENERAL
PAINLEVEL_OUTOF10: 6
PAINLEVEL_OUTOF10: 9
PAINLEVEL_OUTOF10: 9
PAINLEVEL_OUTOF10: 7
PAINLEVEL_OUTOF10: 8
PAINLEVEL_OUTOF10: 9
PAINLEVEL_OUTOF10: 6
PAINLEVEL_OUTOF10: 9
PAINLEVEL_OUTOF10: 9

## 2024-04-12 ASSESSMENT — PAIN DESCRIPTION - LOCATION
LOCATION: FOOT

## 2024-04-12 ASSESSMENT — PAIN - FUNCTIONAL ASSESSMENT
PAIN_FUNCTIONAL_ASSESSMENT: PREVENTS OR INTERFERES SOME ACTIVE ACTIVITIES AND ADLS

## 2024-04-12 ASSESSMENT — PAIN DESCRIPTION - ORIENTATION
ORIENTATION: LEFT

## 2024-04-12 ASSESSMENT — PAIN DESCRIPTION - ONSET: ONSET: ON-GOING

## 2024-04-12 ASSESSMENT — PAIN DESCRIPTION - PAIN TYPE: TYPE: ACUTE PAIN

## 2024-04-12 ASSESSMENT — PAIN DESCRIPTION - FREQUENCY
FREQUENCY: CONTINUOUS
FREQUENCY: CONTINUOUS

## 2024-04-12 NOTE — PLAN OF CARE
Problem: Discharge Planning  Goal: Discharge to home or other facility with appropriate resources  4/12/2024 1620 by Deann Nur RN  Outcome: Progressing  4/12/2024 1559 by Mary Fragoso RN  Outcome: Progressing     Problem: Pain  Goal: Verbalizes/displays adequate comfort level or baseline comfort level  4/12/2024 1620 by Deann Nur RN  Outcome: Progressing  4/12/2024 1559 by Mary Fragoso RN  Outcome: Progressing     Problem: Safety - Adult  Goal: Free from fall injury  4/12/2024 1620 by Deann Nur RN  Outcome: Progressing  4/12/2024 1559 by Mary Fragoso RN  Outcome: Progressing     Problem: Skin/Tissue Integrity  Goal: Absence of new skin breakdown  Description: 1.  Monitor for areas of redness and/or skin breakdown  2.  Assess vascular access sites hourly  3.  Every 4-6 hours minimum:  Change oxygen saturation probe site  4.  Every 4-6 hours:  If on nasal continuous positive airway pressure, respiratory therapy assess nares and determine need for appliance change or resting period.  4/12/2024 1620 by Deann Nur RN  Outcome: Progressing  4/12/2024 1559 by Mary Fragoso RN  Outcome: Progressing     Problem: ABCDS Injury Assessment  Goal: Absence of physical injury  4/12/2024 1620 by Deann Nur RN  Outcome: Progressing  4/12/2024 1559 by Mary Fragoso RN  Outcome: Progressing

## 2024-04-12 NOTE — PROGRESS NOTES
SPIRITUAL HEALTH SERVICES - [facility] Encounter    Name: Jaylene Kapoor                  Referral: Routine Visit    Sacraments  Anointed (Last Rites): Yes  Apostolic [place]: No  Confession: No  Communion: No     Assessment:  Patient receptive to  visit.      Intervention:   provided spiritual support and sacramental ministry for patient.     Outcome:  Patient expressed gratitude for visit.    Plan:  Chaplains will remain available to offer spiritual and emotional support as needed.      Electronically signed by Chaplain Leonardo, on 4/12/2024 at 12:55 PM.  Spiritual Care Department  [facility]  [phone]

## 2024-04-12 NOTE — PROGRESS NOTES
Infectious Disease  Progress Note  [facility]    Chief Complaint: Left foot infection    Subjective: She just came back from surgery.   at bedside.  Patient is complaining of left foot pain.  No fevers overnight.  Tolerating antibiotics well.    Scheduled Meds:   sodium chloride flush  5-40 mL IntraVENous 2 times per day    nicotine  1 patch TransDERmal Daily    meropenem  1,000 mg IntraVENous Q8H    cloNIDine  0.1 mg Oral BID    pantoprazole  40 mg Oral BID AC    sodium chloride flush  5-40 mL IntraVENous 2 times per day    enoxaparin  40 mg SubCUTAneous Daily     Continuous Infusions:   sodium chloride      sodium chloride      sodium chloride      sodium chloride 50 mL/hr at 04/12/24 0534     PRN Meds:povidone-iodine, sodium chloride flush, sodium chloride, promethazine **OR** ondansetron, sodium chloride, morphine, sodium chloride flush, sodium chloride flush, sodium chloride, ondansetron **OR** ondansetron, polyethylene glycol, acetaminophen **OR** acetaminophen, HYDROcodone 5 mg - acetaminophen    Prior to Admission medications    Medication Sig Start Date End Date Taking? Authorizing Provider   cloNIDine (CATAPRES) 0.1 MG tablet Take 1 tablet by mouth 2 times daily 2/21/24  Yes Provider, MD Daljit   colestipol (COLESTID) 1 g tablet Take 1 tablet by mouth 2 times daily 3/20/24  Yes Provider, MD Daljit   cephALEXin (KEFLEX) 500 MG capsule Take 1 capsule by mouth 3 times daily 3/27/24  Yes Provider, MD Daljit   furosemide (LASIX) 40 MG tablet Take 1 tablet by mouth daily 2/29/24   Grzegorz Ervin MD   spironolactone (ALDACTONE) 50 MG tablet Take 1 tablet by mouth daily 2/29/24   Grzegorz Ervin MD   pantoprazole (PROTONIX) 40 MG tablet Take 1 tablet by mouth 2 times daily (before meals) 2/18/24   Keena Nuñez MD        ROS:  As mentioned in subjective, all other systems negative      BP (!) 99/55   Pulse 84   Temp 98.1 °F (36.7 °C) (Oral)   Resp 18   Ht 1.524 m (5')   Wt 72.9  kg (160 lb 11.5 oz)   SpO2 100%   BMI 31.39 kg/m²     Physical Exam  Const/Neuro- unchanged, no signs of acute distress, Alert, scleral icterus  ENMT- Within Normal Limits, Normocephalic, mucous membranes pink/moist, No thrush  Neck: Neck supple  Heart- Regular, Rate, Rhythm- no murmur appreciated.  Lungs- clear to ascultation. Respirations even and nonlabored.  Abdomen- Soft, bowel sounds positive, non tender  Musculo/Extremities-examined foot with podiatry resident,.  Neurological- No focal motor or sensory loss.  No confusion  Skin:  Warm and dry, free from rashes.    Lines: Peripheral IV    Labs, Cultures reviewed  Radiology and other consultants notes reviewed    Microbiology:  Blood cultures 4/9: Gram-negative rods.  BC ID showed Serratia marcescens.  Wound culture 4/9 showing gram-negative rods  Surgical cultures 4/11 in process    Assessment:  Serratia marcescens septicemia:   Left 5th MT necrotizing soft tissue infection: Reviewed op note and discussed with Dr. Grover.  Does not seem to have bone involvement in the OR.  However bone biopsy was done.  Alcoholic liver cirrhosis:      Plan:    Switched IV ashleigh to ertapenem 1gr daily.  Likely need iv antibiotics at discharge.   Monitor labs  Will follow with you      Electronically signed by BRIDGETTE JAIN MD on 4/12/2024 at 11:47 AM

## 2024-04-12 NOTE — PROGRESS NOTES
Physical Therapy  Facility/Department: [address] MED SURG  Physical Therapy Initial Assessment    Name: Jaylene Kapoor  : 1980  MRN: [id number]  Date of Service: 2024    Attending Provider:  Eliel Tinajero MD    Evaluating PT:  Santos Donovan Jr., P.T.    Room #:  0501/0501-A  Diagnosis:  Sepsis (HCC) [A41.9]  Open wound of fifth toe of left foot, initial encounter [S91.105A]  Cirrhosis of liver with ascites, unspecified hepatic cirrhosis type (HCC) [K74.60, R18.8]  Procedure/Surgery:  24 L foot and ankle I and D  Precautions:  NWB LLE, splint L foot and ankle, falls, bed/chair alarm    SUBJECTIVE:    Pt lives with her partner and 5 kids in a 1 story home with 1+1 steps to enter.   Pt ambulated with no AD PTA.    OBJECTIVE:   Initial Evaluation  Date: 24 Treatment Short Term/ Long Term   Goals   Was pt agreeable to Eval/treatment? yes     Does pt have pain? C/o pain all over, except her arms     Bed Mobility  Rolling: SBA  Supine to sit: MIN A  Sit to supine: SBA  Scooting: SBA  Independent    Transfers Sit to stand: MOD A  Stand to sit: MOD A  Stand pivot: NA  Independent   Ambulation   3 feet toward HOB using ww with scoot shuffle technique with MOD A and constant VCs for NWB LLE  50 feet with ww NWB LLE Independent    Stair negotiation: ascended and descended NA  1 step twice with ww NWB LLE SBA   AM-PAC 6 Clicks        BLE ROM is WFL, except L foot and ankle NA due to splint.   RLE strength is grossly 4/5 and LLE hip and knee is grossly 3-/5 to 4-/5 and L ankle NA.   Sensation:  Pt c/o numbness and tingling to L foot  Balance: sitting is SBA and standing with ww is MIN A/MOD A with intermittent NWB LLE  Endurance: fair-    Patient education  Pt educated on NWB LLE    Patient response to education:   Pt verbalized understanding Pt demonstrated skill Pt requires further education in this area   yes inconsistent yes     ASSESSMENT:    Conditions Requiring Skilled Therapeutic

## 2024-04-12 NOTE — PROGRESS NOTES
Physical Therapy  Facility/Department: [address] MED SURG    Name: Jaylene Kapoor  : 1980  MRN: [id number]  Date of Service: 2024    PT consult was received and eval was attempted.  After reviewing pt's chart it was found she has order placed by podiatry for Bedrest and NWB.  Will re-attempt PT eval another time when pt is cleared for OOB activity.    Santos Donovan Jr., P.T.  License Number: [id number]

## 2024-04-12 NOTE — PROGRESS NOTES
Occupational Therapy  OT consult received to eval/treat and chart review complete. Attempted OT evaluation, patient politely refused. Patient reporting she had just been up with PT and it was \"too much\", reporting she \"can't do it\". OT to re-attempt at a later date/time as able and appropriate.     Manju Dolan, OTR/L  License Number: [id number]

## 2024-04-12 NOTE — PROGRESS NOTES
Spoke with Dr. Puentes, betadine needed for dressing change see order. Pt is okay to be NWB to left foot. Electronically signed by Maggie Rudd RN on 4/12/2024 at 10:43 AM

## 2024-04-12 NOTE — PROGRESS NOTES
Podiatry Progress Note  4/12/2024   Jaylene Kapoor       SUBJECTIVE: This is a 44 y.o. female seen bedside s/p Left ankle I&D fasiotomy with bone biopsy DOS: 4/11/24. Patient complains of pain to the right foot and states nurses had to reinforce her dressing multiple times. Patient has no new pedal complaints.      OBJECTIVE:      Pt is AAOx3    Vitals:    04/12/24 1132   BP:    Pulse:    Resp: 18   Temp:    SpO2:       EXAM:  VASCULAR:  DP and PT pulses are faintly palpable due to edema. CFT < 5 seconds to the dee of all 5 digits.  Warm to warm from the tibial tuberosity to the distal aspect of the digits.     NEUROLOGIC:  Protective sensation is diminished by grossly intact     DERM: There is a large surgical incision noted to the left foot and ankle that extends from the lateral malleolus to the dorsal 5th MTPJ. Wound is partially closed with suture intact. There is moderate sanguinous drainage noted. There is no purulence, crepitus, fluctuance, malodor noted at this time.     MUSCULOSKELETAL: Musculoskeletal exam deferred due to pain           Current Facility-Administered Medications   Medication Dose Route Frequency Provider Last Rate Last Admin    povidone-iodine (BETADINE) 10 % external solution   Topical PRN Omkar Puentes DPM        ertapenem (INVanz) 1,000 mg in sodium chloride (PF) 0.9 % 10 mL IV syringe  1,000 mg IntraVENous Q24H Cedrick Small MD        sodium chloride flush 0.9 % injection 5-40 mL  5-40 mL IntraVENous 2 times per day Juli Duque DPM        sodium chloride flush 0.9 % injection 5-40 mL  5-40 mL IntraVENous PRN Juli Duque DPM   10 mL at 04/11/24 1726    0.9 % sodium chloride infusion   IntraVENous PRN Juli Duque DPM        promethazine (PHENERGAN) tablet 12.5 mg  12.5 mg Oral Q6H PRN Juli Duque DPM        Or    ondansetron (ZOFRAN) injection 4 mg  4 mg IntraVENous Q6H PRN Juli Duque DPM        0.9 % sodium chloride infusion   IntraVENous PRN  Eliel Tinajero MD        morphine sulfate (PF) injection 4 mg  4 mg IntraVENous Q4H PRN Eliel Tinajero MD   4 mg at 04/12/24 0828    nicotine (NICODERM CQ) 7 MG/24HR 1 patch  1 patch TransDERmal Daily Hric, Santos, DO   1 patch at 04/12/24 0827    sodium chloride flush 0.9 % injection 10 mL  10 mL IntraVENous PRN Hric, Santos, DO   10 mL at 04/12/24 0830    cloNIDine (CATAPRES) tablet 0.1 mg  0.1 mg Oral BID Hric, Santos, DO   0.1 mg at 04/12/24 0828    pantoprazole (PROTONIX) tablet 40 mg  40 mg Oral BID AC Hric, Santos, DO   40 mg at 04/12/24 0533    sodium chloride flush 0.9 % injection 5-40 mL  5-40 mL IntraVENous 2 times per day Hric, Santos, DO   10 mL at 04/09/24 2200    sodium chloride flush 0.9 % injection 5-40 mL  5-40 mL IntraVENous PRN Hric, Santos, DO        0.9 % sodium chloride infusion   IntraVENous PRN Hric, Santos, DO        enoxaparin (LOVENOX) injection 40 mg  40 mg SubCUTAneous Daily Hric, Santos, DO   40 mg at 04/11/24 2016    ondansetron (ZOFRAN-ODT) disintegrating tablet 4 mg  4 mg Oral Q8H PRN Hric, Santos, DO        Or    ondansetron (ZOFRAN) injection 4 mg  4 mg IntraVENous Q6H PRN Hric, Santos, DO   4 mg at 04/11/24 0905    polyethylene glycol (GLYCOLAX) packet 17 g  17 g Oral Daily PRN Hric, Santos, DO        acetaminophen (TYLENOL) tablet 650 mg  650 mg Oral Q6H PRN Hric, Santos, DO        Or    acetaminophen (TYLENOL) suppository 650 mg  650 mg Rectal Q6H PRN Hric, Santos, DO        0.9 % sodium chloride infusion   IntraVENous Continuous Hric, Santos, DO 50 mL/hr at 04/12/24 0534 Rate Verify at 04/12/24 0534    HYDROcodone-acetaminophen (NORCO) 5-325 MG per tablet 1 tablet  1 tablet Oral Q6H PRN Machuca, Mariluz M, APRN - NP   1 tablet at 04/12/24 1132        Lab Results   Component Value Date    WBC 10.3 04/12/2024    HCT 23.6 (L) 04/12/2024    HGB 7.3 (L) 04/12/2024    PLT 88 (L) 04/12/2024     (L) 04/12/2024    K 5.0 04/12/2024     04/12/2024

## 2024-04-12 NOTE — CARE COORDINATION
Updated plan of care. Spoke to the patient and daughter at bedside. Patient would like to go to a rehab facility. SNF list provided to the patient. Will need PT/OT evals for precert. Patient POD #2 of left foot I&D with bone biopsy. Currently on IV Merrem. Await choices.   Electronically signed by Vida Alcantar RN on 4/12/2024 at 10:12 AM

## 2024-04-12 NOTE — PROGRESS NOTES
Problems:    * No resolved hospital problems. *      Plan:  Blood cultures and wound cultures growing Serratia marcescens  Bone biopsy pending  ID following, appreciate recommendations, on ertapenem  Podiatry following, appreciate recommendations, s/p incision and drainage of the left ankle abscess along with left foot open fasciotomy and left fifth metatarsal bone biopsy on 4/11/2024  Follow surgical cultures  Continue IVF for MELINDA  Continue other home medications as able  New hyponatremia possibly SIADH because of continued pain, will improve pain control and start fluid restriction    Code Status: Full code  DVT/GI ppx: Lovenox/Protonix  Dispo: Continue care    Time spent reviewing chart, clinical exam, discussing case and answering questions with staff/consultants/patient/family = 35 min    NOTE: This report was transcribed using voice recognition software. Every effort was made to ensure accuracy; however, inadvertent computerized transcription errors may be present.  Electronically signed by Eliel Tinajero MD on 4/12/2024 at 12:44 PM

## 2024-04-12 NOTE — PLAN OF CARE
Problem: Discharge Planning  Goal: Discharge to home or other facility with appropriate resources  4/11/2024 2245 by Mary Worley  Outcome: Progressing  4/11/2024 1307 by Dorothy Horn RN  Outcome: Progressing     Problem: Pain  Goal: Verbalizes/displays adequate comfort level or baseline comfort level  4/11/2024 2245 by Mary Worley  Outcome: Progressing  4/11/2024 1307 by Dorothy Horn RN  Outcome: Progressing     Problem: Safety - Adult  Goal: Free from fall injury  4/11/2024 2245 by Mary Worley  Outcome: Progressing  4/11/2024 1307 by Dorothy Horn RN  Outcome: Progressing     Problem: Skin/Tissue Integrity  Goal: Absence of new skin breakdown  Description: 1.  Monitor for areas of redness and/or skin breakdown  2.  Assess vascular access sites hourly  3.  Every 4-6 hours minimum:  Change oxygen saturation probe site  4.  Every 4-6 hours:  If on nasal continuous positive airway pressure, respiratory therapy assess nares and determine need for appliance change or resting period.  4/11/2024 2245 by Mary Worley  Outcome: Progressing  4/11/2024 1307 by Dorothy Horn, RN  Outcome: Progressing     Problem: ABCDS Injury Assessment  Goal: Absence of physical injury  4/11/2024 2245 by Mary Worley  Outcome: Progressing  4/11/2024 1307 by Dorothy Horn, RN  Outcome: Progressing

## 2024-04-13 PROBLEM — M86.9 OSTEOMYELITIS OF FIFTH TOE OF LEFT FOOT (HCC): Status: ACTIVE | Noted: 2024-04-13

## 2024-04-13 PROBLEM — D62 ABLA (ACUTE BLOOD LOSS ANEMIA): Status: ACTIVE | Noted: 2024-04-13

## 2024-04-13 PROBLEM — E22.2 SIADH (SYNDROME OF INAPPROPRIATE ADH PRODUCTION) (HCC): Status: ACTIVE | Noted: 2024-04-13

## 2024-04-13 LAB
ALBUMIN SERPL-MCNC: 2 G/DL (ref 3.5–5.2)
ALP SERPL-CCNC: 207 U/L (ref 35–104)
ALT SERPL-CCNC: 18 U/L (ref 0–32)
ANION GAP SERPL CALCULATED.3IONS-SCNC: 7 MMOL/L (ref 7–16)
AST SERPL-CCNC: 52 U/L (ref 0–31)
BASOPHILS # BLD: 0.03 K/UL (ref 0–0.2)
BASOPHILS NFR BLD: 0 % (ref 0–2)
BILIRUB SERPL-MCNC: 3.5 MG/DL (ref 0–1.2)
BUN SERPL-MCNC: 19 MG/DL (ref 6–20)
CALCIUM SERPL-MCNC: 7.4 MG/DL (ref 8.6–10.2)
CHLORIDE SERPL-SCNC: 109 MMOL/L (ref 98–107)
CO2 SERPL-SCNC: 16 MMOL/L (ref 22–29)
CREAT SERPL-MCNC: 1 MG/DL (ref 0.5–1)
EOSINOPHIL # BLD: 0.17 K/UL (ref 0.05–0.5)
EOSINOPHILS RELATIVE PERCENT: 2 % (ref 0–6)
ERYTHROCYTE [DISTWIDTH] IN BLOOD BY AUTOMATED COUNT: 17.7 % (ref 11.5–15)
GFR SERPL CREATININE-BSD FRML MDRD: 69 ML/MIN/1.73M2
GLUCOSE SERPL-MCNC: 114 MG/DL (ref 74–99)
HCT VFR BLD AUTO: 19.3 % (ref 34–48)
HGB BLD-MCNC: 6.2 G/DL (ref 11.5–15.5)
IMM GRANULOCYTES # BLD AUTO: 0.07 K/UL (ref 0–0.58)
IMM GRANULOCYTES NFR BLD: 1 % (ref 0–5)
LYMPHOCYTES NFR BLD: 1.38 K/UL (ref 1.5–4)
LYMPHOCYTES RELATIVE PERCENT: 16 % (ref 20–42)
MCH RBC QN AUTO: 31 PG (ref 26–35)
MCHC RBC AUTO-ENTMCNC: 32.1 G/DL (ref 32–34.5)
MCV RBC AUTO: 96.5 FL (ref 80–99.9)
MICROORGANISM SPEC CULT: ABNORMAL
MICROORGANISM SPEC CULT: NORMAL
MICROORGANISM/AGENT SPEC: ABNORMAL
MONOCYTES NFR BLD: 1.04 K/UL (ref 0.1–0.95)
MONOCYTES NFR BLD: 12 % (ref 2–12)
NEUTROPHILS NFR BLD: 70 % (ref 43–80)
NEUTS SEG NFR BLD: 6.17 K/UL (ref 1.8–7.3)
PLATELET CONFIRMATION: NORMAL
PLATELET, FLUORESCENCE: 82 K/UL (ref 130–450)
PMV BLD AUTO: 11.8 FL (ref 7–12)
POTASSIUM SERPL-SCNC: 4.5 MMOL/L (ref 3.5–5)
PROT SERPL-MCNC: 4.8 G/DL (ref 6.4–8.3)
RBC # BLD AUTO: 2 M/UL (ref 3.5–5.5)
SERVICE CMNT-IMP: ABNORMAL
SERVICE CMNT-IMP: NORMAL
SODIUM SERPL-SCNC: 132 MMOL/L (ref 132–146)
SPECIMEN DESCRIPTION: ABNORMAL
SPECIMEN DESCRIPTION: NORMAL
WBC OTHER # BLD: 8.9 K/UL (ref 4.5–11.5)

## 2024-04-13 PROCEDURE — 6370000000 HC RX 637 (ALT 250 FOR IP): Performed by: INTERNAL MEDICINE

## 2024-04-13 PROCEDURE — 6360000002 HC RX W HCPCS: Performed by: STUDENT IN AN ORGANIZED HEALTH CARE EDUCATION/TRAINING PROGRAM

## 2024-04-13 PROCEDURE — 99233 SBSQ HOSP IP/OBS HIGH 50: CPT | Performed by: STUDENT IN AN ORGANIZED HEALTH CARE EDUCATION/TRAINING PROGRAM

## 2024-04-13 PROCEDURE — A4216 STERILE WATER/SALINE, 10 ML: HCPCS | Performed by: STUDENT IN AN ORGANIZED HEALTH CARE EDUCATION/TRAINING PROGRAM

## 2024-04-13 PROCEDURE — 2580000003 HC RX 258

## 2024-04-13 PROCEDURE — 85025 COMPLETE CBC W/AUTO DIFF WBC: CPT

## 2024-04-13 PROCEDURE — 6370000000 HC RX 637 (ALT 250 FOR IP): Performed by: STUDENT IN AN ORGANIZED HEALTH CARE EDUCATION/TRAINING PROGRAM

## 2024-04-13 PROCEDURE — 1200000000 HC SEMI PRIVATE

## 2024-04-13 PROCEDURE — 2580000003 HC RX 258: Performed by: INTERNAL MEDICINE

## 2024-04-13 PROCEDURE — 36430 TRANSFUSION BLD/BLD COMPNT: CPT

## 2024-04-13 PROCEDURE — 80053 COMPREHEN METABOLIC PANEL: CPT

## 2024-04-13 PROCEDURE — P9040 RBC LEUKOREDUCED IRRADIATED: HCPCS

## 2024-04-13 PROCEDURE — 36415 COLL VENOUS BLD VENIPUNCTURE: CPT

## 2024-04-13 PROCEDURE — 2580000003 HC RX 258: Performed by: STUDENT IN AN ORGANIZED HEALTH CARE EDUCATION/TRAINING PROGRAM

## 2024-04-13 RX ORDER — SODIUM CHLORIDE 9 MG/ML
INJECTION, SOLUTION INTRAVENOUS PRN
Status: DISCONTINUED | OUTPATIENT
Start: 2024-04-13 | End: 2024-04-19 | Stop reason: HOSPADM

## 2024-04-13 RX ORDER — HEPARIN 100 UNIT/ML
3 SYRINGE INTRAVENOUS PRN
Status: DISCONTINUED | OUTPATIENT
Start: 2024-04-13 | End: 2024-04-19 | Stop reason: HOSPADM

## 2024-04-13 RX ORDER — ACETAMINOPHEN 650 MG
TABLET, EXTENDED RELEASE ORAL PRN
Status: DISCONTINUED | OUTPATIENT
Start: 2024-04-13 | End: 2024-04-19 | Stop reason: HOSPADM

## 2024-04-13 RX ORDER — SODIUM CHLORIDE 0.9 % (FLUSH) 0.9 %
5-40 SYRINGE (ML) INJECTION EVERY 12 HOURS SCHEDULED
Status: DISCONTINUED | OUTPATIENT
Start: 2024-04-13 | End: 2024-04-19 | Stop reason: HOSPADM

## 2024-04-13 RX ORDER — HEPARIN 100 UNIT/ML
3 SYRINGE INTRAVENOUS EVERY 12 HOURS SCHEDULED
Status: DISCONTINUED | OUTPATIENT
Start: 2024-04-13 | End: 2024-04-19 | Stop reason: HOSPADM

## 2024-04-13 RX ORDER — SODIUM CHLORIDE 0.9 % (FLUSH) 0.9 %
5-40 SYRINGE (ML) INJECTION PRN
Status: DISCONTINUED | OUTPATIENT
Start: 2024-04-13 | End: 2024-04-19 | Stop reason: HOSPADM

## 2024-04-13 RX ORDER — LIDOCAINE HYDROCHLORIDE 10 MG/ML
5 INJECTION, SOLUTION EPIDURAL; INFILTRATION; INTRACAUDAL; PERINEURAL ONCE
Status: COMPLETED | OUTPATIENT
Start: 2024-04-13 | End: 2024-04-14

## 2024-04-13 RX ADMIN — HYDROCODONE BITARTRATE AND ACETAMINOPHEN 1 TABLET: 5; 325 TABLET ORAL at 22:16

## 2024-04-13 RX ADMIN — SODIUM CHLORIDE, PRESERVATIVE FREE 10 ML: 5 INJECTION INTRAVENOUS at 19:00

## 2024-04-13 RX ADMIN — SODIUM CHLORIDE, PRESERVATIVE FREE 10 ML: 5 INJECTION INTRAVENOUS at 12:21

## 2024-04-13 RX ADMIN — SODIUM CHLORIDE, PRESERVATIVE FREE 1000 MG: 5 INJECTION INTRAVENOUS at 11:06

## 2024-04-13 RX ADMIN — MORPHINE SULFATE 4 MG: 4 INJECTION, SOLUTION INTRAMUSCULAR; INTRAVENOUS at 18:59

## 2024-04-13 RX ADMIN — MORPHINE SULFATE 4 MG: 4 INJECTION, SOLUTION INTRAMUSCULAR; INTRAVENOUS at 08:23

## 2024-04-13 RX ADMIN — PANTOPRAZOLE SODIUM 40 MG: 40 TABLET, DELAYED RELEASE ORAL at 07:11

## 2024-04-13 RX ADMIN — POLYETHYLENE GLYCOL 3350 17 G: 17 POWDER, FOR SOLUTION ORAL at 08:37

## 2024-04-13 RX ADMIN — SODIUM CHLORIDE, PRESERVATIVE FREE 10 ML: 5 INJECTION INTRAVENOUS at 08:24

## 2024-04-13 RX ADMIN — SODIUM CHLORIDE, PRESERVATIVE FREE 10 ML: 5 INJECTION INTRAVENOUS at 11:57

## 2024-04-13 RX ADMIN — PETROLATUM: 420 OINTMENT TOPICAL at 08:37

## 2024-04-13 RX ADMIN — HYDROCODONE BITARTRATE AND ACETAMINOPHEN 1 TABLET: 5; 325 TABLET ORAL at 00:00

## 2024-04-13 RX ADMIN — Medication: at 08:23

## 2024-04-13 RX ADMIN — SODIUM CHLORIDE, PRESERVATIVE FREE 10 ML: 5 INJECTION INTRAVENOUS at 21:10

## 2024-04-13 RX ADMIN — HYDROCODONE BITARTRATE AND ACETAMINOPHEN 1 TABLET: 5; 325 TABLET ORAL at 14:51

## 2024-04-13 RX ADMIN — MORPHINE SULFATE 4 MG: 4 INJECTION, SOLUTION INTRAMUSCULAR; INTRAVENOUS at 12:20

## 2024-04-13 RX ADMIN — PANTOPRAZOLE SODIUM 40 MG: 40 TABLET, DELAYED RELEASE ORAL at 17:00

## 2024-04-13 ASSESSMENT — PAIN DESCRIPTION - DESCRIPTORS
DESCRIPTORS: ACHING;THROBBING;DISCOMFORT
DESCRIPTORS: THROBBING;ACHING
DESCRIPTORS: ACHING;DISCOMFORT;THROBBING;STABBING
DESCRIPTORS: SHARP;SHOOTING;SORE
DESCRIPTORS: SHARP;SHOOTING;SORE
DESCRIPTORS: BURNING;ACHING;DISCOMFORT;PRESSURE

## 2024-04-13 ASSESSMENT — PAIN DESCRIPTION - LOCATION
LOCATION: FOOT

## 2024-04-13 ASSESSMENT — PAIN DESCRIPTION - ORIENTATION
ORIENTATION: LEFT

## 2024-04-13 ASSESSMENT — PAIN SCALES - GENERAL
PAINLEVEL_OUTOF10: 9
PAINLEVEL_OUTOF10: 7
PAINLEVEL_OUTOF10: 8
PAINLEVEL_OUTOF10: 10
PAINLEVEL_OUTOF10: 8
PAINLEVEL_OUTOF10: 5
PAINLEVEL_OUTOF10: 6

## 2024-04-13 NOTE — PROGRESS NOTES
Date:2024  Patient Name: Jaylene Kapoor  MRN: [id number]  : 1980  ROOM #: 0501/0501-A    Occupational Therapy order received, chart reviewed and evaluation attempted this date. Per RN, patient awaiting blood transfusion; Hbg 6.2. Will re-attempt OT evaluation at a later time. Thank you.     Nikki Judge OTR/L #[id number]

## 2024-04-13 NOTE — PROGRESS NOTES
[facility] Hospitalist Progress Note    Admitting Date and Time: 4/9/2024  2:19 PM  Admit Dx: Sepsis (HCC) [A41.9]  Open wound of fifth toe of left foot, initial encounter [S91.105A]  Cirrhosis of liver with ascites, unspecified hepatic cirrhosis type (HCC) [K74.60, R18.8]    Subjective:  Patient is being followed for Sepsis (HCC) [A41.9]  Open wound of fifth toe of left foot, initial encounter [S91.105A]  Cirrhosis of liver with ascites, unspecified hepatic cirrhosis type (HCC) [K74.60, R18.8]   Pt was seen and examined today. Denies any new issues    ROS: denies fever, chills, cp, sob, n/v, HA unless stated above.     ertapenem (INVanz) 1,000 mg in sodium chloride (PF) 0.9 % 10 mL IV syringe  1,000 mg IntraVENous Q24H    sodium chloride flush  5-40 mL IntraVENous 2 times per day    nicotine  1 patch TransDERmal Daily    [Held by provider] cloNIDine  0.1 mg Oral BID    pantoprazole  40 mg Oral BID AC    sodium chloride flush  5-40 mL IntraVENous 2 times per day    enoxaparin  40 mg SubCUTAneous Daily     povidone-iodine, , PRN  white petrolatum, , BID PRN  sodium chloride, , PRN  povidone-iodine, , PRN  sodium chloride flush, 5-40 mL, PRN  sodium chloride, , PRN  promethazine, 12.5 mg, Q6H PRN   Or  ondansetron, 4 mg, Q6H PRN  sodium chloride, , PRN  morphine, 4 mg, Q4H PRN  sodium chloride flush, 10 mL, PRN  sodium chloride flush, 5-40 mL, PRN  sodium chloride, , PRN  ondansetron, 4 mg, Q8H PRN   Or  ondansetron, 4 mg, Q6H PRN  polyethylene glycol, 17 g, Daily PRN  acetaminophen, 650 mg, Q6H PRN   Or  acetaminophen, 650 mg, Q6H PRN  HYDROcodone 5 mg - acetaminophen, 1 tablet, Q6H PRN         Objective:    BP (!) 102/56   Pulse 85   Temp 98.6 °F (37 °C) (Oral)   Resp 16   Ht 1.524 m (5')   Wt 72.9 kg (160 lb 11.5 oz)   SpO2 98%   BMI 31.39 kg/m²     General Appearance: alert and in no acute distress  Skin: warm and dry  Head: normocephalic and atraumatic  Pulmonary/Chest: clear to auscultation

## 2024-04-13 NOTE — PLAN OF CARE
Problem: Discharge Planning  Goal: Discharge to home or other facility with appropriate resources  4/13/2024 1015 by Deann Nur RN  Outcome: Progressing  4/12/2024 2302 by Mary Fragoso RN  Outcome: Progressing     Problem: Pain  Goal: Verbalizes/displays adequate comfort level or baseline comfort level  4/13/2024 1015 by Deann Nur RN  Outcome: Progressing  4/12/2024 2302 by Mary Fragoso RN  Outcome: Progressing     Problem: Safety - Adult  Goal: Free from fall injury  4/13/2024 1015 by Deann Nur RN  Outcome: Progressing  4/12/2024 2302 by Mary Fragoso RN  Outcome: Progressing     Problem: Skin/Tissue Integrity  Goal: Absence of new skin breakdown  Description: 1.  Monitor for areas of redness and/or skin breakdown  2.  Assess vascular access sites hourly  3.  Every 4-6 hours minimum:  Change oxygen saturation probe site  4.  Every 4-6 hours:  If on nasal continuous positive airway pressure, respiratory therapy assess nares and determine need for appliance change or resting period.  4/13/2024 1015 by Deann Nur RN  Outcome: Progressing  4/12/2024 2302 by Mary Fragoso RN  Outcome: Progressing     Problem: ABCDS Injury Assessment  Goal: Absence of physical injury  4/13/2024 1015 by Deann Nur RN  Outcome: Progressing  4/12/2024 2302 by Mary Fragoso RN  Outcome: Progressing      CDU1

## 2024-04-13 NOTE — PROGRESS NOTES
Podiatry Progress Note  4/13/2024   Jaylene Kapoor       SUBJECTIVE: This is a 44 y.o. female seen bedside s/p Left ankle I&D fasiotomy with bone biopsy DOS: 4/11/24. Patient complains of pain to the right foot and was in severe pain with dressing change today. Patient has no new pedal complaints.      OBJECTIVE:      Pt is AAOx3    Vitals:    04/13/24 0030   BP:    Pulse:    Resp: 16   Temp:    SpO2:       EXAM:  VASCULAR:  DP and PT pulses are faintly palpable due to edema. CFT < 5 seconds to the dee of all 5 digits.  Warm to warm from the tibial tuberosity to the distal aspect of the digits.     NEUROLOGIC:  Protective sensation is diminished by grossly intact     DERM: There is a large surgical incision noted to the left foot and ankle that extends from the lateral malleolus to the dorsal 5th MTPJ. Wound is partially closed with suture intact. There is moderate sanguinous drainage noted. There is no purulence, crepitus, fluctuance, malodor noted at this time.     MUSCULOSKELETAL: Musculoskeletal exam deferred due to pain           Current Facility-Administered Medications   Medication Dose Route Frequency Provider Last Rate Last Admin    povidone-iodine (BETADINE) 10 % external solution   Topical PRN Omkar Puentes DPM        ertapenem (INVanz) 1,000 mg in sodium chloride (PF) 0.9 % 10 mL IV syringe  1,000 mg IntraVENous Q24H Cedrick Small MD   1,000 mg at 04/12/24 1344    sodium chloride flush 0.9 % injection 5-40 mL  5-40 mL IntraVENous 2 times per day Juli Duque DPM        sodium chloride flush 0.9 % injection 5-40 mL  5-40 mL IntraVENous PRN Juli Duque DPM   10 mL at 04/11/24 1726    0.9 % sodium chloride infusion   IntraVENous PRN Juli Duque DPM        promethazine (PHENERGAN) tablet 12.5 mg  12.5 mg Oral Q6H PRN Juli Duque DPM        Or    ondansetron (ZOFRAN) injection 4 mg  4 mg IntraVENous Q6H PRN Juli Duque DPM        0.9 % sodium chloride infusion    IntraVENous PRN Eliel Tinajero MD        morphine sulfate (PF) injection 4 mg  4 mg IntraVENous Q4H PRN Eliel Tinajero MD   4 mg at 04/12/24 0828    nicotine (NICODERM CQ) 7 MG/24HR 1 patch  1 patch TransDERmal Daily Hric, Santos, DO   1 patch at 04/12/24 0827    sodium chloride flush 0.9 % injection 10 mL  10 mL IntraVENous PRN Hric, Santos, DO   10 mL at 04/12/24 1344    cloNIDine (CATAPRES) tablet 0.1 mg  0.1 mg Oral BID Hric, Santos, DO   0.1 mg at 04/12/24 0828    pantoprazole (PROTONIX) tablet 40 mg  40 mg Oral BID AC Hric, Santos, DO   40 mg at 04/12/24 1634    sodium chloride flush 0.9 % injection 5-40 mL  5-40 mL IntraVENous 2 times per day Hric, Santos, DO   10 mL at 04/09/24 2200    sodium chloride flush 0.9 % injection 5-40 mL  5-40 mL IntraVENous PRN Hric, Santos, DO        0.9 % sodium chloride infusion   IntraVENous PRN Hric, Santos, DO        enoxaparin (LOVENOX) injection 40 mg  40 mg SubCUTAneous Daily Hric, Santos, DO   40 mg at 04/11/24 2016    ondansetron (ZOFRAN-ODT) disintegrating tablet 4 mg  4 mg Oral Q8H PRN Hric, Santos, DO        Or    ondansetron (ZOFRAN) injection 4 mg  4 mg IntraVENous Q6H PRN Hric, Santos, DO   4 mg at 04/11/24 0905    polyethylene glycol (GLYCOLAX) packet 17 g  17 g Oral Daily PRN Hric, Santos, DO        acetaminophen (TYLENOL) tablet 650 mg  650 mg Oral Q6H PRN Hric, Santos, DO        Or    acetaminophen (TYLENOL) suppository 650 mg  650 mg Rectal Q6H PRN Hric, Santos, DO        0.9 % sodium chloride infusion   IntraVENous Continuous Hric, Santos, DO 50 mL/hr at 04/12/24 0534 Rate Verify at 04/12/24 0534    HYDROcodone-acetaminophen (NORCO) 5-325 MG per tablet 1 tablet  1 tablet Oral Q6H PRN Machuca, Mariluz M, APRN - NP   1 tablet at 04/13/24 0000        Lab Results   Component Value Date    WBC 10.3 04/12/2024    HCT 23.6 (L) 04/12/2024    HGB 7.3 (L) 04/12/2024    PLT 88 (L) 04/12/2024     (L) 04/12/2024    K 5.0 04/12/2024

## 2024-04-13 NOTE — PROGRESS NOTES
Infectious Disease  Progress Note  [facility]    Chief Complaint: Left foot infection    Subjective: sitting up in bed, nursing in room. Tolerating antibiotics. No fevers. Pain in her foot - says she couldn't get her pain meds because of her BP    Scheduled Meds:   ertapenem (INVanz) 1,000 mg in sodium chloride (PF) 0.9 % 10 mL IV syringe  1,000 mg IntraVENous Q24H    sodium chloride flush  5-40 mL IntraVENous 2 times per day    nicotine  1 patch TransDERmal Daily    [Held by provider] cloNIDine  0.1 mg Oral BID    pantoprazole  40 mg Oral BID AC    sodium chloride flush  5-40 mL IntraVENous 2 times per day    enoxaparin  40 mg SubCUTAneous Daily     Continuous Infusions:   sodium chloride      sodium chloride      sodium chloride      sodium chloride      sodium chloride 50 mL/hr at 04/12/24 0534     PRN Meds:povidone-iodine, white petrolatum, sodium chloride, povidone-iodine, sodium chloride flush, sodium chloride, promethazine **OR** ondansetron, sodium chloride, morphine, sodium chloride flush, sodium chloride flush, sodium chloride, ondansetron **OR** ondansetron, polyethylene glycol, acetaminophen **OR** acetaminophen, HYDROcodone 5 mg - acetaminophen    Prior to Admission medications    Medication Sig Start Date End Date Taking? Authorizing Provider   cloNIDine (CATAPRES) 0.1 MG tablet Take 1 tablet by mouth 2 times daily 2/21/24  Yes ProviderDaljit MD   colestipol (COLESTID) 1 g tablet Take 1 tablet by mouth 2 times daily 3/20/24  Yes ProviderDaljit MD   cephALEXin (KEFLEX) 500 MG capsule Take 1 capsule by mouth 3 times daily 3/27/24  Yes ProviderDaljit MD   furosemide (LASIX) 40 MG tablet Take 1 tablet by mouth daily 2/29/24   Grzegorz Ervin MD   spironolactone (ALDACTONE) 50 MG tablet Take 1 tablet by mouth daily 2/29/24   Grzegorz Ervin MD   pantoprazole (PROTONIX) 40 MG tablet Take 1 tablet by mouth 2 times daily (before meals) 2/18/24   Keena Nuñez MD        ROS:  As

## 2024-04-14 LAB
ABO/RH: NORMAL
ALBUMIN SERPL-MCNC: 2.1 G/DL (ref 3.5–5.2)
ALP SERPL-CCNC: 248 U/L (ref 35–104)
ALT SERPL-CCNC: 20 U/L (ref 0–32)
ANION GAP SERPL CALCULATED.3IONS-SCNC: 7 MMOL/L (ref 7–16)
ANTIBODY SCREEN: NEGATIVE
ARM BAND NUMBER: NORMAL
AST SERPL-CCNC: 52 U/L (ref 0–31)
BASOPHILS # BLD: 0.07 K/UL (ref 0–0.2)
BASOPHILS NFR BLD: 1 % (ref 0–2)
BILIRUB SERPL-MCNC: 4.6 MG/DL (ref 0–1.2)
BLOOD BANK BLOOD PRODUCT EXPIRATION DATE: NORMAL
BLOOD BANK BLOOD PRODUCT EXPIRATION DATE: NORMAL
BLOOD BANK DISPENSE STATUS: NORMAL
BLOOD BANK DISPENSE STATUS: NORMAL
BLOOD BANK ISBT PRODUCT BLOOD TYPE: 5100
BLOOD BANK ISBT PRODUCT BLOOD TYPE: 9500
BLOOD BANK PRODUCT CODE: NORMAL
BLOOD BANK PRODUCT CODE: NORMAL
BLOOD BANK SAMPLE EXPIRATION: NORMAL
BLOOD BANK UNIT TYPE AND RH: NORMAL
BLOOD BANK UNIT TYPE AND RH: NORMAL
BPU ID: NORMAL
BPU ID: NORMAL
BUN SERPL-MCNC: 20 MG/DL (ref 6–20)
CALCIUM SERPL-MCNC: 7.9 MG/DL (ref 8.6–10.2)
CHLORIDE SERPL-SCNC: 109 MMOL/L (ref 98–107)
CO2 SERPL-SCNC: 15 MMOL/L (ref 22–29)
COMPONENT: NORMAL
COMPONENT: NORMAL
CREAT SERPL-MCNC: 0.9 MG/DL (ref 0.5–1)
CROSSMATCH RESULT: NORMAL
CROSSMATCH RESULT: NORMAL
EOSINOPHIL # BLD: 0.26 K/UL (ref 0.05–0.5)
EOSINOPHILS RELATIVE PERCENT: 2 % (ref 0–6)
ERYTHROCYTE [DISTWIDTH] IN BLOOD BY AUTOMATED COUNT: 19.2 % (ref 11.5–15)
GFR SERPL CREATININE-BSD FRML MDRD: 83 ML/MIN/1.73M2
GLUCOSE SERPL-MCNC: 107 MG/DL (ref 74–99)
HCT VFR BLD AUTO: 24.7 % (ref 34–48)
HGB BLD-MCNC: 8.2 G/DL (ref 11.5–15.5)
IMM GRANULOCYTES # BLD AUTO: 0.09 K/UL (ref 0–0.58)
IMM GRANULOCYTES NFR BLD: 1 % (ref 0–5)
INR PPP: 1.4
LYMPHOCYTES NFR BLD: 1.94 K/UL (ref 1.5–4)
LYMPHOCYTES RELATIVE PERCENT: 17 % (ref 20–42)
MCH RBC QN AUTO: 30.8 PG (ref 26–35)
MCHC RBC AUTO-ENTMCNC: 33.2 G/DL (ref 32–34.5)
MCV RBC AUTO: 92.9 FL (ref 80–99.9)
MONOCYTES NFR BLD: 1.45 K/UL (ref 0.1–0.95)
MONOCYTES NFR BLD: 12 % (ref 2–12)
NEUTROPHILS NFR BLD: 68 % (ref 43–80)
NEUTS SEG NFR BLD: 7.93 K/UL (ref 1.8–7.3)
PLATELET CONFIRMATION: NORMAL
PLATELET, FLUORESCENCE: 90 K/UL (ref 130–450)
PMV BLD AUTO: 11.1 FL (ref 7–12)
POTASSIUM SERPL-SCNC: 4.6 MMOL/L (ref 3.5–5)
PROT SERPL-MCNC: 5.6 G/DL (ref 6.4–8.3)
PROTHROMBIN TIME: 15.4 SEC (ref 9.3–12.4)
RBC # BLD AUTO: 2.66 M/UL (ref 3.5–5.5)
SODIUM SERPL-SCNC: 131 MMOL/L (ref 132–146)
TRANSFUSION STATUS: NORMAL
TRANSFUSION STATUS: NORMAL
UNIT DIVISION: 0
UNIT DIVISION: 0
UNIT ISSUE DATE/TIME: NORMAL
UNIT ISSUE DATE/TIME: NORMAL
WBC OTHER # BLD: 11.7 K/UL (ref 4.5–11.5)

## 2024-04-14 PROCEDURE — 6370000000 HC RX 637 (ALT 250 FOR IP): Performed by: INTERNAL MEDICINE

## 2024-04-14 PROCEDURE — C1751 CATH, INF, PER/CENT/MIDLINE: HCPCS

## 2024-04-14 PROCEDURE — 36569 INSJ PICC 5 YR+ W/O IMAGING: CPT

## 2024-04-14 PROCEDURE — 85025 COMPLETE CBC W/AUTO DIFF WBC: CPT

## 2024-04-14 PROCEDURE — 99232 SBSQ HOSP IP/OBS MODERATE 35: CPT | Performed by: STUDENT IN AN ORGANIZED HEALTH CARE EDUCATION/TRAINING PROGRAM

## 2024-04-14 PROCEDURE — 80053 COMPREHEN METABOLIC PANEL: CPT

## 2024-04-14 PROCEDURE — 76937 US GUIDE VASCULAR ACCESS: CPT

## 2024-04-14 PROCEDURE — 6360000002 HC RX W HCPCS: Performed by: STUDENT IN AN ORGANIZED HEALTH CARE EDUCATION/TRAINING PROGRAM

## 2024-04-14 PROCEDURE — 2500000003 HC RX 250 WO HCPCS: Performed by: STUDENT IN AN ORGANIZED HEALTH CARE EDUCATION/TRAINING PROGRAM

## 2024-04-14 PROCEDURE — 2580000003 HC RX 258: Performed by: STUDENT IN AN ORGANIZED HEALTH CARE EDUCATION/TRAINING PROGRAM

## 2024-04-14 PROCEDURE — 36415 COLL VENOUS BLD VENIPUNCTURE: CPT

## 2024-04-14 PROCEDURE — 6360000002 HC RX W HCPCS: Performed by: INTERNAL MEDICINE

## 2024-04-14 PROCEDURE — 1200000000 HC SEMI PRIVATE

## 2024-04-14 PROCEDURE — 85610 PROTHROMBIN TIME: CPT

## 2024-04-14 PROCEDURE — 02HV33Z INSERTION OF INFUSION DEVICE INTO SUPERIOR VENA CAVA, PERCUTANEOUS APPROACH: ICD-10-PCS | Performed by: STUDENT IN AN ORGANIZED HEALTH CARE EDUCATION/TRAINING PROGRAM

## 2024-04-14 PROCEDURE — A4216 STERILE WATER/SALINE, 10 ML: HCPCS | Performed by: STUDENT IN AN ORGANIZED HEALTH CARE EDUCATION/TRAINING PROGRAM

## 2024-04-14 RX ORDER — HYDROMORPHONE HYDROCHLORIDE 2 MG/ML
1.5 INJECTION, SOLUTION INTRAMUSCULAR; INTRAVENOUS; SUBCUTANEOUS EVERY 4 HOURS PRN
Status: DISCONTINUED | OUTPATIENT
Start: 2024-04-14 | End: 2024-04-19 | Stop reason: HOSPADM

## 2024-04-14 RX ADMIN — HYDROCODONE BITARTRATE AND ACETAMINOPHEN 1 TABLET: 5; 325 TABLET ORAL at 17:47

## 2024-04-14 RX ADMIN — HYDROCODONE BITARTRATE AND ACETAMINOPHEN 1 TABLET: 5; 325 TABLET ORAL at 06:00

## 2024-04-14 RX ADMIN — MORPHINE SULFATE 4 MG: 4 INJECTION, SOLUTION INTRAMUSCULAR; INTRAVENOUS at 02:50

## 2024-04-14 RX ADMIN — PANTOPRAZOLE SODIUM 40 MG: 40 TABLET, DELAYED RELEASE ORAL at 16:02

## 2024-04-14 RX ADMIN — ENOXAPARIN SODIUM 40 MG: 100 INJECTION SUBCUTANEOUS at 21:46

## 2024-04-14 RX ADMIN — SODIUM CHLORIDE, PRESERVATIVE FREE 10 ML: 5 INJECTION INTRAVENOUS at 21:46

## 2024-04-14 RX ADMIN — PANTOPRAZOLE SODIUM 40 MG: 40 TABLET, DELAYED RELEASE ORAL at 05:58

## 2024-04-14 RX ADMIN — SODIUM CHLORIDE, PRESERVATIVE FREE 20 ML: 5 INJECTION INTRAVENOUS at 13:46

## 2024-04-14 RX ADMIN — LIDOCAINE HYDROCHLORIDE 2 ML: 10 SOLUTION INTRAVENOUS at 13:30

## 2024-04-14 RX ADMIN — SODIUM CHLORIDE, PRESERVATIVE FREE 10 ML: 5 INJECTION INTRAVENOUS at 07:50

## 2024-04-14 RX ADMIN — SODIUM CHLORIDE, PRESERVATIVE FREE 1000 MG: 5 INJECTION INTRAVENOUS at 11:45

## 2024-04-14 RX ADMIN — Medication 300 UNITS: at 21:48

## 2024-04-14 RX ADMIN — HYDROMORPHONE HYDROCHLORIDE 0.75 MG: 1 INJECTION, SOLUTION INTRAMUSCULAR; INTRAVENOUS; SUBCUTANEOUS at 21:46

## 2024-04-14 RX ADMIN — MORPHINE SULFATE 4 MG: 4 INJECTION, SOLUTION INTRAMUSCULAR; INTRAVENOUS at 07:50

## 2024-04-14 RX ADMIN — HYDROCODONE BITARTRATE AND ACETAMINOPHEN 1 TABLET: 5; 325 TABLET ORAL at 12:00

## 2024-04-14 ASSESSMENT — PAIN SCALES - GENERAL
PAINLEVEL_OUTOF10: 9
PAINLEVEL_OUTOF10: 6
PAINLEVEL_OUTOF10: 6
PAINLEVEL_OUTOF10: 8
PAINLEVEL_OUTOF10: 8
PAINLEVEL_OUTOF10: 0
PAINLEVEL_OUTOF10: 10

## 2024-04-14 ASSESSMENT — PAIN DESCRIPTION - ONSET: ONSET: ON-GOING

## 2024-04-14 ASSESSMENT — PAIN - FUNCTIONAL ASSESSMENT
PAIN_FUNCTIONAL_ASSESSMENT: PREVENTS OR INTERFERES SOME ACTIVE ACTIVITIES AND ADLS

## 2024-04-14 ASSESSMENT — PAIN DESCRIPTION - ORIENTATION
ORIENTATION: RIGHT
ORIENTATION: LEFT
ORIENTATION: LEFT

## 2024-04-14 ASSESSMENT — PAIN DESCRIPTION - DESCRIPTORS
DESCRIPTORS: ACHING;THROBBING
DESCRIPTORS: ACHING;THROBBING
DESCRIPTORS: ACHING;DISCOMFORT

## 2024-04-14 ASSESSMENT — PAIN DESCRIPTION - LOCATION
LOCATION: FOOT

## 2024-04-14 ASSESSMENT — PAIN DESCRIPTION - PAIN TYPE: TYPE: SURGICAL PAIN

## 2024-04-14 ASSESSMENT — PAIN DESCRIPTION - FREQUENCY: FREQUENCY: CONTINUOUS

## 2024-04-14 NOTE — PROGRESS NOTES
Infectious Disease  Progress Note  [facility]    Chief Complaint: Left foot infection    Subjective:  lying in bed. In no distress. Denies new complaints. Tolerating antibiotics. IV team entered room to discuss & place PICC. No fevers     Scheduled Meds:   lidocaine PF  5 mL IntraDERmal Once    sodium chloride flush  5-40 mL IntraVENous 2 times per day    heparin flush  3 mL IntraVENous 2 times per day    ertapenem (INVanz) 1,000 mg in sodium chloride (PF) 0.9 % 10 mL IV syringe  1,000 mg IntraVENous Q24H    sodium chloride flush  5-40 mL IntraVENous 2 times per day    nicotine  1 patch TransDERmal Daily    [Held by provider] cloNIDine  0.1 mg Oral BID    pantoprazole  40 mg Oral BID AC    sodium chloride flush  5-40 mL IntraVENous 2 times per day    enoxaparin  40 mg SubCUTAneous Daily     Continuous Infusions:   sodium chloride      sodium chloride      sodium chloride      sodium chloride      sodium chloride      sodium chloride Stopped (04/13/24 2607)     PRN Meds:povidone-iodine, white petrolatum, sodium chloride, sodium chloride flush, sodium chloride, heparin flush, povidone-iodine, sodium chloride flush, sodium chloride, promethazine **OR** ondansetron, sodium chloride, morphine, sodium chloride flush, sodium chloride flush, sodium chloride, ondansetron **OR** ondansetron, polyethylene glycol, acetaminophen **OR** acetaminophen, HYDROcodone 5 mg - acetaminophen    Prior to Admission medications    Medication Sig Start Date End Date Taking? Authorizing Provider   cloNIDine (CATAPRES) 0.1 MG tablet Take 1 tablet by mouth 2 times daily 2/21/24  Yes ProviderDaljit MD   colestipol (COLESTID) 1 g tablet Take 1 tablet by mouth 2 times daily 3/20/24  Yes Provider, MD Daljit   cephALEXin (KEFLEX) 500 MG capsule Take 1 capsule by mouth 3 times daily 3/27/24  Yes Provider, MD Daljit   furosemide (LASIX) 40 MG tablet Take 1 tablet by mouth daily 2/29/24   Grzegorz Ervin MD   spironolactone

## 2024-04-14 NOTE — PROGRESS NOTES
[facility] Hospitalist Progress Note    Admitting Date and Time: 4/9/2024  2:19 PM  Admit Dx: Sepsis (HCC) [A41.9]  Open wound of fifth toe of left foot, initial encounter [S91.105A]  Cirrhosis of liver with ascites, unspecified hepatic cirrhosis type (HCC) [K74.60, R18.8]    Subjective:  Patient is being followed for Sepsis (HCC) [A41.9]  Open wound of fifth toe of left foot, initial encounter [S91.105A]  Cirrhosis of liver with ascites, unspecified hepatic cirrhosis type (HCC) [K74.60, R18.8]   Pt was seen and examined today. Denies any new issues. States pain is better controlled now.    ROS: denies fever, chills, cp, sob, n/v, HA unless stated above.     sodium chloride flush  5-40 mL IntraVENous 2 times per day    heparin flush  3 mL IntraVENous 2 times per day    ertapenem (INVanz) 1,000 mg in sodium chloride (PF) 0.9 % 10 mL IV syringe  1,000 mg IntraVENous Q24H    sodium chloride flush  5-40 mL IntraVENous 2 times per day    nicotine  1 patch TransDERmal Daily    [Held by provider] cloNIDine  0.1 mg Oral BID    pantoprazole  40 mg Oral BID AC    sodium chloride flush  5-40 mL IntraVENous 2 times per day    enoxaparin  40 mg SubCUTAneous Daily     HYDROmorphone, 0.75 mg, Q4H PRN   Or  HYDROmorphone, 1.5 mg, Q4H PRN  povidone-iodine, , PRN  white petrolatum, , BID PRN  sodium chloride, , PRN  sodium chloride flush, 5-40 mL, PRN  sodium chloride, , PRN  heparin flush, 3 mL, PRN  povidone-iodine, , PRN  sodium chloride flush, 5-40 mL, PRN  sodium chloride, , PRN  promethazine, 12.5 mg, Q6H PRN   Or  ondansetron, 4 mg, Q6H PRN  sodium chloride, , PRN  sodium chloride flush, 10 mL, PRN  sodium chloride flush, 5-40 mL, PRN  sodium chloride, , PRN  ondansetron, 4 mg, Q8H PRN   Or  ondansetron, 4 mg, Q6H PRN  polyethylene glycol, 17 g, Daily PRN  acetaminophen, 650 mg, Q6H PRN   Or  acetaminophen, 650 mg, Q6H PRN  HYDROcodone 5 mg - acetaminophen, 1 tablet, Q6H PRN         Objective:    /70   Pulse 89  swelling.  No evidence of   acute fracture or traumatic malalignment.             Assessment:    Principal Problem:    Sepsis ([facility])  Active Problems:    Leukocytosis    Acidosis    Thrombocytopenia ([facility])    Primary hypertension    Necrotizing soft tissue infection of left foot    Osteomyelitis of fifth toe of left foot ([facility])    ABLA (acute blood loss anemia)    SIADH (syndrome of inappropriate ADH production) ([facility])  Resolved Problems:    * No resolved hospital problems. *      Plan:  Blood cultures, bone cultures, and wound cultures growing Serratia marcescens  ID following, appreciate recommendations, on ertapenem  Podiatry following, appreciate recommendations, s/p incision and drainage of the left ankle abscess along with left foot open fasciotomy and left fifth metatarsal bone biopsy on 4/11/2024, wound vac applied 4/14/2024  Follow surgical cultures  Stop IVF, MELINDA resolved  Continue other home medications as able  New hyponatremia possibly SIADH because of continued pain, will improve pain control and start fluid restriction, improving  Hgb 8.2 this morning, continue to monitor, transfuse if Hgb < 7  Discussed with nursing staff to not hold opioids based on blood pressure    Code Status: Full code  DVT/GI ppx: Lovenox/Protonix  Dispo: Continue care    Time spent reviewing chart, clinical exam, discussing case and answering questions with staff/consultants/patient/family = 35 min    NOTE: This report was transcribed using voice recognition software. Every effort was made to ensure accuracy; however, inadvertent computerized transcription errors may be present.  Electronically signed by Eliel Tinajero MD on 4/14/2024 at 2:43 PM

## 2024-04-14 NOTE — PROCEDURES
PICC  Catheter insertion date: 4/14/2024     Product Number:  [id number]   Lot No: [id number]   Gauge: 17   Lumen: 4.5 [place]orean/ SINGLE   R Brachial    Vein Diameter: 0.42CM   Arm circumference at insertion site: 21CM   Catheter Length: 37CM   Internal Length: 36CM   External Catheter Length: 1CM   Ultrasound Used: YES  VPS Mayito Valenzuelaseye confirms PICC tip is placed in the lower 1/3 of the SVC or at the Cavoatrial junction.  Floor nurse notified PICC is okay to use.   : OMER SIERRA RN, SUZANNE

## 2024-04-14 NOTE — PROGRESS NOTES
Podiatry Progress Note  4/14/2024   Jaylene Kapoor       SUBJECTIVE: This is a 44 y.o. female seen bedside s/p Left ankle I&D fasiotomy with bone biopsy DOS: 4/11/24. Patient complains of pain to the right foot and ankle.  She says that the pain is still very bad and not much change from yesterday.  W patient was in moderate pain with application of wound VAC. Patient has no new pedal complaints.      OBJECTIVE:      Pt is AAOx3    Vitals:    04/14/24 0640   BP: 119/70   Pulse: 89   Resp: 16   Temp: 98.2 °F (36.8 °C)   SpO2: 99%      EXAM:    VASCULAR:  DP and PT pulses are faintly palpable due to edema. CFT < 5 seconds to the dee of all 5 digits.  Warm to warm from the tibial tuberosity to the distal aspect of the digits.     NEUROLOGIC:  Protective sensation is diminished by grossly intact     DERM: There is a large surgical incision noted to the left foot and ankle that extends from the lateral malleolus to the dorsal 5th MTPJ. Wound is partially closed with suture intact. There is moderate sanguinous drainage noted. There is no purulence, crepitus, fluctuance, malodor noted at this time.     MUSCULOSKELETAL: Musculoskeletal exam deferred due to pain           Current Facility-Administered Medications   Medication Dose Route Frequency Provider Last Rate Last Admin    povidone-iodine (BETADINE) 10 % external solution   Topical PRN Андрей Thibodeaux DPM   Given at 04/13/24 0823    white petrolatum ointment   Topical BID PRN Eliel Tinajero MD   Given at 04/13/24 0837    0.9 % sodium chloride infusion   IntraVENous PRN Eliel Tinajero MD        lidocaine PF 1 % injection 5 mL  5 mL IntraDERmal Once Cedrick Small MD        sodium chloride flush 0.9 % injection 5-40 mL  5-40 mL IntraVENous 2 times per day Cedrick Small MD        sodium chloride flush 0.9 % injection 5-40 mL  5-40 mL IntraVENous PRN Cedrick Small MD        0.9 % sodium chloride infusion   IntraVENous PRN Cedrick Small MD    discrete abscess or ulcer. Edema and/or cellulitis left lower extremity, foot and ankle particularly along the dorsal lateral aspect.  -Wound VAC applied today, will start MWF vac changes, next change Wednesday.  Wound VAC running at 125 mmHg with no issues.  - Discussed patient with Dr. Grover  - Will continue to follow patient while they are in-house.    Florentin Grover DPM FACFAS  Fellowship-Trained Foot and Ankle Surgeon  Diplomate, [employer] Board of Foot and Ankle Surgeons  [phone]      Андрей Thibodeaux DPM PGY-1  4/14/2024   7:27 AM    [phone]

## 2024-04-15 LAB
ALBUMIN SERPL-MCNC: 2.1 G/DL (ref 3.5–5.2)
ALP SERPL-CCNC: 229 U/L (ref 35–104)
ALT SERPL-CCNC: 18 U/L (ref 0–32)
ANION GAP SERPL CALCULATED.3IONS-SCNC: 7 MMOL/L (ref 7–16)
AST SERPL-CCNC: 47 U/L (ref 0–31)
BASOPHILS # BLD: 0.05 K/UL (ref 0–0.2)
BASOPHILS NFR BLD: 1 % (ref 0–2)
BILIRUB SERPL-MCNC: 3.9 MG/DL (ref 0–1.2)
BUN SERPL-MCNC: 18 MG/DL (ref 6–20)
CALCIUM SERPL-MCNC: 7.8 MG/DL (ref 8.6–10.2)
CHLORIDE SERPL-SCNC: 110 MMOL/L (ref 98–107)
CO2 SERPL-SCNC: 16 MMOL/L (ref 22–29)
CREAT SERPL-MCNC: 0.8 MG/DL (ref 0.5–1)
EOSINOPHIL # BLD: 0.24 K/UL (ref 0.05–0.5)
EOSINOPHILS RELATIVE PERCENT: 3 % (ref 0–6)
ERYTHROCYTE [DISTWIDTH] IN BLOOD BY AUTOMATED COUNT: 18.6 % (ref 11.5–15)
GFR SERPL CREATININE-BSD FRML MDRD: >90 ML/MIN/1.73M2
GLUCOSE SERPL-MCNC: 110 MG/DL (ref 74–99)
HCT VFR BLD AUTO: 23.3 % (ref 34–48)
HGB BLD-MCNC: 7.7 G/DL (ref 11.5–15.5)
IMM GRANULOCYTES # BLD AUTO: 0.07 K/UL (ref 0–0.58)
IMM GRANULOCYTES NFR BLD: 1 % (ref 0–5)
LYMPHOCYTES NFR BLD: 1.81 K/UL (ref 1.5–4)
LYMPHOCYTES RELATIVE PERCENT: 19 % (ref 20–42)
MCH RBC QN AUTO: 31 PG (ref 26–35)
MCHC RBC AUTO-ENTMCNC: 33 G/DL (ref 32–34.5)
MCV RBC AUTO: 94 FL (ref 80–99.9)
MONOCYTES NFR BLD: 1.04 K/UL (ref 0.1–0.95)
MONOCYTES NFR BLD: 11 % (ref 2–12)
NEUTROPHILS NFR BLD: 67 % (ref 43–80)
NEUTS SEG NFR BLD: 6.42 K/UL (ref 1.8–7.3)
PLATELET CONFIRMATION: NORMAL
PLATELET, FLUORESCENCE: 89 K/UL (ref 130–450)
PMV BLD AUTO: 11.8 FL (ref 7–12)
POTASSIUM SERPL-SCNC: 4.7 MMOL/L (ref 3.5–5)
PROT SERPL-MCNC: 5.4 G/DL (ref 6.4–8.3)
RBC # BLD AUTO: 2.48 M/UL (ref 3.5–5.5)
SODIUM SERPL-SCNC: 133 MMOL/L (ref 132–146)
WBC OTHER # BLD: 9.6 K/UL (ref 4.5–11.5)

## 2024-04-15 PROCEDURE — 80053 COMPREHEN METABOLIC PANEL: CPT

## 2024-04-15 PROCEDURE — 6370000000 HC RX 637 (ALT 250 FOR IP): Performed by: INTERNAL MEDICINE

## 2024-04-15 PROCEDURE — 97165 OT EVAL LOW COMPLEX 30 MIN: CPT

## 2024-04-15 PROCEDURE — 2580000003 HC RX 258: Performed by: STUDENT IN AN ORGANIZED HEALTH CARE EDUCATION/TRAINING PROGRAM

## 2024-04-15 PROCEDURE — 6360000002 HC RX W HCPCS: Performed by: STUDENT IN AN ORGANIZED HEALTH CARE EDUCATION/TRAINING PROGRAM

## 2024-04-15 PROCEDURE — 99232 SBSQ HOSP IP/OBS MODERATE 35: CPT | Performed by: STUDENT IN AN ORGANIZED HEALTH CARE EDUCATION/TRAINING PROGRAM

## 2024-04-15 PROCEDURE — 85025 COMPLETE CBC W/AUTO DIFF WBC: CPT

## 2024-04-15 PROCEDURE — 1200000000 HC SEMI PRIVATE

## 2024-04-15 PROCEDURE — 97530 THERAPEUTIC ACTIVITIES: CPT

## 2024-04-15 RX ADMIN — SODIUM CHLORIDE, PRESERVATIVE FREE 10 ML: 5 INJECTION INTRAVENOUS at 09:31

## 2024-04-15 RX ADMIN — HYDROMORPHONE HYDROCHLORIDE 0.75 MG: 1 INJECTION, SOLUTION INTRAMUSCULAR; INTRAVENOUS; SUBCUTANEOUS at 09:30

## 2024-04-15 RX ADMIN — HYDROMORPHONE HYDROCHLORIDE 1.5 MG: 2 INJECTION, SOLUTION INTRAMUSCULAR; INTRAVENOUS; SUBCUTANEOUS at 23:15

## 2024-04-15 RX ADMIN — SODIUM CHLORIDE, PRESERVATIVE FREE 10 ML: 5 INJECTION INTRAVENOUS at 09:24

## 2024-04-15 RX ADMIN — HYDROMORPHONE HYDROCHLORIDE 1.5 MG: 2 INJECTION, SOLUTION INTRAMUSCULAR; INTRAVENOUS; SUBCUTANEOUS at 16:43

## 2024-04-15 RX ADMIN — PANTOPRAZOLE SODIUM 40 MG: 40 TABLET, DELAYED RELEASE ORAL at 05:30

## 2024-04-15 RX ADMIN — Medication 300 UNITS: at 21:45

## 2024-04-15 RX ADMIN — HYDROCODONE BITARTRATE AND ACETAMINOPHEN 1 TABLET: 5; 325 TABLET ORAL at 05:30

## 2024-04-15 RX ADMIN — SODIUM CHLORIDE, PRESERVATIVE FREE 1000 MG: 5 INJECTION INTRAVENOUS at 11:59

## 2024-04-15 RX ADMIN — HYDROCODONE BITARTRATE AND ACETAMINOPHEN 1 TABLET: 5; 325 TABLET ORAL at 21:43

## 2024-04-15 RX ADMIN — Medication 300 UNITS: at 09:24

## 2024-04-15 RX ADMIN — HYDROCODONE BITARTRATE AND ACETAMINOPHEN 1 TABLET: 5; 325 TABLET ORAL at 12:06

## 2024-04-15 RX ADMIN — PANTOPRAZOLE SODIUM 40 MG: 40 TABLET, DELAYED RELEASE ORAL at 16:42

## 2024-04-15 ASSESSMENT — PAIN SCALES - GENERAL
PAINLEVEL_OUTOF10: 6
PAINLEVEL_OUTOF10: 9
PAINLEVEL_OUTOF10: 9
PAINLEVEL_OUTOF10: 7
PAINLEVEL_OUTOF10: 10
PAINLEVEL_OUTOF10: 10

## 2024-04-15 ASSESSMENT — PAIN DESCRIPTION - DESCRIPTORS
DESCRIPTORS: ACHING;DISCOMFORT;SORE;STABBING
DESCRIPTORS: SHOOTING
DESCRIPTORS: ACHING;THROBBING
DESCRIPTORS: ACHING;THROBBING
DESCRIPTORS: ACHING;SHOOTING

## 2024-04-15 ASSESSMENT — PAIN - FUNCTIONAL ASSESSMENT
PAIN_FUNCTIONAL_ASSESSMENT: PREVENTS OR INTERFERES SOME ACTIVE ACTIVITIES AND ADLS
PAIN_FUNCTIONAL_ASSESSMENT: PREVENTS OR INTERFERES SOME ACTIVE ACTIVITIES AND ADLS

## 2024-04-15 ASSESSMENT — PAIN DESCRIPTION - LOCATION
LOCATION: FOOT

## 2024-04-15 ASSESSMENT — PAIN DESCRIPTION - ORIENTATION
ORIENTATION: RIGHT;LEFT
ORIENTATION: LEFT
ORIENTATION: RIGHT;LEFT
ORIENTATION: LEFT
ORIENTATION: RIGHT;LEFT

## 2024-04-15 NOTE — PROGRESS NOTES
Physical Therapy  Facility/Department: [address] MED SURG  Physical Therapy Treatment Note    Name: Jaylene Kapoor  : 1980  MRN: [id number]  Date of Service: 4/15/2024    Attending Provider:  Eliel Tinajero MD    Evaluating PT:  Santos Donovan Jr., P.T.    Room #:  0501/0501-A  Diagnosis:  Sepsis (HCC) [A41.9]  Open wound of fifth toe of left foot, initial encounter [S91.105A]  Cirrhosis of liver with ascites, unspecified hepatic cirrhosis type (HCC) [K74.60, R18.8]  Procedure/Surgery:  24 L foot and ankle I and D  Precautions:  NWB LLE, splint L foot and ankle, falls, bed/chair alarm    SUBJECTIVE:    Pt lives with her partner and 5 kids in a 1 story home with 1+1 steps to enter.   Pt ambulated with no AD PTA.    OBJECTIVE:   Initial Evaluation  Date: 24 Treatment  4/15/2024 Short Term/ Long Term   Goals   Was pt agreeable to Eval/treatment? yes yes    Does pt have pain? C/o pain all over, except her arms L LE pain to movement    Bed Mobility  Rolling: SBA  Supine to sit: MIN A  Sit to supine: SBA  Scooting: SBA Sit to supine: Min A L LE support  Scooting: Min A side to side in bed, L LE support Independent    Transfers Sit to stand: MOD A  Stand to sit: MOD A  Stand pivot: NA Sit <> stand; Mod A  Stand pivot: Min A with WW. See comments Independent   Ambulation   3 feet toward HOB using ww with scoot shuffle technique with MOD A and constant VCs for NWB LLE Scooting R LE with Min A for balance, NWB/TTWB L LE  maintained  50 feet with ww NWB LLE Independent    Stair negotiation: ascended and descended NA NA 1 step twice with ww NWB LLE SBA   AM-PAC 6 Clicks       Pt is alert, following instruction, states afraid, much encouragement given  Balance: poor during brief mobility with WW    Pt performed therapeutic exercise of the following: NT    Patient education/treatment  Pt was educated on UE usage for transfer safety, NWB L LE    Patient response to education:   Pt verbalized

## 2024-04-15 NOTE — PROGRESS NOTES
Infectious Disease  Progress Note  [facility]    Chief Complaint: Left foot infection    Subjective:  lying in bed. In no distress. Denies new complaints. Tolerating antibiotics. IV team entered room to discuss & place PICC. No fevers     Scheduled Meds:   sodium chloride flush  5-40 mL IntraVENous 2 times per day    heparin flush  3 mL IntraVENous 2 times per day    ertapenem (INVanz) 1,000 mg in sodium chloride (PF) 0.9 % 10 mL IV syringe  1,000 mg IntraVENous Q24H    sodium chloride flush  5-40 mL IntraVENous 2 times per day    nicotine  1 patch TransDERmal Daily    [Held by provider] cloNIDine  0.1 mg Oral BID    pantoprazole  40 mg Oral BID AC    sodium chloride flush  5-40 mL IntraVENous 2 times per day    enoxaparin  40 mg SubCUTAneous Daily     Continuous Infusions:   sodium chloride      sodium chloride      sodium chloride      sodium chloride      sodium chloride       PRN Meds:HYDROmorphone **OR** HYDROmorphone, povidone-iodine, white petrolatum, sodium chloride, sodium chloride flush, sodium chloride, heparin flush, povidone-iodine, sodium chloride flush, sodium chloride, promethazine **OR** [DISCONTINUED] ondansetron, sodium chloride, sodium chloride flush, sodium chloride flush, sodium chloride, ondansetron **OR** ondansetron, polyethylene glycol, acetaminophen **OR** acetaminophen, HYDROcodone 5 mg - acetaminophen    Prior to Admission medications    Medication Sig Start Date End Date Taking? Authorizing Provider   cloNIDine (CATAPRES) 0.1 MG tablet Take 1 tablet by mouth 2 times daily 2/21/24  Yes ProviderDaljit MD   colestipol (COLESTID) 1 g tablet Take 1 tablet by mouth 2 times daily 3/20/24  Yes Provider, MD Daljit   cephALEXin (KEFLEX) 500 MG capsule Take 1 capsule by mouth 3 times daily 3/27/24  Yes ProviderDaljit MD   furosemide (LASIX) 40 MG tablet Take 1 tablet by mouth daily 2/29/24   Grzegorz Ervin MD   spironolactone (ALDACTONE) 50 MG tablet Take 1 tablet by mouth

## 2024-04-15 NOTE — PROGRESS NOTES
Podiatry Progress Note  4/15/2024   Jaylene Antwon       SUBJECTIVE: This is a 44 y.o. female seen bedside s/p Left ankle I&D fasiotomy with bone biopsy DOS: 4/11/24. Patient complains of pain to the right foot and ankle.  She says that the pain is still very bad and not much change from yesterday. Patient has no new pedal complaints.      OBJECTIVE:      Pt is AAOx3    Vitals:    04/15/24 1000   BP:    Pulse:    Resp: 16   Temp:    SpO2:       EXAM:    VASCULAR:  DP and PT pulses are faintly palpable due to edema. CFT < 5 seconds to the dee of all 5 digits.  Warm to warm from the tibial tuberosity to the distal aspect of the digits.     NEUROLOGIC:  Protective sensation is diminished by grossly intact     DERM: There is a large surgical incision noted to the left foot and ankle that extends from the lateral malleolus to the dorsal 5th MTPJ. Wound is partially closed with suture intact. There is moderate sanguinous drainage noted. There is no purulence, crepitus, fluctuance, malodor noted at this time.     MUSCULOSKELETAL: Musculoskeletal exam deferred due to pain           Current Facility-Administered Medications   Medication Dose Route Frequency Provider Last Rate Last Admin    HYDROmorphone (DILAUDID) injection 0.75 mg  0.75 mg IntraVENous Q4H PRN Eliel Tinajero MD   0.75 mg at 04/15/24 0930    Or    HYDROmorphone (DILAUDID) injection 1.5 mg  1.5 mg IntraVENous Q4H PRN Eliel Tinajero MD        povidone-iodine (BETADINE) 10 % external solution   Topical PRN Андрей Thibodeaux DPM   Given at 04/13/24 0823    white petrolatum ointment   Topical BID PRN Eliel Tinajero MD   Given at 04/13/24 0837    0.9 % sodium chloride infusion   IntraVENous PRN Eliel Tinajero MD        sodium chloride flush 0.9 % injection 5-40 mL  5-40 mL IntraVENous 2 times per day Cedrick Small MD   10 mL at 04/15/24 0924    sodium chloride flush 0.9 % injection 5-40 mL  5-40 mL IntraVENous PRN Cedrick Small MD

## 2024-04-15 NOTE — CARE COORDINATION
Updated plan of care. Spoke to the patient and daughter at bedside. Discharge plan is to go to SNF. First choice is [facility]. Referral made to [facility] with [facility]. [facility] is accepting. LOLLY, andreea, transport form, [id number] completed and in soft chart. No Precert needed. PICC line is placed and abx are reconciled. Await podiatry plan, currently has a wound vac in place.   Electronically signed by Vida Alcantar RN on 4/15/2024 at 1:52 PM

## 2024-04-15 NOTE — PROGRESS NOTES
OCCUPATIONAL THERAPY INITIAL EVALUATION    [facility]   [address] [facility]        Date:4/15/2024                                                  Patient Name: Jaylene Kapoor    MRN: [id number]    : 1980    Room: [address] [place] [address]    Evaluating OT: Nikki Beer OTR/L #[id number]    Referring Provider:  Eliel Tinajero MD     Specific Provider Orders/Date:  OT Eval and Treat , 2024    Diagnosis:   1. Open wound of fifth toe of left foot, initial encounter    2. Cirrhosis of liver with ascites, unspecified hepatic cirrhosis type (HCC)    3. Foot abscess        Surgery: S/p left ankle I&D fasiotomy with bone biopsy 24 , s/p wound vac application 4/15/24     Pertinent Medical History: HTN      Precautions:  Fall Risk, alarm, NWB L LE, wound vac      Assessment of current deficits    [x] Functional mobility  [x]ADLs  [x] Strength               []Cognition    [x] Functional transfers   [x] IADLs         [x] Safety Awareness   [x]Endurance    [] Fine Coordination              [x] Balance      [] Vision/perception   []Sensation     []Gross Motor Coordination  [] ROM  [] Delirium                   [] Motor Control     OT PLAN OF CARE   OT POC based on physician orders, patient diagnosis and results of clinical assessment    Frequency/Duration 2-5 days/wk for 2-4 weeks PRN     Specific OT Treatment Interventions to include:   * Instruction/training on adapted ADL techniques and AE recommendations to increase functional independence within precautions       * Training on energy conservation strategies, correct breathing pattern and techniques to improve independence/tolerance for self-care routine  * Functional transfer/mobility training/DME recommendations for increased independence, safety, and fall prevention  * Patient/Family education to increase follow through with safety techniques and functional independence  * Recommendation of  Static: good    Dynamic: good  Standing: fair plus with fww   Activity Tolerance fair  minus / poor; limited by pain   Increase standing tolerance >1-2 minutes, maintaining L LE NWB,for improved engagement with functional transfers and indep in ADLs     Visual/  Perceptual WFL      NA    UE ROM/Strength      Right UE:   AROM: WFL   Strength: 4-/5  -good  and wfl FMC/dexterity noted during ADL tasks    Left UE:   AROM : WFL   Strength: 4-/5  -good  and wfl FMC/dexterity noted during ADL tasks    Improve overall B UE strength for participation in functional tasks      Hand Dominance: N/A    Hearing:  WFL   Sensation: No c/o numbness/tingling   Tone:  WFL   Edema: None     Comments: RN cleared patient for OT.   Upon arrival patient in supine, daughter at bedside. Pt assisted to EOB with assistance. STS transfer from EOB to fww with Mod A and cues to maintain L LE NWB. Pt displays poor standing tolerance, therefore unable to scoot shuffle with walker. SPT from bed to chair with assist x 2 for safety to maintain L LE NWB. Pt c/o increased pain and fatigues quickly. RN at bedside to administer pain medication.  At end of session, patient was in chair, B LEs elevated with call light and phone within reach, all lines and tubes intact.  Overall, patient demonstrated decreased independence and safety during completion of ADL tasks.  Pt would benefit from continued skilled OT to increase safety and independence with completion of ADL tasks and functional mobility for improved quality of life.       Rehab Potential: Good for established goals.      Patient / Family Goal: N/A     Patient and/or family were instructed on functional diagnosis, prognosis/goals and OT plan of care. Demonstrated fair understanding.     Eval Complexity: Low    Time In: 12:00 PM   Time Out: 12:14 PM    Total Treatment Time:  0       Min Units   OT Eval Low 97165  X  1    OT Eval Medium 17037      OT Eval High 72673      OT Re-Eval 19967

## 2024-04-15 NOTE — DISCHARGE INSTRUCTIONS
[facility]  ([facility])  [address] [place] [address]  [address] [place] [address]  Phone [phone]   Fax [phone]        Primo Claros MD, FACP, MD Madeline Covington MD Indra P. Limbu, MD Shirisha Pasula MD    STANDING ORDERS (“ID Protocol”)     Visiting nurses are to write the Primary Care Physician and their own call back number on all laboratory requisition forms.   Abnormal lab values are called to the physician by the nurse and NOT by the laboratory.   Fax all labs to the office in a timely manner, during office hours. All faxes should include nurse’s name and call back number.  Vascular Access Devices or VADs (TLC, PICC, Midline, etc) will be replaced as necessary.  Draw all blood work from VADs, except for drug levels.  If unable to access a VAD, insert a peripheral catheter temporarily. Contact the Primary Care Physician or [facility] office for surgical referral.  Use tPA (Cathflo®, Alteplase®) as per agency protocol to restore patency of VAD.  Remove VAD upon completion of IV antibiotics, unless otherwise specified by the ordering physician.  If VAD cannot be removed, schedule appointment at office for removal.  Notify ordering physician or office if patient requires admission to the hospital with reason for admission.  Discontinue all blood work upon completion of IV antibiotics, unless otherwise specified by ordering physician.  Notify ordering physician if the patient does not receive the scheduled antibiotic for 24 hours or more.  The Pharmacy and Home Health Agency may adjust the timing of the infusion and blood work to accommodate the patient’s home care conditions.  When PICC or VAD is to be removed, documentation of length of inserted PICC. PICC or VAD length is to correlate with inserted length and sent to physician at the time of removal.  Give the patient a list of antibiotics being administered with:  Drug

## 2024-04-15 NOTE — DISCHARGE INSTR - COC
([facility]) M86.9    ABLA (acute blood loss anemia) D62    SIADH (syndrome of inappropriate ADH production) ([facility]) E22.2       Isolation/Infection:   Isolation            No Isolation          Patient Infection Status       None to display                     Nurse Assessment:  Last Vital Signs: /66   Pulse (!) 102   Temp 99 °F (37.2 °C) (Oral)   Resp 16   Ht 1.524 m (5')   Wt 75.2 kg (165 lb 12.6 oz)   SpO2 97%   BMI 32.38 kg/m²     Last documented pain score (0-10 scale): Pain Level: 10  Last Weight:   Wt Readings from Last 1 Encounters:   04/15/24 75.2 kg (165 lb 12.6 oz)     Mental Status:  oriented    IV Access:  Picc inserted 4/14/24    Nursing Mobility/ADLs:  Walking   Assisted  Transfer  Assisted  Bathing  Assisted  Dressing  Assisted  Toileting  Assisted  Feeding  Independent  Med Admin  Independent  Med Delivery   whole    Wound Care Documentation and Therapy:  Negative Pressure Wound Therapy Foot Left (Active)   Dressing Type Black Foam 04/15/24 0930   Target Pressure (mmHg) 125 04/15/24 0930   Dressing Status Clean, dry & intact 04/15/24 0930   Number of days: 1       Wound 04/09/24 Foot Left (Active)   Dressing Status Clean;Dry;Intact 04/15/24 0930   Dressing/Treatment Ace wrap 04/10/24 2130   Drainage Amount None (dry) 04/10/24 0820   Odor None 04/10/24 0820   Number of days: 5       Incision 04/11/24 Foot Left (Active)   Dressing Status Clean;Dry;Intact 04/15/24 0930   Dressing/Treatment Ace wrap;Dry dressing 04/13/24 0944   Closure Other (Comment) 04/11/24 2015   Incision Assessment Other (Comment) 04/11/24 2015   Drainage Amount Moderate (25-50%) 04/11/24 2015   Drainage Description Sanguinous 04/11/24 2015   Odor None 04/11/24 2015   Number of days: 4        Elimination:  Continence:   Bowel: Yes  Bladder: Yes  Urinary Catheter: None   Colostomy/Ileostomy/Ileal Conduit: No       Date of Last BM: ***  No intake or output data in the 24 hours ending 04/15/24 1145  No intake/output data  HandP:[id number]}    PHYSICIAN SIGNATURE:  Electronically signed by Santos Stevens DO on 4/19/24 at 11:16 AM EDT

## 2024-04-15 NOTE — PROGRESS NOTES
[facility] Hospitalist Progress Note    Admitting Date and Time: 4/9/2024  2:19 PM  Admit Dx: Sepsis (HCC) [A41.9]  Open wound of fifth toe of left foot, initial encounter [S91.105A]  Cirrhosis of liver with ascites, unspecified hepatic cirrhosis type (HCC) [K74.60, R18.8]    Subjective:  Patient is being followed for Sepsis (HCC) [A41.9]  Open wound of fifth toe of left foot, initial encounter [S91.105A]  Cirrhosis of liver with ascites, unspecified hepatic cirrhosis type (HCC) [K74.60, R18.8]   Pt was seen and examined today. Denies any new issues.    ROS: denies fever, chills, cp, sob, n/v, HA unless stated above.     sodium chloride flush  5-40 mL IntraVENous 2 times per day    heparin flush  3 mL IntraVENous 2 times per day    ertapenem (INVanz) 1,000 mg in sodium chloride (PF) 0.9 % 10 mL IV syringe  1,000 mg IntraVENous Q24H    sodium chloride flush  5-40 mL IntraVENous 2 times per day    nicotine  1 patch TransDERmal Daily    [Held by provider] cloNIDine  0.1 mg Oral BID    pantoprazole  40 mg Oral BID AC    sodium chloride flush  5-40 mL IntraVENous 2 times per day    enoxaparin  40 mg SubCUTAneous Daily     HYDROmorphone, 0.75 mg, Q4H PRN   Or  HYDROmorphone, 1.5 mg, Q4H PRN  povidone-iodine, , PRN  white petrolatum, , BID PRN  sodium chloride, , PRN  sodium chloride flush, 5-40 mL, PRN  sodium chloride, , PRN  heparin flush, 3 mL, PRN  povidone-iodine, , PRN  sodium chloride flush, 5-40 mL, PRN  sodium chloride, , PRN  promethazine, 12.5 mg, Q6H PRN  sodium chloride, , PRN  sodium chloride flush, 10 mL, PRN  sodium chloride flush, 5-40 mL, PRN  sodium chloride, , PRN  ondansetron, 4 mg, Q8H PRN   Or  ondansetron, 4 mg, Q6H PRN  polyethylene glycol, 17 g, Daily PRN  acetaminophen, 650 mg, Q6H PRN   Or  acetaminophen, 650 mg, Q6H PRN  HYDROcodone 5 mg - acetaminophen, 1 tablet, Q6H PRN         Objective:    /66   Pulse (!) 102   Temp 99 °F (37.2 °C) (Oral)   Resp 16   Ht 1.524 m (5')          Assessment:    Principal Problem:    Sepsis (HCC)  Active Problems:    Leukocytosis    Acidosis    Thrombocytopenia (HCC)    Primary hypertension    Necrotizing soft tissue infection of left foot    Osteomyelitis of fifth toe of left foot (HCC)    ABLA (acute blood loss anemia)    SIADH (syndrome of inappropriate ADH production) ([facility])  Resolved Problems:    * No resolved hospital problems. *      Plan:  Blood cultures, bone cultures, and wound cultures growing Serratia marcescens  ID following, appreciate recommendations, on ertapenem  Podiatry following, appreciate recommendations, s/p incision and drainage of the left ankle abscess along with left foot open fasciotomy and left fifth metatarsal bone biopsy on 4/11/2024, wound vac applied 4/14/2024  Follow surgical cultures  Continue other home medications as able  Hyponatremia improving with better pain control and fluid restriction  Hgb 7.7 this morning, continue to monitor, transfuse if Hgb < 7  Discussed with nursing staff to not hold opioids based on blood pressure  Her pain is now better controlled, Norco Q6H PRN and Dilaudid for breakthrough pain    Code Status: Full code  DVT/GI ppx: Lovenox/Protonix  Dispo: Continue care    Time spent reviewing chart, clinical exam, discussing case and answering questions with staff/consultants/patient/family = 35 min    NOTE: This report was transcribed using voice recognition software. Every effort was made to ensure accuracy; however, inadvertent computerized transcription errors may be present.  Electronically signed by Eliel Tinajero MD on 4/15/2024 at 12:48 PM

## 2024-04-15 NOTE — PLAN OF CARE
Problem: Discharge Planning  Goal: Discharge to home or other facility with appropriate resources  4/15/2024 1847 by Russel Jean RN  Outcome: Progressing  4/15/2024 1004 by aRfia Coto RN  Outcome: Progressing     Problem: Pain  Goal: Verbalizes/displays adequate comfort level or baseline comfort level  4/15/2024 1847 by Russel Jean RN  Outcome: Progressing  4/15/2024 1004 by Rafia Coto RN  Outcome: Progressing     Problem: Safety - Adult  Goal: Free from fall injury  4/15/2024 1847 by Russel Jean RN  Outcome: Progressing  4/15/2024 1004 by Rafia Coto RN  Outcome: Progressing     Problem: Skin/Tissue Integrity  Goal: Absence of new skin breakdown  Description: 1.  Monitor for areas of redness and/or skin breakdown  2.  Assess vascular access sites hourly  3.  Every 4-6 hours minimum:  Change oxygen saturation probe site  4.  Every 4-6 hours:  If on nasal continuous positive airway pressure, respiratory therapy assess nares and determine need for appliance change or resting period.  Outcome: Progressing     Problem: ABCDS Injury Assessment  Goal: Absence of physical injury  Outcome: Progressing

## 2024-04-16 ENCOUNTER — ANESTHESIA EVENT (OUTPATIENT)
Dept: OPERATING ROOM | Age: 44
End: 2024-04-16
Payer: COMMERCIAL

## 2024-04-16 LAB
ALBUMIN SERPL-MCNC: 2.1 G/DL (ref 3.5–5.2)
ALP SERPL-CCNC: 218 U/L (ref 35–104)
ALT SERPL-CCNC: 18 U/L (ref 0–32)
ANION GAP SERPL CALCULATED.3IONS-SCNC: 6 MMOL/L (ref 7–16)
AST SERPL-CCNC: 46 U/L (ref 0–31)
BASOPHILS # BLD: 0.06 K/UL (ref 0–0.2)
BASOPHILS NFR BLD: 1 % (ref 0–2)
BILIRUB SERPL-MCNC: 3.9 MG/DL (ref 0–1.2)
BUN SERPL-MCNC: 15 MG/DL (ref 6–20)
CALCIUM SERPL-MCNC: 8.1 MG/DL (ref 8.6–10.2)
CHLORIDE SERPL-SCNC: 110 MMOL/L (ref 98–107)
CO2 SERPL-SCNC: 17 MMOL/L (ref 22–29)
CREAT SERPL-MCNC: 0.7 MG/DL (ref 0.5–1)
EOSINOPHIL # BLD: 0.31 K/UL (ref 0.05–0.5)
EOSINOPHILS RELATIVE PERCENT: 4 % (ref 0–6)
ERYTHROCYTE [DISTWIDTH] IN BLOOD BY AUTOMATED COUNT: 18.3 % (ref 11.5–15)
GFR SERPL CREATININE-BSD FRML MDRD: >90 ML/MIN/1.73M2
GLUCOSE SERPL-MCNC: 86 MG/DL (ref 74–99)
HCT VFR BLD AUTO: 23.6 % (ref 34–48)
HGB BLD-MCNC: 7.6 G/DL (ref 11.5–15.5)
IMM GRANULOCYTES # BLD AUTO: 0.05 K/UL (ref 0–0.58)
IMM GRANULOCYTES NFR BLD: 1 % (ref 0–5)
LYMPHOCYTES NFR BLD: 2.08 K/UL (ref 1.5–4)
LYMPHOCYTES RELATIVE PERCENT: 23 % (ref 20–42)
MCH RBC QN AUTO: 30.3 PG (ref 26–35)
MCHC RBC AUTO-ENTMCNC: 32.2 G/DL (ref 32–34.5)
MCV RBC AUTO: 94 FL (ref 80–99.9)
MICROORGANISM SPEC CULT: NORMAL
MICROORGANISM SPEC CULT: NORMAL
MONOCYTES NFR BLD: 0.96 K/UL (ref 0.1–0.95)
MONOCYTES NFR BLD: 11 % (ref 2–12)
NEUTROPHILS NFR BLD: 61 % (ref 43–80)
NEUTS SEG NFR BLD: 5.42 K/UL (ref 1.8–7.3)
PLATELET CONFIRMATION: NORMAL
PLATELET, FLUORESCENCE: 96 K/UL (ref 130–450)
PMV BLD AUTO: 11.5 FL (ref 7–12)
POTASSIUM SERPL-SCNC: 5 MMOL/L (ref 3.5–5)
PROT SERPL-MCNC: 5.5 G/DL (ref 6.4–8.3)
RBC # BLD AUTO: 2.51 M/UL (ref 3.5–5.5)
SERVICE CMNT-IMP: NORMAL
SERVICE CMNT-IMP: NORMAL
SODIUM SERPL-SCNC: 133 MMOL/L (ref 132–146)
SPECIMEN DESCRIPTION: NORMAL
SPECIMEN DESCRIPTION: NORMAL
WBC OTHER # BLD: 8.9 K/UL (ref 4.5–11.5)

## 2024-04-16 PROCEDURE — 6370000000 HC RX 637 (ALT 250 FOR IP): Performed by: INTERNAL MEDICINE

## 2024-04-16 PROCEDURE — 2580000003 HC RX 258: Performed by: STUDENT IN AN ORGANIZED HEALTH CARE EDUCATION/TRAINING PROGRAM

## 2024-04-16 PROCEDURE — 99232 SBSQ HOSP IP/OBS MODERATE 35: CPT | Performed by: STUDENT IN AN ORGANIZED HEALTH CARE EDUCATION/TRAINING PROGRAM

## 2024-04-16 PROCEDURE — 6360000002 HC RX W HCPCS: Performed by: STUDENT IN AN ORGANIZED HEALTH CARE EDUCATION/TRAINING PROGRAM

## 2024-04-16 PROCEDURE — 80053 COMPREHEN METABOLIC PANEL: CPT

## 2024-04-16 PROCEDURE — A4216 STERILE WATER/SALINE, 10 ML: HCPCS | Performed by: STUDENT IN AN ORGANIZED HEALTH CARE EDUCATION/TRAINING PROGRAM

## 2024-04-16 PROCEDURE — 1200000000 HC SEMI PRIVATE

## 2024-04-16 PROCEDURE — 85025 COMPLETE CBC W/AUTO DIFF WBC: CPT

## 2024-04-16 RX ORDER — HYDROCODONE BITARTRATE AND ACETAMINOPHEN 5; 325 MG/1; MG/1
2 TABLET ORAL EVERY 6 HOURS PRN
Qty: 40 TABLET | Refills: 0 | Status: CANCELLED | DISCHARGE
Start: 2024-04-16 | End: 2024-04-21

## 2024-04-16 RX ADMIN — SODIUM CHLORIDE, PRESERVATIVE FREE 10 ML: 5 INJECTION INTRAVENOUS at 20:30

## 2024-04-16 RX ADMIN — HYDROMORPHONE HYDROCHLORIDE 1.5 MG: 2 INJECTION, SOLUTION INTRAMUSCULAR; INTRAVENOUS; SUBCUTANEOUS at 20:29

## 2024-04-16 RX ADMIN — HYDROMORPHONE HYDROCHLORIDE 1.5 MG: 2 INJECTION, SOLUTION INTRAMUSCULAR; INTRAVENOUS; SUBCUTANEOUS at 10:50

## 2024-04-16 RX ADMIN — SODIUM CHLORIDE, PRESERVATIVE FREE 10 ML: 5 INJECTION INTRAVENOUS at 08:11

## 2024-04-16 RX ADMIN — Medication 300 UNITS: at 20:31

## 2024-04-16 RX ADMIN — HYDROCODONE BITARTRATE AND ACETAMINOPHEN 1 TABLET: 5; 325 TABLET ORAL at 03:40

## 2024-04-16 RX ADMIN — SODIUM CHLORIDE, PRESERVATIVE FREE 1000 MG: 5 INJECTION INTRAVENOUS at 11:53

## 2024-04-16 RX ADMIN — HYDROMORPHONE HYDROCHLORIDE 1.5 MG: 2 INJECTION, SOLUTION INTRAMUSCULAR; INTRAVENOUS; SUBCUTANEOUS at 05:46

## 2024-04-16 RX ADMIN — PANTOPRAZOLE SODIUM 40 MG: 40 TABLET, DELAYED RELEASE ORAL at 15:56

## 2024-04-16 RX ADMIN — HYDROCODONE BITARTRATE AND ACETAMINOPHEN 1 TABLET: 5; 325 TABLET ORAL at 16:00

## 2024-04-16 RX ADMIN — PANTOPRAZOLE SODIUM 40 MG: 40 TABLET, DELAYED RELEASE ORAL at 05:40

## 2024-04-16 RX ADMIN — Medication 300 UNITS: at 08:10

## 2024-04-16 ASSESSMENT — PAIN DESCRIPTION - ORIENTATION
ORIENTATION: RIGHT
ORIENTATION: LEFT

## 2024-04-16 ASSESSMENT — PAIN SCALES - GENERAL
PAINLEVEL_OUTOF10: 9
PAINLEVEL_OUTOF10: 9
PAINLEVEL_OUTOF10: 8
PAINLEVEL_OUTOF10: 8
PAINLEVEL_OUTOF10: 7

## 2024-04-16 ASSESSMENT — PAIN DESCRIPTION - DESCRIPTORS
DESCRIPTORS: BURNING;SHARP
DESCRIPTORS: ACHING
DESCRIPTORS: ACHING;PRESSURE;SQUEEZING
DESCRIPTORS: SHOOTING
DESCRIPTORS: ACHING;DISCOMFORT;PRESSURE

## 2024-04-16 ASSESSMENT — PAIN - FUNCTIONAL ASSESSMENT
PAIN_FUNCTIONAL_ASSESSMENT: PREVENTS OR INTERFERES SOME ACTIVE ACTIVITIES AND ADLS

## 2024-04-16 ASSESSMENT — PAIN DESCRIPTION - LOCATION
LOCATION: FOOT;LEG
LOCATION: FOOT

## 2024-04-16 ASSESSMENT — LIFESTYLE VARIABLES: SMOKING_STATUS: 1

## 2024-04-16 NOTE — PROGRESS NOTES
Infectious Disease  Progress Note  [facility]    Chief Complaint: Left foot infection    Subjective:  lying in bed. In no distress. Denies new complaints. Tolerating antibiotics.     Scheduled Meds:   sodium chloride flush  5-40 mL IntraVENous 2 times per day    heparin flush  3 mL IntraVENous 2 times per day    ertapenem (INVanz) 1,000 mg in sodium chloride (PF) 0.9 % 10 mL IV syringe  1,000 mg IntraVENous Q24H    nicotine  1 patch TransDERmal Daily    [Held by provider] cloNIDine  0.1 mg Oral BID    pantoprazole  40 mg Oral BID AC    enoxaparin  40 mg SubCUTAneous Daily     Continuous Infusions:   sodium chloride      sodium chloride      sodium chloride       PRN Meds:HYDROmorphone **OR** HYDROmorphone, povidone-iodine, white petrolatum, sodium chloride, sodium chloride flush, sodium chloride, heparin flush, povidone-iodine, promethazine **OR** [DISCONTINUED] ondansetron, sodium chloride, sodium chloride flush, ondansetron **OR** ondansetron, polyethylene glycol, acetaminophen **OR** acetaminophen, HYDROcodone 5 mg - acetaminophen    Prior to Admission medications    Medication Sig Start Date End Date Taking? Authorizing Provider   ertapenem (INVANZ) infusion Infuse 1,000 mg intravenously every 24 hours for 21 days Compound per protocol. 4/15/24 5/6/24 Yes Cedrick Small MD   cloNIDine (CATAPRES) 0.1 MG tablet Take 1 tablet by mouth 2 times daily 2/21/24  Yes Daljit Lester MD   colestipol (COLESTID) 1 g tablet Take 1 tablet by mouth 2 times daily 3/20/24  Yes Daljit Lester MD   furosemide (LASIX) 40 MG tablet Take 1 tablet by mouth daily 2/29/24   Grzegorz Ervin MD   spironolactone (ALDACTONE) 50 MG tablet Take 1 tablet by mouth daily 2/29/24   Grzegorz Ervin MD   pantoprazole (PROTONIX) 40 MG tablet Take 1 tablet by mouth 2 times daily (before meals) 2/18/24   Keena Nuñez MD        ROS:  As mentioned in subjective, all other systems negative      /63   Pulse (!) 107   Temp

## 2024-04-16 NOTE — PROGRESS NOTES
[facility] Hospitalist Progress Note    Admitting Date and Time: 4/9/2024  2:19 PM  Admit Dx: Sepsis (HCC) [A41.9]  Open wound of fifth toe of left foot, initial encounter [S91.105A]  Cirrhosis of liver with ascites, unspecified hepatic cirrhosis type (HCC) [K74.60, R18.8]    Subjective:  Patient is being followed for Sepsis (HCC) [A41.9]  Open wound of fifth toe of left foot, initial encounter [S91.105A]  Cirrhosis of liver with ascites, unspecified hepatic cirrhosis type (HCC) [K74.60, R18.8]   Pt was seen and examined today. Denies any new issues.    ROS: denies fever, chills, cp, sob, n/v, HA unless stated above.     sodium chloride flush  5-40 mL IntraVENous 2 times per day    heparin flush  3 mL IntraVENous 2 times per day    ertapenem (INVanz) 1,000 mg in sodium chloride (PF) 0.9 % 10 mL IV syringe  1,000 mg IntraVENous Q24H    nicotine  1 patch TransDERmal Daily    [Held by provider] cloNIDine  0.1 mg Oral BID    pantoprazole  40 mg Oral BID AC    enoxaparin  40 mg SubCUTAneous Daily     HYDROmorphone, 0.75 mg, Q4H PRN   Or  HYDROmorphone, 1.5 mg, Q4H PRN  povidone-iodine, , PRN  white petrolatum, , BID PRN  sodium chloride, , PRN  sodium chloride flush, 5-40 mL, PRN  sodium chloride, , PRN  heparin flush, 3 mL, PRN  povidone-iodine, , PRN  promethazine, 12.5 mg, Q6H PRN  sodium chloride, , PRN  sodium chloride flush, 10 mL, PRN  ondansetron, 4 mg, Q8H PRN   Or  ondansetron, 4 mg, Q6H PRN  polyethylene glycol, 17 g, Daily PRN  acetaminophen, 650 mg, Q6H PRN   Or  acetaminophen, 650 mg, Q6H PRN  HYDROcodone 5 mg - acetaminophen, 1 tablet, Q6H PRN         Objective:    /63   Pulse (!) 107   Temp 99.6 °F (37.6 °C) (Oral)   Resp 18   Ht 1.524 m (5')   Wt 75.3 kg (166 lb 0.1 oz)   SpO2 99%   BMI 32.42 kg/m²     General Appearance: alert and in no acute distress  Skin: warm and dry  Head: normocephalic and atraumatic  Pulmonary/Chest: clear to auscultation bilaterally- no wheezes, rales or  (acute blood loss anemia)    SIADH (syndrome of inappropriate ADH production) ([facility])  Resolved Problems:    * No resolved hospital problems. *      Plan:  Blood cultures, bone cultures, and wound cultures growing Serratia marcescens  ID following, appreciate recommendations, on ertapenem  Podiatry following, appreciate recommendations, s/p incision and drainage of the left ankle abscess along with left foot open fasciotomy and left fifth metatarsal bone biopsy on 4/11/2024, wound vac applied 4/14/2024, for further surgical intervention tomorrow  Follow surgical cultures  Continue other home medications as able  Hyponatremia improving with better pain control and fluid restriction  Hgb 7.6 this morning, continue to monitor, transfuse if Hgb < 7  Discussed with nursing staff to not hold opioids based on blood pressure  Her pain is now better controlled, Norco Q6H PRN and Dilaudid for breakthrough pain    Code Status: Full code  DVT/GI ppx: Lovenox/Protonix  Dispo: Continue care    Time spent reviewing chart, clinical exam, discussing case and answering questions with staff/consultants/patient/family = 35 min    NOTE: This report was transcribed using voice recognition software. Every effort was made to ensure accuracy; however, inadvertent computerized transcription errors may be present.  Electronically signed by Eliel Tinajero MD on 4/16/2024 at 12:36 PM

## 2024-04-16 NOTE — PLAN OF CARE
Problem: Discharge Planning  Goal: Discharge to home or other facility with appropriate resources  4/15/2024 2309 by Hannah Vega RN  Outcome: Progressing  4/15/2024 1847 by Russel Jean RN  Outcome: Progressing  4/15/2024 1004 by Rafia Coto RN  Outcome: Progressing     Problem: Pain  Goal: Verbalizes/displays adequate comfort level or baseline comfort level  4/15/2024 2309 by Hannah Vega RN  Outcome: Progressing  4/15/2024 1847 by Russel Jean RN  Outcome: Progressing  4/15/2024 1004 by Rafia Coto RN  Outcome: Progressing     Problem: Safety - Adult  Goal: Free from fall injury  4/15/2024 2309 by Hannah Vega RN  Outcome: Progressing  4/15/2024 1847 by Russel Jean RN  Outcome: Progressing  4/15/2024 1004 by Rafia Coto RN  Outcome: Progressing     Problem: Skin/Tissue Integrity  Goal: Absence of new skin breakdown  Description: 1.  Monitor for areas of redness and/or skin breakdown  2.  Assess vascular access sites hourly  3.  Every 4-6 hours minimum:  Change oxygen saturation probe site  4.  Every 4-6 hours:  If on nasal continuous positive airway pressure, respiratory therapy assess nares and determine need for appliance change or resting period.  4/15/2024 2309 by Hannah Vega RN  Outcome: Progressing  4/15/2024 1847 by Russel Jean RN  Outcome: Progressing     Problem: ABCDS Injury Assessment  Goal: Absence of physical injury  4/15/2024 2309 by Hannah Vega RN  Outcome: Progressing  4/15/2024 1847 by Russel Jean RN  Outcome: Progressing

## 2024-04-16 NOTE — PROGRESS NOTES
Comprehensive Nutrition Assessment    Type and Reason for Visit:  Initial, RD Nutrition Re-Screen/LOS    Nutrition Recommendations/Plan:   Continue current diet & monitor  Will add Expedite Cup daily     Malnutrition Assessment:  Malnutrition Status:  At risk for malnutrition (Comment) (04/16/24 5607)    Context:  Chronic Illness     Findings of the 6 clinical characteristics of malnutrition:  Energy Intake:  Mild decrease in energy intake (Comment)  Weight Loss:  Unable to assess (d/t fluid shifts)     Body Fat Loss:  Unable to assess (d/t pt sound asleep at time of room visit)     Muscle Mass Loss:  Unable to assess (d/t pt sound asleep at time of room visit)    Fluid Accumulation:  Unable to assess (d/t multifactorial)     Strength:  Not Performed    Nutrition Assessment:    Pt admits w/ sepsis. Pt w/ left ankle & foot cellulits w/ abscess, necrotizing infection s/p left ankle & foot I&D, fasiotomy 4/11. Hx ETOH abuse, cirrhosis w/ ascites. Will add ONS to promote wound healing & monitor.    Nutrition Related Findings:    A&O X4, I&Os not avail, BLE +2 edema, abd soft/distended, +BS, constipation (prn bowel regimen), elevated LFT's, bili 3.9   Wound Type: Surgical Incision, Wound Vac       Current Nutrition Intake & Therapies:    Average Meal Intake: 26-50%, %  Average Supplements Intake: None Ordered  ADULT DIET; Regular; 1200 ml    Anthropometric Measures:  Height: 152.4 cm (5')  Ideal Body Weight (IBW): 100 lbs (45 kg)    Admission Body Weight: 64.7 kg (142 lb 11.2 oz) (4/9 bed)  Current Body Weight: 75.3 kg (166 lb 0.1 oz) (4/16), 166 % IBW. Weight Source: Bed Scale  Current BMI (kg/m2): 32.4  Usual Body Weight: 65.7 kg (144 lb 13 oz) (2/14/24 actual per EMR)  % Weight Change (Calculated): 14.6                    BMI Categories: Overweight (BMI 25.0-29.9) (admit wt BMI)    Estimated Daily Nutrient Needs:  Energy Requirements Based On: Formula  Weight Used for Energy Requirements: Admission  Energy

## 2024-04-16 NOTE — CARE COORDINATION
Updated plan of care. Discharge plan is rehab at [facility]. [facility] accepting, no precert needed. LOLLY, demos, transport form, 01455 completed and in soft chart. PICC line is placed and abx are reconciled. Podiatry following, plan to take patient back to OR tomorrow.   Electronically signed by Vida Alcantar RN on 4/16/2024 at 1:02 PM

## 2024-04-16 NOTE — PROGRESS NOTES
Podiatry Progress Note  4/16/2024   Jaylene Kapoor       SUBJECTIVE: This is a 44 y.o. female seen bedside s/p Left ankle I&D fasiotomy with bone biopsy DOS: 4/11/24. Patient complains of pain to the right foot and ankle.  She says that the pain is still very bad and not much change from yesterday. Patient has no new pedal complaints.      OBJECTIVE:      Pt is AAOx3    Vitals:    04/16/24 0700   BP: 112/63   Pulse: (!) 107   Resp: 18   Temp: 99.6 °F (37.6 °C)   SpO2:       EXAM:    VASCULAR:  DP and PT pulses are faintly palpable due to edema. CFT < 5 seconds to the dee of all 5 digits.  Warm to warm from the tibial tuberosity to the distal aspect of the digits.     NEUROLOGIC:  Protective sensation is diminished by grossly intact     DERM: There is a large surgical incision noted to the left foot and ankle that extends from the lateral malleolus to the dorsal 5th MTPJ. Wound is partially closed with suture intact. There is moderate sanguinous drainage noted. There is no purulence, crepitus, fluctuance, malodor noted at this time.     MUSCULOSKELETAL: Musculoskeletal exam deferred due to pain           Current Facility-Administered Medications   Medication Dose Route Frequency Provider Last Rate Last Admin    HYDROmorphone (DILAUDID) injection 0.75 mg  0.75 mg IntraVENous Q4H PRN Eliel Tinajero MD   0.75 mg at 04/15/24 0930    Or    HYDROmorphone (DILAUDID) injection 1.5 mg  1.5 mg IntraVENous Q4H PRN Eliel Tinajero MD   1.5 mg at 04/16/24 1050    povidone-iodine (BETADINE) 10 % external solution   Topical PRN Андрей Thibodeaux DPM   Given at 04/13/24 0823    white petrolatum ointment   Topical BID PRN Eliel Tinajero MD   Given at 04/13/24 0837    0.9 % sodium chloride infusion   IntraVENous PRN Eliel Tinajero MD        sodium chloride flush 0.9 % injection 5-40 mL  5-40 mL IntraVENous 2 times per day Cedrick Small MD   10 mL at 04/16/24 0811    sodium chloride flush 0.9 % injection

## 2024-04-17 ENCOUNTER — ANESTHESIA (OUTPATIENT)
Dept: OPERATING ROOM | Age: 44
End: 2024-04-17
Payer: COMMERCIAL

## 2024-04-17 LAB
ALBUMIN SERPL-MCNC: 2 G/DL (ref 3.5–5.2)
ALP SERPL-CCNC: 232 U/L (ref 35–104)
ALT SERPL-CCNC: 17 U/L (ref 0–32)
ANION GAP SERPL CALCULATED.3IONS-SCNC: 8 MMOL/L (ref 7–16)
AST SERPL-CCNC: 46 U/L (ref 0–31)
BILIRUB SERPL-MCNC: 3.3 MG/DL (ref 0–1.2)
BUN SERPL-MCNC: 16 MG/DL (ref 6–20)
CALCIUM SERPL-MCNC: 7.9 MG/DL (ref 8.6–10.2)
CHLORIDE SERPL-SCNC: 110 MMOL/L (ref 98–107)
CO2 SERPL-SCNC: 17 MMOL/L (ref 22–29)
CREAT SERPL-MCNC: 0.8 MG/DL (ref 0.5–1)
GFR SERPL CREATININE-BSD FRML MDRD: 88 ML/MIN/1.73M2
GLUCOSE SERPL-MCNC: 121 MG/DL (ref 74–99)
HCT VFR BLD AUTO: 23.9 % (ref 34–48)
HGB BLD-MCNC: 7.8 G/DL (ref 11.5–15.5)
POTASSIUM SERPL-SCNC: 5.2 MMOL/L (ref 3.5–5)
PROT SERPL-MCNC: 5.3 G/DL (ref 6.4–8.3)
SODIUM SERPL-SCNC: 135 MMOL/L (ref 132–146)
SURGICAL PATHOLOGY REPORT: NORMAL

## 2024-04-17 PROCEDURE — 3600000002 HC SURGERY LEVEL 2 BASE: Performed by: PODIATRIST

## 2024-04-17 PROCEDURE — 3600000012 HC SURGERY LEVEL 2 ADDTL 15MIN: Performed by: PODIATRIST

## 2024-04-17 PROCEDURE — 2580000003 HC RX 258

## 2024-04-17 PROCEDURE — 2500000003 HC RX 250 WO HCPCS: Performed by: PODIATRIST

## 2024-04-17 PROCEDURE — 1200000000 HC SEMI PRIVATE

## 2024-04-17 PROCEDURE — 3700000000 HC ANESTHESIA ATTENDED CARE: Performed by: PODIATRIST

## 2024-04-17 PROCEDURE — 6360000002 HC RX W HCPCS: Performed by: PODIATRIST

## 2024-04-17 PROCEDURE — 6370000000 HC RX 637 (ALT 250 FOR IP)

## 2024-04-17 PROCEDURE — 6360000002 HC RX W HCPCS

## 2024-04-17 PROCEDURE — 6360000002 HC RX W HCPCS: Performed by: NURSE ANESTHETIST, CERTIFIED REGISTERED

## 2024-04-17 PROCEDURE — 99232 SBSQ HOSP IP/OBS MODERATE 35: CPT | Performed by: STUDENT IN AN ORGANIZED HEALTH CARE EDUCATION/TRAINING PROGRAM

## 2024-04-17 PROCEDURE — 0KBW0ZZ EXCISION OF LEFT FOOT MUSCLE, OPEN APPROACH: ICD-10-PCS | Performed by: PODIATRIST

## 2024-04-17 PROCEDURE — A4216 STERILE WATER/SALINE, 10 ML: HCPCS

## 2024-04-17 PROCEDURE — 85018 HEMOGLOBIN: CPT

## 2024-04-17 PROCEDURE — 6370000000 HC RX 637 (ALT 250 FOR IP): Performed by: STUDENT IN AN ORGANIZED HEALTH CARE EDUCATION/TRAINING PROGRAM

## 2024-04-17 PROCEDURE — 6360000002 HC RX W HCPCS: Performed by: STUDENT IN AN ORGANIZED HEALTH CARE EDUCATION/TRAINING PROGRAM

## 2024-04-17 PROCEDURE — 3700000001 HC ADD 15 MINUTES (ANESTHESIA): Performed by: PODIATRIST

## 2024-04-17 PROCEDURE — 7100000010 HC PHASE II RECOVERY - FIRST 15 MIN: Performed by: PODIATRIST

## 2024-04-17 PROCEDURE — 80053 COMPREHEN METABOLIC PANEL: CPT

## 2024-04-17 PROCEDURE — C9363 INTEGRA MESHED BIL WOUND MAT: HCPCS | Performed by: PODIATRIST

## 2024-04-17 PROCEDURE — 2580000003 HC RX 258: Performed by: NURSE ANESTHETIST, CERTIFIED REGISTERED

## 2024-04-17 PROCEDURE — 2709999900 HC NON-CHARGEABLE SUPPLY: Performed by: PODIATRIST

## 2024-04-17 PROCEDURE — 7100000011 HC PHASE II RECOVERY - ADDTL 15 MIN: Performed by: PODIATRIST

## 2024-04-17 PROCEDURE — 2500000003 HC RX 250 WO HCPCS: Performed by: NURSE ANESTHETIST, CERTIFIED REGISTERED

## 2024-04-17 PROCEDURE — 85014 HEMATOCRIT: CPT

## 2024-04-17 DEVICE — INTEGRA® MESHED BILAYER WOUND MATRIX 4 IN*5 IN (10 CM*12.5 CM)
Type: IMPLANTABLE DEVICE | Site: FOOT | Status: FUNCTIONAL
Brand: INTEGRA®

## 2024-04-17 RX ORDER — NALOXONE HYDROCHLORIDE 0.4 MG/ML
INJECTION, SOLUTION INTRAMUSCULAR; INTRAVENOUS; SUBCUTANEOUS PRN
Status: DISCONTINUED | OUTPATIENT
Start: 2024-04-17 | End: 2024-04-17 | Stop reason: HOSPADM

## 2024-04-17 RX ORDER — KETAMINE HYDROCHLORIDE 10 MG/ML
INJECTION, SOLUTION INTRAMUSCULAR; INTRAVENOUS PRN
Status: DISCONTINUED | OUTPATIENT
Start: 2024-04-17 | End: 2024-04-17 | Stop reason: SDUPTHER

## 2024-04-17 RX ORDER — FENTANYL CITRATE 50 UG/ML
INJECTION, SOLUTION INTRAMUSCULAR; INTRAVENOUS PRN
Status: DISCONTINUED | OUTPATIENT
Start: 2024-04-17 | End: 2024-04-17 | Stop reason: SDUPTHER

## 2024-04-17 RX ORDER — FUROSEMIDE 20 MG/1
20 TABLET ORAL DAILY
Status: DISCONTINUED | OUTPATIENT
Start: 2024-04-17 | End: 2024-04-19 | Stop reason: HOSPADM

## 2024-04-17 RX ORDER — SPIRONOLACTONE 25 MG/1
25 TABLET ORAL DAILY
Status: DISCONTINUED | OUTPATIENT
Start: 2024-04-17 | End: 2024-04-19 | Stop reason: HOSPADM

## 2024-04-17 RX ORDER — IPRATROPIUM BROMIDE AND ALBUTEROL SULFATE 2.5; .5 MG/3ML; MG/3ML
1 SOLUTION RESPIRATORY (INHALATION)
Status: DISCONTINUED | OUTPATIENT
Start: 2024-04-17 | End: 2024-04-17 | Stop reason: HOSPADM

## 2024-04-17 RX ORDER — SODIUM CHLORIDE 0.9 % (FLUSH) 0.9 %
5-40 SYRINGE (ML) INJECTION EVERY 12 HOURS SCHEDULED
Status: DISCONTINUED | OUTPATIENT
Start: 2024-04-17 | End: 2024-04-17 | Stop reason: HOSPADM

## 2024-04-17 RX ORDER — MEPERIDINE HYDROCHLORIDE 25 MG/ML
12.5 INJECTION INTRAMUSCULAR; INTRAVENOUS; SUBCUTANEOUS EVERY 5 MIN PRN
Status: DISCONTINUED | OUTPATIENT
Start: 2024-04-17 | End: 2024-04-17 | Stop reason: HOSPADM

## 2024-04-17 RX ORDER — LABETALOL HYDROCHLORIDE 5 MG/ML
10 INJECTION, SOLUTION INTRAVENOUS
Status: DISCONTINUED | OUTPATIENT
Start: 2024-04-17 | End: 2024-04-17 | Stop reason: HOSPADM

## 2024-04-17 RX ORDER — SODIUM CHLORIDE 9 MG/ML
INJECTION, SOLUTION INTRAVENOUS PRN
Status: DISCONTINUED | OUTPATIENT
Start: 2024-04-17 | End: 2024-04-17 | Stop reason: HOSPADM

## 2024-04-17 RX ORDER — MIDAZOLAM HYDROCHLORIDE 2 MG/2ML
2 INJECTION, SOLUTION INTRAMUSCULAR; INTRAVENOUS
Status: DISCONTINUED | OUTPATIENT
Start: 2024-04-17 | End: 2024-04-17 | Stop reason: HOSPADM

## 2024-04-17 RX ORDER — PROPOFOL 10 MG/ML
INJECTION, EMULSION INTRAVENOUS CONTINUOUS PRN
Status: DISCONTINUED | OUTPATIENT
Start: 2024-04-17 | End: 2024-04-17 | Stop reason: SDUPTHER

## 2024-04-17 RX ORDER — HYDRALAZINE HYDROCHLORIDE 20 MG/ML
10 INJECTION INTRAMUSCULAR; INTRAVENOUS
Status: DISCONTINUED | OUTPATIENT
Start: 2024-04-17 | End: 2024-04-17 | Stop reason: HOSPADM

## 2024-04-17 RX ORDER — SODIUM CHLORIDE 9 MG/ML
INJECTION, SOLUTION INTRAVENOUS CONTINUOUS PRN
Status: DISCONTINUED | OUTPATIENT
Start: 2024-04-17 | End: 2024-04-17 | Stop reason: SDUPTHER

## 2024-04-17 RX ORDER — SODIUM CHLORIDE 0.9 % (FLUSH) 0.9 %
5-40 SYRINGE (ML) INJECTION PRN
Status: DISCONTINUED | OUTPATIENT
Start: 2024-04-17 | End: 2024-04-17 | Stop reason: HOSPADM

## 2024-04-17 RX ORDER — ONDANSETRON 2 MG/ML
4 INJECTION INTRAMUSCULAR; INTRAVENOUS
Status: DISCONTINUED | OUTPATIENT
Start: 2024-04-17 | End: 2024-04-17 | Stop reason: HOSPADM

## 2024-04-17 RX ADMIN — PANTOPRAZOLE SODIUM 40 MG: 40 TABLET, DELAYED RELEASE ORAL at 17:57

## 2024-04-17 RX ADMIN — SODIUM CHLORIDE, PRESERVATIVE FREE 10 ML: 5 INJECTION INTRAVENOUS at 21:27

## 2024-04-17 RX ADMIN — SODIUM CHLORIDE, PRESERVATIVE FREE 10 ML: 5 INJECTION INTRAVENOUS at 08:56

## 2024-04-17 RX ADMIN — HYDROMORPHONE HYDROCHLORIDE 1.5 MG: 2 INJECTION, SOLUTION INTRAMUSCULAR; INTRAVENOUS; SUBCUTANEOUS at 21:26

## 2024-04-17 RX ADMIN — SODIUM CHLORIDE, PRESERVATIVE FREE 1000 MG: 5 INJECTION INTRAVENOUS at 12:47

## 2024-04-17 RX ADMIN — Medication 300 UNITS: at 08:55

## 2024-04-17 RX ADMIN — PROPOFOL 100 MCG/KG/MIN: 10 INJECTION, EMULSION INTRAVENOUS at 07:00

## 2024-04-17 RX ADMIN — Medication 300 UNITS: at 21:27

## 2024-04-17 RX ADMIN — FENTANYL CITRATE 50 MCG: 50 INJECTION, SOLUTION INTRAMUSCULAR; INTRAVENOUS at 07:00

## 2024-04-17 RX ADMIN — SODIUM CHLORIDE, PRESERVATIVE FREE 10 ML: 5 INJECTION INTRAVENOUS at 12:55

## 2024-04-17 RX ADMIN — KETAMINE HYDROCHLORIDE 25 MG: 10 INJECTION INTRAMUSCULAR; INTRAVENOUS at 07:00

## 2024-04-17 RX ADMIN — SPIRONOLACTONE 25 MG: 25 TABLET ORAL at 12:47

## 2024-04-17 RX ADMIN — HYDROMORPHONE HYDROCHLORIDE 1.5 MG: 2 INJECTION, SOLUTION INTRAMUSCULAR; INTRAVENOUS; SUBCUTANEOUS at 05:30

## 2024-04-17 RX ADMIN — SODIUM CHLORIDE: 9 INJECTION, SOLUTION INTRAVENOUS at 06:54

## 2024-04-17 RX ADMIN — HYDROCODONE BITARTRATE AND ACETAMINOPHEN 1 TABLET: 5; 325 TABLET ORAL at 08:54

## 2024-04-17 RX ADMIN — SODIUM ZIRCONIUM CYCLOSILICATE 10 G: 10 POWDER, FOR SUSPENSION ORAL at 12:47

## 2024-04-17 RX ADMIN — FUROSEMIDE 20 MG: 20 TABLET ORAL at 12:47

## 2024-04-17 RX ADMIN — HYDROCODONE BITARTRATE AND ACETAMINOPHEN 1 TABLET: 5; 325 TABLET ORAL at 17:57

## 2024-04-17 ASSESSMENT — PAIN DESCRIPTION - LOCATION
LOCATION: FOOT

## 2024-04-17 ASSESSMENT — PAIN - FUNCTIONAL ASSESSMENT
PAIN_FUNCTIONAL_ASSESSMENT: 0-10
PAIN_FUNCTIONAL_ASSESSMENT: PREVENTS OR INTERFERES SOME ACTIVE ACTIVITIES AND ADLS
PAIN_FUNCTIONAL_ASSESSMENT: PREVENTS OR INTERFERES SOME ACTIVE ACTIVITIES AND ADLS

## 2024-04-17 ASSESSMENT — PAIN DESCRIPTION - DESCRIPTORS
DESCRIPTORS: THROBBING;SHARP
DESCRIPTORS: ACHING
DESCRIPTORS: THROBBING

## 2024-04-17 ASSESSMENT — PAIN SCALES - GENERAL
PAINLEVEL_OUTOF10: 10
PAINLEVEL_OUTOF10: 9
PAINLEVEL_OUTOF10: 8
PAINLEVEL_OUTOF10: 8

## 2024-04-17 ASSESSMENT — PAIN DESCRIPTION - ORIENTATION
ORIENTATION: LEFT

## 2024-04-17 NOTE — PROGRESS NOTES
Infectious Disease  Progress Note  [facility]    Chief Complaint: Left foot infection    Subjective:  lying in bed. In no distress. Denies new complaints. Tolerating antibiotics.     Scheduled Meds:   furosemide  20 mg Oral Daily    spironolactone  25 mg Oral Daily    sodium chloride flush  5-40 mL IntraVENous 2 times per day    heparin flush  3 mL IntraVENous 2 times per day    ertapenem (INVanz) 1,000 mg in sodium chloride (PF) 0.9 % 10 mL IV syringe  1,000 mg IntraVENous Q24H    nicotine  1 patch TransDERmal Daily    [Held by provider] cloNIDine  0.1 mg Oral BID    pantoprazole  40 mg Oral BID AC    [Held by provider] enoxaparin  40 mg SubCUTAneous Daily     Continuous Infusions:   sodium chloride      sodium chloride      sodium chloride       PRN Meds:HYDROmorphone **OR** HYDROmorphone, povidone-iodine, white petrolatum, sodium chloride, sodium chloride flush, sodium chloride, heparin flush, povidone-iodine, promethazine **OR** [DISCONTINUED] ondansetron, sodium chloride, sodium chloride flush, ondansetron **OR** ondansetron, polyethylene glycol, acetaminophen **OR** acetaminophen, HYDROcodone 5 mg - acetaminophen    Prior to Admission medications    Medication Sig Start Date End Date Taking? Authorizing Provider   ertapenem (INVANZ) infusion Infuse 1,000 mg intravenously every 24 hours for 21 days Compound per protocol. 4/15/24 5/6/24 Yes Cedrick Small MD   cloNIDine (CATAPRES) 0.1 MG tablet Take 1 tablet by mouth 2 times daily 2/21/24  Yes ProviderDaljit MD   colestipol (COLESTID) 1 g tablet Take 1 tablet by mouth 2 times daily 3/20/24  Yes Daljit Lester MD   furosemide (LASIX) 40 MG tablet Take 1 tablet by mouth daily 2/29/24   Grzegorz Ervin MD   spironolactone (ALDACTONE) 50 MG tablet Take 1 tablet by mouth daily 2/29/24   Grzegorz Ervin MD   pantoprazole (PROTONIX) 40 MG tablet Take 1 tablet by mouth 2 times daily (before meals) 2/18/24   Keena Nuñez MD        ROS:  As

## 2024-04-17 NOTE — BRIEF OP NOTE
Brief Postoperative Note      Patient: Jaylene Kapoor  YOB: 1980  MRN: [id number]    Date of Procedure: 4/17/2024    Pre-Op Diagnosis Codes:     * Infection of left foot [L08.9]    Post-Op Diagnosis: Same       Procedure(s):  LEFT FOOT INCISION AND DRAINAGE POSSIBLE WOUND VAC APPLICATION    Surgeon(s):  Florentin Grover DPM    Assistant:  Resident: Bisi Renteria DPM    Anesthesia: Monitor Anesthesia Care    Estimated Blood Loss (mL): Minimal    Complications: None    Specimens:   * No specimens in log *    Implants:  * No implants in log *      Drains:   Negative Pressure Wound Therapy Foot Left (Active)   Dressing Type Black Foam 04/16/24 2030   Target Pressure (mmHg) 125 04/16/24 2030   Dressing Status Clean, dry & intact 04/16/24 2030       Findings:  Infection Present At Time Of Surgery (PATOS) (choose all levels that have infection present):  No infection present  Other Findings: necrosis of soft tissue    Electronically signed by Florentin Grover DPM on 4/17/2024 at 7:18 AM

## 2024-04-17 NOTE — PROGRESS NOTES
Sent patient's blood drawn from PICC line. Lab called and said it was clotted. Redrawn and sent again. Lab said they are unable to run it, their machine is rejecting it. Patient already left for surgery. Notified Pre-op that it was not done. Said they will attempt to get it down there.

## 2024-04-17 NOTE — ANESTHESIA PRE PROCEDURE
Physical exam updated.  No interval changes to history or physical examination (unless noted above).      NPO status confirmed.     Anesthetic plan, risks, benefits, alternatives discussed with patient.  Patient verbalized an understanding and agrees to proceed.     Honey Stock, DO

## 2024-04-17 NOTE — ANESTHESIA POSTPROCEDURE EVALUATION
Department of Anesthesiology  Postprocedure Note    Patient: Jaylene Kapoor  MRN: [id number]  YOB: 1980  Date of evaluation: 4/17/2024    Procedure Summary       Date: 04/17/24 Room / Location: [address]    Anesthesia Start: 0654 Anesthesia Stop: 0728    Procedure: LEFT FOOT INCISION AND DRAINAGE (Left) Diagnosis:       Infection of left foot      (Infection of left foot [L08.9])    Surgeons: Florentin Grover DPM Responsible Provider: Honey Stock DO    Anesthesia Type: MAC ASA Status: 4            Anesthesia Type: MAC    Miguel Phase I: Miguel Score: 10    Miguel Phase II: Miguel Score: 10    Anesthesia Post Evaluation    Patient location during evaluation: PACU  Patient participation: complete - patient participated  Level of consciousness: sleepy but conscious  Pain score: 0  Airway patency: patent  Nausea & Vomiting: no vomiting and no nausea  Cardiovascular status: blood pressure returned to baseline  Respiratory status: room air  Hydration status: euvolemic  Pain management: adequate        No notable events documented.

## 2024-04-17 NOTE — PROGRESS NOTES
[facility] Hospitalist Progress Note    Admitting Date and Time: 4/9/2024  2:19 PM  Admit Dx: Sepsis (HCC) [A41.9]  Open wound of fifth toe of left foot, initial encounter [S91.105A]  Cirrhosis of liver with ascites, unspecified hepatic cirrhosis type (HCC) [K74.60, R18.8]    Subjective:  Patient is being followed for Sepsis (HCC) [A41.9]  Open wound of fifth toe of left foot, initial encounter [S91.105A]  Cirrhosis of liver with ascites, unspecified hepatic cirrhosis type (HCC) [K74.60, R18.8]   Pt was seen and examined today. Denies any new issues.    ROS: denies fever, chills, cp, sob, n/v, HA unless stated above.     sodium zirconium cyclosilicate  10 g Oral Once    furosemide  20 mg Oral Daily    spironolactone  25 mg Oral Daily    sodium chloride flush  5-40 mL IntraVENous 2 times per day    heparin flush  3 mL IntraVENous 2 times per day    ertapenem (INVanz) 1,000 mg in sodium chloride (PF) 0.9 % 10 mL IV syringe  1,000 mg IntraVENous Q24H    nicotine  1 patch TransDERmal Daily    [Held by provider] cloNIDine  0.1 mg Oral BID    pantoprazole  40 mg Oral BID AC    [Held by provider] enoxaparin  40 mg SubCUTAneous Daily     HYDROmorphone, 0.75 mg, Q4H PRN   Or  HYDROmorphone, 1.5 mg, Q4H PRN  povidone-iodine, , PRN  white petrolatum, , BID PRN  sodium chloride, , PRN  sodium chloride flush, 5-40 mL, PRN  sodium chloride, , PRN  heparin flush, 3 mL, PRN  povidone-iodine, , PRN  promethazine, 12.5 mg, Q6H PRN  sodium chloride, , PRN  sodium chloride flush, 10 mL, PRN  ondansetron, 4 mg, Q8H PRN   Or  ondansetron, 4 mg, Q6H PRN  polyethylene glycol, 17 g, Daily PRN  acetaminophen, 650 mg, Q6H PRN   Or  acetaminophen, 650 mg, Q6H PRN  HYDROcodone 5 mg - acetaminophen, 1 tablet, Q6H PRN         Objective:    /63   Pulse (!) 116   Temp 98.3 °F (36.8 °C) (Oral)   Resp 18   Ht 1.524 m (5')   Wt 75.3 kg (166 lb 0.1 oz)   SpO2 99%   BMI 32.42 kg/m²     General Appearance: alert and in no acute

## 2024-04-17 NOTE — CARE COORDINATION
Updated plan of care. Discharge plan is rehab at [place]. [place] accepting, no precert needed. LOLLY, demos, transport form, [id number] completed and in soft chart. PICC line is placed and abx are reconciled. Podiatry following, took patient back to OR today for further debridement. Wound vac not replaced but will be possibly placed again tomorrow.   Electronically signed by Vida Alcantar RN on 4/17/2024 at 9:17 AM

## 2024-04-18 PROBLEM — M86.9 OSTEOMYELITIS (HCC): Status: ACTIVE | Noted: 2024-04-18

## 2024-04-18 PROBLEM — L03.90 CELLULITIS: Status: ACTIVE | Noted: 2024-04-18

## 2024-04-18 PROBLEM — D62 ANEMIA ASSOCIATED WITH ACUTE BLOOD LOSS: Status: ACTIVE | Noted: 2024-04-18

## 2024-04-18 LAB
ALBUMIN SERPL-MCNC: 2 G/DL (ref 3.5–5.2)
ALP SERPL-CCNC: 214 U/L (ref 35–104)
ALT SERPL-CCNC: 16 U/L (ref 0–32)
ANION GAP SERPL CALCULATED.3IONS-SCNC: 6 MMOL/L (ref 7–16)
AST SERPL-CCNC: 40 U/L (ref 0–31)
BASOPHILS # BLD: 0.06 K/UL (ref 0–0.2)
BASOPHILS NFR BLD: 1 % (ref 0–2)
BILIRUB SERPL-MCNC: 3.1 MG/DL (ref 0–1.2)
BUN SERPL-MCNC: 18 MG/DL (ref 6–20)
CALCIUM SERPL-MCNC: 7.8 MG/DL (ref 8.6–10.2)
CHLORIDE SERPL-SCNC: 113 MMOL/L (ref 98–107)
CO2 SERPL-SCNC: 18 MMOL/L (ref 22–29)
CREAT SERPL-MCNC: 1 MG/DL (ref 0.5–1)
EOSINOPHIL # BLD: 0.31 K/UL (ref 0.05–0.5)
EOSINOPHILS RELATIVE PERCENT: 3 % (ref 0–6)
ERYTHROCYTE [DISTWIDTH] IN BLOOD BY AUTOMATED COUNT: 18.2 % (ref 11.5–15)
GFR SERPL CREATININE-BSD FRML MDRD: 73 ML/MIN/1.73M2
GLUCOSE SERPL-MCNC: 105 MG/DL (ref 74–99)
HCT VFR BLD AUTO: 20.5 % (ref 34–48)
HCT VFR BLD AUTO: 24 % (ref 34–48)
HGB BLD-MCNC: 6.7 G/DL (ref 11.5–15.5)
HGB BLD-MCNC: 7.7 G/DL (ref 11.5–15.5)
IMM GRANULOCYTES # BLD AUTO: 0.05 K/UL (ref 0–0.58)
IMM GRANULOCYTES NFR BLD: 1 % (ref 0–5)
LYMPHOCYTES NFR BLD: 2 K/UL (ref 1.5–4)
LYMPHOCYTES RELATIVE PERCENT: 22 % (ref 20–42)
MCH RBC QN AUTO: 31.3 PG (ref 26–35)
MCHC RBC AUTO-ENTMCNC: 32.4 G/DL (ref 32–34.5)
MCV RBC AUTO: 96.7 FL (ref 80–99.9)
MONOCYTES NFR BLD: 0.91 K/UL (ref 0.1–0.95)
MONOCYTES NFR BLD: 10 % (ref 2–12)
NEUTROPHILS NFR BLD: 64 % (ref 43–80)
NEUTS SEG NFR BLD: 5.88 K/UL (ref 1.8–7.3)
PLATELET CONFIRMATION: NORMAL
PLATELET, FLUORESCENCE: 108 K/UL (ref 130–450)
PMV BLD AUTO: 11.8 FL (ref 7–12)
POTASSIUM SERPL-SCNC: 4.8 MMOL/L (ref 3.5–5)
PROT SERPL-MCNC: 5.2 G/DL (ref 6.4–8.3)
RBC # BLD AUTO: 2.14 M/UL (ref 3.5–5.5)
SODIUM SERPL-SCNC: 137 MMOL/L (ref 132–146)
WBC OTHER # BLD: 9.2 K/UL (ref 4.5–11.5)

## 2024-04-18 PROCEDURE — 85018 HEMOGLOBIN: CPT

## 2024-04-18 PROCEDURE — 80053 COMPREHEN METABOLIC PANEL: CPT

## 2024-04-18 PROCEDURE — 6370000000 HC RX 637 (ALT 250 FOR IP)

## 2024-04-18 PROCEDURE — 86923 COMPATIBILITY TEST ELECTRIC: CPT

## 2024-04-18 PROCEDURE — 2580000003 HC RX 258

## 2024-04-18 PROCEDURE — A4216 STERILE WATER/SALINE, 10 ML: HCPCS

## 2024-04-18 PROCEDURE — P9016 RBC LEUKOCYTES REDUCED: HCPCS

## 2024-04-18 PROCEDURE — 85014 HEMATOCRIT: CPT

## 2024-04-18 PROCEDURE — 86850 RBC ANTIBODY SCREEN: CPT

## 2024-04-18 PROCEDURE — 85025 COMPLETE CBC W/AUTO DIFF WBC: CPT

## 2024-04-18 PROCEDURE — 6360000002 HC RX W HCPCS

## 2024-04-18 PROCEDURE — 6370000000 HC RX 637 (ALT 250 FOR IP): Performed by: STUDENT IN AN ORGANIZED HEALTH CARE EDUCATION/TRAINING PROGRAM

## 2024-04-18 PROCEDURE — 99232 SBSQ HOSP IP/OBS MODERATE 35: CPT | Performed by: INTERNAL MEDICINE

## 2024-04-18 PROCEDURE — 86901 BLOOD TYPING SEROLOGIC RH(D): CPT

## 2024-04-18 PROCEDURE — 36430 TRANSFUSION BLD/BLD COMPNT: CPT

## 2024-04-18 PROCEDURE — 86900 BLOOD TYPING SEROLOGIC ABO: CPT

## 2024-04-18 PROCEDURE — 1200000000 HC SEMI PRIVATE

## 2024-04-18 RX ORDER — SODIUM CHLORIDE 9 MG/ML
INJECTION, SOLUTION INTRAVENOUS PRN
Status: DISCONTINUED | OUTPATIENT
Start: 2024-04-18 | End: 2024-04-19 | Stop reason: HOSPADM

## 2024-04-18 RX ADMIN — SODIUM CHLORIDE, PRESERVATIVE FREE 1000 MG: 5 INJECTION INTRAVENOUS at 17:22

## 2024-04-18 RX ADMIN — HYDROCODONE BITARTRATE AND ACETAMINOPHEN 1 TABLET: 5; 325 TABLET ORAL at 17:21

## 2024-04-18 RX ADMIN — SODIUM CHLORIDE, PRESERVATIVE FREE 10 ML: 5 INJECTION INTRAVENOUS at 08:25

## 2024-04-18 RX ADMIN — HYDROMORPHONE HYDROCHLORIDE 1.5 MG: 2 INJECTION, SOLUTION INTRAMUSCULAR; INTRAVENOUS; SUBCUTANEOUS at 20:42

## 2024-04-18 RX ADMIN — Medication 300 UNITS: at 20:43

## 2024-04-18 RX ADMIN — HYDROMORPHONE HYDROCHLORIDE 1.5 MG: 2 INJECTION, SOLUTION INTRAMUSCULAR; INTRAVENOUS; SUBCUTANEOUS at 10:50

## 2024-04-18 RX ADMIN — HYDROCODONE BITARTRATE AND ACETAMINOPHEN 1 TABLET: 5; 325 TABLET ORAL at 08:23

## 2024-04-18 RX ADMIN — PANTOPRAZOLE SODIUM 40 MG: 40 TABLET, DELAYED RELEASE ORAL at 05:24

## 2024-04-18 RX ADMIN — SODIUM CHLORIDE, PRESERVATIVE FREE 10 ML: 5 INJECTION INTRAVENOUS at 20:43

## 2024-04-18 RX ADMIN — PANTOPRAZOLE SODIUM 40 MG: 40 TABLET, DELAYED RELEASE ORAL at 17:21

## 2024-04-18 RX ADMIN — FUROSEMIDE 20 MG: 20 TABLET ORAL at 08:25

## 2024-04-18 RX ADMIN — SPIRONOLACTONE 25 MG: 25 TABLET ORAL at 08:25

## 2024-04-18 ASSESSMENT — PAIN - FUNCTIONAL ASSESSMENT: PAIN_FUNCTIONAL_ASSESSMENT: PREVENTS OR INTERFERES WITH MANY ACTIVE NOT PASSIVE ACTIVITIES

## 2024-04-18 ASSESSMENT — PAIN DESCRIPTION - FREQUENCY: FREQUENCY: CONTINUOUS

## 2024-04-18 ASSESSMENT — PAIN DESCRIPTION - ONSET: ONSET: ON-GOING

## 2024-04-18 ASSESSMENT — PAIN DESCRIPTION - DESCRIPTORS: DESCRIPTORS: ACHING

## 2024-04-18 ASSESSMENT — PAIN SCALES - GENERAL
PAINLEVEL_OUTOF10: 7
PAINLEVEL_OUTOF10: 0
PAINLEVEL_OUTOF10: 9
PAINLEVEL_OUTOF10: 8

## 2024-04-18 ASSESSMENT — PAIN DESCRIPTION - PAIN TYPE: TYPE: ACUTE PAIN;SURGICAL PAIN

## 2024-04-18 ASSESSMENT — PAIN DESCRIPTION - LOCATION: LOCATION: FOOT

## 2024-04-18 ASSESSMENT — PAIN DESCRIPTION - ORIENTATION: ORIENTATION: LEFT

## 2024-04-18 NOTE — PROGRESS NOTES
Physical Therapy    Pt on hold this AM per Nurse. Will follow as able on another date/time.  Vargas Bruce PTA [id number]

## 2024-04-18 NOTE — PROGRESS NOTES
[facility] Quality Flow/Interdisciplinary Rounds Progress Note        Quality Flow Rounds held on April 18, 2024    Disciplines Attending:  Bedside Nurse, , , and Nursing Unit Leadership    Jaylene Kapoor was admitted on 4/9/2024  2:19 PM    Anticipated Discharge Date:       Disposition:    Julio Score:  Julio Scale Score: 18    Readmission Risk              Risk of Unplanned Readmission:  22           Discussed patient goal for the day, patient clinical progression, and barriers to discharge.  The following Goal(s) of the Day/Commitment(s) have been identified:  Diagnostics - Report Results and Labs - Report Results      Donna Ferrari RN  April 18, 2024

## 2024-04-18 NOTE — CARE COORDINATION
Updated plan of care. Discharge plan is rehab at [facility]. [facility] accepting, no precert needed. LOLLY, demos, transport form, [id number] completed and in soft chart. PICC line is placed and abx are reconciled. Podiatry following, possible vac replacement today. Will have to send the patient with a wet to dry dressing to the facility as their wound vacs are not compatible with ours.   Electronically signed by Vida Alcantar RN on 4/18/2024 at 9:53 AM

## 2024-04-18 NOTE — PROGRESS NOTES
Occupational Therapy  Patient treatment attempted this AM.  Patient  on hold per nursing, will continue OT POC as able and appropriate.    Isa CHAVEZ/ATTILA [id number]

## 2024-04-18 NOTE — PROGRESS NOTES
Podiatry Progress Note  4/18/2024   Jaylene Kapoor       SUBJECTIVE: This is a 44 y.o. female seen bedside s/p Left ankle I&D fasiotomy with bone biopsy DOS: 4/11/24. Patient is also s/p wound debridement, delayed primary closure, application skin graft substitute DOS: 4/17/24. Patient complains of pain to the left foot and ankle.  She says that the pain is still very bad and not much change from yesterday. Patient has no new pedal complaints.      OBJECTIVE:      Pt is AAOx3    Vitals:    04/18/24 1314   BP: 106/62   Pulse: (!) 106   Resp: 17   Temp: 98.6 °F (37 °C)   SpO2: 99%      EXAM:    VASCULAR:  DP and PT pulses are faintly palpable due to edema. CFT < 5 seconds to the dee of all 5 digits.  Warm to warm from the tibial tuberosity to the distal aspect of the digits.     NEUROLOGIC:  Protective sensation is diminished by grossly intact     DERM: Large surgical wound noted to dorsal/lateral foot and ankle. There is an integra graft intact to the wound with staples intact. There is mild sanguinous drainage. There is no purulence, crepitus, fluctuance, malodor noted at this time.     MUSCULOSKELETAL: Musculoskeletal exam deferred due to pain           Current Facility-Administered Medications   Medication Dose Route Frequency Provider Last Rate Last Admin    0.9 % sodium chloride infusion   IntraVENous PRN Nadia Hernandez, APRN - CNP        furosemide (LASIX) tablet 20 mg  20 mg Oral Daily Eliel Tinajero MD   20 mg at 04/18/24 0825    spironolactone (ALDACTONE) tablet 25 mg  25 mg Oral Daily Eliel Tinajero MD   25 mg at 04/18/24 0825    HYDROmorphone (DILAUDID) injection 0.75 mg  0.75 mg IntraVENous Q4H PRN Bisi Renteria DPM   0.75 mg at 04/15/24 0930    Or    HYDROmorphone (DILAUDID) injection 1.5 mg  1.5 mg IntraVENous Q4H PRN Bisi Renteria DPM   1.5 mg at 04/18/24 1050    povidone-iodine (BETADINE) 10 % external solution   Topical PRN Bisi Renteria DPM   Given at 04/13/24 0823    white

## 2024-04-18 NOTE — PROGRESS NOTES
[facility] Hospitalist   Progress Note    Admitting Date and Time: 4/9/2024  2:19 PM  Admit Dx: Sepsis (HCC) [A41.9]  Open wound of fifth toe of left foot, initial encounter [S91.105A]  Cirrhosis of liver with ascites, unspecified hepatic cirrhosis type (HCC) [K74.60, R18.8]    Subjective:    Patient was admitted with Sepsis (HCC) [A41.9]  Open wound of fifth toe of left foot, initial encounter [S91.105A]  Cirrhosis of liver with ascites, unspecified hepatic cirrhosis type (HCC) [K74.60, R18.8]. Patient denies fever, chills, cp, sob, n/v.     furosemide  20 mg Oral Daily    spironolactone  25 mg Oral Daily    sodium chloride flush  5-40 mL IntraVENous 2 times per day    heparin flush  3 mL IntraVENous 2 times per day    ertapenem (INVanz) 1,000 mg in sodium chloride (PF) 0.9 % 10 mL IV syringe  1,000 mg IntraVENous Q24H    nicotine  1 patch TransDERmal Daily    [Held by provider] cloNIDine  0.1 mg Oral BID    pantoprazole  40 mg Oral BID AC    [Held by provider] enoxaparin  40 mg SubCUTAneous Daily     sodium chloride, , PRN  HYDROmorphone, 0.75 mg, Q4H PRN   Or  HYDROmorphone, 1.5 mg, Q4H PRN  povidone-iodine, , PRN  white petrolatum, , BID PRN  sodium chloride, , PRN  sodium chloride flush, 5-40 mL, PRN  sodium chloride, , PRN  heparin flush, 3 mL, PRN  povidone-iodine, , PRN  promethazine, 12.5 mg, Q6H PRN  sodium chloride, , PRN  sodium chloride flush, 10 mL, PRN  ondansetron, 4 mg, Q8H PRN   Or  ondansetron, 4 mg, Q6H PRN  polyethylene glycol, 17 g, Daily PRN  acetaminophen, 650 mg, Q6H PRN   Or  acetaminophen, 650 mg, Q6H PRN  HYDROcodone 5 mg - acetaminophen, 1 tablet, Q6H PRN         Objective:    /62   Pulse 95   Temp 98.5 °F (36.9 °C) (Oral)   Resp 17   Ht 1.524 m (5')   Wt 75.2 kg (165 lb 12.8 oz)   SpO2 99%   BMI 32.38 kg/m²   Skin: warm and dry, no rash or erythema  Pulmonary/Chest: clear to auscultation bilaterally- no wheezes, rales or rhonchi, normal air movement, no

## 2024-04-18 NOTE — PLAN OF CARE
Problem: Discharge Planning  Goal: Discharge to home or other facility with appropriate resources  4/17/2024 2223 by Mary Worley  Outcome: Progressing  4/17/2024 2223 by Mary Worley  Outcome: Progressing  4/17/2024 1638 by Deann Nur RN  Outcome: Progressing     Problem: Pain  Goal: Verbalizes/displays adequate comfort level or baseline comfort level  4/17/2024 2223 by Mary Worley  Outcome: Progressing  4/17/2024 2223 by Mary Worley  Outcome: Progressing  4/17/2024 1638 by Deann Nur RN  Outcome: Progressing     Problem: Safety - Adult  Goal: Free from fall injury  4/17/2024 2223 by Mary Worley  Outcome: Progressing  4/17/2024 2223 by Mary Worley  Outcome: Progressing  4/17/2024 1638 by Deann Nur RN  Outcome: Progressing     Problem: Skin/Tissue Integrity  Goal: Absence of new skin breakdown  Description: 1.  Monitor for areas of redness and/or skin breakdown  2.  Assess vascular access sites hourly  3.  Every 4-6 hours minimum:  Change oxygen saturation probe site  4.  Every 4-6 hours:  If on nasal continuous positive airway pressure, respiratory therapy assess nares and determine need for appliance change or resting period.  4/17/2024 2223 by Mary Worley  Outcome: Progressing  4/17/2024 2223 by Mary Worley  Outcome: Progressing  4/17/2024 1638 by Deann Nur, RN  Outcome: Progressing     Problem: ABCDS Injury Assessment  Goal: Absence of physical injury  4/17/2024 2223 by Mary Worley  Outcome: Progressing  4/17/2024 1638 by Deann Nur RN  Outcome: Progressing     Problem: Nutrition Deficit:  Goal: Optimize nutritional status  4/17/2024 2223 by Mary Worley  Outcome: Progressing  4/17/2024 1638 by Deann Nur RN  Outcome: Progressing

## 2024-04-18 NOTE — PROGRESS NOTES
Infectious Disease  Progress Note  [facility]    Chief Complaint: Left foot infection    Subjective:  lying in bed. In no distress. Denies new complaints. Tolerating antibiotics. Had surgery yesterday.    Scheduled Meds:   furosemide  20 mg Oral Daily    spironolactone  25 mg Oral Daily    sodium chloride flush  5-40 mL IntraVENous 2 times per day    heparin flush  3 mL IntraVENous 2 times per day    ertapenem (INVanz) 1,000 mg in sodium chloride (PF) 0.9 % 10 mL IV syringe  1,000 mg IntraVENous Q24H    nicotine  1 patch TransDERmal Daily    [Held by provider] cloNIDine  0.1 mg Oral BID    pantoprazole  40 mg Oral BID AC    [Held by provider] enoxaparin  40 mg SubCUTAneous Daily     Continuous Infusions:   sodium chloride      sodium chloride      sodium chloride      sodium chloride       PRN Meds:sodium chloride, HYDROmorphone **OR** HYDROmorphone, povidone-iodine, white petrolatum, sodium chloride, sodium chloride flush, sodium chloride, heparin flush, povidone-iodine, promethazine **OR** [DISCONTINUED] ondansetron, sodium chloride, sodium chloride flush, ondansetron **OR** ondansetron, polyethylene glycol, acetaminophen **OR** acetaminophen, HYDROcodone 5 mg - acetaminophen    Prior to Admission medications    Medication Sig Start Date End Date Taking? Authorizing Provider   ertapenem (INVANZ) infusion Infuse 1,000 mg intravenously every 24 hours for 21 days Compound per protocol. 4/15/24 5/6/24 Yes Cedrick Small MD   cloNIDine (CATAPRES) 0.1 MG tablet Take 1 tablet by mouth 2 times daily 2/21/24  Yes ProviderDaljit MD   colestipol (COLESTID) 1 g tablet Take 1 tablet by mouth 2 times daily 3/20/24  Yes Daljit Lester MD   furosemide (LASIX) 40 MG tablet Take 1 tablet by mouth daily 2/29/24   Grzegorz Ervin MD   spironolactone (ALDACTONE) 50 MG tablet Take 1 tablet by mouth daily 2/29/24   Grzegorz Ervin MD   pantoprazole (PROTONIX) 40 MG tablet Take 1 tablet by mouth 2 times daily

## 2024-04-19 VITALS
DIASTOLIC BLOOD PRESSURE: 56 MMHG | SYSTOLIC BLOOD PRESSURE: 105 MMHG | BODY MASS INDEX: 34.45 KG/M2 | OXYGEN SATURATION: 98 % | WEIGHT: 175.49 LBS | HEART RATE: 93 BPM | RESPIRATION RATE: 16 BRPM | TEMPERATURE: 97.4 F | HEIGHT: 60 IN

## 2024-04-19 LAB
ABO/RH: NORMAL
ALBUMIN SERPL-MCNC: 2.1 G/DL (ref 3.5–5.2)
ALP SERPL-CCNC: 209 U/L (ref 35–104)
ALT SERPL-CCNC: 16 U/L (ref 0–32)
ANION GAP SERPL CALCULATED.3IONS-SCNC: 9 MMOL/L (ref 7–16)
ANTIBODY SCREEN: NEGATIVE
ARM BAND NUMBER: NORMAL
AST SERPL-CCNC: 39 U/L (ref 0–31)
BASOPHILS # BLD: 0.06 K/UL (ref 0–0.2)
BASOPHILS NFR BLD: 1 % (ref 0–2)
BILIRUB SERPL-MCNC: 3.6 MG/DL (ref 0–1.2)
BLOOD BANK BLOOD PRODUCT EXPIRATION DATE: NORMAL
BLOOD BANK DISPENSE STATUS: NORMAL
BLOOD BANK ISBT PRODUCT BLOOD TYPE: 5100
BLOOD BANK PRODUCT CODE: NORMAL
BLOOD BANK SAMPLE EXPIRATION: NORMAL
BLOOD BANK UNIT TYPE AND RH: NORMAL
BPU ID: NORMAL
BUN SERPL-MCNC: 23 MG/DL (ref 6–20)
CALCIUM SERPL-MCNC: 7.7 MG/DL (ref 8.6–10.2)
CHLORIDE SERPL-SCNC: 109 MMOL/L (ref 98–107)
CO2 SERPL-SCNC: 17 MMOL/L (ref 22–29)
COMPONENT: NORMAL
CREAT SERPL-MCNC: 1.1 MG/DL (ref 0.5–1)
CROSSMATCH RESULT: NORMAL
EOSINOPHIL # BLD: 0.34 K/UL (ref 0.05–0.5)
EOSINOPHILS RELATIVE PERCENT: 3 % (ref 0–6)
ERYTHROCYTE [DISTWIDTH] IN BLOOD BY AUTOMATED COUNT: 17.9 % (ref 11.5–15)
GFR SERPL CREATININE-BSD FRML MDRD: 62 ML/MIN/1.73M2
GLUCOSE SERPL-MCNC: 96 MG/DL (ref 74–99)
HCT VFR BLD AUTO: 22.4 % (ref 34–48)
HGB BLD-MCNC: 7.2 G/DL (ref 11.5–15.5)
IMM GRANULOCYTES # BLD AUTO: 0.06 K/UL (ref 0–0.58)
IMM GRANULOCYTES NFR BLD: 1 % (ref 0–5)
LYMPHOCYTES NFR BLD: 2.02 K/UL (ref 1.5–4)
LYMPHOCYTES RELATIVE PERCENT: 19 % (ref 20–42)
MCH RBC QN AUTO: 30.3 PG (ref 26–35)
MCHC RBC AUTO-ENTMCNC: 32.1 G/DL (ref 32–34.5)
MCV RBC AUTO: 94.1 FL (ref 80–99.9)
MONOCYTES NFR BLD: 1.07 K/UL (ref 0.1–0.95)
MONOCYTES NFR BLD: 10 % (ref 2–12)
NEUTROPHILS NFR BLD: 67 % (ref 43–80)
NEUTS SEG NFR BLD: 7.06 K/UL (ref 1.8–7.3)
PLATELET # BLD AUTO: 110 K/UL (ref 130–450)
PMV BLD AUTO: 11.2 FL (ref 7–12)
POTASSIUM SERPL-SCNC: 4.6 MMOL/L (ref 3.5–5)
PROT SERPL-MCNC: 5 G/DL (ref 6.4–8.3)
RBC # BLD AUTO: 2.38 M/UL (ref 3.5–5.5)
SODIUM SERPL-SCNC: 135 MMOL/L (ref 132–146)
TRANSFUSION STATUS: NORMAL
UNIT DIVISION: 0
UNIT ISSUE DATE/TIME: NORMAL
WBC OTHER # BLD: 10.6 K/UL (ref 4.5–11.5)

## 2024-04-19 PROCEDURE — 6370000000 HC RX 637 (ALT 250 FOR IP)

## 2024-04-19 PROCEDURE — 97530 THERAPEUTIC ACTIVITIES: CPT

## 2024-04-19 PROCEDURE — 2580000003 HC RX 258

## 2024-04-19 PROCEDURE — 6370000000 HC RX 637 (ALT 250 FOR IP): Performed by: STUDENT IN AN ORGANIZED HEALTH CARE EDUCATION/TRAINING PROGRAM

## 2024-04-19 PROCEDURE — 80053 COMPREHEN METABOLIC PANEL: CPT

## 2024-04-19 PROCEDURE — A4216 STERILE WATER/SALINE, 10 ML: HCPCS

## 2024-04-19 PROCEDURE — 6360000002 HC RX W HCPCS

## 2024-04-19 PROCEDURE — 99239 HOSP IP/OBS DSCHRG MGMT >30: CPT | Performed by: INTERNAL MEDICINE

## 2024-04-19 PROCEDURE — 85025 COMPLETE CBC W/AUTO DIFF WBC: CPT

## 2024-04-19 RX ORDER — FUROSEMIDE 20 MG/1
20 TABLET ORAL DAILY
Qty: 60 TABLET | Refills: 3 | DISCHARGE
Start: 2024-04-20

## 2024-04-19 RX ORDER — SPIRONOLACTONE 25 MG/1
25 TABLET ORAL DAILY
Qty: 30 TABLET | Refills: 3 | DISCHARGE
Start: 2024-04-20

## 2024-04-19 RX ORDER — HYDROCODONE BITARTRATE AND ACETAMINOPHEN 5; 325 MG/1; MG/1
1 TABLET ORAL EVERY 6 HOURS PRN
Qty: 2 TABLET | Refills: 0 | Status: ON HOLD | DISCHARGE
Start: 2024-04-19 | End: 2024-04-23 | Stop reason: HOSPADM

## 2024-04-19 RX ADMIN — HYDROMORPHONE HYDROCHLORIDE 1.5 MG: 2 INJECTION, SOLUTION INTRAMUSCULAR; INTRAVENOUS; SUBCUTANEOUS at 13:30

## 2024-04-19 RX ADMIN — SODIUM CHLORIDE, PRESERVATIVE FREE 10 ML: 5 INJECTION INTRAVENOUS at 13:30

## 2024-04-19 RX ADMIN — FUROSEMIDE 20 MG: 20 TABLET ORAL at 08:30

## 2024-04-19 RX ADMIN — SODIUM CHLORIDE, PRESERVATIVE FREE 1000 MG: 5 INJECTION INTRAVENOUS at 12:50

## 2024-04-19 RX ADMIN — PANTOPRAZOLE SODIUM 40 MG: 40 TABLET, DELAYED RELEASE ORAL at 05:46

## 2024-04-19 RX ADMIN — SODIUM CHLORIDE, PRESERVATIVE FREE 10 ML: 5 INJECTION INTRAVENOUS at 12:50

## 2024-04-19 RX ADMIN — Medication 300 UNITS: at 08:33

## 2024-04-19 RX ADMIN — HYDROCODONE BITARTRATE AND ACETAMINOPHEN 1 TABLET: 5; 325 TABLET ORAL at 05:46

## 2024-04-19 RX ADMIN — SPIRONOLACTONE 25 MG: 25 TABLET ORAL at 08:30

## 2024-04-19 RX ADMIN — SODIUM CHLORIDE, PRESERVATIVE FREE 10 ML: 5 INJECTION INTRAVENOUS at 08:33

## 2024-04-19 RX ADMIN — HYDROMORPHONE HYDROCHLORIDE 1.5 MG: 2 INJECTION, SOLUTION INTRAMUSCULAR; INTRAVENOUS; SUBCUTANEOUS at 08:33

## 2024-04-19 ASSESSMENT — PAIN DESCRIPTION - FREQUENCY: FREQUENCY: INTERMITTENT

## 2024-04-19 ASSESSMENT — PAIN SCALES - GENERAL
PAINLEVEL_OUTOF10: 6
PAINLEVEL_OUTOF10: 10
PAINLEVEL_OUTOF10: 6

## 2024-04-19 ASSESSMENT — PAIN DESCRIPTION - LOCATION
LOCATION: FOOT

## 2024-04-19 ASSESSMENT — PAIN DESCRIPTION - DESCRIPTORS
DESCRIPTORS: SHARP;SHOOTING;SORE
DESCRIPTORS: ACHING
DESCRIPTORS: ACHING

## 2024-04-19 ASSESSMENT — PAIN DESCRIPTION - ORIENTATION
ORIENTATION: LEFT

## 2024-04-19 ASSESSMENT — PAIN DESCRIPTION - ONSET: ONSET: ON-GOING

## 2024-04-19 NOTE — PROGRESS NOTES
Patient got picked up around 2pm to go to [place].  RN attempted to call again for nurse to nurse report.  No answer.  Pt AVS and anitibiotic and pain meds scripts sent with patient in discharge folder.    Electronically signed by Mely Bingham RN on 4/19/2024 at 2:02 PM

## 2024-04-19 NOTE — PATIENT CARE CONFERENCE
[facility] Quality Flow/Interdisciplinary Rounds Progress Note        Quality Flow Rounds held on April 16, 2024    Disciplines Attending:  Bedside Nurse, , , and Nursing Unit Leadership    Jaylene Kapoor was admitted on 4/9/2024  2:19 PM    Anticipated Discharge Date:       Disposition:    Julio Score:  Julio Scale Score: 18    Readmission Risk              Risk of Unplanned Readmission:  21           Discussed patient goal for the day, patient clinical progression, and barriers to discharge.  The following Goal(s) of the Day/Commitment(s) have been identified:  Diagnostics - Report Results      Janine Giles RN  April 16, 2024        
  P Quality Flow/Interdisciplinary Rounds Progress Note        Quality Flow Rounds held on April 17, 2024    Disciplines Attending:  Bedside Nurse, , , and Nursing Unit Leadership    Jaylene Kapoor was admitted on 4/9/2024  2:19 PM    Anticipated Discharge Date:       Disposition:    Julio Score:  Julio Scale Score: 18    Readmission Risk              Risk of Unplanned Readmission:  21           Discussed patient goal for the day, patient clinical progression, and barriers to discharge.  The following Goal(s) of the Day/Commitment(s) have been identified:  Labs - Report Results      Lilli Allen RN  April 17, 2024        
  [facility] Quality Flow/Interdisciplinary Rounds Progress Note        Quality Flow Rounds held on April 12, 2024    Disciplines Attending:  Bedside Nurse, , , and Nursing Unit Leadership    Jaylene Kapoor was admitted on 4/9/2024  2:19 PM    Anticipated Discharge Date:       Disposition:    Julio Score:  Julio Scale Score: 14    Readmission Risk              Risk of Unplanned Readmission:  25           Discussed patient goal for the day, patient clinical progression, and barriers to discharge.  The following Goal(s) of the Day/Commitment(s) have been identified:  Labs - Report Results      Maggie Rudd RN  April 12, 2024        
  [facility] Quality Flow/Interdisciplinary Rounds Progress Note        Quality Flow Rounds held on April 15, 2024    Disciplines Attending:  Bedside Nurse, , , and Nursing Unit Leadership    Jaylene Kapoor was admitted on 4/9/2024  2:19 PM    Anticipated Discharge Date:       Disposition:    Julio Score:  Julio Scale Score: 19    Readmission Risk              Risk of Unplanned Readmission:  22           Discussed patient goal for the day, patient clinical progression, and barriers to discharge.  The following Goal(s) of the Day/Commitment(s) have been identified:  Diagnostics - Report Results and Labs - Report Results      Janine Giles RN  April 15, 2024        
  [facility] Quality Flow/Interdisciplinary Rounds Progress Note        Quality Flow Rounds held on April 19, 2024    Disciplines Attending:  Bedside Nurse, , , and Nursing Unit Leadership    Jaylene Kapoor was admitted on 4/9/2024  2:19 PM    Anticipated Discharge Date:       Disposition:    Julio Score:  Julio Scale Score: 16    Readmission Risk              Risk of Unplanned Readmission:  27           Discussed patient goal for the day, patient clinical progression, and barriers to discharge.  The following Goal(s) of the Day/Commitment(s) have been identified:  Discharge - Obtain Order and Labs - Report Results      Donna Greco RN  April 19, 2024        
Name band;

## 2024-04-19 NOTE — CARE COORDINATION
Updated plan of care. Per nursing patient can be discharged today. Discharge plan is rehab at [place]. [place] accepting, no precert needed. LOLLY, demos, transport form, [id number] completed and in soft chart. PICC line is placed and abx are reconciled. Will have to send the patient with a wet to dry dressing to the facility as their wound vacs are not compatible with ours. [facility] to provide transport at 1400. Nursing and liaison aware.   Electronically signed by Vida Alcantar RN on 4/19/2024 at 11:13 AM

## 2024-04-19 NOTE — PROGRESS NOTES
Occupational Therapy  OT BEDSIDE TREATMENT NOTE      Date:2024  Patient Name: Jaylene Kapoor  MRN: [id number]  : 1980  Room: [address] [place], [place] [address]A     Evaluating OT: Nikki Judge OTR/L #[id number]     Referring Provider:  Eliel Tinajero MD      Specific Provider Orders/Date:  OT Eval and Treat , 2024     Diagnosis:   1. Open wound of fifth toe of left foot, initial encounter    2. Cirrhosis of liver with ascites, unspecified hepatic cirrhosis type (HCC)    3. Foot abscess        Surgery: S/p left ankle I&D fasiotomy with bone biopsy 24 , s/p wound vac application 4/15/24      Pertinent Medical History: HTN      Precautions:  Fall Risk, alarm, NWB L LE, wound vac       Assessment of current deficits    [x] Functional mobility            [x]ADLs           [x] Strength                   []Cognition    [x] Functional transfers          [x] IADLs          [x] Safety Awareness   [x]Endurance    [] Fine Coordination              [x] Balance      [] Vision/perception    []Sensation      []Gross Motor Coordination  [] ROM           [] Delirium                   [] Motor Control      OT PLAN OF CARE   OT POC based on physician orders, patient diagnosis and results of clinical assessment     Frequency/Duration 2-5 days/wk for 2-4 weeks PRN      Specific OT Treatment Interventions to include:   * Instruction/training on adapted ADL techniques and AE recommendations to increase functional independence within precautions       * Training on energy conservation strategies, correct breathing pattern and techniques to improve independence/tolerance for self-care routine  * Functional transfer/mobility training/DME recommendations for increased independence, safety, and fall prevention  * Patient/Family education to increase follow through with safety techniques and functional independence  * Recommendation of environmental modifications for increased safety with functional transfers/mobility and ADLs  * Therapeutic

## 2024-04-19 NOTE — PROGRESS NOTES
Physical Therapy  Facility/Department: [address] MED SURG  Physical Therapy Treatment Note    Name: Jaylene Kapoor  : 1980  MRN: [id number]  Date of Service: 2024    Attending Provider:  Santos Stevens DO    Evaluating PT:  Santos Donovan Jr., P.T.    Room #:  0501/0501-A  Diagnosis:  Sepsis (HCC) [A41.9]  Open wound of fifth toe of left foot, initial encounter [S91.105A]  Cirrhosis of liver with ascites, unspecified hepatic cirrhosis type (HCC) [K74.60, R18.8]  Procedure/Surgery:  24 L foot and ankle I and D  Precautions:  NWB LLE, splint L foot and ankle, falls, bed/chair alarm    SUBJECTIVE:    Pt lives with her partner and 5 kids in a 1 story home with 1+1 steps to enter.   Pt ambulated with no AD PTA.    OBJECTIVE:   Initial Evaluation  Date: 24 Treatment  2024 Short Term/ Long Term   Goals   Was pt agreeable to Eval/treatment? yes yes    Does pt have pain? C/o pain all over, except her arms L LE pain to movement    Bed Mobility  Rolling: SBA  Supine to sit: MIN A  Sit to supine: SBA  Scooting: SBA NT Independent    Transfers Sit to stand: MOD A  Stand to sit: MOD A  Stand pivot: NA Sit <> stand; Min A  Stand pivot: NT Independent   Ambulation   3 feet toward HOB using ww with scoot shuffle technique with MOD A and constant VCs for NWB LLE NT. See comments  50 feet with ww NWB LLE Independent    Stair negotiation: ascended and descended NA NA 1 step twice with ww NWB LLE SBA   AM-PAC 6 Clicks       Pt is alert, following instruction, states wants to get her pants on  Balance: poor standing with WW for support    Pt performed therapeutic exercise of the following: NT    Patient education/treatment  Pt was educated on UE usage for transfer safety, NWB L LE    Patient response to education:   Pt verbalized understanding Pt demonstrated skill Pt requires further education in this area   yes With instruction yes     ASSESSMENT:   Comments: Pt found in a recliner chair, agreed

## 2024-04-19 NOTE — DISCHARGE SUMMARY
Weight:       Height:           Skin: warm and dry, no rash or erythema  Pulmonary/Chest: clear to auscultation bilaterally- no wheezes, rales or rhonchi, normal air movement, no respiratory distress  Cardiovascular: rhythm reg at rate of 92  Abdomen: soft, non-tender, non-distended, normal bowel sounds, no masses or organomegaly  Extremities: no cyanosis, no clubbing, and no edema  I/O last 3 completed shifts:  In: 329 [Blood:329]  Out: -   I/O this shift:  In: -   Out: 200 [Urine:200]      LABS:  Recent Labs     04/17/24  0515 04/18/24  0530 04/19/24  0251    137 135   K 5.2* 4.8 4.6   * 113* 109*   CO2 17* 18* 17*   BUN 16 18 23*   CREATININE 0.8 1.0 1.1*   GLUCOSE 121* 105* 96   CALCIUM 7.9* 7.8* 7.7*       Recent Labs     04/18/24  0530 04/18/24  1730 04/19/24  0251   WBC 9.2  --  10.6   RBC 2.14*  --  2.38*   HGB 6.7* 7.7* 7.2*   HCT 20.5* 24.0* 22.4*   MCV 96.7  --  94.1   MCH 31.3  --  30.3   MCHC 32.4  --  32.1   RDW 18.2*  --  17.9*   PLT  --   --  110*   MPV 11.8  --  11.2       No results for input(s): \"POCGLU\" in the last 72 hours.    CBC with Differential:    Lab Results   Component Value Date/Time    WBC 10.6 04/19/2024 02:51 AM    RBC 2.38 04/19/2024 02:51 AM    HGB 7.2 04/19/2024 02:51 AM    HCT 22.4 04/19/2024 02:51 AM     04/19/2024 02:51 AM    MCV 94.1 04/19/2024 02:51 AM    MCH 30.3 04/19/2024 02:51 AM    MCHC 32.1 04/19/2024 02:51 AM    RDW 17.9 04/19/2024 02:51 AM    NRBC 1 02/28/2024 05:10 AM    METASPCT 1 02/26/2024 07:03 PM    LYMPHOPCT 19 04/19/2024 02:51 AM    PROMYELOPCT 2 02/15/2024 06:33 AM    MONOPCT 10 04/19/2024 02:51 AM    MYELOPCT 1 02/28/2024 05:10 AM    BASOPCT 1 04/19/2024 02:51 AM    MONOSABS 1.07 04/19/2024 02:51 AM    LYMPHSABS 2.02 04/19/2024 02:51 AM    EOSABS 0.34 04/19/2024 02:51 AM    BASOSABS 0.06 04/19/2024 02:51 AM     CMP:    Lab Results   Component Value Date/Time     04/19/2024 02:51 AM    K 4.6 04/19/2024 02:51 AM     04/19/2024  changed:   medication strength  how much to take     spironolactone 25 MG tablet  Commonly known as: ALDACTONE  Take 1 tablet by mouth daily  Start taking on: April 20, 2024  What changed:   medication strength  how much to take            CONTINUE taking these medications      colestipol 1 g tablet  Commonly known as: COLESTID     pantoprazole 40 MG tablet  Commonly known as: PROTONIX  Take 1 tablet by mouth 2 times daily (before meals)            STOP taking these medications      cephALEXin 500 MG capsule  Commonly known as: KEFLEX     cloNIDine 0.1 MG tablet  Commonly known as: CATAPRES               Where to Get Your Medications        You can get these medications from any pharmacy    Bring a paper prescription for each of these medications  ertapenem  infusion  HYDROcodone-acetaminophen 5-325 MG per tablet       Information about where to get these medications is not yet available    Ask your nurse or doctor about these medications  furosemide 20 MG tablet  spironolactone 25 MG tablet           Total time for discharge is 37 min    Signed:  Electronically signed by Santos Stevens DO on 4/19/2024 at 3:27 PM

## 2024-04-19 NOTE — PROGRESS NOTES
RN called [place] to give nurse to nurse report.  NO answered, left a voicemail with call back number.      Electronically signed by Mely Bingham RN on 4/19/2024 at 12:58 PM

## 2024-04-19 NOTE — PLAN OF CARE
Problem: Discharge Planning  Goal: Discharge to home or other facility with appropriate resources  4/19/2024 1028 by Mely Bingham RN  Outcome: Progressing  4/19/2024 0411 by Shaniqua Aguilar RN  Outcome: Progressing     Problem: Pain  Goal: Verbalizes/displays adequate comfort level or baseline comfort level  4/19/2024 1028 by Mely Bingham RN  Outcome: Progressing  4/19/2024 0411 by Shaniqua Aguilar RN  Outcome: Progressing     Problem: Safety - Adult  Goal: Free from fall injury  4/19/2024 1028 by Mely Bingham RN  Outcome: Progressing  4/19/2024 0411 by Shaniqua Aguilar RN  Outcome: Progressing     Problem: Skin/Tissue Integrity  Goal: Absence of new skin breakdown  Description: 1.  Monitor for areas of redness and/or skin breakdown  2.  Assess vascular access sites hourly  3.  Every 4-6 hours minimum:  Change oxygen saturation probe site  4.  Every 4-6 hours:  If on nasal continuous positive airway pressure, respiratory therapy assess nares and determine need for appliance change or resting period.  4/19/2024 1028 by Mely Bingham RN  Outcome: Progressing  4/19/2024 0411 by Shaniqua Aguilar RN  Outcome: Progressing     Problem: ABCDS Injury Assessment  Goal: Absence of physical injury  4/19/2024 1028 by Mely Bingham RN  Outcome: Progressing  4/19/2024 0411 by Shaniqua Aguilar RN  Outcome: Progressing     Problem: Nutrition Deficit:  Goal: Optimize nutritional status  4/19/2024 1028 by Mely Bingham RN  Outcome: Progressing  4/19/2024 0411 by Shaniqua Aguilar RN  Outcome: Progressing

## 2024-04-19 NOTE — PROGRESS NOTES
Infectious Disease  Progress Note  [facility]    Chief Complaint: Left foot infection    Subjective:  sitting in chair in no distress. Denies new complaints. Tolerating antibiotics.  Ready to be discharged.    Scheduled Meds:   furosemide  20 mg Oral Daily    spironolactone  25 mg Oral Daily    sodium chloride flush  5-40 mL IntraVENous 2 times per day    heparin flush  3 mL IntraVENous 2 times per day    ertapenem (INVanz) 1,000 mg in sodium chloride (PF) 0.9 % 10 mL IV syringe  1,000 mg IntraVENous Q24H    nicotine  1 patch TransDERmal Daily    [Held by provider] cloNIDine  0.1 mg Oral BID    pantoprazole  40 mg Oral BID AC    [Held by provider] enoxaparin  40 mg SubCUTAneous Daily     Continuous Infusions:   sodium chloride      sodium chloride      sodium chloride      sodium chloride       PRN Meds:sodium chloride, HYDROmorphone **OR** HYDROmorphone, povidone-iodine, white petrolatum, sodium chloride, sodium chloride flush, sodium chloride, heparin flush, povidone-iodine, promethazine **OR** [DISCONTINUED] ondansetron, sodium chloride, sodium chloride flush, ondansetron **OR** ondansetron, polyethylene glycol, acetaminophen **OR** acetaminophen, HYDROcodone 5 mg - acetaminophen    Prior to Admission medications    Medication Sig Start Date End Date Taking? Authorizing Provider   furosemide (LASIX) 20 MG tablet Take 1 tablet by mouth daily 4/20/24  Yes Hric, Santos, DO   spironolactone (ALDACTONE) 25 MG tablet Take 1 tablet by mouth daily 4/20/24  Yes Hric, Santos, DO   HYDROcodone-acetaminophen (NORCO) 5-325 MG per tablet Take 1 tablet by mouth every 6 hours as needed for Pain for up to 3 days. Max Daily Amount: 4 tablets 4/19/24 4/22/24 Yes HrSantos tapia, DO   ertapenem (INVANZ) infusion Infuse 1,000 mg intravenously every 24 hours for 21 days Compound per protocol. 4/15/24 5/6/24 Yes Cedrick Small MD   colestipol (COLESTID) 1 g tablet Take 1 tablet by mouth 2 times daily 3/20/24  Yes Provider, Historical,

## 2024-04-20 ENCOUNTER — APPOINTMENT (OUTPATIENT)
Dept: GENERAL RADIOLOGY | Age: 44
DRG: 344 | End: 2024-04-20
Payer: COMMERCIAL

## 2024-04-20 ENCOUNTER — HOSPITAL ENCOUNTER (INPATIENT)
Age: 44
LOS: 1 days | Discharge: HOME | DRG: 344 | End: 2024-04-23
Attending: EMERGENCY MEDICINE | Admitting: INTERNAL MEDICINE
Payer: COMMERCIAL

## 2024-04-20 DIAGNOSIS — T14.8XXA WOUND INFECTION: ICD-10-CM

## 2024-04-20 DIAGNOSIS — M79.89 NECROTIZING SOFT TISSUE INFECTION: ICD-10-CM

## 2024-04-20 DIAGNOSIS — L08.9 WOUND INFECTION: ICD-10-CM

## 2024-04-20 DIAGNOSIS — Z51.89 VISIT FOR WOUND CHECK: Primary | ICD-10-CM

## 2024-04-20 PROBLEM — L03.90 CELLULITIS: Status: RESOLVED | Noted: 2024-04-18 | Resolved: 2024-04-20

## 2024-04-20 PROBLEM — A41.9 SEPSIS (HCC): Status: RESOLVED | Noted: 2024-04-09 | Resolved: 2024-04-20

## 2024-04-20 PROBLEM — D63.8 ANEMIA, CHRONIC DISEASE: Status: ACTIVE | Noted: 2024-04-20

## 2024-04-20 PROBLEM — K70.10 ALCOHOLIC HEPATITIS: Status: RESOLVED | Noted: 2024-02-27 | Resolved: 2024-04-20

## 2024-04-20 PROBLEM — N30.00 ACUTE CYSTITIS: Status: RESOLVED | Noted: 2024-02-12 | Resolved: 2024-04-20

## 2024-04-20 PROBLEM — F10.139 ALCOHOL ABUSE WITH WITHDRAWAL (HCC): Status: RESOLVED | Noted: 2024-02-12 | Resolved: 2024-04-20

## 2024-04-20 PROBLEM — D62 ABLA (ACUTE BLOOD LOSS ANEMIA): Status: RESOLVED | Noted: 2024-04-13 | Resolved: 2024-04-20

## 2024-04-20 PROBLEM — N17.9 AKI (ACUTE KIDNEY INJURY) (HCC): Status: RESOLVED | Noted: 2017-08-14 | Resolved: 2024-04-20

## 2024-04-20 PROBLEM — D62 ANEMIA ASSOCIATED WITH ACUTE BLOOD LOSS: Status: RESOLVED | Noted: 2024-04-18 | Resolved: 2024-04-20

## 2024-04-20 PROBLEM — E87.20 ACIDOSIS: Status: RESOLVED | Noted: 2024-04-09 | Resolved: 2024-04-20

## 2024-04-20 LAB
ALBUMIN SERPL-MCNC: 2.3 G/DL (ref 3.5–5.2)
ALP SERPL-CCNC: 223 U/L (ref 35–104)
ALT SERPL-CCNC: 17 U/L (ref 0–32)
ANION GAP SERPL CALCULATED.3IONS-SCNC: 8 MMOL/L (ref 7–16)
AST SERPL-CCNC: 42 U/L (ref 0–31)
BASOPHILS # BLD: 0.05 K/UL (ref 0–0.2)
BASOPHILS NFR BLD: 1 % (ref 0–2)
BILIRUB SERPL-MCNC: 3.3 MG/DL (ref 0–1.2)
BILIRUB UR QL STRIP: ABNORMAL
BUN SERPL-MCNC: 23 MG/DL (ref 6–20)
CALCIUM SERPL-MCNC: 7.9 MG/DL (ref 8.6–10.2)
CHLORIDE SERPL-SCNC: 108 MMOL/L (ref 98–107)
CLARITY UR: CLEAR
CO2 SERPL-SCNC: 17 MMOL/L (ref 22–29)
COLOR UR: YELLOW
CREAT SERPL-MCNC: 1.1 MG/DL (ref 0.5–1)
EOSINOPHIL # BLD: 0.31 K/UL (ref 0.05–0.5)
EOSINOPHILS RELATIVE PERCENT: 3 % (ref 0–6)
ERYTHROCYTE [DISTWIDTH] IN BLOOD BY AUTOMATED COUNT: 17.8 % (ref 11.5–15)
GFR SERPL CREATININE-BSD FRML MDRD: 65 ML/MIN/1.73M2
GLUCOSE SERPL-MCNC: 95 MG/DL (ref 74–99)
GLUCOSE UR STRIP-MCNC: NEGATIVE MG/DL
HCG, URINE, POC: NEGATIVE
HCT VFR BLD AUTO: 23.6 % (ref 34–48)
HGB BLD-MCNC: 7.5 G/DL (ref 11.5–15.5)
HGB UR QL STRIP.AUTO: NEGATIVE
IMM GRANULOCYTES # BLD AUTO: 0.06 K/UL (ref 0–0.58)
IMM GRANULOCYTES NFR BLD: 1 % (ref 0–5)
KETONES UR STRIP-MCNC: ABNORMAL MG/DL
LACTATE BLDV-SCNC: 1 MMOL/L (ref 0.5–2.2)
LEUKOCYTE ESTERASE UR QL STRIP: ABNORMAL
LYMPHOCYTES NFR BLD: 1.52 K/UL (ref 1.5–4)
LYMPHOCYTES RELATIVE PERCENT: 15 % (ref 20–42)
Lab: NORMAL
MAGNESIUM SERPL-MCNC: 1.5 MG/DL (ref 1.6–2.6)
MCH RBC QN AUTO: 29.8 PG (ref 26–35)
MCHC RBC AUTO-ENTMCNC: 31.8 G/DL (ref 32–34.5)
MCV RBC AUTO: 93.7 FL (ref 80–99.9)
MICROORGANISM SPEC CULT: NORMAL
MICROORGANISM/AGENT SPEC: NORMAL
MONOCYTES NFR BLD: 1.05 K/UL (ref 0.1–0.95)
MONOCYTES NFR BLD: 10 % (ref 2–12)
NEGATIVE QC PASS/FAIL: NORMAL
NEUTROPHILS NFR BLD: 71 % (ref 43–80)
NEUTS SEG NFR BLD: 7.47 K/UL (ref 1.8–7.3)
NITRITE UR QL STRIP: NEGATIVE
PH UR STRIP: 5.5 [PH] (ref 5–9)
PLATELET, FLUORESCENCE: 114 K/UL (ref 130–450)
PMV BLD AUTO: 11.1 FL (ref 7–12)
POSITIVE QC PASS/FAIL: NORMAL
POTASSIUM SERPL-SCNC: 4.6 MMOL/L (ref 3.5–5)
PROT SERPL-MCNC: 5.2 G/DL (ref 6.4–8.3)
PROT UR STRIP-MCNC: NEGATIVE MG/DL
RBC # BLD AUTO: 2.52 M/UL (ref 3.5–5.5)
SERVICE CMNT-IMP: NORMAL
SODIUM SERPL-SCNC: 133 MMOL/L (ref 132–146)
SP GR UR STRIP: 1.02 (ref 1–1.03)
SPECIMEN DESCRIPTION: NORMAL
TROPONIN I SERPL HS-MCNC: 26 NG/L (ref 0–9)
TROPONIN I SERPL HS-MCNC: 29 NG/L (ref 0–9)
UROBILINOGEN UR STRIP-ACNC: 1 EU/DL (ref 0–1)
WBC OTHER # BLD: 10.5 K/UL (ref 4.5–11.5)

## 2024-04-20 PROCEDURE — 71045 X-RAY EXAM CHEST 1 VIEW: CPT

## 2024-04-20 PROCEDURE — 96375 TX/PRO/DX INJ NEW DRUG ADDON: CPT

## 2024-04-20 PROCEDURE — 2580000003 HC RX 258

## 2024-04-20 PROCEDURE — 83735 ASSAY OF MAGNESIUM: CPT

## 2024-04-20 PROCEDURE — 81003 URINALYSIS AUTO W/O SCOPE: CPT

## 2024-04-20 PROCEDURE — 6370000000 HC RX 637 (ALT 250 FOR IP): Performed by: FAMILY MEDICINE

## 2024-04-20 PROCEDURE — 96372 THER/PROPH/DIAG INJ SC/IM: CPT

## 2024-04-20 PROCEDURE — 80053 COMPREHEN METABOLIC PANEL: CPT

## 2024-04-20 PROCEDURE — G0378 HOSPITAL OBSERVATION PER HR: HCPCS

## 2024-04-20 PROCEDURE — 6360000002 HC RX W HCPCS: Performed by: EMERGENCY MEDICINE

## 2024-04-20 PROCEDURE — 99285 EMERGENCY DEPT VISIT HI MDM: CPT

## 2024-04-20 PROCEDURE — 2580000003 HC RX 258: Performed by: FAMILY MEDICINE

## 2024-04-20 PROCEDURE — APPSS45 APP SPLIT SHARED TIME 31-45 MINUTES

## 2024-04-20 PROCEDURE — 6360000002 HC RX W HCPCS: Performed by: FAMILY MEDICINE

## 2024-04-20 PROCEDURE — 96365 THER/PROPH/DIAG IV INF INIT: CPT

## 2024-04-20 PROCEDURE — 85025 COMPLETE CBC W/AUTO DIFF WBC: CPT

## 2024-04-20 PROCEDURE — 99222 1ST HOSP IP/OBS MODERATE 55: CPT | Performed by: FAMILY MEDICINE

## 2024-04-20 PROCEDURE — 83605 ASSAY OF LACTIC ACID: CPT

## 2024-04-20 PROCEDURE — 93005 ELECTROCARDIOGRAM TRACING: CPT

## 2024-04-20 PROCEDURE — A4216 STERILE WATER/SALINE, 10 ML: HCPCS | Performed by: FAMILY MEDICINE

## 2024-04-20 PROCEDURE — 96376 TX/PRO/DX INJ SAME DRUG ADON: CPT

## 2024-04-20 PROCEDURE — 87040 BLOOD CULTURE FOR BACTERIA: CPT

## 2024-04-20 PROCEDURE — 96361 HYDRATE IV INFUSION ADD-ON: CPT

## 2024-04-20 PROCEDURE — 73630 X-RAY EXAM OF FOOT: CPT

## 2024-04-20 PROCEDURE — 96366 THER/PROPH/DIAG IV INF ADDON: CPT

## 2024-04-20 PROCEDURE — 6360000002 HC RX W HCPCS: Performed by: INTERNAL MEDICINE

## 2024-04-20 PROCEDURE — 84484 ASSAY OF TROPONIN QUANT: CPT

## 2024-04-20 RX ORDER — ACETAMINOPHEN 650 MG/1
650 SUPPOSITORY RECTAL EVERY 6 HOURS PRN
Status: DISCONTINUED | OUTPATIENT
Start: 2024-04-20 | End: 2024-04-23 | Stop reason: HOSPADM

## 2024-04-20 RX ORDER — ENOXAPARIN SODIUM 100 MG/ML
40 INJECTION SUBCUTANEOUS DAILY
Status: DISCONTINUED | OUTPATIENT
Start: 2024-04-20 | End: 2024-04-23 | Stop reason: HOSPADM

## 2024-04-20 RX ORDER — ACETAMINOPHEN 325 MG/1
650 TABLET ORAL EVERY 6 HOURS PRN
Status: DISCONTINUED | OUTPATIENT
Start: 2024-04-20 | End: 2024-04-23 | Stop reason: HOSPADM

## 2024-04-20 RX ORDER — 0.9 % SODIUM CHLORIDE 0.9 %
1000 INTRAVENOUS SOLUTION INTRAVENOUS ONCE
Status: COMPLETED | OUTPATIENT
Start: 2024-04-20 | End: 2024-04-20

## 2024-04-20 RX ORDER — PANTOPRAZOLE SODIUM 40 MG/1
40 TABLET, DELAYED RELEASE ORAL
Status: DISCONTINUED | OUTPATIENT
Start: 2024-04-20 | End: 2024-04-23 | Stop reason: HOSPADM

## 2024-04-20 RX ORDER — ONDANSETRON 2 MG/ML
4 INJECTION INTRAMUSCULAR; INTRAVENOUS ONCE
Status: COMPLETED | OUTPATIENT
Start: 2024-04-20 | End: 2024-04-20

## 2024-04-20 RX ORDER — CHOLESTYRAMINE LIGHT 4 G/5.7G
4 POWDER, FOR SUSPENSION ORAL DAILY
Status: DISCONTINUED | OUTPATIENT
Start: 2024-04-20 | End: 2024-04-23 | Stop reason: HOSPADM

## 2024-04-20 RX ORDER — MAGNESIUM SULFATE IN WATER 40 MG/ML
2000 INJECTION, SOLUTION INTRAVENOUS ONCE
Status: COMPLETED | OUTPATIENT
Start: 2024-04-20 | End: 2024-04-20

## 2024-04-20 RX ORDER — SPIRONOLACTONE 25 MG/1
25 TABLET ORAL DAILY
Status: DISCONTINUED | OUTPATIENT
Start: 2024-04-20 | End: 2024-04-23 | Stop reason: HOSPADM

## 2024-04-20 RX ORDER — FUROSEMIDE 20 MG/1
20 TABLET ORAL DAILY
Status: DISCONTINUED | OUTPATIENT
Start: 2024-04-20 | End: 2024-04-21

## 2024-04-20 RX ORDER — SODIUM CHLORIDE 9 MG/ML
INJECTION, SOLUTION INTRAVENOUS PRN
Status: DISCONTINUED | OUTPATIENT
Start: 2024-04-20 | End: 2024-04-23 | Stop reason: HOSPADM

## 2024-04-20 RX ORDER — FUROSEMIDE 10 MG/ML
20 INJECTION INTRAMUSCULAR; INTRAVENOUS 2 TIMES DAILY
Status: DISCONTINUED | OUTPATIENT
Start: 2024-04-20 | End: 2024-04-23 | Stop reason: HOSPADM

## 2024-04-20 RX ORDER — MORPHINE SULFATE 4 MG/ML
4 INJECTION, SOLUTION INTRAMUSCULAR; INTRAVENOUS ONCE
Status: COMPLETED | OUTPATIENT
Start: 2024-04-20 | End: 2024-04-20

## 2024-04-20 RX ORDER — SODIUM CHLORIDE 0.9 % (FLUSH) 0.9 %
5-40 SYRINGE (ML) INJECTION EVERY 12 HOURS SCHEDULED
Status: DISCONTINUED | OUTPATIENT
Start: 2024-04-20 | End: 2024-04-23 | Stop reason: HOSPADM

## 2024-04-20 RX ORDER — SODIUM CHLORIDE 0.9 % (FLUSH) 0.9 %
5-40 SYRINGE (ML) INJECTION PRN
Status: DISCONTINUED | OUTPATIENT
Start: 2024-04-20 | End: 2024-04-23 | Stop reason: HOSPADM

## 2024-04-20 RX ORDER — HYDROCODONE BITARTRATE AND ACETAMINOPHEN 5; 325 MG/1; MG/1
1 TABLET ORAL EVERY 6 HOURS PRN
Status: DISCONTINUED | OUTPATIENT
Start: 2024-04-20 | End: 2024-04-23 | Stop reason: HOSPADM

## 2024-04-20 RX ADMIN — PANTOPRAZOLE SODIUM 40 MG: 40 TABLET, DELAYED RELEASE ORAL at 16:25

## 2024-04-20 RX ADMIN — SODIUM CHLORIDE 1000 ML: 9 INJECTION, SOLUTION INTRAVENOUS at 04:23

## 2024-04-20 RX ADMIN — SODIUM CHLORIDE, PRESERVATIVE FREE 10 ML: 5 INJECTION INTRAVENOUS at 10:13

## 2024-04-20 RX ADMIN — SODIUM CHLORIDE 1000 MG: 9 INJECTION INTRAMUSCULAR; INTRAVENOUS; SUBCUTANEOUS at 07:58

## 2024-04-20 RX ADMIN — MORPHINE SULFATE 4 MG: 4 INJECTION, SOLUTION INTRAMUSCULAR; INTRAVENOUS at 05:45

## 2024-04-20 RX ADMIN — HYDROCODONE BITARTRATE AND ACETAMINOPHEN 1 TABLET: 5; 325 TABLET ORAL at 16:29

## 2024-04-20 RX ADMIN — PANTOPRAZOLE SODIUM 40 MG: 40 TABLET, DELAYED RELEASE ORAL at 07:59

## 2024-04-20 RX ADMIN — MAGNESIUM SULFATE HEPTAHYDRATE 2000 MG: 40 INJECTION, SOLUTION INTRAVENOUS at 05:50

## 2024-04-20 RX ADMIN — SODIUM CHLORIDE, PRESERVATIVE FREE 10 ML: 5 INJECTION INTRAVENOUS at 20:33

## 2024-04-20 RX ADMIN — ONDANSETRON 4 MG: 2 INJECTION INTRAMUSCULAR; INTRAVENOUS at 05:43

## 2024-04-20 RX ADMIN — ENOXAPARIN SODIUM 40 MG: 100 INJECTION SUBCUTANEOUS at 10:13

## 2024-04-20 RX ADMIN — FUROSEMIDE 20 MG: 10 INJECTION, SOLUTION INTRAMUSCULAR; INTRAVENOUS at 11:48

## 2024-04-20 RX ADMIN — FUROSEMIDE 20 MG: 10 INJECTION, SOLUTION INTRAMUSCULAR; INTRAVENOUS at 19:44

## 2024-04-20 RX ADMIN — SPIRONOLACTONE 25 MG: 25 TABLET ORAL at 10:12

## 2024-04-20 RX ADMIN — FUROSEMIDE 20 MG: 20 TABLET ORAL at 10:12

## 2024-04-20 RX ADMIN — CHOLESTYRAMINE 4 G: 4 POWDER, FOR SUSPENSION ORAL at 10:10

## 2024-04-20 ASSESSMENT — PAIN - FUNCTIONAL ASSESSMENT
PAIN_FUNCTIONAL_ASSESSMENT: PREVENTS OR INTERFERES SOME ACTIVE ACTIVITIES AND ADLS
PAIN_FUNCTIONAL_ASSESSMENT: 0-10
PAIN_FUNCTIONAL_ASSESSMENT: 0-10
PAIN_FUNCTIONAL_ASSESSMENT: PREVENTS OR INTERFERES SOME ACTIVE ACTIVITIES AND ADLS

## 2024-04-20 ASSESSMENT — LIFESTYLE VARIABLES
HOW MANY STANDARD DRINKS CONTAINING ALCOHOL DO YOU HAVE ON A TYPICAL DAY: PATIENT DOES NOT DRINK
HOW OFTEN DO YOU HAVE A DRINK CONTAINING ALCOHOL: NEVER

## 2024-04-20 ASSESSMENT — PAIN SCALES - GENERAL
PAINLEVEL_OUTOF10: 6
PAINLEVEL_OUTOF10: 10
PAINLEVEL_OUTOF10: 10
PAINLEVEL_OUTOF10: 8

## 2024-04-20 ASSESSMENT — PAIN DESCRIPTION - ORIENTATION
ORIENTATION: LEFT

## 2024-04-20 ASSESSMENT — PAIN DESCRIPTION - LOCATION
LOCATION: FOOT
LOCATION: FOOT;LEG
LOCATION: FOOT
LOCATION: FOOT

## 2024-04-20 ASSESSMENT — PAIN DESCRIPTION - FREQUENCY: FREQUENCY: CONTINUOUS

## 2024-04-20 ASSESSMENT — PAIN DESCRIPTION - PAIN TYPE: TYPE: ACUTE PAIN

## 2024-04-20 ASSESSMENT — PAIN DESCRIPTION - DESCRIPTORS
DESCRIPTORS: SHARP;THROBBING;TINGLING
DESCRIPTORS: DISCOMFORT;SHARP

## 2024-04-20 ASSESSMENT — PAIN DESCRIPTION - ONSET: ONSET: ON-GOING

## 2024-04-20 NOTE — PROGRESS NOTES
ID consult completed via perfect serve. Dr. Jackson on call. Electronically signed by Maggie Rudd RN on 4/20/2024 at 8:05 AM

## 2024-04-20 NOTE — PROGRESS NOTES
Pt seen and examined by night physician who admitted pt earlier today. Chart reviewed and updated by nursing.

## 2024-04-20 NOTE — CARE COORDINATION
CASE MANAGEMENT..... Patient discharged to [facility] yesterday for therapy, left foot wound care and iv antibiotics. Met with patient at the bedside and she states she returned because the facility did not have the right size wound vac, so she had her   bring her back to the ER. States that without the wound vac she the pain ws increasing and her wound kept draining. Confirmed with Li from the facility that their wound care nurse did address the issue and vac would have been available next day. Jaylene is interested in [facility] again at discharge, but does not wish to return to [facility]. List of [facility] choices provided and PT/OT order obtained. In the meantime, ID/Podiatry are on consult. Has PICC line pta. On iv invanz. During our conversation, Jaylene c/o abd pain with significant weight gain. She does have h/o etoh abuse with cirrhosis of the liver. Is on po lasix and po aldactone. Has never had paracentesis. Dr Stevens on the unit, was notified and will address. Will cont to follow along and assist with needs accordingly.

## 2024-04-20 NOTE — CONSULTS
Department of Podiatry   Consult Note        Reason for Consult:  left foot care    CHIEF COMPLAINT:  left foot    HISTORY OF PRESENT ILLNESS:                The patient is a 44 y.o. female with significant past medical history of left foot wound, diabetes mellitus. Patient was just discharged from hospital yesterday but returned after being unhappy with care at [facility]. Patient reports that she had a lot of confusion with her caregivers at [facility] and does not wish to return there when she is discharged from the hospital. Patient had questions concerning her wound vac that she felt were not addressed at the facility. Patient denies any N/V/D/F/C/SOB/CP and has no other pedal complaints at this time.     Past Medical History:        Diagnosis Date    Heart burn     History of alcohol abuse     Hypertension     Liver cirrhosis (HCC)        Past Surgical History:        Procedure Laterality Date    FOOT DEBRIDEMENT Left 4/11/2024    LEFT FOOT AND ANKLE INCISION AND DRAINAGE performed by Florentin Grover DPM at [facility] OR    FOOT DEBRIDEMENT Left 4/17/2024    LEFT FOOT INCISION AND DRAINAGE performed by Florentin rGover DPM at [facility] OR    PICC INSERTION VASCULAR ACCESS TEAM  4/14/2024    UPPER GASTROINTESTINAL ENDOSCOPY N/A 2/16/2024    EGD ESOPHAGOGASTRODUODENOSCOPY WITH DILATION performed by Dwayne Quinones DO at [facility] ENDOSCOPY       Medications Prior to Admission:    Medications Prior to Admission: furosemide (LASIX) 20 MG tablet, Take 1 tablet by mouth daily  spironolactone (ALDACTONE) 25 MG tablet, Take 1 tablet by mouth daily  HYDROcodone-acetaminophen (NORCO) 5-325 MG per tablet, Take 1 tablet by mouth every 6 hours as needed for Pain for up to 3 days. Max Daily Amount: 4 tablets  ertapenem (INVANZ) infusion, Infuse 1,000 mg intravenously every 24 hours for 21 days Compound per protocol.  colestipol (COLESTID) 1 g tablet, Take 1 tablet by mouth 2 times daily  pantoprazole (PROTONIX) 40 MG tablet, Take 1 tablet by mouth 2

## 2024-04-20 NOTE — ED PROVIDER NOTES
[facility] MED [facility]  EMERGENCY DEPARTMENT ENCOUNTER      Pt Name: Jaylene Kapoor  MRN: [id number]  Birthdate 1980  Date of evaluation: 4/20/2024  Provider: Grzegorz Vergara DO  PCP: Logan Lee III, DO  Note Started: 8:44 AM EDT 4/20/24    CHIEF COMPLAINT       Chief Complaint   Patient presents with    Wound Check     Left foot. Pt supposed to have a wound vac on, states [facility] doesn't have the equipment she needs.        HISTORY OF PRESENT ILLNESS: 1 or more Elements   History From: Patient  Limitations to history : None    Jaylene Kapoor is a 44 y.o. female who presents with complaints of left lower extremity pain and chronic wound of left foot.  Per patient and per chart review she was discharged from this facility on 4/19/2024 which is yesterday to outpatient rehab after she was admitted for left open foot wound and concern for sepsis, patient is status post excisional wound debridement, application of skin graft, delayed primary closure of the left foot.  Was discharged with PICC line for 1 g ertapenem IV daily, was recommended for wound VAC Monday Wednesday Friday by podiatry.  Patient reports he was discharged to [facility] rehab facility and facility did not have compatible wound VAC.  She reports significant dissatisfaction with the rehab facility and does not wish to return which family present also support.  She currently denies chest pain, shortness of breath, dizziness, dysuria, diarrhea, constipation.  Denies objective fevers.    Nursing Notes were all reviewed and agreed with or any disagreements were addressed in the HPI.    REVIEW OF SYSTEMS :    Positives and Pertinent negatives as per HPI.     PAST MEDICAL HISTORY/Chronic Conditions Affecting Care    has a past medical history of Heart burn, History of alcohol abuse, Hypertension, and Liver cirrhosis (HCC).     SURGICAL HISTORY     Past Surgical History:   Procedure Laterality Date    FOOT DEBRIDEMENT Left 4/11/2024    LEFT FOOT AND ANKLE  require admission for rehabilitation placement, wound care and podiatry evaluation.    I discussed the results of the workup with the patient as well as the recommendation for admission.  Patient verbalizes their understanding and is agreeable and amenable to the plan at this time.  All questions answered, through shared decision making we will plan for admission.    Disposition Considerations (Tests not ordered but considered, Shared Decision Making, Pt Expectation of Test or Tx.): Admit    FINAL IMPRESSION      1. Visit for wound check    2. Wound infection          DISPOSITION/PLAN     DISPOSITION Admitted 04/20/2024 05:05:04 AM    PATIENT REFERRED TO:  No follow-up provider specified.    DISCHARGE MEDICATIONS:  Current Discharge Medication List          DISCONTINUED MEDICATIONS:  Current Discharge Medication List               (Please note that portions of this note were completed with a voice recognition program.  Efforts were made to edit the dictations but occasionally words are mis-transcribed.)    Grzegorz Vergara DO (electronically signed)      Grzegorz Vergara DO  04/21/24 0871

## 2024-04-20 NOTE — H&P
[facility] History and Physical      CHIEF COMPLAINT: Wound check    History of Present Illness:  This is a 44-year-old female with a past medical history of alcohol abuse, hypertension, cirrhosis, and left foot osteomyelitis who presents to the ED for a wound check.  Patient was recently admitted on 4/9/2024 and treated for sepsis due to osteomyelitis of the fifth metatarsal and cellulitis of the left foot.  Blood cultures were positive for Serratia marcescens. She underwent a excisional wound debridement and application of skin substitute graft on 4/17, followed inpatient by ID.  Patient was discharged yesterday afternoon to [facility] with PICC line, on ertapenem.  Patient states the wound VAC they had at [facility] was not functional and she was told they would have to order another wound VAC and they would not receive it until sometime next week.  Patient states she was very concerned about infection and decided to sign out AMA.  Patient does not wish to go back to [facility] and would like to consider going home with home health care.  Complains of pain in her left foot and toes.  Skin graft intact, serosanguineous drainage noted. Denies fevers, chills, nausea, vomiting, diarrhea, or dysuria.  Left foot x-ray showed new deformity of the right distal fifth metatarsal, sequela of osteomyelitis versus postsurgical change.  Admitted for observation.    Informant(s) for H&P: Patient and chart review    REVIEW OF SYSTEMS:  A comprehensive review of systems was negative except for: what is in the HPI      PMH:  Past Medical History:   Diagnosis Date    Heart burn     History of alcohol abuse     Hypertension     Liver cirrhosis (HCC)        Surgical History:  Past Surgical History:   Procedure Laterality Date    FOOT DEBRIDEMENT Left 4/11/2024    LEFT FOOT AND ANKLE INCISION AND DRAINAGE performed by Florentin Grover DPM at [facility] OR    FOOT DEBRIDEMENT Left 4/17/2024    LEFT FOOT INCISION AND  cirrhosis: Continue home regimen of Lasix and Aldactone.  Chronic anemia: Received 1 unit of blood during prior admission.  Trend hemoglobin and transfuse for hemoglobin less than 7.  Thrombocytopenia: Secondary to liver cirrhosis.  Monitor for bleeding.  H/o alcohol use disorder: Patient reports that she has recently stopped drinking alcohol.    Code Status: Full  DVT prophylaxis: Lovenox    45 minutes or more spent reviewing patient chart, assessing patient, discussing plan of care with patient and family, discussing plan of care with collaborating physician, and documentation.    NOTE: This report was transcribed using voice recognition software. Every effort was made to ensure accuracy; however, inadvertent computerized transcription errors may be present.  Electronically signed by ROBERTO CARLOS Yeh CNP on 4/20/2024 at 5:11 AM

## 2024-04-20 NOTE — ED NOTES
ED to Inpatient Handoff Report    Notified 5W rn that electronic handoff available and patient ready for transport to room 520.    Safety Risks: Risk of falls    Patient in Restraints: no    Constant Observer or Patient : no    Telemetry Monitoring Ordered: No          Order to transfer to unit without monitor: NA    Last MEWS: 1 Time completed: 542    Deterioration Index: 26.14    Vitals:    04/20/24 0217 04/20/24 0218 04/20/24 0542   BP: 134/84  105/65   Pulse: (!) 115  97   Resp: 18  18   Temp:  98.4 °F (36.9 °C) 97.4 °F (36.3 °C)   TempSrc: Oral Oral Oral   SpO2: 100%  99%   Weight: 79.4 kg (175 lb)     Height: 1.524 m (5')         Opportunity for questions and clarification was provided.

## 2024-04-20 NOTE — CONSULTS
[facility] CONSULT NOTE    Reason for Consult: Ongoing antibiotic therapy for left fifth metatarsal necrotizing soft tissue infection with Serratia marcescens bacteremia  Requested by: Renee Rodriguez DO      Chief complaint: Placement    History Obtained From: Patient and EMR    HISTORY OFPRESENT ILLNESS              The patient is a 44 y.o. female with history of cirrhosis, alcohol abuse, hypertension, previously admitted from 04/09-04/19 for sepsis secondary to left fifth metatarsal necrotizing soft tissue infection with Serratia marcescens bacteremia s/p I&D on 04/11 with wound closure on 04/18 and for which she was seen by Dr. Small and was discharged on 2 weeks of ertapenem, readmitted on 04/20 for placement issues after patient signed out AGAINST MEDICAL ADVICE from nursing facility where she was told that they would not have wound VAC until sometime next week.  On admission, she was afebrile and hemodynamically stable with no leukocytosis.  Left foot x-ray showed new deformity of distal fifth metatarsal likely sequela of osteomyelitis versus postsurgical change.  Ertapenem was continued.  ID service was subsequently consulted for further recommendations.    Past Medical History  Past Medical History:   Diagnosis Date    Heart burn     History of alcohol abuse     Hypertension     Liver cirrhosis (HCC)        Current Facility-Administered Medications   Medication Dose Route Frequency Provider Last Rate Last Admin    sodium chloride flush 0.9 % injection 5-40 mL  5-40 mL IntraVENous 2 times per day Renee Rodriguez DO        sodium chloride flush 0.9 % injection 5-40 mL  5-40 mL IntraVENous PRN Renee Rodriguez DO        0.9 % sodium chloride infusion   IntraVENous PRN Renee Rodriguez DO        enoxaparin (LOVENOX) injection 40 mg  40 mg SubCUTAneous Daily Renee Rodriguez DO        acetaminophen (TYLENOL) tablet 650 mg  650 mg Oral Q6H PRN Renee Rodriguez DO        Or    acetaminophen (TYLENOL)

## 2024-04-21 PROBLEM — E87.20 ACIDOSIS: Status: ACTIVE | Noted: 2024-04-21

## 2024-04-21 PROBLEM — L03.90 CELLULITIS: Status: ACTIVE | Noted: 2024-04-21

## 2024-04-21 LAB
ALBUMIN SERPL-MCNC: 1.9 G/DL (ref 3.5–5.2)
ALP SERPL-CCNC: 209 U/L (ref 35–104)
ALT SERPL-CCNC: 15 U/L (ref 0–32)
ANION GAP SERPL CALCULATED.3IONS-SCNC: 6 MMOL/L (ref 7–16)
AST SERPL-CCNC: 37 U/L (ref 0–31)
BASOPHILS # BLD: 0.07 K/UL (ref 0–0.2)
BASOPHILS NFR BLD: 1 % (ref 0–2)
BILIRUB SERPL-MCNC: 2.7 MG/DL (ref 0–1.2)
BUN SERPL-MCNC: 20 MG/DL (ref 6–20)
CALCIUM SERPL-MCNC: 7.7 MG/DL (ref 8.6–10.2)
CHLORIDE SERPL-SCNC: 111 MMOL/L (ref 98–107)
CO2 SERPL-SCNC: 18 MMOL/L (ref 22–29)
CREAT SERPL-MCNC: 1 MG/DL (ref 0.5–1)
EOSINOPHIL # BLD: 0.31 K/UL (ref 0.05–0.5)
EOSINOPHILS RELATIVE PERCENT: 4 % (ref 0–6)
ERYTHROCYTE [DISTWIDTH] IN BLOOD BY AUTOMATED COUNT: 17.5 % (ref 11.5–15)
GFR SERPL CREATININE-BSD FRML MDRD: 70 ML/MIN/1.73M2
GLUCOSE SERPL-MCNC: 106 MG/DL (ref 74–99)
HCT VFR BLD AUTO: 22.5 % (ref 34–48)
HGB BLD-MCNC: 7.3 G/DL (ref 11.5–15.5)
IMM GRANULOCYTES # BLD AUTO: 0.05 K/UL (ref 0–0.58)
IMM GRANULOCYTES NFR BLD: 1 % (ref 0–5)
LYMPHOCYTES NFR BLD: 1.58 K/UL (ref 1.5–4)
LYMPHOCYTES RELATIVE PERCENT: 18 % (ref 20–42)
MAGNESIUM SERPL-MCNC: 1.6 MG/DL (ref 1.6–2.6)
MCH RBC QN AUTO: 30.8 PG (ref 26–35)
MCHC RBC AUTO-ENTMCNC: 32.4 G/DL (ref 32–34.5)
MCV RBC AUTO: 94.9 FL (ref 80–99.9)
MICROORGANISM SPEC CULT: NORMAL
MICROORGANISM/AGENT SPEC: NORMAL
MONOCYTES NFR BLD: 0.8 K/UL (ref 0.1–0.95)
MONOCYTES NFR BLD: 9 % (ref 2–12)
NEUTROPHILS NFR BLD: 69 % (ref 43–80)
NEUTS SEG NFR BLD: 6.17 K/UL (ref 1.8–7.3)
PHOSPHATE SERPL-MCNC: 3.9 MG/DL (ref 2.5–4.5)
PLATELET, FLUORESCENCE: 125 K/UL (ref 130–450)
PMV BLD AUTO: 11 FL (ref 7–12)
POTASSIUM SERPL-SCNC: 4.4 MMOL/L (ref 3.5–5)
PROT SERPL-MCNC: 5.3 G/DL (ref 6.4–8.3)
RBC # BLD AUTO: 2.37 M/UL (ref 3.5–5.5)
SERVICE CMNT-IMP: NORMAL
SODIUM SERPL-SCNC: 135 MMOL/L (ref 132–146)
SPECIMEN DESCRIPTION: NORMAL
WBC OTHER # BLD: 9 K/UL (ref 4.5–11.5)

## 2024-04-21 PROCEDURE — 96376 TX/PRO/DX INJ SAME DRUG ADON: CPT

## 2024-04-21 PROCEDURE — 6360000002 HC RX W HCPCS: Performed by: INTERNAL MEDICINE

## 2024-04-21 PROCEDURE — 99232 SBSQ HOSP IP/OBS MODERATE 35: CPT | Performed by: INTERNAL MEDICINE

## 2024-04-21 PROCEDURE — 80053 COMPREHEN METABOLIC PANEL: CPT

## 2024-04-21 PROCEDURE — 6370000000 HC RX 637 (ALT 250 FOR IP): Performed by: FAMILY MEDICINE

## 2024-04-21 PROCEDURE — 2580000003 HC RX 258: Performed by: FAMILY MEDICINE

## 2024-04-21 PROCEDURE — G0378 HOSPITAL OBSERVATION PER HR: HCPCS

## 2024-04-21 PROCEDURE — 84100 ASSAY OF PHOSPHORUS: CPT

## 2024-04-21 PROCEDURE — 83735 ASSAY OF MAGNESIUM: CPT

## 2024-04-21 PROCEDURE — 6360000002 HC RX W HCPCS: Performed by: CLINICAL NURSE SPECIALIST

## 2024-04-21 PROCEDURE — 85025 COMPLETE CBC W/AUTO DIFF WBC: CPT

## 2024-04-21 PROCEDURE — 6360000002 HC RX W HCPCS: Performed by: FAMILY MEDICINE

## 2024-04-21 PROCEDURE — A4216 STERILE WATER/SALINE, 10 ML: HCPCS | Performed by: FAMILY MEDICINE

## 2024-04-21 PROCEDURE — 96372 THER/PROPH/DIAG INJ SC/IM: CPT

## 2024-04-21 RX ORDER — HEPARIN 100 UNIT/ML
300 SYRINGE INTRAVENOUS PRN
Status: DISCONTINUED | OUTPATIENT
Start: 2024-04-21 | End: 2024-04-23 | Stop reason: HOSPADM

## 2024-04-21 RX ORDER — HEPARIN 100 UNIT/ML
300 SYRINGE INTRAVENOUS 2 TIMES DAILY
Status: DISCONTINUED | OUTPATIENT
Start: 2024-04-21 | End: 2024-04-23 | Stop reason: HOSPADM

## 2024-04-21 RX ADMIN — HYDROCODONE BITARTRATE AND ACETAMINOPHEN 1 TABLET: 5; 325 TABLET ORAL at 17:38

## 2024-04-21 RX ADMIN — FUROSEMIDE 20 MG: 10 INJECTION, SOLUTION INTRAMUSCULAR; INTRAVENOUS at 17:38

## 2024-04-21 RX ADMIN — SODIUM CHLORIDE, PRESERVATIVE FREE 10 ML: 5 INJECTION INTRAVENOUS at 09:33

## 2024-04-21 RX ADMIN — HYDROCODONE BITARTRATE AND ACETAMINOPHEN 1 TABLET: 5; 325 TABLET ORAL at 07:30

## 2024-04-21 RX ADMIN — SODIUM CHLORIDE, PRESERVATIVE FREE 10 ML: 5 INJECTION INTRAVENOUS at 20:13

## 2024-04-21 RX ADMIN — FUROSEMIDE 20 MG: 10 INJECTION, SOLUTION INTRAMUSCULAR; INTRAVENOUS at 09:32

## 2024-04-21 RX ADMIN — HYDROCODONE BITARTRATE AND ACETAMINOPHEN 1 TABLET: 5; 325 TABLET ORAL at 23:35

## 2024-04-21 RX ADMIN — SODIUM CHLORIDE 1000 MG: 9 INJECTION INTRAMUSCULAR; INTRAVENOUS; SUBCUTANEOUS at 08:26

## 2024-04-21 RX ADMIN — Medication 300 UNITS: at 20:13

## 2024-04-21 RX ADMIN — ENOXAPARIN SODIUM 40 MG: 100 INJECTION SUBCUTANEOUS at 08:26

## 2024-04-21 RX ADMIN — CHOLESTYRAMINE 4 G: 4 POWDER, FOR SUSPENSION ORAL at 08:26

## 2024-04-21 RX ADMIN — PANTOPRAZOLE SODIUM 40 MG: 40 TABLET, DELAYED RELEASE ORAL at 06:04

## 2024-04-21 RX ADMIN — PANTOPRAZOLE SODIUM 40 MG: 40 TABLET, DELAYED RELEASE ORAL at 15:45

## 2024-04-21 RX ADMIN — SODIUM CHLORIDE, PRESERVATIVE FREE 10 ML: 5 INJECTION INTRAVENOUS at 08:26

## 2024-04-21 RX ADMIN — HYDROCODONE BITARTRATE AND ACETAMINOPHEN 1 TABLET: 5; 325 TABLET ORAL at 01:02

## 2024-04-21 ASSESSMENT — PAIN DESCRIPTION - DESCRIPTORS
DESCRIPTORS: ACHING;DISCOMFORT
DESCRIPTORS: ACHING;SHARP
DESCRIPTORS: ACHING;DISCOMFORT
DESCRIPTORS: ACHING;DISCOMFORT

## 2024-04-21 ASSESSMENT — PAIN DESCRIPTION - PAIN TYPE: TYPE: ACUTE PAIN

## 2024-04-21 ASSESSMENT — PAIN DESCRIPTION - LOCATION
LOCATION: ANKLE;FOOT
LOCATION: FOOT
LOCATION: FOOT
LOCATION: ANKLE;FOOT;KNEE

## 2024-04-21 ASSESSMENT — PAIN SCALES - GENERAL
PAINLEVEL_OUTOF10: 7
PAINLEVEL_OUTOF10: 4
PAINLEVEL_OUTOF10: 7
PAINLEVEL_OUTOF10: 8
PAINLEVEL_OUTOF10: 7

## 2024-04-21 ASSESSMENT — PAIN DESCRIPTION - ORIENTATION
ORIENTATION: LEFT

## 2024-04-21 ASSESSMENT — PAIN DESCRIPTION - FREQUENCY: FREQUENCY: CONTINUOUS

## 2024-04-21 ASSESSMENT — PAIN DESCRIPTION - ONSET: ONSET: ON-GOING

## 2024-04-21 NOTE — PROGRESS NOTES
Patient would like an order for omeprazole as discussed during last visit sent to [facility]. Please advise if an appointment is necessary Spoke with Dr. Duque, podiatry resident, pt is okay to be Weight bearing as tolerated with wound vac in place. See new order. Electronically signed by Maggie Rudd RN on 4/21/2024 at 10:58 AM

## 2024-04-21 NOTE — PROGRESS NOTES
[facility]  [facility]  Progress Note      Chief Complaint   Patient presents with    Wound Check     Left foot. Pt supposed to have a wound vac on, states [facility] doesn't have the equipment she needs.        SUBJECTIVE:  Patient is tolerating medications. No reported adverse drug reactions.  No nausea, vomiting, diarrhea.    Review of systems:  As stated above in the chief complaint, otherwise negative.    Medications:  Scheduled Meds:   sodium chloride flush  5-40 mL IntraVENous 2 times per day    enoxaparin  40 mg SubCUTAneous Daily    cholestyramine light  4 g Oral Daily    pantoprazole  40 mg Oral BID AC    spironolactone  25 mg Oral Daily    ertapenem (INVanz) IV  1,000 mg IntraVENous Q24H    furosemide  20 mg IntraVENous BID     Continuous Infusions:   sodium chloride       PRN Meds:sodium chloride flush, sodium chloride, acetaminophen **OR** acetaminophen, HYDROcodone-acetaminophen    OBJECTIVE:  BP (!) 102/52   Pulse 80   Temp 98 °F (36.7 °C) (Oral)   Resp 18   Ht 1.524 m (5')   Wt 74.4 kg (164 lb 1 oz)   SpO2 98%   BMI 32.04 kg/m²   Temp  Av.2 °F (36.8 °C)  Min: 98 °F (36.7 °C)  Max: 98.5 °F (36.9 °C)  Constitutional: The patient is awake, alert, and oriented.   Skin: Warm and dry. No rashes were noted.   HEENT: Round and reactive pupils.  Moist mucous membranes.  No ulcerations or thrush.  Neck: Supple to movements.   Chest: No use of accessory muscles to breathe. Symmetrical expansion.  No wheezing, crackles or rhonchi.  Cardiovascular: S1 and S2 are rhythmic and regular. No murmurs appreciated.   Abdomen: Positive bowel sounds to auscultation. Benign to palpation. No masses felt. No hepatosplenomegaly.  Genitourinary:   Extremities: No clubbing, no cyanosis, no edema.  -left foot dressed  Lines: PICC    Laboratory and Tests Review:  Lab Results   Component Value Date    WBC 9.0 2024    WBC 10.5 2024    WBC 10.6 2024    HGB 7.3 (L) 2024

## 2024-04-21 NOTE — PROGRESS NOTES
Spoke with Joni Johnson NP okay to place heparin flush order. Electronically signed by Maggie Rudd RN on 4/21/2024 at 12:33 PM

## 2024-04-21 NOTE — PLAN OF CARE
Problem: Safety - Adult  Goal: Free from fall injury  4/21/2024 1048 by Tracy Landeros RN  Outcome: Progressing  4/21/2024 0056 by Chanelle Cline RN  Outcome: Progressing  4/21/2024 0042 by Chanelle Cline RN  Outcome: Progressing     Problem: Pain  Goal: Verbalizes/displays adequate comfort level or baseline comfort level  4/21/2024 1048 by Tracy Landeros RN  Outcome: Progressing  4/21/2024 0056 by Chanelle Cline RN  Outcome: Progressing  4/21/2024 0042 by Chanelle Cline RN  Outcome: Progressing     Problem: Skin/Tissue Integrity  Goal: Absence of new skin breakdown  Description: 1.  Monitor for areas of redness and/or skin breakdown  2.  Assess vascular access sites hourly  3.  Every 4-6 hours minimum:  Change oxygen saturation probe site  4.  Every 4-6 hours:  If on nasal continuous positive airway pressure, respiratory therapy assess nares and determine need for appliance change or resting period.  4/21/2024 1048 by Tracy Landeros RN  Outcome: Progressing  4/21/2024 0056 by Chanelle Cline RN  Outcome: Progressing  4/21/2024 0042 by Chanelle Cline RN  Outcome: Progressing

## 2024-04-21 NOTE — PROGRESS NOTES
[facility] Hospitalist   Progress Note    Admitting Date and Time: 4/20/2024  2:24 AM  Admit Dx: Wound infection [T14.8XXA, L08.9]    Subjective:    Patient was admitted with Wound infection [T14.8XXA, L08.9]. Patient denies fever, chills, cp, sob, n/v.     heparin (PF)  300 Units IntraCATHeter BID    sodium chloride flush  5-40 mL IntraVENous 2 times per day    enoxaparin  40 mg SubCUTAneous Daily    cholestyramine light  4 g Oral Daily    pantoprazole  40 mg Oral BID AC    spironolactone  25 mg Oral Daily    ertapenem (INVanz) IV  1,000 mg IntraVENous Q24H    furosemide  20 mg IntraVENous BID     heparin (PF), 300 Units, PRN  sodium chloride flush, 5-40 mL, PRN  sodium chloride, , PRN  acetaminophen, 650 mg, Q6H PRN   Or  acetaminophen, 650 mg, Q6H PRN  HYDROcodone-acetaminophen, 1 tablet, Q6H PRN         Objective:    BP (!) 102/52   Pulse 80   Temp 98 °F (36.7 °C) (Oral)   Resp 18   Ht 1.524 m (5')   Wt 74.4 kg (164 lb 1 oz)   SpO2 98%   BMI 32.04 kg/m²   Skin: warm and dry, no rash or erythema  Pulmonary/Chest: clear to auscultation bilaterally- no wheezes, rales or rhonchi, normal air movement, no respiratory distress  Cardiovascular: rhythm reg at rate of 84  Abdomen: soft, non-tender, non-distended, normal bowel sounds, no masses or organomegaly  Extremities: no cyanosis, no clubbing, and no edema      Recent Labs     04/19/24  0251 04/20/24  0339 04/21/24  1125    133 135   K 4.6 4.6 4.4   * 108* 111*   CO2 17* 17* 18*   BUN 23* 23* 20   CREATININE 1.1* 1.1* 1.0   GLUCOSE 96 95 106*   CALCIUM 7.7* 7.9* 7.7*       Recent Labs     04/19/24  0251 04/20/24  0339 04/21/24  1125   WBC 10.6 10.5 9.0   RBC 2.38* 2.52* 2.37*   HGB 7.2* 7.5* 7.3*   HCT 22.4* 23.6* 22.5*   MCV 94.1 93.7 94.9   MCH 30.3 29.8 30.8   MCHC 32.1 31.8* 32.4   RDW 17.9* 17.8* 17.5*   *  --   --    MPV 11.2 11.1 11.0       CBC with Differential:    Lab Results   Component Value Date/Time    WBC 9.0  04/21/2024 11:25 AM    RBC 2.37 04/21/2024 11:25 AM    HGB 7.3 04/21/2024 11:25 AM    HCT 22.5 04/21/2024 11:25 AM     04/19/2024 02:51 AM    MCV 94.9 04/21/2024 11:25 AM    MCH 30.8 04/21/2024 11:25 AM    MCHC 32.4 04/21/2024 11:25 AM    RDW 17.5 04/21/2024 11:25 AM    NRBC 1 02/28/2024 05:10 AM    METASPCT 1 02/26/2024 07:03 PM    LYMPHOPCT 18 04/21/2024 11:25 AM    PROMYELOPCT 2 02/15/2024 06:33 AM    MONOPCT 9 04/21/2024 11:25 AM    MYELOPCT 1 02/28/2024 05:10 AM    BASOPCT 1 04/21/2024 11:25 AM    MONOSABS 0.80 04/21/2024 11:25 AM    LYMPHSABS 1.58 04/21/2024 11:25 AM    EOSABS 0.31 04/21/2024 11:25 AM    BASOSABS 0.07 04/21/2024 11:25 AM     CMP:    Lab Results   Component Value Date/Time     04/21/2024 11:25 AM    K 4.4 04/21/2024 11:25 AM     04/21/2024 11:25 AM    CO2 18 04/21/2024 11:25 AM    BUN 20 04/21/2024 11:25 AM    CREATININE 1.0 04/21/2024 11:25 AM    GFRAA >60 02/25/2020 01:43 PM    LABGLOM 70 04/21/2024 11:25 AM    GLUCOSE 106 04/21/2024 11:25 AM    PROT 5.3 04/21/2024 11:25 AM    CALCIUM 7.7 04/21/2024 11:25 AM    BILITOT 2.7 04/21/2024 11:25 AM    ALKPHOS 209 04/21/2024 11:25 AM    AST 37 04/21/2024 11:25 AM    ALT 15 04/21/2024 11:25 AM     Magnesium:    Lab Results   Component Value Date/Time    MG 1.6 04/21/2024 11:25 AM     Phosphorus:    Lab Results   Component Value Date/Time    PHOS 3.9 04/21/2024 11:25 AM        Radiology:   XR CHEST PORTABLE   Final Result   Interval placement of right upper extremity PICC, with tip projecting over   the lower SVC.         XR FOOT LEFT (MIN 3 VIEWS)   Final Result   New deformity of the right distal 5th metatarsal, likely sequela of   osteomyelitis versus postsurgical change.             Assessment:    Principal Problem:    Wound infection  Active Problems:    Cirrhosis of liver (HCC)    Hypomagnesemia    Primary hypertension    Necrotizing soft tissue infection of left foot    Osteomyelitis (HCC)    Anemia, chronic disease  Resolved

## 2024-04-21 NOTE — PROGRESS NOTES
Pt was on the bedpan put her call light on and then shortly after we heard her yelling \"I need help, I fell\".  Pt was found on the floor leaning against the bed on her knees.  Helped pt back up to the bed, obtained vitals and assessed for any injuries.  Herbert knees have slight redness to them but nothing open. Pt states she hit her head just slightly on floor when we went back but it does not hurt and denies LOC.  Foot wound dressing also is still CDI and pt states she did not hit that on anything.  Updated Nadia Hernandez NP and her partner, Pierre, about the fall.

## 2024-04-21 NOTE — PROGRESS NOTES
Shortly after leaving pts room after her fall her bed alarm was going off. Staff went into room and as we were heading there alarm turned off and we witness pt turning off her own bed alarm.  Pt was educated that the alarm is on for her safety and to prevent falls and that she is not to be turning it off. I asked pt if she turned it off when she had her fall and she did admit to turning it off at that time.  Again education was provided and pt verbalized understanding.   Electronically signed by Chanelle Cline RN on 4/20/2024 at 11:49 PM

## 2024-04-21 NOTE — PLAN OF CARE
Problem: Safety - Adult  Goal: Free from fall injury  4/21/2024 1049 by Tracy Landeros RN  Outcome: Progressing  4/21/2024 1048 by Tracy Landeros RN  Outcome: Progressing  4/21/2024 0056 by Chanelle Cline RN  Outcome: Progressing  4/21/2024 0042 by Chanelle Cline RN  Outcome: Progressing     Problem: Pain  Goal: Verbalizes/displays adequate comfort level or baseline comfort level  4/21/2024 1049 by Tracy Landeros RN  Outcome: Progressing  4/21/2024 1048 by Tracy Landeros RN  Outcome: Progressing  4/21/2024 0056 by Chanelle Cline RN  Outcome: Progressing  4/21/2024 0042 by Chanelle Cline RN  Outcome: Progressing     Problem: Skin/Tissue Integrity  Goal: Absence of new skin breakdown  Description: 1.  Monitor for areas of redness and/or skin breakdown  2.  Assess vascular access sites hourly  3.  Every 4-6 hours minimum:  Change oxygen saturation probe site  4.  Every 4-6 hours:  If on nasal continuous positive airway pressure, respiratory therapy assess nares and determine need for appliance change or resting period.  4/21/2024 1049 by Tracy Landeros RN  Outcome: Progressing  4/21/2024 1048 by Tracy Landeros RN  Outcome: Progressing  4/21/2024 0056 by Chanelle Cline RN  Outcome: Progressing  4/21/2024 0042 by Chanelle Cline RN  Outcome: Progressing

## 2024-04-21 NOTE — PROGRESS NOTES
Department of Podiatry  Progress Note    SUBJECTIVE:  Patient seen bedside for left foot care. Patient is feeling well today. Wound vac applied today. [facility]. No acute events overnight. Patient denies any N/V/D/F/C/SOB/CP and has no other pedal complaints at this time.     OBJECTIVE:    Scheduled Meds:   sodium chloride flush  5-40 mL IntraVENous 2 times per day    enoxaparin  40 mg SubCUTAneous Daily    cholestyramine light  4 g Oral Daily    pantoprazole  40 mg Oral BID AC    spironolactone  25 mg Oral Daily    ertapenem (INVanz) IV  1,000 mg IntraVENous Q24H    furosemide  20 mg IntraVENous BID     Continuous Infusions:   sodium chloride       PRN Meds:.sodium chloride flush, sodium chloride, acetaminophen **OR** acetaminophen, HYDROcodone-acetaminophen    No Known Allergies    BP (!) 102/52   Pulse 80   Temp 98 °F (36.7 °C) (Oral)   Resp 18   Ht 1.524 m (5')   Wt 74.4 kg (164 lb 1 oz)   SpO2 98%   BMI 32.04 kg/m²       EXAM:    VASCULAR:  DP and PT pulses are faintly palpable due to edema. CFT < 5 seconds to the dee of all 5 digits.  Warm to warm from the tibial tuberosity to the distal aspect of the digits.     NEUROLOGIC:  Protective sensation is diminished by grossly intact     DERM: Large surgical wound noted to dorsal/lateral foot and ankle. There is an integra graft intact to the wound with staples intact. There is mild sanguinous drainage. There is no purulence, crepitus, fluctuance, malodor noted at this time.     MUSCULOSKELETAL: Musculoskeletal exam deferred due to pain                  Scheduled Meds:   sodium chloride flush  5-40 mL IntraVENous 2 times per day    enoxaparin  40 mg SubCUTAneous Daily    cholestyramine light  4 g Oral Daily    pantoprazole  40 mg Oral BID AC    spironolactone  25 mg Oral Daily    ertapenem (INVanz) IV  1,000 mg IntraVENous Q24H    furosemide  20 mg IntraVENous BID     Continuous Infusions:   sodium

## 2024-04-22 LAB
ALBUMIN SERPL-MCNC: 1.9 G/DL (ref 3.5–5.2)
ALP SERPL-CCNC: 204 U/L (ref 35–104)
ALT SERPL-CCNC: 14 U/L (ref 0–32)
ANION GAP SERPL CALCULATED.3IONS-SCNC: 7 MMOL/L (ref 7–16)
AST SERPL-CCNC: 34 U/L (ref 0–31)
BASOPHILS # BLD: 0.05 K/UL (ref 0–0.2)
BASOPHILS NFR BLD: 1 % (ref 0–2)
BILIRUB SERPL-MCNC: 2.5 MG/DL (ref 0–1.2)
BUN SERPL-MCNC: 19 MG/DL (ref 6–20)
CALCIUM SERPL-MCNC: 7.4 MG/DL (ref 8.6–10.2)
CHLORIDE SERPL-SCNC: 112 MMOL/L (ref 98–107)
CO2 SERPL-SCNC: 17 MMOL/L (ref 22–29)
CREAT SERPL-MCNC: 1 MG/DL (ref 0.5–1)
EKG ATRIAL RATE: 101 BPM
EKG P AXIS: 44 DEGREES
EKG P-R INTERVAL: 126 MS
EKG Q-T INTERVAL: 350 MS
EKG QRS DURATION: 66 MS
EKG QTC CALCULATION (BAZETT): 453 MS
EKG R AXIS: 56 DEGREES
EKG T AXIS: 46 DEGREES
EKG VENTRICULAR RATE: 101 BPM
EOSINOPHIL # BLD: 0.32 K/UL (ref 0.05–0.5)
EOSINOPHILS RELATIVE PERCENT: 4 % (ref 0–6)
ERYTHROCYTE [DISTWIDTH] IN BLOOD BY AUTOMATED COUNT: 17.3 % (ref 11.5–15)
GFR SERPL CREATININE-BSD FRML MDRD: 70 ML/MIN/1.73M2
GLUCOSE SERPL-MCNC: 115 MG/DL (ref 74–99)
HCT VFR BLD AUTO: 20.9 % (ref 34–48)
HCT VFR BLD AUTO: 21.2 % (ref 34–48)
HCT VFR BLD AUTO: 26.4 % (ref 34–48)
HGB BLD-MCNC: 6.7 G/DL (ref 11.5–15.5)
HGB BLD-MCNC: 6.8 G/DL (ref 11.5–15.5)
HGB BLD-MCNC: 8.6 G/DL (ref 11.5–15.5)
IMM GRANULOCYTES # BLD AUTO: 0.03 K/UL (ref 0–0.58)
IMM GRANULOCYTES NFR BLD: 0 % (ref 0–5)
LYMPHOCYTES NFR BLD: 1.57 K/UL (ref 1.5–4)
LYMPHOCYTES RELATIVE PERCENT: 19 % (ref 20–42)
MCH RBC QN AUTO: 30.8 PG (ref 26–35)
MCHC RBC AUTO-ENTMCNC: 32.5 G/DL (ref 32–34.5)
MCV RBC AUTO: 94.6 FL (ref 80–99.9)
MONOCYTES NFR BLD: 0.87 K/UL (ref 0.1–0.95)
MONOCYTES NFR BLD: 11 % (ref 2–12)
NEUTROPHILS NFR BLD: 65 % (ref 43–80)
NEUTS SEG NFR BLD: 5.39 K/UL (ref 1.8–7.3)
PLATELET # BLD AUTO: 124 K/UL (ref 130–450)
PMV BLD AUTO: 10.7 FL (ref 7–12)
POTASSIUM SERPL-SCNC: 4.4 MMOL/L (ref 3.5–5)
PROT SERPL-MCNC: 5.1 G/DL (ref 6.4–8.3)
RBC # BLD AUTO: 2.21 M/UL (ref 3.5–5.5)
SODIUM SERPL-SCNC: 136 MMOL/L (ref 132–146)
WBC OTHER # BLD: 8.2 K/UL (ref 4.5–11.5)

## 2024-04-22 PROCEDURE — 86900 BLOOD TYPING SEROLOGIC ABO: CPT

## 2024-04-22 PROCEDURE — 93010 ELECTROCARDIOGRAM REPORT: CPT | Performed by: INTERNAL MEDICINE

## 2024-04-22 PROCEDURE — 6360000002 HC RX W HCPCS: Performed by: INTERNAL MEDICINE

## 2024-04-22 PROCEDURE — 1200000000 HC SEMI PRIVATE

## 2024-04-22 PROCEDURE — 36592 COLLECT BLOOD FROM PICC: CPT

## 2024-04-22 PROCEDURE — 85018 HEMOGLOBIN: CPT

## 2024-04-22 PROCEDURE — 36430 TRANSFUSION BLD/BLD COMPNT: CPT

## 2024-04-22 PROCEDURE — 99232 SBSQ HOSP IP/OBS MODERATE 35: CPT | Performed by: INTERNAL MEDICINE

## 2024-04-22 PROCEDURE — 6360000002 HC RX W HCPCS: Performed by: FAMILY MEDICINE

## 2024-04-22 PROCEDURE — 85025 COMPLETE CBC W/AUTO DIFF WBC: CPT

## 2024-04-22 PROCEDURE — P9016 RBC LEUKOCYTES REDUCED: HCPCS

## 2024-04-22 PROCEDURE — 86923 COMPATIBILITY TEST ELECTRIC: CPT

## 2024-04-22 PROCEDURE — 80053 COMPREHEN METABOLIC PANEL: CPT

## 2024-04-22 PROCEDURE — 97535 SELF CARE MNGMENT TRAINING: CPT

## 2024-04-22 PROCEDURE — 6370000000 HC RX 637 (ALT 250 FOR IP): Performed by: FAMILY MEDICINE

## 2024-04-22 PROCEDURE — 2580000003 HC RX 258: Performed by: FAMILY MEDICINE

## 2024-04-22 PROCEDURE — 86850 RBC ANTIBODY SCREEN: CPT

## 2024-04-22 PROCEDURE — A4216 STERILE WATER/SALINE, 10 ML: HCPCS | Performed by: FAMILY MEDICINE

## 2024-04-22 PROCEDURE — 97165 OT EVAL LOW COMPLEX 30 MIN: CPT

## 2024-04-22 PROCEDURE — 6360000002 HC RX W HCPCS: Performed by: CLINICAL NURSE SPECIALIST

## 2024-04-22 PROCEDURE — 85014 HEMATOCRIT: CPT

## 2024-04-22 PROCEDURE — 97530 THERAPEUTIC ACTIVITIES: CPT

## 2024-04-22 PROCEDURE — 97161 PT EVAL LOW COMPLEX 20 MIN: CPT

## 2024-04-22 PROCEDURE — 86901 BLOOD TYPING SEROLOGIC RH(D): CPT

## 2024-04-22 RX ORDER — SODIUM CHLORIDE 9 MG/ML
INJECTION, SOLUTION INTRAVENOUS PRN
Status: DISCONTINUED | OUTPATIENT
Start: 2024-04-22 | End: 2024-04-23 | Stop reason: HOSPADM

## 2024-04-22 RX ADMIN — SPIRONOLACTONE 25 MG: 25 TABLET ORAL at 10:38

## 2024-04-22 RX ADMIN — ENOXAPARIN SODIUM 40 MG: 100 INJECTION SUBCUTANEOUS at 10:38

## 2024-04-22 RX ADMIN — PANTOPRAZOLE SODIUM 40 MG: 40 TABLET, DELAYED RELEASE ORAL at 06:22

## 2024-04-22 RX ADMIN — CHOLESTYRAMINE 4 G: 4 POWDER, FOR SUSPENSION ORAL at 10:38

## 2024-04-22 RX ADMIN — HYDROCODONE BITARTRATE AND ACETAMINOPHEN 1 TABLET: 5; 325 TABLET ORAL at 16:28

## 2024-04-22 RX ADMIN — Medication 300 UNITS: at 20:24

## 2024-04-22 RX ADMIN — HYDROCODONE BITARTRATE AND ACETAMINOPHEN 1 TABLET: 5; 325 TABLET ORAL at 08:09

## 2024-04-22 RX ADMIN — PANTOPRAZOLE SODIUM 40 MG: 40 TABLET, DELAYED RELEASE ORAL at 16:28

## 2024-04-22 RX ADMIN — FUROSEMIDE 20 MG: 10 INJECTION, SOLUTION INTRAMUSCULAR; INTRAVENOUS at 17:50

## 2024-04-22 RX ADMIN — SODIUM CHLORIDE, PRESERVATIVE FREE 10 ML: 5 INJECTION INTRAVENOUS at 10:38

## 2024-04-22 RX ADMIN — FUROSEMIDE 20 MG: 10 INJECTION, SOLUTION INTRAMUSCULAR; INTRAVENOUS at 10:35

## 2024-04-22 RX ADMIN — SODIUM CHLORIDE 1000 MG: 9 INJECTION INTRAMUSCULAR; INTRAVENOUS; SUBCUTANEOUS at 11:27

## 2024-04-22 RX ADMIN — SODIUM CHLORIDE, PRESERVATIVE FREE 10 ML: 5 INJECTION INTRAVENOUS at 20:25

## 2024-04-22 ASSESSMENT — PAIN DESCRIPTION - ORIENTATION
ORIENTATION: LEFT
ORIENTATION: LEFT

## 2024-04-22 ASSESSMENT — PAIN DESCRIPTION - LOCATION
LOCATION: FOOT
LOCATION: FOOT

## 2024-04-22 ASSESSMENT — PAIN SCALES - GENERAL
PAINLEVEL_OUTOF10: 0
PAINLEVEL_OUTOF10: 5
PAINLEVEL_OUTOF10: 6
PAINLEVEL_OUTOF10: 7

## 2024-04-22 ASSESSMENT — PAIN SCALES - WONG BAKER: WONGBAKER_NUMERICALRESPONSE: HURTS A LITTLE BIT

## 2024-04-22 ASSESSMENT — PAIN DESCRIPTION - DESCRIPTORS
DESCRIPTORS: SORE;THROBBING
DESCRIPTORS: ACHING;DISCOMFORT

## 2024-04-22 NOTE — PATIENT CARE CONFERENCE
[facility] Quality Flow/Interdisciplinary Rounds Progress Note        Quality Flow Rounds held on April 22, 2024    Disciplines Attending:  Bedside Nurse, , , and Nursing Unit Leadership    Jaylene Kapoor was admitted on 4/20/2024  2:24 AM    Anticipated Discharge Date:       Disposition:    Julio Score:  Julio Scale Score: 19    Readmission Risk              Risk of Unplanned Readmission:  0           Discussed patient goal for the day, patient clinical progression, and barriers to discharge.  The following Goal(s) of the Day/Commitment(s) have been identified:  Diagnostics - Report Results and Labs - Report Results      Janine Giles RN  April 22, 2024

## 2024-04-22 NOTE — PROGRESS NOTES
[facility] Hospitalist   Progress Note    Admitting Date and Time: 4/20/2024  2:24 AM  Admit Dx: Wound infection [T14.8XXA, L08.9]    Subjective:    Patient was admitted with Wound infection [T14.8XXA, L08.9]. Patient denies fever, chills, cp, sob, n/v.     heparin (PF)  300 Units IntraCATHeter BID    sodium chloride flush  5-40 mL IntraVENous 2 times per day    enoxaparin  40 mg SubCUTAneous Daily    cholestyramine light  4 g Oral Daily    pantoprazole  40 mg Oral BID AC    spironolactone  25 mg Oral Daily    ertapenem (INVanz) IV  1,000 mg IntraVENous Q24H    furosemide  20 mg IntraVENous BID     sodium chloride, , PRN  heparin (PF), 300 Units, PRN  sodium chloride flush, 5-40 mL, PRN  sodium chloride, , PRN  acetaminophen, 650 mg, Q6H PRN   Or  acetaminophen, 650 mg, Q6H PRN  HYDROcodone-acetaminophen, 1 tablet, Q6H PRN         Objective:    /64   Pulse 83   Temp 98.6 °F (37 °C) (Oral)   Resp 16   Ht 1.524 m (5')   Wt 73.4 kg (161 lb 14.4 oz)   SpO2 99%   BMI 31.62 kg/m²   Skin: warm and dry, no rash or erythema  Pulmonary/Chest: clear to auscultation bilaterally- no wheezes, rales or rhonchi, normal air movement, no respiratory distress  Cardiovascular: rhythm reg at rate of 84  Abdomen: soft, non-tender, non-distended, normal bowel sounds, no masses or organomegaly  Extremities: no cyanosis, no clubbing, and no edema      Recent Labs     04/20/24  0339 04/21/24  1125 04/22/24  0330    135 136   K 4.6 4.4 4.4   * 111* 112*   CO2 17* 18* 17*   BUN 23* 20 19   CREATININE 1.1* 1.0 1.0   GLUCOSE 95 106* 115*   CALCIUM 7.9* 7.7* 7.4*       Recent Labs     04/20/24  0339 04/21/24  1125 04/22/24  0330 04/22/24  0355 04/22/24  1142   WBC 10.5 9.0 8.2  --   --    RBC 2.52* 2.37* 2.21*  --   --    HGB 7.5* 7.3* 6.8* 6.7* 8.6*   HCT 23.6* 22.5* 20.9* 21.2* 26.4*   MCV 93.7 94.9 94.6  --   --    MCH 29.8 30.8 30.8  --   --    MCHC 31.8* 32.4 32.5  --   --    RDW 17.8* 17.5* 17.3*

## 2024-04-22 NOTE — CARE COORDINATION
Updated plan of care. Patient from home with her family. Patent admitted from [facility]. Patient states [facility] did not have the supplies needed to take care of her. Patient states she would like to return home with [facility]. Patient would like [facility] to follow. Mai called with [facility], referral made. Patient has a PICC line in place and a wound vac. Will need signed script sent to [facility] along with the signed med agreement. Will need C order placed. Will need home wound vac form filled out and signed by podiatry. Samir with [facility] called and notified that the patient is now planning on returning home.   Electronically signed by Vida Alcantar RN on 4/22/2024 at 10:23 AM

## 2024-04-22 NOTE — DISCHARGE INSTRUCTIONS
name  Route  Frequency  Start/Stop date      ROUTINE LABS TO BE DRAWN/ORDERED:  Twice weekly (preferably every Monday & Thursday):  BUN Creatinine and liver panel  Complete Blood Count with differential (CBC with dif)  Once weekly:  C-Reactive Protein (not high sensitivity CRP)  Erythrocyte/Westergren Sedimentation Rate (WSR or ESR)  Total CPK for patients on Daptomycin (Cubicin®). Obtain CPK more often if the patient is experiencing muscle weakness or myalgias.  Clinical Pharmacist is to adjust Vancomycin and Aminoglycosides unless otherwise indicated.  Draw Vancomycin trough 30 minutes before the third dose  After starting drug, or   After the dosing or interval is changed.  If the trough level is between 5 and 20 continue dose as ordered.  Draw troughs twice weekly thereafter until troughs are stable (i.e. until trough is between 5 and 20 mcg/ml for two consecutive laboratory values).  Once stable check troughs once weekly or every third dose.  Please do not call physician unless the trough is < 5 or >20.  If the trough is <20 continue dosing as ordered.  If the trough is >20 call the office for further orders.  Do not hold the dose while waiting for the trough result.  Amingoglycosides (e.g. Gentamicin, Tobramycin and Amikacin) peaks and troughs should be drawn twice weekly (preferably on Mondays and Thursdays) or every third dose.  Aminoglycoside peaks are not to be drawn if patient on Once-Daily Dosing (ODD).  Call physician or office if the trough is:  >1 for gentamicin,   >2 for tobramycin, or   >5 for amikacin  When clinically indicated obtain:  Urine culture. If the patient has a fever with purulent drainage from Amado or suprapubic catheter, or foul smelling urine. Do not irrigate a clogged Amado catheter. Replace it.  Blood cultures and Wound Gram stain with culture & sensitivity. If the patient has a fever or increasing drainage or foul odor from a wound. Notify the treating physician in a timely  manner  Stool specimen. If diarrhea occurs while on antibiotics, send stools for C. difficile and WBCs.  When a drug is discontinued due to a low white blood cell count (WBCs) draw two consecutive CBC with differential and CMP.         ALLERGIC OR ADVERSE REACTIONS TO MEDICATIONS  Mild reaction: (itching, with or without rash):  Administer Benadryl 50mg po x 1, then 25mg po q6h prn.   Notify office or physician in a timely matter.  Moderate reaction (itching with or without rash and/or wheezing, dyspnea, itchy throat):  Administer Benadryl 50 mg IV push x 1.   Notify office or physician in a timely manner.  Severe reaction i.e. Anaphylaxis (wheezing or stidor, sudden rash, lightheadedness, hypotension):  Administer epinephrine subcutaneous 0.3mg (1:1000) x 1 dose.   May repeat twice every 5 minutes if needed.  Call EMS and notify office or physician immediately.  For all above reactions: administer Solu-Cortef 100mg slow IV push x 1.    VASCULAR ACCESS DRESSING CHANGE PROTOCOL  Cleanse insertion site with Shur-Clens® or equivalent three times.  Secure catheter with Steri-Strips®, Bone®, or equivalent securing device.  Apply Opsite® 3000 or equivalent transparent dressing.  Change dressing twice weekly, maintaining sterile technique. If there is a BioPatch®, SilverSite® or equivalent, change once weekly only or as needed.    FOLLOW-UP VISITS  Nursing staff should call the office during business hours to schedule a follow-up appointment once the patient is admitted to the service or facility. Every effort should be made to have patient follow-up within 2 weeks of discharge.  Continue IV antibiotic therapy until patient is seen in the office or unless specific stop date is noted on the original order or unless otherwise ordered by physician.  Call office to ensure stop date is correct before stopping antibiotics.        Cedrick Small MD

## 2024-04-22 NOTE — PROGRESS NOTES
SPIRITUAL HEALTH SERVICES - JESSICA Gallegos Encounter    Name: Jaylene Kapoor                  Referral: Routine Visit    Sacraments  Anointed (Last Rites): Yes  Apostolic [place]: No  Confession: No  Communion: No     Assessment:  Patient receptive to  visit.      Intervention:   provided spiritual support and sacramental ministry for patient.     Outcome:  Patient expressed gratitude for visit.    Plan:  Chaplains will remain available to offer spiritual and emotional support as needed.      Electronically signed by Chaplain Leonardo, on 4/22/2024 at 3:34 PM.  Spiritual Care Department  [facility]  [phone]

## 2024-04-22 NOTE — PROGRESS NOTES
hepatosplenomegaly.  Genitourinary:   Extremities: No clubbing, no cyanosis, no edema.  -left foot dressed  Lines: PICC    Laboratory and Tests Review:  Lab Results   Component Value Date    WBC 8.2 04/22/2024    WBC 9.0 04/21/2024    WBC 10.5 04/20/2024    HGB 8.6 (L) 04/22/2024    HCT 26.4 (L) 04/22/2024    MCV 94.6 04/22/2024     (L) 04/22/2024     Lab Results   Component Value Date    NEUTROABS 5.39 04/22/2024    NEUTROABS 6.17 04/21/2024    NEUTROABS 7.47 (H) 04/20/2024     No results found for: \"CRPHS\"  Lab Results   Component Value Date    ALT 14 04/22/2024    AST 34 (H) 04/22/2024    ALKPHOS 204 (H) 04/22/2024    BILITOT 2.5 (H) 04/22/2024     Lab Results   Component Value Date/Time     04/22/2024 03:30 AM    K 4.4 04/22/2024 03:30 AM     04/22/2024 03:30 AM    CO2 17 04/22/2024 03:30 AM    BUN 19 04/22/2024 03:30 AM    CREATININE 1.0 04/22/2024 03:30 AM    CREATININE 1.0 04/21/2024 11:25 AM    CREATININE 1.1 04/20/2024 03:39 AM    GFRAA >60 02/25/2020 01:43 PM    LABGLOM 70 04/22/2024 03:30 AM    GLUCOSE 115 04/22/2024 03:30 AM    PROT 5.1 04/22/2024 03:30 AM    CALCIUM 7.4 04/22/2024 03:30 AM    BILITOT 2.5 04/22/2024 03:30 AM    ALKPHOS 204 04/22/2024 03:30 AM    AST 34 04/22/2024 03:30 AM    ALT 14 04/22/2024 03:30 AM     Lab Results   Component Value Date    CRP 29.0 (H) 04/09/2024     Lab Results   Component Value Date    SEDRATE 87 (H) 04/09/2024     Radiology:  Reviewed.     Microbiology:   Blood cx 4/20 : negative    Path Number: [id number]     -- Diagnosis --   \"Left foot fifth metatarsal\", bone biopsy: Partially devitalized bone   with focal intramedullary fibrosis and attached benign fibromuscular   and adipose tissue.       Comment: Features diagnostic of osteomyelitis are not identified in   the sampled tissue.   Intradepartmental consultation is obtained.     Assessment:  Left fifth metatarsal necrotizing soft tissue Serratia marcescens infection s/p I&D on 04/11 with wound

## 2024-04-22 NOTE — PROGRESS NOTES
OCCUPATIONAL THERAPY INITIAL EVALUATION    [facility]   [address] [facility]        Date:2024                                                  Patient Name: Jaylene Kapoor    MRN: [id number]    : 1980    Room: [address] [place] [address]    Evaluating OT: Nikki Judge OTR/L #[id number]    Referring Provider:  Santos Stevens DO     Specific Provider Orders/Date:  OT Eval and Treat , 2024     Diagnosis:   1. Visit for wound check    2. Wound infection        Surgery: None      Pertinent Medical History: HTN, L foot I&D 24 and 24      Precautions:  Fall Risk, WBAT L LE per podiatry orders, wound vac      Assessment of current deficits    [x] Functional mobility  [x]ADLs  [x] Strength               []Cognition    [x] Functional transfers   [x] IADLs         [] Safety Awareness   []Endurance    [] Fine Coordination              [x] Balance      [] Vision/perception   []Sensation     []Gross Motor Coordination  [] ROM  [] Delirium                   [] Motor Control     OT PLAN OF CARE   OT POC based on physician orders, patient diagnosis and results of clinical assessment    Frequency/Duration 2-5 days/wk for 2-4 weeks PRN     Specific OT Treatment Interventions to include:   * Instruction/training on adapted ADL techniques and AE recommendations to increase functional independence within precautions       * Training on energy conservation strategies, correct breathing pattern and techniques to improve independence/tolerance for self-care routine  * Functional transfer/mobility training/DME recommendations for increased independence, safety, and fall prevention  * Patient/Family education to increase follow through with safety techniques and functional independence  * Recommendation of environmental modifications for increased safety with functional transfers/mobility and ADLs  * Therapeutic exercise to improve motor endurance, ROM, and  Sitting:     Static: good    Dynamic: good  Standing: fair plus with fww     Sitting:     Static: good    Dynamic: good  Standing: good with fww    Activity Tolerance fair  plus   Increase standing tolerance >3 minutes for improved engagement with functional transfers and indep in ADLs     Visual/  Perceptual WFL      NA    UE ROM/Strength      Right UE:   AROM: WFL   Strength: 4-/5  -good  and wfl FMC/dexterity noted during ADL tasks    Left UE:   AROM : WFL   Strength: 4-/5  -good  and wfl FMC/dexterity noted during ADL tasks    Improve overall B UE strength for participation in functional tasks      Hand Dominance:     Hearing:  WFL   Sensation:   No c/o numbness or tingling  Tone:  WFL   Edema: None     Comments: RN cleared patient for OT.   Upon arrival patient in supine.  Therapist facilitated and instructed pt on adapted  techniques & compensatory strategies to improve safety and independence with basic ADLs, bed mobility, functional transfers and mobility to allow pt to achieve highest level of independence and safely.  Pt demonstrated fair plus/good understanding of education & follow through.  At end of session, patient was in supine, LEs elevated, with call light and phone within reach, all lines and tubes intact.  Overall, patient demonstrated  decreased independence and safety during completion of ADL tasks.  Pt would benefit from continued skilled OT to increase safety and independence with completion of ADL tasks and functional mobility for improved quality of life.       Treatment: OT treatment provided this date includes:   Instruction, education and training on safe facilitation and adapted techniques for completion of ADLs. These include safe functional transfer techniques and energy conservation for completion of ADLs. Education provided on hand/feet placement with walker and body mechanics for fall prevention. Cues for energy conservation and safety for in the home at AL, including

## 2024-04-22 NOTE — PROGRESS NOTES
Physical Therapy  Facility/Department: [address] MED SURG  Physical Therapy Initial Assessment    Name: Jaylene Kapoor  : 1980  MRN: [id number]  Date of Service: 2024    Attending Provider:  Santos Stevens DO    Evaluating PT:  Santos Donovan Jr., P.T.    Room #:  0520/0520-A  Diagnosis:  Wound infection [T14.8XXA, L08.9]  Pertinent PMHx/PSHx:   24 L foot and ankle I and D and wound vac, liver cirrhosis, ETOH abuse  Precautions:  WBAT LLE, wound vac, falls, bed/chair alarm, Hgb 6.7 24 (but was receiving blood transfusion at time of PT eval)  Equipment Needs: Wheel Chair with elevating leg rests for longer distances.    SUBJECTIVE:    Pt lives with her partner and 5 kids in a 1 story home with 1+1 steps to enter.   Pt ambulated with no AD prior to L foot surgery 24. She came in from [facility].  She has a ww     OBJECTIVE:   Initial Evaluation  Date: 24 Treatment Short Term/ Long Term   Goals   Was pt agreeable to Eval/treatment? yes     Does pt have pain? Mild L foot pain at this time.     Bed Mobility  Rolling: supervision  Supine to sit: supervision  Sit to supine: supervision  Scooting: supervision  Independent    Transfers Sit to stand: SBA  Stand to sit: SBA  Stand pivot: SBA/CGA with ww  Independent   Ambulation   3 feet x2 reps and 30 feet with ww SBA/CGA  100 feet with ww Independent    Stair negotiation: ascended and descended NA, pt fatigued with amb  2 platform steps with ww supervision   AM-PAC 6 Clicks        BLE ROM is WFL, except L ankle was limited and wrapped with ace bandage   RLE strength is grossly 4/5 and LLE is grossly 4-/5 to 4/5.  Balance: sitting is supervision and standing with ww is SBA/CGA  Endurance: fair     Patient education  Pt educated on WBAT LLE    Patient response to education:   Pt verbalized understanding Pt demonstrated skill Pt requires further education in this area   yes yes yes     ASSESSMENT:    Conditions Requiring Skilled Therapeutic

## 2024-04-22 NOTE — PROGRESS NOTES
Department of Podiatry  Progress Note    SUBJECTIVE:  Patient seen bedside for left foot care. Patient is feeling well today. Wound vac on and running at 125 mmHG. Awating case management for placement at a differrent facility. No acute events overnight. Patient denies any N/V/D/F/C/SOB/CP and has no other pedal complaints at this time.     OBJECTIVE:    Scheduled Meds:   heparin (PF)  300 Units IntraCATHeter BID    sodium chloride flush  5-40 mL IntraVENous 2 times per day    enoxaparin  40 mg SubCUTAneous Daily    cholestyramine light  4 g Oral Daily    pantoprazole  40 mg Oral BID AC    spironolactone  25 mg Oral Daily    ertapenem (INVanz) IV  1,000 mg IntraVENous Q24H    furosemide  20 mg IntraVENous BID     Continuous Infusions:   sodium chloride      sodium chloride       PRN Meds:.sodium chloride, heparin (PF), sodium chloride flush, sodium chloride, acetaminophen **OR** acetaminophen, HYDROcodone-acetaminophen    No Known Allergies    /65   Pulse 83   Temp 98.6 °F (37 °C) (Oral)   Resp 16   Ht 1.524 m (5')   Wt 73.4 kg (161 lb 14.4 oz)   SpO2 99%   BMI 31.62 kg/m²       EXAM:    VASCULAR:  DP and PT pulses are faintly palpable due to edema. CFT < 5 seconds to the dee of all 5 digits.  Warm to warm from the tibial tuberosity to the distal aspect of the digits.     NEUROLOGIC:  Protective sensation is diminished by grossly intact     DERM: Large surgical wound noted to dorsal/lateral foot and ankle. There is an integra graft intact to the wound with staples intact. There is mild sanguinous drainage. There is no purulence, crepitus, fluctuance, malodor noted at this time.     MUSCULOSKELETAL: Musculoskeletal exam deferred due to pain                  Scheduled Meds:   heparin (PF)  300 Units IntraCATHeter BID    sodium chloride flush  5-40 mL IntraVENous 2 times per day    enoxaparin  40 mg SubCUTAneous Daily    cholestyramine light  4 g Oral Daily    pantoprazole  40 mg Oral BID AC

## 2024-04-23 VITALS
HEIGHT: 60 IN | SYSTOLIC BLOOD PRESSURE: 104 MMHG | DIASTOLIC BLOOD PRESSURE: 59 MMHG | BODY MASS INDEX: 31.57 KG/M2 | WEIGHT: 160.8 LBS | OXYGEN SATURATION: 95 % | HEART RATE: 81 BPM | RESPIRATION RATE: 16 BRPM | TEMPERATURE: 98.6 F

## 2024-04-23 LAB
ABO/RH: NORMAL
ALBUMIN SERPL-MCNC: 2.1 G/DL (ref 3.5–5.2)
ALP SERPL-CCNC: 202 U/L (ref 35–104)
ALT SERPL-CCNC: 15 U/L (ref 0–32)
ANION GAP SERPL CALCULATED.3IONS-SCNC: 10 MMOL/L (ref 7–16)
ANTIBODY SCREEN: NEGATIVE
ARM BAND NUMBER: NORMAL
AST SERPL-CCNC: 35 U/L (ref 0–31)
BASOPHILS # BLD: 0.06 K/UL (ref 0–0.2)
BASOPHILS NFR BLD: 1 % (ref 0–2)
BILIRUB SERPL-MCNC: 3 MG/DL (ref 0–1.2)
BLOOD BANK BLOOD PRODUCT EXPIRATION DATE: NORMAL
BLOOD BANK DISPENSE STATUS: NORMAL
BLOOD BANK ISBT PRODUCT BLOOD TYPE: 5100
BLOOD BANK PRODUCT CODE: NORMAL
BLOOD BANK SAMPLE EXPIRATION: NORMAL
BLOOD BANK UNIT TYPE AND RH: NORMAL
BPU ID: NORMAL
BUN SERPL-MCNC: 19 MG/DL (ref 6–20)
CALCIUM SERPL-MCNC: 7.4 MG/DL (ref 8.6–10.2)
CHLORIDE SERPL-SCNC: 112 MMOL/L (ref 98–107)
CO2 SERPL-SCNC: 16 MMOL/L (ref 22–29)
COMPONENT: NORMAL
CREAT SERPL-MCNC: 1 MG/DL (ref 0.5–1)
CROSSMATCH RESULT: NORMAL
EOSINOPHIL # BLD: 0.3 K/UL (ref 0.05–0.5)
EOSINOPHILS RELATIVE PERCENT: 3 % (ref 0–6)
ERYTHROCYTE [DISTWIDTH] IN BLOOD BY AUTOMATED COUNT: 17 % (ref 11.5–15)
GFR SERPL CREATININE-BSD FRML MDRD: 74 ML/MIN/1.73M2
GLUCOSE SERPL-MCNC: 109 MG/DL (ref 74–99)
HCT VFR BLD AUTO: 25.4 % (ref 34–48)
HGB BLD-MCNC: 8.4 G/DL (ref 11.5–15.5)
IMM GRANULOCYTES # BLD AUTO: 0.03 K/UL (ref 0–0.58)
IMM GRANULOCYTES NFR BLD: 0 % (ref 0–5)
LYMPHOCYTES NFR BLD: 1.67 K/UL (ref 1.5–4)
LYMPHOCYTES RELATIVE PERCENT: 19 % (ref 20–42)
MCH RBC QN AUTO: 30.4 PG (ref 26–35)
MCHC RBC AUTO-ENTMCNC: 33.1 G/DL (ref 32–34.5)
MCV RBC AUTO: 92 FL (ref 80–99.9)
MONOCYTES NFR BLD: 0.91 K/UL (ref 0.1–0.95)
MONOCYTES NFR BLD: 10 % (ref 2–12)
NEUTROPHILS NFR BLD: 66 % (ref 43–80)
NEUTS SEG NFR BLD: 5.82 K/UL (ref 1.8–7.3)
PLATELET # BLD AUTO: 116 K/UL (ref 130–450)
PMV BLD AUTO: 9.9 FL (ref 7–12)
POTASSIUM SERPL-SCNC: 4.2 MMOL/L (ref 3.5–5)
PROT SERPL-MCNC: 5.1 G/DL (ref 6.4–8.3)
RBC # BLD AUTO: 2.76 M/UL (ref 3.5–5.5)
SODIUM SERPL-SCNC: 138 MMOL/L (ref 132–146)
TRANSFUSION STATUS: NORMAL
UNIT DIVISION: 0
UNIT ISSUE DATE/TIME: NORMAL
WBC OTHER # BLD: 8.8 K/UL (ref 4.5–11.5)

## 2024-04-23 PROCEDURE — 6360000002 HC RX W HCPCS: Performed by: FAMILY MEDICINE

## 2024-04-23 PROCEDURE — 99239 HOSP IP/OBS DSCHRG MGMT >30: CPT | Performed by: INTERNAL MEDICINE

## 2024-04-23 PROCEDURE — A4216 STERILE WATER/SALINE, 10 ML: HCPCS | Performed by: FAMILY MEDICINE

## 2024-04-23 PROCEDURE — 6360000002 HC RX W HCPCS: Performed by: CLINICAL NURSE SPECIALIST

## 2024-04-23 PROCEDURE — 2580000003 HC RX 258: Performed by: FAMILY MEDICINE

## 2024-04-23 PROCEDURE — 6370000000 HC RX 637 (ALT 250 FOR IP): Performed by: FAMILY MEDICINE

## 2024-04-23 PROCEDURE — 6360000002 HC RX W HCPCS: Performed by: INTERNAL MEDICINE

## 2024-04-23 PROCEDURE — 85025 COMPLETE CBC W/AUTO DIFF WBC: CPT

## 2024-04-23 PROCEDURE — 80053 COMPREHEN METABOLIC PANEL: CPT

## 2024-04-23 RX ORDER — HYDROCODONE BITARTRATE AND ACETAMINOPHEN 5; 325 MG/1; MG/1
1 TABLET ORAL EVERY 6 HOURS PRN
Qty: 10 TABLET | Refills: 0 | Status: SHIPPED | OUTPATIENT
Start: 2024-04-23 | End: 2024-04-28

## 2024-04-23 RX ADMIN — SODIUM CHLORIDE 1000 MG: 9 INJECTION INTRAMUSCULAR; INTRAVENOUS; SUBCUTANEOUS at 08:39

## 2024-04-23 RX ADMIN — HYDROCODONE BITARTRATE AND ACETAMINOPHEN 1 TABLET: 5; 325 TABLET ORAL at 02:15

## 2024-04-23 RX ADMIN — ENOXAPARIN SODIUM 40 MG: 100 INJECTION SUBCUTANEOUS at 08:35

## 2024-04-23 RX ADMIN — CHOLESTYRAMINE 4 G: 4 POWDER, FOR SUSPENSION ORAL at 08:38

## 2024-04-23 RX ADMIN — PANTOPRAZOLE SODIUM 40 MG: 40 TABLET, DELAYED RELEASE ORAL at 05:08

## 2024-04-23 RX ADMIN — SPIRONOLACTONE 25 MG: 25 TABLET ORAL at 08:36

## 2024-04-23 RX ADMIN — FUROSEMIDE 20 MG: 10 INJECTION, SOLUTION INTRAMUSCULAR; INTRAVENOUS at 08:37

## 2024-04-23 RX ADMIN — SODIUM CHLORIDE, PRESERVATIVE FREE 10 ML: 5 INJECTION INTRAVENOUS at 08:36

## 2024-04-23 RX ADMIN — HYDROCODONE BITARTRATE AND ACETAMINOPHEN 1 TABLET: 5; 325 TABLET ORAL at 13:56

## 2024-04-23 RX ADMIN — Medication 300 UNITS: at 08:35

## 2024-04-23 ASSESSMENT — PAIN DESCRIPTION - LOCATION
LOCATION: LEG
LOCATION: FOOT

## 2024-04-23 ASSESSMENT — PAIN SCALES - GENERAL
PAINLEVEL_OUTOF10: 7
PAINLEVEL_OUTOF10: 7

## 2024-04-23 ASSESSMENT — PAIN DESCRIPTION - DESCRIPTORS
DESCRIPTORS: ACHING
DESCRIPTORS: ACHING

## 2024-04-23 ASSESSMENT — PAIN DESCRIPTION - ORIENTATION
ORIENTATION: LEFT
ORIENTATION: LEFT

## 2024-04-23 ASSESSMENT — PAIN - FUNCTIONAL ASSESSMENT: PAIN_FUNCTIONAL_ASSESSMENT: ACTIVITIES ARE NOT PREVENTED

## 2024-04-23 NOTE — DISCHARGE SUMMARY
[facility] Hospitalist       Hospitalist Physician Discharge Summary       Cedrick Small MD  [address]  [address]  [facility] [address] [phone]    Schedule an appointment as soon as possible for a visit in 2 week(s)      Logan Lee III, DO  [address]  [facility] [address]  [phone]    Follow up  follow up, As needed      Activity level: as felix    Diet: ADULT DIET; Regular; 4 carb choices (60 gm/meal); Low Fat/Low Chol/High Fiber/SÁNCHEZ    Dispo:home    Condition at discharge: fair        Patient ID:  Jaylene Kapoor  [id number]  44 y.o.  1980    Admit date: 4/20/2024    Discharge date and time:  4/23/2024  2:34 PM    Admission Diagnoses: Principal Problem:    Wound infection  Active Problems:    Cirrhosis of liver (HCC)    Hypomagnesemia    Primary hypertension    Necrotizing soft tissue infection of left foot    Osteomyelitis (HCC)    Anemia, chronic disease    Acidosis    Cellulitis  Resolved Problems:    * No resolved hospital problems. *      Discharge Diagnoses: Principal Problem:    Wound infection  Active Problems:    Cirrhosis of liver (HCC)    Hypomagnesemia    Primary hypertension    Necrotizing soft tissue infection of left foot    Osteomyelitis (HCC)    Anemia, chronic disease    Acidosis    Cellulitis  Resolved Problems:    * No resolved hospital problems. *    Left 5th metatarsal soft tissue infection/cellulitis of left foot  Acidosis  Thrombocytopenia  Cirrhosis  Htn  Anemia with acute blood loss component  hypervolemia      Consults:  IP CONSULT TO SOCIAL WORK  IP CONSULT TO PODIATRY  IP CONSULT TO INFECTIOUS DISEASES  IP CONSULT TO HOME CARE NEEDS    Procedures: none    Hospital Course: Patient was admitted with Wound infection [T14.8XXA, L08.9]. Patient is a 44-year-old female with a past medical history of alcohol abuse, hypertension, cirrhosis, and left foot osteomyelitis who presents to the ED for a wound check.  Patient was recently admitted on  04/23/2024 02:48 AM    CO2 16 04/23/2024 02:48 AM    BUN 19 04/23/2024 02:48 AM    CREATININE 1.0 04/23/2024 02:48 AM    GFRAA >60 02/25/2020 01:43 PM    LABGLOM 74 04/23/2024 02:48 AM    GLUCOSE 109 04/23/2024 02:48 AM    PROT 5.1 04/23/2024 02:48 AM    CALCIUM 7.4 04/23/2024 02:48 AM    BILITOT 3.0 04/23/2024 02:48 AM    ALKPHOS 202 04/23/2024 02:48 AM    AST 35 04/23/2024 02:48 AM    ALT 15 04/23/2024 02:48 AM       Imaging:   XR CHEST PORTABLE   Final Result   Interval placement of right upper extremity PICC, with tip projecting over   the lower SVC.         XR FOOT LEFT (MIN 3 VIEWS)   Final Result   New deformity of the right distal 5th metatarsal, likely sequela of   osteomyelitis versus postsurgical change.             Patient Instructions:      Medication List        CONTINUE taking these medications      colestipol 1 g tablet  Commonly known as: COLESTID     ertapenem  infusion  Commonly known as: INVanz  Infuse 1,000 mg intravenously every 24 hours for 14 days Compound per protocol.     furosemide 20 MG tablet  Commonly known as: LASIX  Take 1 tablet by mouth daily     HYDROcodone-acetaminophen 5-325 MG per tablet  Commonly known as: NORCO  Take 1 tablet by mouth every 6 hours as needed for Pain for up to 5 days. Max Daily Amount: 4 tablets     pantoprazole 40 MG tablet  Commonly known as: PROTONIX  Take 1 tablet by mouth 2 times daily (before meals)     spironolactone 25 MG tablet  Commonly known as: ALDACTONE  Take 1 tablet by mouth daily               Where to Get Your Medications        You can get these medications from any pharmacy    Bring a paper prescription for each of these medications  ertapenem  infusion  HYDROcodone-acetaminophen 5-325 MG per tablet           Total time for discharge is 37 min    Signed:  Electronically signed by Santos Stevens DO on 4/23/2024 at 2:34 PM

## 2024-04-23 NOTE — PROGRESS NOTES
[facility]  [facility]  Progress Note      Chief Complaint   Patient presents with    Wound Check     Left foot. Pt supposed to have a wound vac on, states [facility] doesn't have the equipment she needs.        SUBJECTIVE:  Patient is tolerating medications. No reported adverse drug reactions.  No nausea, vomiting, diarrhea.  Sitting up in the wheelchair in the bathroom getting washed up   No fevers     Review of systems:  As stated above in the chief complaint, otherwise negative.    Medications:  Scheduled Meds:   heparin (PF)  300 Units IntraCATHeter BID    sodium chloride flush  5-40 mL IntraVENous 2 times per day    enoxaparin  40 mg SubCUTAneous Daily    cholestyramine light  4 g Oral Daily    pantoprazole  40 mg Oral BID AC    spironolactone  25 mg Oral Daily    ertapenem (INVanz) IV  1,000 mg IntraVENous Q24H    furosemide  20 mg IntraVENous BID     Continuous Infusions:   sodium chloride      sodium chloride       PRN Meds:sodium chloride, heparin (PF), sodium chloride flush, sodium chloride, acetaminophen **OR** acetaminophen, HYDROcodone-acetaminophen    OBJECTIVE:  BP (!) 104/59   Pulse 81   Temp 98.6 °F (37 °C) (Oral)   Resp 18   Ht 1.524 m (5')   Wt 72.9 kg (160 lb 12.8 oz)   SpO2 95%   BMI 31.40 kg/m²   Temp  Av.7 °F (37.1 °C)  Min: 98.6 °F (37 °C)  Max: 98.7 °F (37.1 °C)  Constitutional: The patient is awake, alert, and oriented. Up in the bathroom getting washed up  Skin: Warm and dry. No rashes were noted.   HEENT: Round and reactive pupils.  Moist mucous membranes.  No ulcerations or thrush.  Neck: Supple to movements.   Chest: No use of accessory muscles to breathe. Symmetrical expansion.  No wheezing, crackles or rhonchi.  Cardiovascular: S1 and S2 are rhythmic and regular. No murmurs appreciated.   Abdomen: Positive bowel sounds to auscultation. Benign to palpation. No masses felt.   Extremities: No clubbing, no cyanosis, no edema.  -left foot  dressed  Lines: PICC    Laboratory and Tests Review:  Lab Results   Component Value Date    CRP 29.0 (H) 04/09/2024     Lab Results   Component Value Date    SEDRATE 87 (H) 04/09/2024     Radiology:  Reviewed.     Microbiology:   Blood cx 4/20 : negative    Path Number: [id number]     -- Diagnosis --   \"Left foot fifth metatarsal\", bone biopsy: Partially devitalized bone   with focal intramedullary fibrosis and attached benign fibromuscular   and adipose tissue.       Comment: Features diagnostic of osteomyelitis are not identified in   the sampled tissue.   Intradepartmental consultation is obtained.     Assessment:  Left fifth metatarsal necrotizing soft tissue Serratia marcescens infection s/p I&D on 04/11 with wound closure on 04/18, antibiotic therapy ongoing     Plan:  Continue ertapenem 1 g every 24 hours end date 5.6.24.   Med rec done.   Pt is ready to be d/ernesto - home with [facility]  Monitor labs.    ROBERTO CARLOS Charles - CNP  11:59 AM  4/23/2024    Pt seen and examined. Above discussed agree with advanced practice nurse. Labs, cultures, and radiographs reviewed.  Face to Face encounter occurred. Changes made as necessary.     Cedrick Small MD

## 2024-04-23 NOTE — PROGRESS NOTES
Department of Podiatry  Progress Note    SUBJECTIVE:  Patient seen bedside for left foot care. Patient is feeling well today. Wound vac on and running at 125 mmHG. Likely DC to home with home health today. No acute events overnight. Patient denies any N/V/D/F/C/SOB/CP and has no other pedal complaints at this time.     OBJECTIVE:    Scheduled Meds:   heparin (PF)  300 Units IntraCATHeter BID    sodium chloride flush  5-40 mL IntraVENous 2 times per day    enoxaparin  40 mg SubCUTAneous Daily    cholestyramine light  4 g Oral Daily    pantoprazole  40 mg Oral BID AC    spironolactone  25 mg Oral Daily    ertapenem (INVanz) IV  1,000 mg IntraVENous Q24H    furosemide  20 mg IntraVENous BID     Continuous Infusions:   sodium chloride      sodium chloride       PRN Meds:.sodium chloride, heparin (PF), sodium chloride flush, sodium chloride, acetaminophen **OR** acetaminophen, HYDROcodone-acetaminophen    No Known Allergies    BP (!) 104/59   Pulse 81   Temp 98.6 °F (37 °C) (Oral)   Resp 18   Ht 1.524 m (5')   Wt 72.9 kg (160 lb 12.8 oz)   SpO2 95%   BMI 31.40 kg/m²       EXAM:    VASCULAR:  DP and PT pulses are faintly palpable due to edema. CFT < 5 seconds to the dee of all 5 digits.  Warm to warm from the tibial tuberosity to the distal aspect of the digits.     NEUROLOGIC:  Protective sensation is diminished by grossly intact     DERM: Large surgical wound noted to dorsal/lateral foot and ankle. There is an integra graft intact to the wound with staples intact. There is mild sanguinous drainage. There is no purulence, crepitus, fluctuance, malodor noted at this time.     MUSCULOSKELETAL: Musculoskeletal exam deferred due to pain                  Scheduled Meds:   heparin (PF)  300 Units IntraCATHeter BID    sodium chloride flush  5-40 mL IntraVENous 2 times per day    enoxaparin  40 mg SubCUTAneous Daily    cholestyramine light  4 g Oral Daily    pantoprazole  40 mg Oral BID AC    spironolactone  25 mg Oral  represent a soft tissue corn or focal soft tissue swelling.  No evidence of acute fracture or traumatic malalignment.  - Dressing: Xeroform DSD applied to the left foot and ankle, dressings to be left clean, dry and intact  -Left ankle CT: Osteomyelitis 5th metatarsal head with surrounding phlegmon.  No discrete abscess or ulcer. Edema and/or cellulitis left lower extremity, foot and ankle particularly along the dorsal lateral aspect.  - Wound vac intact. MWF changes   - Patient is stable for discharge once cleared by other teams.  - Discussed patient with Dr. Grover  - Will continue to follow patient while they are in-house.

## 2024-04-23 NOTE — CARE COORDINATION
Updated plan of care. Patient from home with her family. Patient states she would like to return home with [facility]. Patient would like [facility] to follow. Mai called with [facility], they are following. Patient has a PICC line in place and a wound vac. Signed script and med agreement faxed to [facility]. Will need home wound vac form filled out and signed by podiatry. Samir with [facility] called and notified that the patient is now planning on returning home. Patient currently getting IV diuretics.   Electronically signed by Vida Alcantar RN on 4/23/2024 at 8:12 AM    UPDATE: Discharge order placed. Wound vac delivered to bedside. [facility] order placed. Called Helen to confirm [facility] to see tomorrow. Patient start of care is tomorrow 4/24/24.   Electronically signed by Vida Alcantar RN on 4/23/2024 at 12:59 PM    UPDATE: Patient being discharged, she would like a bedside commode. Order placed to [facility] with [facility]. BSC will be delivered to her home tomorrow.   Electronically signed by Vida Alcantar RN on 4/23/2024 at 2:07 PM

## 2024-04-23 NOTE — PATIENT CARE CONFERENCE
[facility] Quality Flow/Interdisciplinary Rounds Progress Note        Quality Flow Rounds held on April 23, 2024    Disciplines Attending:  Bedside Nurse, , , and Nursing Unit Leadership    Jaylene Kapoor was admitted on 4/20/2024  2:24 AM    Anticipated Discharge Date:       Disposition:    Julio Score:  Julio Scale Score: 19    Readmission Risk              Risk of Unplanned Readmission:  26           Discussed patient goal for the day, patient clinical progression, and barriers to discharge.  The following Goal(s) of the Day/Commitment(s) have been identified:  Discharge - Obtain Order      Donna Greco RN  April 23, 2024

## 2024-04-25 LAB
MICROORGANISM SPEC CULT: NORMAL
MICROORGANISM SPEC CULT: NORMAL
SERVICE CMNT-IMP: NORMAL
SERVICE CMNT-IMP: NORMAL
SPECIMEN DESCRIPTION: NORMAL
SPECIMEN DESCRIPTION: NORMAL

## 2024-04-26 NOTE — PROGRESS NOTES
Physician Progress Note      PATIENT:               VLADIMIR ACEVEDO  [facility] #:                  [id number]  :                       1980  ADMIT DATE:       2024 2:24 AM  DISCH DATE:        2024 4:03 PM  RESPONDING  PROVIDER #:        Florentin Grover DPM          QUERY TEXT:    Dear Attending Physician,    Patient admitted with Left foot and ankle chronic wounds. Noted documentation   of Sepsis only in Podiatry notes. WBC 10.5, Lactic acid 1.0, Vitals 98.4 115   18 134/84. In order to support the diagnosis of Sepsis, please include   additional clinical indicators in your documentation. Or please document if   the diagnosis of Sepsis has been ruled out after further study.    The medical record reflects the following:  Risk Factors: s/p Left ankle I&D fasciotomy with bone biopsy DOS:   24...s/p wound debridement, delayed primary closure  Clinical Indicators: Per Podiatry -,\"... ASSESSMENTS:-Left foot and   ankle infected wounds-Sepsis-Leukocytosis...\"  Treatment: wound vac, IV ATB, for placement at a different facility.    Thank you,  Candice Castillo RN CCDS  Clinical Documentation Improvement Specialist  Phone# [phone]  Options provided:  -- Sepsis present as evidenced by, Please document evidence and source.  -- Sepsis was ruled out after study  -- Other - I will add my own diagnosis  -- Disagree - Not applicable / Not valid  -- Disagree - Clinically unable to determine / Unknown  -- Refer to Clinical Documentation Reviewer    PROVIDER RESPONSE TEXT:    Provider disagreed with this query.    Query created by: Candice Castillo on 2024 12:10 PM      Electronically signed by:  Florentin Grover DPM 2024 9:46 AM

## 2024-04-27 LAB
MICROORGANISM SPEC CULT: NORMAL
MICROORGANISM/AGENT SPEC: NORMAL
SERVICE CMNT-IMP: NORMAL
SPECIMEN DESCRIPTION: NORMAL

## 2024-05-06 ENCOUNTER — HOSPITAL ENCOUNTER (OUTPATIENT)
Dept: ULTRASOUND IMAGING | Age: 44
Discharge: HOME | End: 2024-05-08
Payer: COMMERCIAL

## 2024-05-06 DIAGNOSIS — K74.60 HEPATIC CIRRHOSIS, UNSPECIFIED HEPATIC CIRRHOSIS TYPE, UNSPECIFIED WHETHER ASCITES PRESENT (HCC): ICD-10-CM

## 2024-05-06 PROCEDURE — 76705 ECHO EXAM OF ABDOMEN: CPT

## 2024-05-11 LAB
MICROORGANISM SPEC CULT: NORMAL
MICROORGANISM SPEC CULT: NORMAL
MICROORGANISM/AGENT SPEC: NORMAL
MICROORGANISM/AGENT SPEC: NORMAL
SERVICE CMNT-IMP: NORMAL
SERVICE CMNT-IMP: NORMAL
SPECIMEN DESCRIPTION: NORMAL
SPECIMEN DESCRIPTION: NORMAL

## 2024-05-17 ENCOUNTER — HOSPITAL ENCOUNTER (OUTPATIENT)
Age: 44
Discharge: HOME | End: 2024-05-17
Payer: COMMERCIAL

## 2024-05-17 ENCOUNTER — HOSPITAL ENCOUNTER (OUTPATIENT)
Dept: ULTRASOUND IMAGING | Age: 44
Discharge: HOME | End: 2024-05-17
Payer: COMMERCIAL

## 2024-05-17 VITALS
OXYGEN SATURATION: 99 % | RESPIRATION RATE: 18 BRPM | DIASTOLIC BLOOD PRESSURE: 70 MMHG | HEART RATE: 93 BPM | SYSTOLIC BLOOD PRESSURE: 131 MMHG

## 2024-05-17 DIAGNOSIS — R18.8 OTHER ASCITES: ICD-10-CM

## 2024-05-17 LAB
ALBUMIN FLD-MCNC: 0.3 G/DL
ALBUMIN SERPL-MCNC: 2.5 G/DL (ref 3.5–5.2)
ALP SERPL-CCNC: 237 U/L (ref 35–104)
ALT SERPL-CCNC: 10 U/L (ref 0–32)
AMYLASE FLD-CCNC: 10 U/L
ANION GAP SERPL CALCULATED.3IONS-SCNC: 12 MMOL/L (ref 7–16)
APPEARANCE FLD: CLEAR
AST SERPL-CCNC: 30 U/L (ref 0–31)
BASOPHILS # BLD: 0.06 K/UL (ref 0–0.2)
BASOPHILS NFR BLD: 1 % (ref 0–2)
BILIRUB SERPL-MCNC: 3.3 MG/DL (ref 0–1.2)
BODY FLD TYPE: NORMAL
BUN SERPL-MCNC: 10 MG/DL (ref 6–20)
CALCIUM SERPL-MCNC: 8.1 MG/DL (ref 8.6–10.2)
CHLORIDE SERPL-SCNC: 105 MMOL/L (ref 98–107)
CLOT CHECK: NORMAL
CO2 SERPL-SCNC: 18 MMOL/L (ref 22–29)
COLOR FLD: YELLOW
CREAT SERPL-MCNC: 0.9 MG/DL (ref 0.5–1)
EOSINOPHIL # BLD: 0.19 K/UL (ref 0.05–0.5)
EOSINOPHILS RELATIVE PERCENT: 2 % (ref 0–6)
ERYTHROCYTE [DISTWIDTH] IN BLOOD BY AUTOMATED COUNT: 18.4 % (ref 11.5–15)
FERRITIN SERPL-MCNC: 994 NG/ML
GFR, ESTIMATED: 80 ML/MIN/1.73M2
GLUCOSE SERPL-MCNC: 127 MG/DL (ref 74–99)
HCT VFR BLD AUTO: 28.4 % (ref 34–48)
HGB BLD-MCNC: 9 G/DL (ref 11.5–15.5)
IMM GRANULOCYTES # BLD AUTO: 0.05 K/UL (ref 0–0.58)
IMM GRANULOCYTES NFR BLD: 1 % (ref 0–5)
INR PPP: 1.4
IRON SATN MFR SERPL: 78 % (ref 15–50)
IRON SERPL-MCNC: 111 UG/DL (ref 37–145)
LYMPHOCYTES NFR BLD: 1.47 K/UL (ref 1.5–4)
LYMPHOCYTES RELATIVE PERCENT: 18 % (ref 20–42)
MCH RBC QN AUTO: 30.2 PG (ref 26–35)
MCHC RBC AUTO-ENTMCNC: 31.7 G/DL (ref 32–34.5)
MCV RBC AUTO: 95.3 FL (ref 80–99.9)
MONOCYTES NFR BLD: 0.69 K/UL (ref 0.1–0.95)
MONOCYTES NFR BLD: 9 % (ref 2–12)
MONOCYTES NFR FLD: 71 %
NEUTROPHILS NFR BLD: 70 % (ref 43–80)
NEUTROPHILS NFR FLD: 29 %
NEUTS SEG NFR BLD: 5.67 K/UL (ref 1.8–7.3)
PARTIAL THROMBOPLASTIN TIME: 36.6 SEC (ref 24.5–35.1)
PLATELET # BLD AUTO: 163 K/UL (ref 130–450)
PMV BLD AUTO: 11.4 FL (ref 7–12)
POTASSIUM SERPL-SCNC: 3.4 MMOL/L (ref 3.5–5)
PROT FLD-MCNC: 0.8 G/DL
PROT SERPL-MCNC: 6.3 G/DL (ref 6.4–8.3)
PROTHROMBIN TIME: 15.3 SEC (ref 9.3–12.4)
RBC # BLD AUTO: 2.98 M/UL (ref 3.5–5.5)
RBC # FLD: <2000 CELLS/UL
SODIUM SERPL-SCNC: 135 MMOL/L (ref 132–146)
SPECIMEN TYPE: NORMAL
TIBC SERPL-MCNC: 142 UG/DL (ref 250–450)
WBC # FLD: 99 CELLS/UL
WBC OTHER # BLD: 8.1 K/UL (ref 4.5–11.5)

## 2024-05-17 PROCEDURE — 49083 ABD PARACENTESIS W/IMAGING: CPT

## 2024-05-17 PROCEDURE — 82150 ASSAY OF AMYLASE: CPT

## 2024-05-17 PROCEDURE — 85610 PROTHROMBIN TIME: CPT

## 2024-05-17 PROCEDURE — 2500000003 HC RX 250 WO HCPCS: Performed by: RADIOLOGY

## 2024-05-17 PROCEDURE — 82105 ALPHA-FETOPROTEIN SERUM: CPT

## 2024-05-17 PROCEDURE — 87205 SMEAR GRAM STAIN: CPT

## 2024-05-17 PROCEDURE — 87070 CULTURE OTHR SPECIMN AEROBIC: CPT

## 2024-05-17 PROCEDURE — 82042 OTHER SOURCE ALBUMIN QUAN EA: CPT

## 2024-05-17 PROCEDURE — 36415 COLL VENOUS BLD VENIPUNCTURE: CPT

## 2024-05-17 PROCEDURE — 82728 ASSAY OF FERRITIN: CPT

## 2024-05-17 PROCEDURE — 85730 THROMBOPLASTIN TIME PARTIAL: CPT

## 2024-05-17 PROCEDURE — 88305 TISSUE EXAM BY PATHOLOGIST: CPT

## 2024-05-17 PROCEDURE — 84157 ASSAY OF PROTEIN OTHER: CPT

## 2024-05-17 PROCEDURE — 88112 CYTOPATH CELL ENHANCE TECH: CPT

## 2024-05-17 PROCEDURE — P9047 ALBUMIN (HUMAN), 25%, 50ML: HCPCS | Performed by: NURSE PRACTITIONER

## 2024-05-17 PROCEDURE — 85025 COMPLETE CBC W/AUTO DIFF WBC: CPT

## 2024-05-17 PROCEDURE — 83540 ASSAY OF IRON: CPT

## 2024-05-17 PROCEDURE — 6360000002 HC RX W HCPCS: Performed by: NURSE PRACTITIONER

## 2024-05-17 PROCEDURE — 80053 COMPREHEN METABOLIC PANEL: CPT

## 2024-05-17 PROCEDURE — 83550 IRON BINDING TEST: CPT

## 2024-05-17 PROCEDURE — 89051 BODY FLUID CELL COUNT: CPT

## 2024-05-17 RX ORDER — DOXYCYCLINE HYCLATE 100 MG/1
100 CAPSULE ORAL 2 TIMES DAILY
COMMUNITY

## 2024-05-17 RX ORDER — ALBUMIN (HUMAN) 12.5 G/50ML
SOLUTION INTRAVENOUS CONTINUOUS PRN
Status: DISCONTINUED | OUTPATIENT
Start: 2024-05-17 | End: 2024-05-20 | Stop reason: HOSPADM

## 2024-05-17 RX ORDER — HYDROCODONE BITARTRATE AND ACETAMINOPHEN 5; 325 MG/1; MG/1
1 TABLET ORAL EVERY 12 HOURS PRN
COMMUNITY

## 2024-05-17 RX ORDER — LIDOCAINE HYDROCHLORIDE 10 MG/ML
INJECTION, SOLUTION INFILTRATION; PERINEURAL PRN
Status: DISCONTINUED | OUTPATIENT
Start: 2024-05-17 | End: 2024-05-20 | Stop reason: HOSPADM

## 2024-05-17 RX ADMIN — ALBUMIN (HUMAN) 50 G: 25 SOLUTION INTRAVENOUS at 12:15

## 2024-05-17 RX ADMIN — LIDOCAINE HYDROCHLORIDE 8 ML: 10 INJECTION, SOLUTION INFILTRATION; PERINEURAL at 11:54

## 2024-05-17 ASSESSMENT — PAIN DESCRIPTION - LOCATION: LOCATION: FOOT

## 2024-05-17 ASSESSMENT — PAIN DESCRIPTION - DESCRIPTORS: DESCRIPTORS: ACHING

## 2024-05-17 ASSESSMENT — PAIN DESCRIPTION - ORIENTATION: ORIENTATION: LEFT

## 2024-05-17 ASSESSMENT — PAIN SCALES - GENERAL: PAINLEVEL_OUTOF10: 8

## 2024-05-17 ASSESSMENT — PAIN - FUNCTIONAL ASSESSMENT: PAIN_FUNCTIONAL_ASSESSMENT: 0-10

## 2024-05-17 NOTE — DISCHARGE INSTRUCTIONS
Discharge Instructions for Abdominal Paracentesis     An abdominal paracentesis , also known as an ascites fluid tap,occurs when a needle is inserted into the abdominal area to remove fluid. Usually, there is very little fluid in the abdominal cavity. When fluid does accumulate, a paracentesis may be done to remove the excess fluid or to take samples for testing.   There are several reasons to perform this procedure including internal bleeding, infection, or liver, pancreatic and peritoneal disorders. Extra fluid may also be removed when it leads to breathing problems and/or pain.   Depending on the reason for removing the fluid, anywhere from a few milliliters to several liters may be removed in one procedure. You may need intravenous fluids to prevent your blood pressure from getting too low and to prevent shock. The fluid may be sent to the lab for testing. This procedure can take from 10-15 minutes.   No hospital stay is needed, if the procedure is done for diagnosis. If you have a lot of fluid or are having trouble breathing, you may need to stay in the hospital.   Steps to Take   Home Care    Rest as needed.    Keep the insertion area clean and dry.    Change the bandage as needed.    Diet    Maintain your normal diet.    Physical Activity    After the procedure, return to your normal activities. If you are feeling tired, rest as needed.    Do not drive unless your doctor has given you permission to do so.    Medications    When taking medications, it's important to:   Take your medication as directednot more, not less, not at a different time.   Do not stop taking them without consulting your healthcare provider.   Don't share them with anyone else.   Know what effects and side effects to expect, and report them to your healthcare provider.   If you are taking more than one drug, even if it is an over-the-counter medication, herb, or dietary supplement, be sure to check with a physician or pharmacist about  drug interactions.   Plan ahead for refills so you don't run out.   Lifestyle Changes    You and your doctor will plan lifestyle changes that will aid in your recovery. Keep in mind include that:   Fluid accumulation may continue until the reason for the extra fluid is diagnosed. Multiple paracenteses may be necessary until this happens.   A catheter may be inserted into the abdominal area if you have chronic fluid accumulation.   Possible complications include:   Bleeding   Persistent leakage of ascitic fluid   Low blood pressure   Peritonitis   Accidental piercing of other structures in the abdomen, such as the intestine, liver, spleen, stomach, bladder, or blood vessels   Follow-up    Schedule a follow-up appointment as directed by your doctor.    Call Your Doctor If Any of the Following Occurs   Monitor your recovery once you leave the hospital. If you have a problem, alert your doctor. If any of the following occur, call your doctor:   Signs of infection, including fever and chills   Redness, swelling, increasing pain, excessive bleeding, or fluid from the paracentesis site   Pain that you can't control with the medications you've been given   Cough, shortness of breath, feeling of faint, or chest pain   Swelling of the abdomen   In case of an emergency, call 911 immediately.     ***    Last Reviewed: December 2010 Varun Gurrola MD   Updated: 12/1/2010

## 2024-05-17 NOTE — BRIEF OP NOTE
Brief Postoperative Note for Paracentesis    Jaylene Kapoor  YOB: 1980  [id number]    Pre-operative Diagnosis: Ascites      Post-operative Diagnosis: Same    Procedure: Ultrasound guided Paracentesis     Anesthesia: 1% Lidocaine     Surgeons/Assistants: ZAYDA GUTIÉRREZ MD    Complications: none    Specimens: were obtained    Ultrasound guided paracentesis performed. 5860 ml clear yellow fluid obtained from RLQ.  Dressing applied.  Vital signs were reviewed and were stable after the procedure.  Discharged to home post Albumin 50 gms IV.      Electronically signed by ZAYDA GUTIÉRREZ MD on 5/17/2024 at 1:50 PM

## 2024-05-17 NOTE — OR NURSING
Pt transported to IR room for paracentesis. Pt is alert and oriented, denies any pain prior to procedure. Ultrasound images taken prior to procedure. Procedure is explained to patient, including possible risks. Pt verbalizes understanding and consent form signed. Procedure done under sterile technique and guidance of ultrasound imaging. 1% Lidocaine is used during procedure for comfort measures. A total of 5860 ml's of clear yellow colored ascitic fluid drained during procedure. Catheter removed and op-site dressing applied to site with no bleeding noted. Specimen collected and sent to lab for ordered testing. Albumin infusion given during procedure. Pt tolerated procedure well and denies any pain after. Pt given discharge instructions and pt verbalizes understanding of post procedure care/follow up. Pt transported out of department with no difficulties.

## 2024-05-19 LAB
MICROORGANISM SPEC CULT: NORMAL
MICROORGANISM/AGENT SPEC: NORMAL
SPECIMEN DESCRIPTION: NORMAL

## 2024-05-20 LAB
MICROORGANISM SPEC CULT: NO GROWTH
MICROORGANISM/AGENT SPEC: NORMAL
NON-GYN CYTOLOGY REPORT: NORMAL
SPECIMEN DESCRIPTION: NORMAL

## 2024-05-21 LAB — AFP SERPL-MCNC: <1.8 UG/L

## 2024-05-31 ENCOUNTER — HOSPITAL ENCOUNTER (OUTPATIENT)
Dept: ULTRASOUND IMAGING | Age: 44
Discharge: HOME | End: 2024-05-31
Payer: COMMERCIAL

## 2024-05-31 VITALS
SYSTOLIC BLOOD PRESSURE: 128 MMHG | HEART RATE: 91 BPM | RESPIRATION RATE: 18 BRPM | OXYGEN SATURATION: 100 % | DIASTOLIC BLOOD PRESSURE: 86 MMHG

## 2024-05-31 DIAGNOSIS — R18.8 OTHER ASCITES: ICD-10-CM

## 2024-05-31 LAB
ALBUMIN FLD-MCNC: 0.3 G/DL
AMYLASE FLD-CCNC: 8 U/L
APPEARANCE FLD: CLEAR
BODY FLD TYPE: NORMAL
CLOT CHECK: NORMAL
COLOR FLD: YELLOW
MONOCYTES NFR FLD: 74 %
NEUTROPHILS NFR FLD: 26 %
PROT FLD-MCNC: 0.8 G/DL
RBC # FLD: <2000 CELLS/UL
SPECIMEN TYPE: NORMAL
WBC # FLD: 92 CELLS/UL

## 2024-05-31 PROCEDURE — 2500000003 HC RX 250 WO HCPCS: Performed by: NURSE PRACTITIONER

## 2024-05-31 PROCEDURE — 82150 ASSAY OF AMYLASE: CPT

## 2024-05-31 PROCEDURE — 88112 CYTOPATH CELL ENHANCE TECH: CPT

## 2024-05-31 PROCEDURE — 87205 SMEAR GRAM STAIN: CPT

## 2024-05-31 PROCEDURE — 6360000002 HC RX W HCPCS: Performed by: NURSE PRACTITIONER

## 2024-05-31 PROCEDURE — 88305 TISSUE EXAM BY PATHOLOGIST: CPT

## 2024-05-31 PROCEDURE — 87070 CULTURE OTHR SPECIMN AEROBIC: CPT

## 2024-05-31 PROCEDURE — 82042 OTHER SOURCE ALBUMIN QUAN EA: CPT

## 2024-05-31 PROCEDURE — 89051 BODY FLUID CELL COUNT: CPT

## 2024-05-31 PROCEDURE — P9047 ALBUMIN (HUMAN), 25%, 50ML: HCPCS | Performed by: NURSE PRACTITIONER

## 2024-05-31 PROCEDURE — C1729 CATH, DRAINAGE: HCPCS

## 2024-05-31 PROCEDURE — 84157 ASSAY OF PROTEIN OTHER: CPT

## 2024-05-31 RX ORDER — ALBUMIN (HUMAN) 12.5 G/50ML
50 SOLUTION INTRAVENOUS ONCE
Status: COMPLETED | OUTPATIENT
Start: 2024-05-31 | End: 2024-05-31

## 2024-05-31 RX ORDER — LIDOCAINE HYDROCHLORIDE 10 MG/ML
INJECTION, SOLUTION EPIDURAL; INFILTRATION; INTRACAUDAL; PERINEURAL PRN
Status: COMPLETED | OUTPATIENT
Start: 2024-05-31 | End: 2024-05-31

## 2024-05-31 RX ADMIN — LIDOCAINE HYDROCHLORIDE 10 ML: 10 INJECTION, SOLUTION EPIDURAL; INFILTRATION; INTRACAUDAL; PERINEURAL at 12:20

## 2024-05-31 RX ADMIN — ALBUMIN (HUMAN) 50 G: 25 SOLUTION INTRAVENOUS at 13:11

## 2024-05-31 NOTE — PROCEDURES
Procedure: Ultrasound Guided Paracentesis    Diagnosis: Ascites    Findings: Large volume ascites, successful catheter placement with clear yellow ascitic fluid return    Specimen: Approximately 60cc obtained and sent for analysis (orders from referring physician).  Total amount of fluid removed to be reported in PACS.    Anesthesia: Local infiltration of 10cc 1% Lidocaine without epi    EBL: Minimal.    Complication: None immediately post procedure.    Plan: Discharge to home following paracentesis.      Comments:    See radiology dictation in PACs for image review and additional procedural information.

## 2024-05-31 NOTE — OR NURSING
Patient arrived from home to IR for ultrasound guided paracentesis. Vitals obtained, IV started per IV team, and consent signed per patient. Haleigh Stevens PA-C in to speak with the patient about the procedure, all questions answered. Abdomen scanned and prepped. 1% Lidocaine administered to procedural site. 5 Indonesian centesis catheter inserted to RLQ with ultrasound guidance, catheter connected to suction. 50 G IV Albumin given per order. Patient tolerated procedure well. 6650 ml drained of clear yellow colored ascitic fluid. Centesis catheter removed post procedure. Specimens collected and sent to lab per order. Puncture site cleansed and dry dressing applied. No bleeding, swelling or complications noted. Post procedure vitals obtained. IV site removed, dry dressing applied. Discharge instructions reviewed and understood by patient. Patient discharged home without complication.

## 2024-06-02 LAB
MICROORGANISM SPEC CULT: NORMAL
MICROORGANISM/AGENT SPEC: NORMAL
SPECIMEN DESCRIPTION: NORMAL

## 2024-06-03 LAB
MICROORGANISM SPEC CULT: NO GROWTH
MICROORGANISM/AGENT SPEC: NORMAL
SPECIMEN DESCRIPTION: NORMAL

## 2024-06-04 LAB — NON-GYN CYTOLOGY REPORT: NORMAL

## 2024-06-14 ENCOUNTER — HOSPITAL ENCOUNTER (OUTPATIENT)
Dept: ULTRASOUND IMAGING | Age: 44
Discharge: HOME | End: 2024-06-14
Payer: COMMERCIAL

## 2024-06-14 VITALS
RESPIRATION RATE: 18 BRPM | SYSTOLIC BLOOD PRESSURE: 115 MMHG | HEART RATE: 77 BPM | DIASTOLIC BLOOD PRESSURE: 55 MMHG | OXYGEN SATURATION: 97 %

## 2024-06-14 DIAGNOSIS — R18.8 OTHER ASCITES: ICD-10-CM

## 2024-06-14 LAB
ALBUMIN FLD-MCNC: 0.3 G/DL
AMYLASE FLD-CCNC: 9 U/L
APPEARANCE FLD: CLEAR
BODY FLD TYPE: NORMAL
CLOT CHECK: NORMAL
COLOR FLD: YELLOW
MONOCYTES NFR FLD: 77 %
NEUTROPHILS NFR FLD: 23 %
PROT FLD-MCNC: 0.7 G/DL
RBC # FLD: <2000 CELLS/UL
SPECIMEN TYPE: NORMAL
WBC # FLD: 86 CELLS/UL

## 2024-06-14 PROCEDURE — 84157 ASSAY OF PROTEIN OTHER: CPT

## 2024-06-14 PROCEDURE — 49083 ABD PARACENTESIS W/IMAGING: CPT

## 2024-06-14 PROCEDURE — 82042 OTHER SOURCE ALBUMIN QUAN EA: CPT

## 2024-06-14 PROCEDURE — 82150 ASSAY OF AMYLASE: CPT

## 2024-06-14 PROCEDURE — 6360000002 HC RX W HCPCS: Performed by: NURSE PRACTITIONER

## 2024-06-14 PROCEDURE — 88112 CYTOPATH CELL ENHANCE TECH: CPT

## 2024-06-14 PROCEDURE — 87070 CULTURE OTHR SPECIMN AEROBIC: CPT

## 2024-06-14 PROCEDURE — P9047 ALBUMIN (HUMAN), 25%, 50ML: HCPCS | Performed by: NURSE PRACTITIONER

## 2024-06-14 PROCEDURE — 88305 TISSUE EXAM BY PATHOLOGIST: CPT

## 2024-06-14 PROCEDURE — 87205 SMEAR GRAM STAIN: CPT

## 2024-06-14 PROCEDURE — 2500000003 HC RX 250 WO HCPCS: Performed by: RADIOLOGY

## 2024-06-14 PROCEDURE — 89051 BODY FLUID CELL COUNT: CPT

## 2024-06-14 RX ORDER — LIDOCAINE HYDROCHLORIDE 10 MG/ML
INJECTION, SOLUTION EPIDURAL; INFILTRATION; INTRACAUDAL; PERINEURAL PRN
Status: SHIPPED | OUTPATIENT
Start: 2024-06-14

## 2024-06-14 RX ORDER — ALBUMIN (HUMAN) 12.5 G/50ML
50 SOLUTION INTRAVENOUS ONCE
Status: ACTIVE | OUTPATIENT
Start: 2024-06-14

## 2024-06-14 RX ORDER — ALBUMIN (HUMAN) 12.5 G/50ML
SOLUTION INTRAVENOUS CONTINUOUS PRN
Status: SHIPPED | OUTPATIENT
Start: 2024-06-14

## 2024-06-14 RX ADMIN — LIDOCAINE HYDROCHLORIDE 10 ML: 10 INJECTION, SOLUTION EPIDURAL; INFILTRATION; INTRACAUDAL; PERINEURAL at 12:37

## 2024-06-14 RX ADMIN — ALBUMIN (HUMAN) 50 G: 12.5 SOLUTION INTRAVENOUS at 13:29

## 2024-06-14 ASSESSMENT — PAIN DESCRIPTION - ORIENTATION: ORIENTATION: LEFT

## 2024-06-14 ASSESSMENT — PAIN SCALES - GENERAL: PAINLEVEL_OUTOF10: 8

## 2024-06-14 ASSESSMENT — PAIN DESCRIPTION - LOCATION: LOCATION: FOOT

## 2024-06-14 ASSESSMENT — PAIN DESCRIPTION - DESCRIPTORS: DESCRIPTORS: ACHING

## 2024-06-14 NOTE — OR NURSING
Patient arrived from home to IR for ultrasound guided paracentesis. Vitals obtained, IV started per IV team, and consent signed per patient. Dr Farga in to speak with the patient about the procedure, all questions answered. Abdomen scanned and prepped. 1% Lidocaine administered to procedural site. 5 Sudanese centesis catheter inserted to (RLQ) with ultrasound guidance, catheter connected to suction. 50 G IV Albumin given per order. Patient tolerated procedure well. 7100 ml drained of hazy yellow colored ascitic fluid. Centesis catheter removed post procedure. Specimens collected and sent to lab per order. Puncture site cleansed and dry dressing applied. No bleeding, swelling or complications noted. Post procedure vitals obtained. IV site removed, dry dressing applied. Discharge instructions reviewed and understood by patient. Patient discharged home without complication.

## 2024-06-16 LAB
MICROORGANISM SPEC CULT: NORMAL
MICROORGANISM/AGENT SPEC: NORMAL
SPECIMEN DESCRIPTION: NORMAL

## 2024-06-17 LAB
MICROORGANISM SPEC CULT: NO GROWTH
MICROORGANISM/AGENT SPEC: NORMAL
SPECIMEN DESCRIPTION: NORMAL

## 2024-06-18 LAB — NON-GYN CYTOLOGY REPORT: NORMAL

## 2024-06-28 ENCOUNTER — HOSPITAL ENCOUNTER (OUTPATIENT)
Dept: ULTRASOUND IMAGING | Age: 44
Discharge: HOME | End: 2024-06-28
Payer: COMMERCIAL

## 2024-06-28 ENCOUNTER — HOSPITAL ENCOUNTER (OUTPATIENT)
Age: 44
Discharge: HOME | End: 2024-06-28
Payer: COMMERCIAL

## 2024-06-28 VITALS
OXYGEN SATURATION: 98 % | DIASTOLIC BLOOD PRESSURE: 66 MMHG | SYSTOLIC BLOOD PRESSURE: 111 MMHG | HEART RATE: 79 BPM | RESPIRATION RATE: 18 BRPM

## 2024-06-28 DIAGNOSIS — R18.8 OTHER ASCITES: ICD-10-CM

## 2024-06-28 LAB
ALBUMIN FLD-MCNC: 0.6 G/DL
ALBUMIN SERPL-MCNC: 2.8 G/DL (ref 3.5–5.2)
ALP SERPL-CCNC: 188 U/L (ref 35–104)
ALT SERPL-CCNC: 10 U/L (ref 0–32)
AMYLASE FLD-CCNC: 8 U/L
ANION GAP SERPL CALCULATED.3IONS-SCNC: 11 MMOL/L (ref 7–16)
APPEARANCE FLD: CLEAR
AST SERPL-CCNC: 27 U/L (ref 0–31)
BASOPHILS # BLD: 0.04 K/UL (ref 0–0.2)
BASOPHILS NFR BLD: 0 % (ref 0–2)
BILIRUB SERPL-MCNC: 1.7 MG/DL (ref 0–1.2)
BODY FLD TYPE: NORMAL
BUN SERPL-MCNC: 21 MG/DL (ref 6–20)
CALCIUM SERPL-MCNC: 7.9 MG/DL (ref 8.6–10.2)
CHLORIDE SERPL-SCNC: 108 MMOL/L (ref 98–107)
CLOT CHECK: NORMAL
CO2 SERPL-SCNC: 18 MMOL/L (ref 22–29)
COLOR FLD: YELLOW
CREAT SERPL-MCNC: 0.9 MG/DL (ref 0.5–1)
EOSINOPHIL # BLD: 0.14 K/UL (ref 0.05–0.5)
EOSINOPHILS RELATIVE PERCENT: 1 % (ref 0–6)
ERYTHROCYTE [DISTWIDTH] IN BLOOD BY AUTOMATED COUNT: 19.1 % (ref 11.5–15)
FERRITIN SERPL-MCNC: 801 NG/ML
GFR, ESTIMATED: 79 ML/MIN/1.73M2
GLUCOSE SERPL-MCNC: 127 MG/DL (ref 74–99)
HCT VFR BLD AUTO: 26.9 % (ref 34–48)
HGB BLD-MCNC: 8.7 G/DL (ref 11.5–15.5)
IMM GRANULOCYTES # BLD AUTO: 0.05 K/UL (ref 0–0.58)
IMM GRANULOCYTES NFR BLD: 0 % (ref 0–5)
INR PPP: 1.6
IRON SATN MFR SERPL: 41 % (ref 15–50)
IRON SERPL-MCNC: 57 UG/DL (ref 37–145)
LYMPHOCYTES NFR BLD: 1.64 K/UL (ref 1.5–4)
LYMPHOCYTES RELATIVE PERCENT: 14 % (ref 20–42)
MCH RBC QN AUTO: 31.4 PG (ref 26–35)
MCHC RBC AUTO-ENTMCNC: 32.3 G/DL (ref 32–34.5)
MCV RBC AUTO: 97.1 FL (ref 80–99.9)
MONOCYTES NFR BLD: 0.64 K/UL (ref 0.1–0.95)
MONOCYTES NFR BLD: 5 % (ref 2–12)
MONOCYTES NFR FLD: 63 %
NEUTROPHILS NFR BLD: 79 % (ref 43–80)
NEUTROPHILS NFR FLD: 38 %
NEUTS SEG NFR BLD: 9.62 K/UL (ref 1.8–7.3)
PARTIAL THROMBOPLASTIN TIME: 42.7 SEC (ref 24.5–35.1)
PLATELET # BLD AUTO: 129 K/UL (ref 130–450)
PMV BLD AUTO: 9 FL (ref 7–12)
POTASSIUM SERPL-SCNC: 3.8 MMOL/L (ref 3.5–5)
PROT FLD-MCNC: 0.9 G/DL
PROT SERPL-MCNC: 6.2 G/DL (ref 6.4–8.3)
PROTHROMBIN TIME: 17.5 SEC (ref 9.3–12.4)
RBC # BLD AUTO: 2.77 M/UL (ref 3.5–5.5)
RBC # FLD: <2000 CELLS/UL
SODIUM SERPL-SCNC: 137 MMOL/L (ref 132–146)
SPECIMEN TYPE: NORMAL
TIBC SERPL-MCNC: 138 UG/DL (ref 250–450)
WBC # FLD: 72 CELLS/UL
WBC OTHER # BLD: 12.1 K/UL (ref 4.5–11.5)

## 2024-06-28 PROCEDURE — 82042 OTHER SOURCE ALBUMIN QUAN EA: CPT

## 2024-06-28 PROCEDURE — 36415 COLL VENOUS BLD VENIPUNCTURE: CPT

## 2024-06-28 PROCEDURE — 83550 IRON BINDING TEST: CPT

## 2024-06-28 PROCEDURE — 85025 COMPLETE CBC W/AUTO DIFF WBC: CPT

## 2024-06-28 PROCEDURE — 49083 ABD PARACENTESIS W/IMAGING: CPT

## 2024-06-28 PROCEDURE — 83540 ASSAY OF IRON: CPT

## 2024-06-28 PROCEDURE — 82150 ASSAY OF AMYLASE: CPT

## 2024-06-28 PROCEDURE — 84157 ASSAY OF PROTEIN OTHER: CPT

## 2024-06-28 PROCEDURE — 82105 ALPHA-FETOPROTEIN SERUM: CPT

## 2024-06-28 PROCEDURE — 6360000002 HC RX W HCPCS: Performed by: NURSE PRACTITIONER

## 2024-06-28 PROCEDURE — 85610 PROTHROMBIN TIME: CPT

## 2024-06-28 PROCEDURE — 2500000003 HC RX 250 WO HCPCS: Performed by: RADIOLOGY

## 2024-06-28 PROCEDURE — 80053 COMPREHEN METABOLIC PANEL: CPT

## 2024-06-28 PROCEDURE — P9047 ALBUMIN (HUMAN), 25%, 50ML: HCPCS | Performed by: NURSE PRACTITIONER

## 2024-06-28 PROCEDURE — 82728 ASSAY OF FERRITIN: CPT

## 2024-06-28 PROCEDURE — 85730 THROMBOPLASTIN TIME PARTIAL: CPT

## 2024-06-28 PROCEDURE — 87205 SMEAR GRAM STAIN: CPT

## 2024-06-28 PROCEDURE — 87070 CULTURE OTHR SPECIMN AEROBIC: CPT

## 2024-06-28 PROCEDURE — 89051 BODY FLUID CELL COUNT: CPT

## 2024-06-28 RX ORDER — ALBUMIN (HUMAN) 12.5 G/50ML
SOLUTION INTRAVENOUS CONTINUOUS PRN
Status: COMPLETED | OUTPATIENT
Start: 2024-06-28 | End: 2024-06-28

## 2024-06-28 RX ORDER — LIDOCAINE HYDROCHLORIDE 10 MG/ML
INJECTION, SOLUTION INFILTRATION; PERINEURAL PRN
Status: COMPLETED | OUTPATIENT
Start: 2024-06-28 | End: 2024-06-28

## 2024-06-28 RX ADMIN — LIDOCAINE HYDROCHLORIDE 10 ML: 10 INJECTION, SOLUTION INFILTRATION; PERINEURAL at 13:03

## 2024-06-28 RX ADMIN — ALBUMIN (HUMAN) 50 G: 25 SOLUTION INTRAVENOUS at 13:52

## 2024-06-28 NOTE — OR NURSING
Patient brought into room, discussed procedure. Dr. Florse answered all questions and concerns related to the procedure. Treatment consent signed. Patient positioned and sterile prepped for the procedure. 1% Lidocaine administered by Dr. Flores.  A total of 6000 mL clear yellow ascitic fluid was taken. Patient tolerated procedure well. Site cleaned and dressed with a bandage. Vitals monitored and remained stable throughout. Discharge papers declined. Patient escorted out of department with all belongings and without distress.

## 2024-06-30 LAB
MICROORGANISM SPEC CULT: ABNORMAL
MICROORGANISM/AGENT SPEC: ABNORMAL
SERVICE CMNT-IMP: ABNORMAL
SPECIMEN DESCRIPTION: ABNORMAL

## 2024-07-02 LAB
AFP SERPL-MCNC: <1.8 UG/L
MICROORGANISM SPEC CULT: NO GROWTH
MICROORGANISM/AGENT SPEC: NORMAL
NON-GYN CYTOLOGY REPORT: NORMAL
SERVICE CMNT-IMP: NORMAL
SPECIMEN DESCRIPTION: NORMAL

## 2024-07-12 ENCOUNTER — HOSPITAL ENCOUNTER (OUTPATIENT)
Dept: ULTRASOUND IMAGING | Age: 44
Discharge: HOME | End: 2024-07-12
Payer: COMMERCIAL

## 2024-07-12 VITALS
RESPIRATION RATE: 18 BRPM | DIASTOLIC BLOOD PRESSURE: 55 MMHG | OXYGEN SATURATION: 100 % | HEART RATE: 83 BPM | SYSTOLIC BLOOD PRESSURE: 103 MMHG

## 2024-07-12 DIAGNOSIS — R18.8 OTHER ASCITES: ICD-10-CM

## 2024-07-12 LAB
ALBUMIN FLD-MCNC: 0.6 G/DL
AMYLASE FLD-CCNC: 22 U/L
APPEARANCE FLD: NORMAL
BODY FLD TYPE: NORMAL
CLOT CHECK: NORMAL
COLOR FLD: YELLOW
MONOCYTES NFR FLD: 76 %
NEUTROPHILS NFR FLD: 24 %
PROT FLD-MCNC: 0.9 G/DL
RBC # FLD: <2000 CELLS/UL
SPECIMEN TYPE: NORMAL
WBC # FLD: 79 CELLS/UL

## 2024-07-12 PROCEDURE — 87070 CULTURE OTHR SPECIMN AEROBIC: CPT

## 2024-07-12 PROCEDURE — 89051 BODY FLUID CELL COUNT: CPT

## 2024-07-12 PROCEDURE — P9047 ALBUMIN (HUMAN), 25%, 50ML: HCPCS | Performed by: NURSE PRACTITIONER

## 2024-07-12 PROCEDURE — 82042 OTHER SOURCE ALBUMIN QUAN EA: CPT

## 2024-07-12 PROCEDURE — 88305 TISSUE EXAM BY PATHOLOGIST: CPT

## 2024-07-12 PROCEDURE — 82150 ASSAY OF AMYLASE: CPT

## 2024-07-12 PROCEDURE — 49083 ABD PARACENTESIS W/IMAGING: CPT

## 2024-07-12 PROCEDURE — 88112 CYTOPATH CELL ENHANCE TECH: CPT

## 2024-07-12 PROCEDURE — 6360000002 HC RX W HCPCS: Performed by: NURSE PRACTITIONER

## 2024-07-12 PROCEDURE — 84157 ASSAY OF PROTEIN OTHER: CPT

## 2024-07-12 PROCEDURE — 2500000003 HC RX 250 WO HCPCS: Performed by: PHYSICIAN ASSISTANT

## 2024-07-12 PROCEDURE — 87205 SMEAR GRAM STAIN: CPT

## 2024-07-12 RX ORDER — LIDOCAINE HYDROCHLORIDE 10 MG/ML
INJECTION, SOLUTION INFILTRATION; PERINEURAL PRN
Status: COMPLETED | OUTPATIENT
Start: 2024-07-12 | End: 2024-07-12

## 2024-07-12 RX ORDER — ALBUMIN (HUMAN) 12.5 G/50ML
SOLUTION INTRAVENOUS CONTINUOUS PRN
Status: COMPLETED | OUTPATIENT
Start: 2024-07-12 | End: 2024-07-12

## 2024-07-12 RX ADMIN — LIDOCAINE HYDROCHLORIDE 10 ML: 10 INJECTION, SOLUTION INFILTRATION; PERINEURAL at 13:02

## 2024-07-12 RX ADMIN — ALBUMIN (HUMAN) 50 G: 25 SOLUTION INTRAVENOUS at 13:20

## 2024-07-12 NOTE — PROCEDURES
Procedure: Ultrasound Guided Paracentesis    Diagnosis: Ascites    Findings: Large volume ascites, successful catheter placement with clear pale yellow ascitic fluid return    Specimen:  Approximately 60cc obtained and sent for analysis (orders from referring physician).  Total amount of fluid removed 7220 mL.    Anesthesia: Local infiltration of 10cc 1% Lidocaine without epi    EBL: Minimal.    Complication: None immediately post procedure.    Plan: Discharge to home following paracentesis.      Comments:    See radiology dictation in PACs for image review and additional procedural information.

## 2024-07-12 NOTE — OR NURSING
Patient brought into room, discussed procedure. ANAYA Stevens PAC answered all questions and concerns related to the procedure. Treatment consent signed. Patient positioned and sterile prepped for the procedure. 1% Lidocaine administered by ANAYA Stevens PAC.  A total of 7220 mL/clear pale yellow ascitic fluid taken. Patient tolerated procedure well. Site cleaned and dressed with a bandage. Vitals monitored and remained stable throughout. Discharge papers reviewed, patient did not want to take with her. Patient escorted out of department with all belongings and without distress.

## 2024-07-16 LAB
MICROORGANISM SPEC CULT: NO GROWTH
MICROORGANISM/AGENT SPEC: NORMAL
NON-GYN CYTOLOGY REPORT: NORMAL
SERVICE CMNT-IMP: NORMAL
SPECIMEN DESCRIPTION: NORMAL

## 2024-07-23 ENCOUNTER — HOSPITAL ENCOUNTER (OUTPATIENT)
Age: 44
Discharge: HOME | End: 2024-07-23
Payer: COMMERCIAL

## 2024-07-23 LAB
ALBUMIN SERPL-MCNC: 3.1 G/DL (ref 3.5–5.2)
ALP SERPL-CCNC: 189 U/L (ref 35–104)
ALT SERPL-CCNC: 9 U/L (ref 0–32)
ANION GAP SERPL CALCULATED.3IONS-SCNC: 13 MMOL/L (ref 7–16)
AST SERPL-CCNC: 24 U/L (ref 0–31)
BASOPHILS # BLD: 0.02 K/UL (ref 0–0.2)
BASOPHILS NFR BLD: 0 % (ref 0–2)
BILIRUB SERPL-MCNC: 1.2 MG/DL (ref 0–1.2)
BUN SERPL-MCNC: 31 MG/DL (ref 6–20)
CALCIUM SERPL-MCNC: 8.1 MG/DL (ref 8.6–10.2)
CHLORIDE SERPL-SCNC: 108 MMOL/L (ref 98–107)
CO2 SERPL-SCNC: 16 MMOL/L (ref 22–29)
CREAT SERPL-MCNC: 1.1 MG/DL (ref 0.5–1)
EOSINOPHIL # BLD: 0.19 K/UL (ref 0.05–0.5)
EOSINOPHILS RELATIVE PERCENT: 3 % (ref 0–6)
ERYTHROCYTE [DISTWIDTH] IN BLOOD BY AUTOMATED COUNT: 19.9 % (ref 11.5–15)
FERRITIN SERPL-MCNC: 498 NG/ML
GFR, ESTIMATED: 62 ML/MIN/1.73M2
GLUCOSE SERPL-MCNC: 107 MG/DL (ref 74–99)
HCT VFR BLD AUTO: 26.9 % (ref 34–48)
HGB BLD-MCNC: 8.4 G/DL (ref 11.5–15.5)
IMM GRANULOCYTES # BLD AUTO: 0.03 K/UL (ref 0–0.58)
IMM GRANULOCYTES NFR BLD: 0 % (ref 0–5)
INR PPP: 1.3
IRON SATN MFR SERPL: 51 % (ref 15–50)
IRON SERPL-MCNC: 90 UG/DL (ref 37–145)
LYMPHOCYTES NFR BLD: 1.32 K/UL (ref 1.5–4)
LYMPHOCYTES RELATIVE PERCENT: 19 % (ref 20–42)
MCH RBC QN AUTO: 30.7 PG (ref 26–35)
MCHC RBC AUTO-ENTMCNC: 31.2 G/DL (ref 32–34.5)
MCV RBC AUTO: 98.2 FL (ref 80–99.9)
MONOCYTES NFR BLD: 0.65 K/UL (ref 0.1–0.95)
MONOCYTES NFR BLD: 9 % (ref 2–12)
NEUTROPHILS NFR BLD: 69 % (ref 43–80)
NEUTS SEG NFR BLD: 4.84 K/UL (ref 1.8–7.3)
PARTIAL THROMBOPLASTIN TIME: 42.1 SEC (ref 24.5–35.1)
PLATELET # BLD AUTO: 131 K/UL (ref 130–450)
PMV BLD AUTO: 10.3 FL (ref 7–12)
POTASSIUM SERPL-SCNC: 3.7 MMOL/L (ref 3.5–5)
PROT SERPL-MCNC: 6.7 G/DL (ref 6.4–8.3)
PROTHROMBIN TIME: 14.1 SEC (ref 9.3–12.4)
RBC # BLD AUTO: 2.74 M/UL (ref 3.5–5.5)
SODIUM SERPL-SCNC: 137 MMOL/L (ref 132–146)
TIBC SERPL-MCNC: 177 UG/DL (ref 250–450)
WBC OTHER # BLD: 7.1 K/UL (ref 4.5–11.5)

## 2024-07-23 PROCEDURE — 83540 ASSAY OF IRON: CPT

## 2024-07-23 PROCEDURE — 83550 IRON BINDING TEST: CPT

## 2024-07-23 PROCEDURE — 82728 ASSAY OF FERRITIN: CPT

## 2024-07-23 PROCEDURE — 82105 ALPHA-FETOPROTEIN SERUM: CPT

## 2024-07-23 PROCEDURE — 80053 COMPREHEN METABOLIC PANEL: CPT

## 2024-07-23 PROCEDURE — 85610 PROTHROMBIN TIME: CPT

## 2024-07-23 PROCEDURE — 85730 THROMBOPLASTIN TIME PARTIAL: CPT

## 2024-07-23 PROCEDURE — 36415 COLL VENOUS BLD VENIPUNCTURE: CPT

## 2024-07-23 PROCEDURE — 85025 COMPLETE CBC W/AUTO DIFF WBC: CPT

## 2024-07-25 LAB — AFP SERPL-MCNC: <1.8 UG/L

## 2024-07-26 ENCOUNTER — HOSPITAL ENCOUNTER (OUTPATIENT)
Dept: ULTRASOUND IMAGING | Age: 44
Discharge: HOME | End: 2024-07-26
Payer: COMMERCIAL

## 2024-07-26 VITALS
HEART RATE: 77 BPM | RESPIRATION RATE: 18 BRPM | SYSTOLIC BLOOD PRESSURE: 101 MMHG | OXYGEN SATURATION: 99 % | DIASTOLIC BLOOD PRESSURE: 57 MMHG

## 2024-07-26 DIAGNOSIS — R18.8 OTHER ASCITES: ICD-10-CM

## 2024-07-26 LAB
ALBUMIN FLD-MCNC: 0.7 G/DL
AMYLASE FLD-CCNC: 27 U/L
APPEARANCE FLD: CLEAR
BODY FLD TYPE: NORMAL
CLOT CHECK: NORMAL
COLOR FLD: YELLOW
MONOCYTES NFR FLD: 76 %
NEUTROPHILS NFR FLD: 24 %
PROT FLD-MCNC: 1.2 G/DL
RBC # FLD: <2000 CELLS/UL
SPECIMEN TYPE: NORMAL
WBC # FLD: 120 CELLS/UL

## 2024-07-26 PROCEDURE — P9047 ALBUMIN (HUMAN), 25%, 50ML: HCPCS | Performed by: NURSE PRACTITIONER

## 2024-07-26 PROCEDURE — 88112 CYTOPATH CELL ENHANCE TECH: CPT

## 2024-07-26 PROCEDURE — 89051 BODY FLUID CELL COUNT: CPT

## 2024-07-26 PROCEDURE — 82150 ASSAY OF AMYLASE: CPT

## 2024-07-26 PROCEDURE — 87070 CULTURE OTHR SPECIMN AEROBIC: CPT

## 2024-07-26 PROCEDURE — 87205 SMEAR GRAM STAIN: CPT

## 2024-07-26 PROCEDURE — 82042 OTHER SOURCE ALBUMIN QUAN EA: CPT

## 2024-07-26 PROCEDURE — 6360000002 HC RX W HCPCS: Performed by: NURSE PRACTITIONER

## 2024-07-26 PROCEDURE — 49083 ABD PARACENTESIS W/IMAGING: CPT

## 2024-07-26 PROCEDURE — 88305 TISSUE EXAM BY PATHOLOGIST: CPT

## 2024-07-26 PROCEDURE — 84157 ASSAY OF PROTEIN OTHER: CPT

## 2024-07-26 RX ORDER — IBUPROFEN 200 MG
200 TABLET ORAL EVERY 6 HOURS PRN
COMMUNITY

## 2024-07-26 RX ORDER — ALBUMIN (HUMAN) 12.5 G/50ML
SOLUTION INTRAVENOUS CONTINUOUS PRN
Status: COMPLETED | OUTPATIENT
Start: 2024-07-26 | End: 2024-07-26

## 2024-07-26 RX ADMIN — ALBUMIN (HUMAN) 50 G: 25 SOLUTION INTRAVENOUS at 12:44

## 2024-07-26 NOTE — OR NURSING
Patient arrival from home to IR for paracentesis. Vitals taken, Iv started, and consent signed. Haleigh Stevens PA-C  in to speak with the patient about the procedure, all questions answered. Abdomen scanned, prepped and centesis catheter inserted LLQ with ultrasound guidance by Haleigh Stevens Pa-C @ 1251. Patient tolerated well. 6,450 ml drained of clear yellow colored ascitic fluid. Centesis catheter removed @ 1326. Puncture site cleansed and dry dressing applied. No bleeding, swelling or complications noted. Discharge instructions reviewed and understood by patient. Patient discharged home with son @ 1346.

## 2024-07-28 LAB
MICROORGANISM SPEC CULT: NORMAL
MICROORGANISM/AGENT SPEC: NORMAL
SPECIMEN DESCRIPTION: NORMAL

## 2024-07-29 LAB
MICROORGANISM SPEC CULT: NO GROWTH
MICROORGANISM/AGENT SPEC: NORMAL
SPECIMEN DESCRIPTION: NORMAL

## 2024-07-30 LAB — NON-GYN CYTOLOGY REPORT: NORMAL

## 2024-08-09 ENCOUNTER — HOSPITAL ENCOUNTER (OUTPATIENT)
Dept: ULTRASOUND IMAGING | Age: 44
Discharge: HOME | End: 2024-08-09
Payer: COMMERCIAL

## 2024-08-09 VITALS
SYSTOLIC BLOOD PRESSURE: 122 MMHG | HEART RATE: 76 BPM | OXYGEN SATURATION: 100 % | RESPIRATION RATE: 18 BRPM | DIASTOLIC BLOOD PRESSURE: 76 MMHG

## 2024-08-09 DIAGNOSIS — R18.8 OTHER ASCITES: ICD-10-CM

## 2024-08-09 LAB
ALBUMIN FLD-MCNC: 0.8 G/DL
AMYLASE FLD-CCNC: 27 U/L
APPEARANCE FLD: CLEAR
BODY FLD TYPE: NORMAL
CLOT CHECK: NORMAL
COLOR FLD: NORMAL
MONOCYTES NFR FLD: 79 %
NEUTROPHILS NFR FLD: 21 %
PROT FLD-MCNC: 1.2 G/DL
RBC # FLD: <2000 CELLS/UL
SPECIMEN TYPE: NORMAL
WBC # FLD: 123 CELLS/UL

## 2024-08-09 PROCEDURE — 89051 BODY FLUID CELL COUNT: CPT

## 2024-08-09 PROCEDURE — 82042 OTHER SOURCE ALBUMIN QUAN EA: CPT

## 2024-08-09 PROCEDURE — 88112 CYTOPATH CELL ENHANCE TECH: CPT

## 2024-08-09 PROCEDURE — 49083 ABD PARACENTESIS W/IMAGING: CPT

## 2024-08-09 PROCEDURE — 6360000002 HC RX W HCPCS: Performed by: NURSE PRACTITIONER

## 2024-08-09 PROCEDURE — P9047 ALBUMIN (HUMAN), 25%, 50ML: HCPCS | Performed by: NURSE PRACTITIONER

## 2024-08-09 PROCEDURE — 88305 TISSUE EXAM BY PATHOLOGIST: CPT

## 2024-08-09 PROCEDURE — 87070 CULTURE OTHR SPECIMN AEROBIC: CPT

## 2024-08-09 PROCEDURE — 87205 SMEAR GRAM STAIN: CPT

## 2024-08-09 PROCEDURE — 84157 ASSAY OF PROTEIN OTHER: CPT

## 2024-08-09 PROCEDURE — 2500000003 HC RX 250 WO HCPCS: Performed by: PHYSICIAN ASSISTANT

## 2024-08-09 PROCEDURE — 82150 ASSAY OF AMYLASE: CPT

## 2024-08-09 RX ORDER — LIDOCAINE HYDROCHLORIDE 10 MG/ML
INJECTION, SOLUTION EPIDURAL; INFILTRATION; INTRACAUDAL; PERINEURAL PRN
Status: DISCONTINUED | OUTPATIENT
Start: 2024-08-09 | End: 2024-08-12 | Stop reason: HOSPADM

## 2024-08-09 RX ORDER — LACTULOSE 10 G/15ML
10 SOLUTION ORAL 2 TIMES DAILY
COMMUNITY

## 2024-08-09 RX ORDER — ALBUMIN (HUMAN) 12.5 G/50ML
SOLUTION INTRAVENOUS CONTINUOUS PRN
Status: DISCONTINUED | OUTPATIENT
Start: 2024-08-09 | End: 2024-08-12 | Stop reason: HOSPADM

## 2024-08-09 RX ADMIN — ALBUMIN (HUMAN) 50 G: 25 SOLUTION INTRAVENOUS at 12:46

## 2024-08-09 RX ADMIN — LIDOCAINE HYDROCHLORIDE 10 ML: 10 INJECTION, SOLUTION EPIDURAL; INFILTRATION; INTRACAUDAL; PERINEURAL at 12:40

## 2024-08-09 NOTE — OR NURSING
Patient arrived from home to IR for ultrasound guided paracentesis. Vitals obtained, IV started per IV team and consent signed per patient. Haleigh Stevens PA-C in to speak with the patient about the procedure, all questions answered. Abdomen scanned and prepped. 1% Lidocaine administered to procedural site. 5 Sudanese centesis catheter inserted to (RLQ) with ultrasound guidance, catheter connected to suction. 50 G IV Albumin given per order. Patient tolerated procedure well. (6650) ml drained of clear yellow colored ascitic fluid. Centesis catheter removed post procedure. Specimens collected and sent to lab per order. Puncture site cleansed and dry dressing applied. No bleeding, swelling or complications noted. Post procedure vitals obtained. IV site removed, dry dressing applied. Discharge instructions reviewed and understood by patient. Patient discharged home without complication.

## 2024-08-09 NOTE — PROCEDURES
Procedure: Ultrasound Guided Paracentesis    Diagnosis: Ascites    Findings: Large volume ascites, successful catheter placement with clear yellow ascitic fluid return    Specimen: Approximately 60cc obtained and sent for analysis (orders from referring physician).  Total amount of fluid removed to be reported in PACS.    Anesthesia: Local infiltration of 10cc 1% Lidocaine without epi    EBL: Minimal.    Complication: None immediately post procedure.    Plan: Discharge to home following paracentesis.      Comments:    At today's visit patient reports right sided breast swelling over the past few weeks, she states she sleeps on the right side due to having a wound vac on the left foot- I instructed patient to follow up with her primary care doctor and/or gynecologist to have this evaluated.     See radiology dictation in PACs for image review and additional procedural information.

## 2024-08-11 LAB
MICROORGANISM SPEC CULT: NO GROWTH
MICROORGANISM/AGENT SPEC: NORMAL
SPECIMEN DESCRIPTION: NORMAL

## 2024-08-13 LAB — NON-GYN CYTOLOGY REPORT: NORMAL

## 2024-08-23 ENCOUNTER — HOSPITAL ENCOUNTER (OUTPATIENT)
Dept: ULTRASOUND IMAGING | Age: 44
Discharge: HOME | End: 2024-08-23
Payer: COMMERCIAL

## 2024-08-23 VITALS
RESPIRATION RATE: 16 BRPM | HEART RATE: 77 BPM | SYSTOLIC BLOOD PRESSURE: 131 MMHG | OXYGEN SATURATION: 99 % | DIASTOLIC BLOOD PRESSURE: 73 MMHG

## 2024-08-23 DIAGNOSIS — R18.8 OTHER ASCITES: ICD-10-CM

## 2024-08-23 LAB
ALBUMIN FLD-MCNC: 0.8 G/DL
AMYLASE FLD-CCNC: 33 U/L
APPEARANCE FLD: CLEAR
BODY FLD TYPE: NORMAL
CLOT CHECK: NORMAL
COLOR FLD: YELLOW
MONOCYTES NFR FLD: 73 %
NEUTROPHILS NFR FLD: 27 %
PROT FLD-MCNC: 1.3 G/DL
RBC # FLD: <2000 CELLS/UL
SPECIMEN TYPE: NORMAL
WBC # FLD: 113 CELLS/UL

## 2024-08-23 PROCEDURE — 87205 SMEAR GRAM STAIN: CPT

## 2024-08-23 PROCEDURE — 84157 ASSAY OF PROTEIN OTHER: CPT

## 2024-08-23 PROCEDURE — 6360000002 HC RX W HCPCS: Performed by: NURSE PRACTITIONER

## 2024-08-23 PROCEDURE — 82150 ASSAY OF AMYLASE: CPT

## 2024-08-23 PROCEDURE — 49083 ABD PARACENTESIS W/IMAGING: CPT

## 2024-08-23 PROCEDURE — 87070 CULTURE OTHR SPECIMN AEROBIC: CPT

## 2024-08-23 PROCEDURE — 82042 OTHER SOURCE ALBUMIN QUAN EA: CPT

## 2024-08-23 PROCEDURE — 88112 CYTOPATH CELL ENHANCE TECH: CPT

## 2024-08-23 PROCEDURE — 2500000003 HC RX 250 WO HCPCS: Performed by: PHYSICIAN ASSISTANT

## 2024-08-23 PROCEDURE — 88305 TISSUE EXAM BY PATHOLOGIST: CPT

## 2024-08-23 PROCEDURE — P9047 ALBUMIN (HUMAN), 25%, 50ML: HCPCS | Performed by: NURSE PRACTITIONER

## 2024-08-23 PROCEDURE — 89051 BODY FLUID CELL COUNT: CPT

## 2024-08-23 RX ORDER — LIDOCAINE HYDROCHLORIDE 10 MG/ML
INJECTION, SOLUTION INFILTRATION; PERINEURAL PRN
Status: COMPLETED | OUTPATIENT
Start: 2024-08-23 | End: 2024-08-23

## 2024-08-23 RX ORDER — ALBUMIN (HUMAN) 12.5 G/50ML
SOLUTION INTRAVENOUS CONTINUOUS PRN
Status: COMPLETED | OUTPATIENT
Start: 2024-08-23 | End: 2024-08-23

## 2024-08-23 RX ADMIN — LIDOCAINE HYDROCHLORIDE 8 ML: 10 INJECTION, SOLUTION INFILTRATION; PERINEURAL at 12:49

## 2024-08-23 RX ADMIN — ALBUMIN (HUMAN) 50 G: 25 SOLUTION INTRAVENOUS at 12:51

## 2024-08-23 NOTE — OR NURSING
Pt transported via wheelchair to IR room for paracentesis. Pt is alert and oriented, denies any pain prior to procedure.  Ultrasound images taken prior to procedure. Procedure is explained to patient, including possible risks. Pt verbalizes understanding and consent form signed. Procedure done under sterile technique and guidance of ultrasound imaging. 1% Lidocaine is used during procedure for comfort measures. A total of () ml's of clear yellow colored ascitic fluid drained during procedure. Catheter removed and op-site dressing applied to site with no bleeding noted. Specimen collected and sent to lab for ordered testing. Albumin infusion given during procedure. Pt tolerated procedure well and denies any pain after. Pt given discharge instructions and pt verbalizes understanding of post procedure care/follow up. Pt transported via wheelchair out of department with no difficulties.

## 2024-08-23 NOTE — PROCEDURES
Procedure: Ultrasound Guided Paracentesis    Diagnosis: Ascites    Findings: Large volume ascites, successful catheter placement with clear yellow ascitic fluid return    Specimen:  Approximately 30cc obtained and sent for analysis (orders from referring physician).  Total amount of fluid removed to be reported in PACS.    Anesthesia: Local infiltration of 10cc 1% Lidocaine without epi    EBL: Minimal.    Complication: None immediately post procedure.    Plan: Discharge to home following paracentesis.      Comments:    See radiology dictation in PACs for image review and additional procedural information.

## 2024-08-23 NOTE — OR NURSING
7,380 ml drained of clear yelow colored ascitic fluid. Centesis catheter removed @ 1325. Puncture site cleansed and dry dressing applied. No bleeding, swelling or complications noted. Discharge instructions reviewed and understood by patient. Patient discharged home with ride @ 1335.

## 2024-08-26 LAB
MICROORGANISM SPEC CULT: NO GROWTH
MICROORGANISM/AGENT SPEC: NORMAL
SERVICE CMNT-IMP: NORMAL
SPECIMEN DESCRIPTION: NORMAL

## 2024-08-27 LAB — NON-GYN CYTOLOGY REPORT: NORMAL

## 2024-09-06 ENCOUNTER — HOSPITAL ENCOUNTER (OUTPATIENT)
Dept: ULTRASOUND IMAGING | Age: 44
Discharge: HOME | End: 2024-09-08
Payer: COMMERCIAL

## 2024-09-06 ENCOUNTER — HOSPITAL ENCOUNTER (OUTPATIENT)
Age: 44
Discharge: HOME | End: 2024-09-06
Payer: COMMERCIAL

## 2024-09-06 VITALS
OXYGEN SATURATION: 100 % | SYSTOLIC BLOOD PRESSURE: 103 MMHG | RESPIRATION RATE: 16 BRPM | DIASTOLIC BLOOD PRESSURE: 57 MMHG | HEART RATE: 78 BPM

## 2024-09-06 DIAGNOSIS — R18.8 OTHER ASCITES: ICD-10-CM

## 2024-09-06 LAB
ALBUMIN FLD-MCNC: 0.8 G/DL
AMYLASE FLD-CCNC: 30 U/L
APPEARANCE FLD: CLEAR
BODY FLD TYPE: NORMAL
CLOT CHECK: NORMAL
COLOR FLD: YELLOW
INR PPP: 1.5
MONOCYTES NFR FLD: 79 %
NEUTROPHILS NFR FLD: 21 %
PLATELET # BLD AUTO: 164 K/UL (ref 130–450)
PROT FLD-MCNC: 1.4 G/DL
PROTHROMBIN TIME: 16.2 SEC (ref 9.3–12.4)
RBC # FLD: <2000 CELLS/UL
SPECIMEN TYPE: NORMAL
WBC # FLD: 80 CELLS/UL

## 2024-09-06 PROCEDURE — 82150 ASSAY OF AMYLASE: CPT

## 2024-09-06 PROCEDURE — P9047 ALBUMIN (HUMAN), 25%, 50ML: HCPCS | Performed by: NURSE PRACTITIONER

## 2024-09-06 PROCEDURE — 2500000003 HC RX 250 WO HCPCS: Performed by: PHYSICIAN ASSISTANT

## 2024-09-06 PROCEDURE — 87070 CULTURE OTHR SPECIMN AEROBIC: CPT

## 2024-09-06 PROCEDURE — 82042 OTHER SOURCE ALBUMIN QUAN EA: CPT

## 2024-09-06 PROCEDURE — 84157 ASSAY OF PROTEIN OTHER: CPT

## 2024-09-06 PROCEDURE — 85610 PROTHROMBIN TIME: CPT

## 2024-09-06 PROCEDURE — 36415 COLL VENOUS BLD VENIPUNCTURE: CPT

## 2024-09-06 PROCEDURE — 85049 AUTOMATED PLATELET COUNT: CPT

## 2024-09-06 PROCEDURE — 87205 SMEAR GRAM STAIN: CPT

## 2024-09-06 PROCEDURE — 2709999900 US GUIDED PARACENTESIS

## 2024-09-06 PROCEDURE — 6360000002 HC RX W HCPCS: Performed by: NURSE PRACTITIONER

## 2024-09-06 PROCEDURE — 89051 BODY FLUID CELL COUNT: CPT

## 2024-09-06 RX ORDER — ALBUMIN (HUMAN) 12.5 G/50ML
50 SOLUTION INTRAVENOUS ONCE
Status: COMPLETED | OUTPATIENT
Start: 2024-09-06 | End: 2024-09-06

## 2024-09-06 RX ORDER — LIDOCAINE HYDROCHLORIDE 10 MG/ML
INJECTION, SOLUTION INFILTRATION; PERINEURAL PRN
Status: COMPLETED | OUTPATIENT
Start: 2024-09-06 | End: 2024-09-06

## 2024-09-06 RX ADMIN — ALBUMIN (HUMAN) 50 G: 25 SOLUTION INTRAVENOUS at 13:18

## 2024-09-06 RX ADMIN — LIDOCAINE HYDROCHLORIDE 10 ML: 10 INJECTION, SOLUTION INFILTRATION; PERINEURAL at 13:36

## 2024-09-06 NOTE — PROCEDURES
Procedure: Ultrasound Guided Paracentesis    Diagnosis: Ascites    Findings: Large volume ascites, successful catheter placement with clear yellow ascitic fluid return    Specimen: None at this time.  Total amount of fluid removed 7500 ml    Anesthesia: Local infiltration of 10cc 1% Lidocaine without epi    EBL: Minimal.    Complication: None immediately post procedure.    Plan: Discharge to home following paracentesis.      Comments:    See radiology dictation in PACs for image review and additional procedural information.

## 2024-09-06 NOTE — OR NURSING
Patient brought into room, discussed procedure. Haleigh Stevens PA-C answered all questions and concerns related to the procedure. Treatment consent signed. Patient positioned and sterile prepped for the procedure. 1% Lidocaine administered by Haleigh Stevens PA-C.  A total of 7500 mL clear yellow ascitic fluid was taken. Patient tolerated procedure well. Site cleaned and dressed with a bandage. Vitals monitored and remained stable throughout. Discharge papers declined by patient. Patient escorted out of department with all belongings and without distress.

## 2024-09-10 LAB — NON-GYN CYTOLOGY REPORT: NORMAL

## 2024-09-20 ENCOUNTER — HOSPITAL ENCOUNTER (OUTPATIENT)
Dept: ULTRASOUND IMAGING | Age: 44
Discharge: HOME | End: 2024-09-22
Payer: COMMERCIAL

## 2024-09-20 VITALS
BODY MASS INDEX: 28.32 KG/M2 | OXYGEN SATURATION: 100 % | HEART RATE: 76 BPM | DIASTOLIC BLOOD PRESSURE: 60 MMHG | SYSTOLIC BLOOD PRESSURE: 97 MMHG | WEIGHT: 145 LBS | RESPIRATION RATE: 16 BRPM

## 2024-09-20 DIAGNOSIS — R18.8 OTHER ASCITES: ICD-10-CM

## 2024-09-20 LAB
ALBUMIN FLD-MCNC: 0.7 G/DL
AMYLASE FLD-CCNC: 25 U/L
APPEARANCE FLD: CLEAR
BODY FLD TYPE: NORMAL
CLOT CHECK: NORMAL
COLOR FLD: YELLOW
MONOCYTES NFR FLD: 70 %
NEUTROPHILS NFR FLD: 30 %
PROT FLD-MCNC: 1.1 G/DL
RBC # FLD: <2000 CELLS/UL
SPECIMEN TYPE: NORMAL
WBC # FLD: 99 CELLS/UL

## 2024-09-20 PROCEDURE — 89051 BODY FLUID CELL COUNT: CPT

## 2024-09-20 PROCEDURE — 82042 OTHER SOURCE ALBUMIN QUAN EA: CPT

## 2024-09-20 PROCEDURE — 2500000003 HC RX 250 WO HCPCS: Performed by: PHYSICIAN ASSISTANT

## 2024-09-20 PROCEDURE — 84157 ASSAY OF PROTEIN OTHER: CPT

## 2024-09-20 PROCEDURE — 88112 CYTOPATH CELL ENHANCE TECH: CPT

## 2024-09-20 PROCEDURE — 6360000002 HC RX W HCPCS: Performed by: NURSE PRACTITIONER

## 2024-09-20 PROCEDURE — P9047 ALBUMIN (HUMAN), 25%, 50ML: HCPCS | Performed by: NURSE PRACTITIONER

## 2024-09-20 PROCEDURE — 88305 TISSUE EXAM BY PATHOLOGIST: CPT

## 2024-09-20 PROCEDURE — 87205 SMEAR GRAM STAIN: CPT

## 2024-09-20 PROCEDURE — 82150 ASSAY OF AMYLASE: CPT

## 2024-09-20 PROCEDURE — 2709999900 US GUIDED PARACENTESIS

## 2024-09-20 PROCEDURE — 87070 CULTURE OTHR SPECIMN AEROBIC: CPT

## 2024-09-20 RX ORDER — LIDOCAINE HYDROCHLORIDE 10 MG/ML
INJECTION, SOLUTION INFILTRATION; PERINEURAL PRN
Status: DISCONTINUED | OUTPATIENT
Start: 2024-09-20 | End: 2024-09-23 | Stop reason: HOSPADM

## 2024-09-20 RX ORDER — ALBUMIN (HUMAN) 12.5 G/50ML
SOLUTION INTRAVENOUS CONTINUOUS PRN
Status: DISCONTINUED | OUTPATIENT
Start: 2024-09-20 | End: 2024-09-23 | Stop reason: HOSPADM

## 2024-09-20 RX ADMIN — LIDOCAINE HYDROCHLORIDE 9 ML: 10 INJECTION, SOLUTION INFILTRATION; PERINEURAL at 12:55

## 2024-09-20 RX ADMIN — ALBUMIN (HUMAN) 50 G: 12.5 SOLUTION INTRAVENOUS at 12:53

## 2024-09-20 ASSESSMENT — PAIN - FUNCTIONAL ASSESSMENT: PAIN_FUNCTIONAL_ASSESSMENT: NONE - DENIES PAIN

## 2024-09-24 LAB — NON-GYN CYTOLOGY REPORT: NORMAL

## 2024-10-04 ENCOUNTER — HOSPITAL ENCOUNTER (OUTPATIENT)
Dept: ULTRASOUND IMAGING | Age: 44
Discharge: HOME | End: 2024-10-06
Payer: COMMERCIAL

## 2024-10-04 ENCOUNTER — HOSPITAL ENCOUNTER (OUTPATIENT)
Age: 44
Discharge: HOME | End: 2024-10-04
Payer: COMMERCIAL

## 2024-10-04 VITALS
SYSTOLIC BLOOD PRESSURE: 124 MMHG | DIASTOLIC BLOOD PRESSURE: 72 MMHG | OXYGEN SATURATION: 98 % | BODY MASS INDEX: 28.12 KG/M2 | WEIGHT: 144 LBS | RESPIRATION RATE: 18 BRPM | HEART RATE: 77 BPM

## 2024-10-04 DIAGNOSIS — R18.8 OTHER ASCITES: ICD-10-CM

## 2024-10-04 LAB
ALBUMIN FLD-MCNC: 0.9 G/DL
ALBUMIN SERPL-MCNC: 3.6 G/DL (ref 3.5–5.2)
ALP SERPL-CCNC: 153 U/L (ref 35–104)
ALT SERPL-CCNC: 7 U/L (ref 0–32)
AMYLASE FLD-CCNC: 25 U/L
ANION GAP SERPL CALCULATED.3IONS-SCNC: 9 MMOL/L (ref 7–16)
APPEARANCE FLD: CLEAR
AST SERPL-CCNC: 23 U/L (ref 0–31)
BASOPHILS # BLD: 0.03 K/UL (ref 0–0.2)
BASOPHILS NFR BLD: 1 % (ref 0–2)
BILIRUB SERPL-MCNC: 1.2 MG/DL (ref 0–1.2)
BODY FLD TYPE: NORMAL
BUN SERPL-MCNC: 6 MG/DL (ref 6–20)
CALCIUM SERPL-MCNC: 7.9 MG/DL (ref 8.6–10.2)
CHLORIDE SERPL-SCNC: 110 MMOL/L (ref 98–107)
CLOT CHECK: NORMAL
CO2 SERPL-SCNC: 21 MMOL/L (ref 22–29)
COLOR FLD: YELLOW
CREAT SERPL-MCNC: 0.5 MG/DL (ref 0.5–1)
EOSINOPHIL # BLD: 0.14 K/UL (ref 0.05–0.5)
EOSINOPHILS RELATIVE PERCENT: 4 % (ref 0–6)
ERYTHROCYTE [DISTWIDTH] IN BLOOD BY AUTOMATED COUNT: 17.5 % (ref 11.5–15)
GFR, ESTIMATED: >90 ML/MIN/1.73M2
GLUCOSE SERPL-MCNC: 84 MG/DL (ref 74–99)
HCT VFR BLD AUTO: 27.2 % (ref 34–48)
HGB BLD-MCNC: 8.2 G/DL (ref 11.5–15.5)
IMM GRANULOCYTES # BLD AUTO: <0.03 K/UL (ref 0–0.58)
IMM GRANULOCYTES NFR BLD: 0 % (ref 0–5)
INR PPP: 1.6
LYMPHOCYTES NFR BLD: 1.22 K/UL (ref 1.5–4)
LYMPHOCYTES RELATIVE PERCENT: 32 % (ref 20–42)
MCH RBC QN AUTO: 29.2 PG (ref 26–35)
MCHC RBC AUTO-ENTMCNC: 30.1 G/DL (ref 32–34.5)
MCV RBC AUTO: 96.8 FL (ref 80–99.9)
MONOCYTES NFR BLD: 0.44 K/UL (ref 0.1–0.95)
MONOCYTES NFR BLD: 11 % (ref 2–12)
MONOCYTES NFR FLD: 88 %
NEUTROPHILS NFR BLD: 52 % (ref 43–80)
NEUTROPHILS NFR FLD: 12 %
NEUTS SEG NFR BLD: 2.03 K/UL (ref 1.8–7.3)
PARTIAL THROMBOPLASTIN TIME: 44.8 SEC (ref 24.5–35.1)
PLATELET # BLD AUTO: 109 K/UL (ref 130–450)
PMV BLD AUTO: 12.5 FL (ref 7–12)
POTASSIUM SERPL-SCNC: 3.5 MMOL/L (ref 3.5–5)
PROT FLD-MCNC: 1.4 G/DL
PROT SERPL-MCNC: 6 G/DL (ref 6.4–8.3)
PROTHROMBIN TIME: 17.2 SEC (ref 9.3–12.4)
RBC # BLD AUTO: 2.81 M/UL (ref 3.5–5.5)
RBC # FLD: <2000 CELLS/UL
SODIUM SERPL-SCNC: 140 MMOL/L (ref 132–146)
SPECIMEN TYPE: NORMAL
WBC # FLD: 116 CELLS/UL
WBC OTHER # BLD: 3.9 K/UL (ref 4.5–11.5)

## 2024-10-04 PROCEDURE — 85025 COMPLETE CBC W/AUTO DIFF WBC: CPT

## 2024-10-04 PROCEDURE — 85610 PROTHROMBIN TIME: CPT

## 2024-10-04 PROCEDURE — 82105 ALPHA-FETOPROTEIN SERUM: CPT

## 2024-10-04 PROCEDURE — 84157 ASSAY OF PROTEIN OTHER: CPT

## 2024-10-04 PROCEDURE — 36415 COLL VENOUS BLD VENIPUNCTURE: CPT

## 2024-10-04 PROCEDURE — 89051 BODY FLUID CELL COUNT: CPT

## 2024-10-04 PROCEDURE — 2709999900 US GUIDED PARACENTESIS

## 2024-10-04 PROCEDURE — 6360000002 HC RX W HCPCS: Performed by: NURSE PRACTITIONER

## 2024-10-04 PROCEDURE — P9047 ALBUMIN (HUMAN), 25%, 50ML: HCPCS | Performed by: NURSE PRACTITIONER

## 2024-10-04 PROCEDURE — 87205 SMEAR GRAM STAIN: CPT

## 2024-10-04 PROCEDURE — 82042 OTHER SOURCE ALBUMIN QUAN EA: CPT

## 2024-10-04 PROCEDURE — 87070 CULTURE OTHR SPECIMN AEROBIC: CPT

## 2024-10-04 PROCEDURE — 82150 ASSAY OF AMYLASE: CPT

## 2024-10-04 PROCEDURE — 85730 THROMBOPLASTIN TIME PARTIAL: CPT

## 2024-10-04 PROCEDURE — 80053 COMPREHEN METABOLIC PANEL: CPT

## 2024-10-04 RX ORDER — ACETAMINOPHEN 325 MG/1
650 TABLET ORAL EVERY 6 HOURS PRN
COMMUNITY

## 2024-10-04 RX ORDER — ALBUMIN (HUMAN) 12.5 G/50ML
SOLUTION INTRAVENOUS CONTINUOUS PRN
Status: COMPLETED | OUTPATIENT
Start: 2024-10-04 | End: 2024-10-04

## 2024-10-04 RX ADMIN — ALBUMIN (HUMAN) 50 G: 0.25 INJECTION, SOLUTION INTRAVENOUS at 12:35

## 2024-10-04 ASSESSMENT — PAIN - FUNCTIONAL ASSESSMENT: PAIN_FUNCTIONAL_ASSESSMENT: NONE - DENIES PAIN

## 2024-10-04 NOTE — OR NURSING
Patient arrival from home to IR for paracentesis. Vitals taken, Iv started, and consent signed. Dr. Fraga in to speak with the patient about the procedure, all questions answered. Abdomen scanned, prepped and centesis catheter inserted LLQ with ultrasound guidance by Dr Fraga @ 1257. Patient tolerated well. 8,180 ml drained of clear yellow colored ascitic fluid. Centesis catheter removed @ 1323. Puncture site cleansed , skin glue and dry dressing applied. No bleeding, swelling or complications noted. Discharge instructions reviewed and understood by patient. Patient discharged home with ride @ 1335.

## 2024-10-06 LAB
MICROORGANISM SPEC CULT: NO GROWTH
MICROORGANISM/AGENT SPEC: NORMAL
SPECIMEN DESCRIPTION: NORMAL

## 2024-10-08 LAB — NON-GYN CYTOLOGY REPORT: NORMAL

## 2024-10-09 LAB
SEND OUT REPORT: NORMAL
TEST NAME: NORMAL

## 2024-10-18 ENCOUNTER — HOSPITAL ENCOUNTER (OUTPATIENT)
Dept: ULTRASOUND IMAGING | Age: 44
Discharge: HOME | End: 2024-10-20
Payer: COMMERCIAL

## 2024-10-18 VITALS
SYSTOLIC BLOOD PRESSURE: 126 MMHG | RESPIRATION RATE: 18 BRPM | DIASTOLIC BLOOD PRESSURE: 75 MMHG | HEART RATE: 77 BPM | OXYGEN SATURATION: 100 %

## 2024-10-18 DIAGNOSIS — R18.8 OTHER ASCITES: ICD-10-CM

## 2024-10-18 LAB
ALBUMIN FLD-MCNC: 1 G/DL
AMYLASE FLD-CCNC: 30 U/L
APPEARANCE FLD: NORMAL
BODY FLD TYPE: NORMAL
CLOT CHECK: NORMAL
COLOR FLD: YELLOW
MONOCYTES NFR FLD: 85 %
NEUTROPHILS NFR FLD: 15 %
PROT FLD-MCNC: 1.6 G/DL
RBC # FLD: <2000 CELLS/UL
SPECIMEN TYPE: NORMAL
WBC # FLD: 155 CELLS/UL

## 2024-10-18 PROCEDURE — P9047 ALBUMIN (HUMAN), 25%, 50ML: HCPCS | Performed by: SPECIALIST

## 2024-10-18 PROCEDURE — 87070 CULTURE OTHR SPECIMN AEROBIC: CPT

## 2024-10-18 PROCEDURE — 89051 BODY FLUID CELL COUNT: CPT

## 2024-10-18 PROCEDURE — C1729 CATH, DRAINAGE: HCPCS

## 2024-10-18 PROCEDURE — 82042 OTHER SOURCE ALBUMIN QUAN EA: CPT

## 2024-10-18 PROCEDURE — 82150 ASSAY OF AMYLASE: CPT

## 2024-10-18 PROCEDURE — 87205 SMEAR GRAM STAIN: CPT

## 2024-10-18 PROCEDURE — 88305 TISSUE EXAM BY PATHOLOGIST: CPT

## 2024-10-18 PROCEDURE — 6360000002 HC RX W HCPCS: Performed by: SPECIALIST

## 2024-10-18 PROCEDURE — 84157 ASSAY OF PROTEIN OTHER: CPT

## 2024-10-18 PROCEDURE — 2500000003 HC RX 250 WO HCPCS: Performed by: PHYSICIAN ASSISTANT

## 2024-10-18 PROCEDURE — 88112 CYTOPATH CELL ENHANCE TECH: CPT

## 2024-10-18 RX ORDER — LIDOCAINE HYDROCHLORIDE 10 MG/ML
INJECTION, SOLUTION INFILTRATION; PERINEURAL PRN
Status: DISCONTINUED | OUTPATIENT
Start: 2024-10-18 | End: 2024-10-21 | Stop reason: HOSPADM

## 2024-10-18 RX ORDER — ALBUMIN (HUMAN) 12.5 G/50ML
50 SOLUTION INTRAVENOUS ONCE
Status: COMPLETED | OUTPATIENT
Start: 2024-10-18 | End: 2024-10-18

## 2024-10-18 RX ADMIN — ALBUMIN (HUMAN) 50 G: 0.25 INJECTION, SOLUTION INTRAVENOUS at 13:05

## 2024-10-18 RX ADMIN — LIDOCAINE HYDROCHLORIDE 5 ML: 10 INJECTION, SOLUTION INFILTRATION; PERINEURAL at 12:22

## 2024-10-18 NOTE — OR NURSING
Pt transporter via wheelchair to IR room for paracentesis. Pt is alert and oriented, denies any pain prior to procedure. Ultrasound images taken prior to procedure. Procedure is explained to patient, including possible risks. Pt verbalizes understanding and consent from signed. Procedure done under sterile technique and guidance of ultrasound imaging. 1% Lidocaine is used during procedure for comfort measures. A total of 6950 ml's of clear yellow colored ascitic fluid drained during procedure. Catheter removed and op-site dressing applied to site with no bleeding noted. Specimen collected and sent to lab for ordered testing. Albumin infusion given during procedure. Pt tolerated procedure well and denies any pain after. Pt given discharge instructions and pt verbalizes understanding of post procedure care/follow up. Pt transported via wheelchair out of department with no difficulties.

## 2024-10-20 LAB
MICROORGANISM SPEC CULT: NORMAL
MICROORGANISM/AGENT SPEC: NORMAL
SPECIMEN DESCRIPTION: NORMAL

## 2024-10-21 LAB
MICROORGANISM SPEC CULT: NO GROWTH
MICROORGANISM/AGENT SPEC: NORMAL
SPECIMEN DESCRIPTION: NORMAL

## 2024-10-22 LAB — NON-GYN CYTOLOGY REPORT: NORMAL

## 2024-11-01 ENCOUNTER — HOSPITAL ENCOUNTER (OUTPATIENT)
Dept: ULTRASOUND IMAGING | Age: 44
Discharge: HOME | End: 2024-11-03
Payer: COMMERCIAL

## 2024-11-01 VITALS
HEART RATE: 72 BPM | OXYGEN SATURATION: 94 % | SYSTOLIC BLOOD PRESSURE: 127 MMHG | RESPIRATION RATE: 20 BRPM | DIASTOLIC BLOOD PRESSURE: 73 MMHG

## 2024-11-01 DIAGNOSIS — R18.8 OTHER ASCITES: ICD-10-CM

## 2024-11-01 LAB
ALBUMIN FLD-MCNC: 1.1 G/DL
AMYLASE FLD-CCNC: 37 U/L
APPEARANCE FLD: CLEAR
BODY FLD TYPE: NORMAL
CLOT CHECK: NORMAL
COLOR FLD: YELLOW
MONOCYTES NFR FLD: 85 %
NEUTROPHILS NFR FLD: 15 %
PROT FLD-MCNC: 1.7 G/DL
RBC # FLD: <2000 CELLS/UL
SPECIMEN TYPE: NORMAL
WBC # FLD: 187 CELLS/UL

## 2024-11-01 PROCEDURE — 87205 SMEAR GRAM STAIN: CPT

## 2024-11-01 PROCEDURE — 89051 BODY FLUID CELL COUNT: CPT

## 2024-11-01 PROCEDURE — 88305 TISSUE EXAM BY PATHOLOGIST: CPT

## 2024-11-01 PROCEDURE — 6360000002 HC RX W HCPCS: Performed by: NURSE PRACTITIONER

## 2024-11-01 PROCEDURE — 87070 CULTURE OTHR SPECIMN AEROBIC: CPT

## 2024-11-01 PROCEDURE — C1729 CATH, DRAINAGE: HCPCS

## 2024-11-01 PROCEDURE — 2500000003 HC RX 250 WO HCPCS: Performed by: PHYSICIAN ASSISTANT

## 2024-11-01 PROCEDURE — P9047 ALBUMIN (HUMAN), 25%, 50ML: HCPCS | Performed by: NURSE PRACTITIONER

## 2024-11-01 PROCEDURE — 88112 CYTOPATH CELL ENHANCE TECH: CPT

## 2024-11-01 PROCEDURE — 84157 ASSAY OF PROTEIN OTHER: CPT

## 2024-11-01 PROCEDURE — 82042 OTHER SOURCE ALBUMIN QUAN EA: CPT

## 2024-11-01 PROCEDURE — 82150 ASSAY OF AMYLASE: CPT

## 2024-11-01 RX ORDER — ALBUMIN (HUMAN) 12.5 G/50ML
50 SOLUTION INTRAVENOUS ONCE
Status: COMPLETED | OUTPATIENT
Start: 2024-11-01 | End: 2024-11-01

## 2024-11-01 RX ORDER — LIDOCAINE HYDROCHLORIDE 10 MG/ML
INJECTION, SOLUTION EPIDURAL; INFILTRATION; INTRACAUDAL; PERINEURAL PRN
Status: COMPLETED | OUTPATIENT
Start: 2024-11-01 | End: 2024-11-01

## 2024-11-01 RX ADMIN — LIDOCAINE HYDROCHLORIDE 10 ML: 10 INJECTION, SOLUTION EPIDURAL; INFILTRATION; INTRACAUDAL; PERINEURAL at 12:40

## 2024-11-01 RX ADMIN — ALBUMIN (HUMAN) 50 G: 0.25 INJECTION, SOLUTION INTRAVENOUS at 12:40

## 2024-11-01 NOTE — PROCEDURES
Procedure: Ultrasound Guided Paracentesis    Diagnosis: Ascites    Findings: Large volume ascites, successful catheter placement with clear yellow ascitic fluid return    Specimen:  Approximately 50cc obtained and sent for analysis (orders from referring physician).  Total amount of fluid removed to be reported in PACS.    Anesthesia: Local infiltration of 10cc 1% Lidocaine without epi    EBL: Minimal.    Complication: None immediately post procedure.    Plan: Discharge to home following paracentesis.      Comments:    See radiology dictation in PACs for image review and additional procedural information.

## 2024-11-01 NOTE — OR NURSING
Patient arrived from home to  for ultrasound guided paracentesis. Vitals obtained, IV started per IV team in the right arm and consent signed per patient. Haleigh Stevens PA-C in to speak with the patient about the procedure, all questions answered. Abdomen scanned and prepped. 1% Lidocaine administered to procedural site. 5 Indonesian centesis catheter inserted to (LLQ) with ultrasound guidance, catheter connected to suction. 50 G IV Albumin given per order. Patient tolerated procedure well. ml drained of clear yellow colored ascitic fluid. Centesis catheter removed post procedure. Specimens collected and sent to lab per order. Puncture site cleansed and dry dressing applied. No bleeding, swelling or complications noted. Post procedure vitals obtained. IV site removed, dry dressing applied. Discharge instructions reviewed and understood by patient. Patient discharged home without complication.

## 2024-11-03 LAB
MICROORGANISM SPEC CULT: NORMAL
MICROORGANISM/AGENT SPEC: NORMAL
SPECIMEN DESCRIPTION: NORMAL

## 2024-11-15 ENCOUNTER — HOSPITAL ENCOUNTER (OUTPATIENT)
Dept: ULTRASOUND IMAGING | Age: 44
Discharge: HOME | End: 2024-11-17
Payer: COMMERCIAL

## 2024-11-15 ENCOUNTER — HOSPITAL ENCOUNTER (OUTPATIENT)
Age: 44
Discharge: HOME | End: 2024-11-15
Payer: COMMERCIAL

## 2024-11-15 VITALS
OXYGEN SATURATION: 100 % | HEART RATE: 79 BPM | RESPIRATION RATE: 18 BRPM | DIASTOLIC BLOOD PRESSURE: 85 MMHG | SYSTOLIC BLOOD PRESSURE: 149 MMHG

## 2024-11-15 DIAGNOSIS — E87.6 HYPOKALEMIA: ICD-10-CM

## 2024-11-15 DIAGNOSIS — R18.8 OTHER ASCITES: ICD-10-CM

## 2024-11-15 LAB
ALBUMIN FLD-MCNC: 1.6 G/DL
AMYLASE FLD-CCNC: 42 U/L
ANION GAP SERPL CALCULATED.3IONS-SCNC: 9 MMOL/L (ref 7–16)
APPEARANCE FLD: CLEAR
BODY FLD TYPE: NORMAL
BUN SERPL-MCNC: 6 MG/DL (ref 6–20)
CALCIUM SERPL-MCNC: 8.2 MG/DL (ref 8.6–10.2)
CHLORIDE SERPL-SCNC: 107 MMOL/L (ref 98–107)
CLOT CHECK: NORMAL
CO2 SERPL-SCNC: 21 MMOL/L (ref 22–29)
COLOR FLD: YELLOW
CREAT SERPL-MCNC: 0.6 MG/DL (ref 0.5–1)
GFR, ESTIMATED: >90 ML/MIN/1.73M2
GLUCOSE SERPL-MCNC: 152 MG/DL (ref 74–99)
INR PPP: 1.3
MONOCYTES NFR FLD: 94 %
NEUTROPHILS NFR FLD: 6 %
PLATELET # BLD AUTO: 132 K/UL (ref 130–450)
POTASSIUM SERPL-SCNC: 3.8 MMOL/L (ref 3.5–5)
PROT FLD-MCNC: 1.9 G/DL
PROTHROMBIN TIME: 14.1 SEC (ref 9.3–12.4)
RBC # FLD: <2000 CELLS/UL
SODIUM SERPL-SCNC: 137 MMOL/L (ref 132–146)
SPECIMEN TYPE: NORMAL
WBC # FLD: 120 CELLS/UL

## 2024-11-15 PROCEDURE — 49083 ABD PARACENTESIS W/IMAGING: CPT

## 2024-11-15 PROCEDURE — 82042 OTHER SOURCE ALBUMIN QUAN EA: CPT

## 2024-11-15 PROCEDURE — 87205 SMEAR GRAM STAIN: CPT

## 2024-11-15 PROCEDURE — 2500000003 HC RX 250 WO HCPCS: Performed by: RADIOLOGY

## 2024-11-15 PROCEDURE — 82150 ASSAY OF AMYLASE: CPT

## 2024-11-15 PROCEDURE — 87070 CULTURE OTHR SPECIMN AEROBIC: CPT

## 2024-11-15 PROCEDURE — P9047 ALBUMIN (HUMAN), 25%, 50ML: HCPCS | Performed by: SPECIALIST

## 2024-11-15 PROCEDURE — 80048 BASIC METABOLIC PNL TOTAL CA: CPT

## 2024-11-15 PROCEDURE — 84157 ASSAY OF PROTEIN OTHER: CPT

## 2024-11-15 PROCEDURE — 6360000002 HC RX W HCPCS: Performed by: SPECIALIST

## 2024-11-15 PROCEDURE — 85610 PROTHROMBIN TIME: CPT

## 2024-11-15 PROCEDURE — 85049 AUTOMATED PLATELET COUNT: CPT

## 2024-11-15 PROCEDURE — 89051 BODY FLUID CELL COUNT: CPT

## 2024-11-15 RX ORDER — LIDOCAINE HYDROCHLORIDE 10 MG/ML
INJECTION, SOLUTION INFILTRATION; PERINEURAL PRN
Status: COMPLETED | OUTPATIENT
Start: 2024-11-15 | End: 2024-11-15

## 2024-11-15 RX ORDER — ALBUMIN (HUMAN) 12.5 G/50ML
SOLUTION INTRAVENOUS CONTINUOUS PRN
Status: COMPLETED | OUTPATIENT
Start: 2024-11-15 | End: 2024-11-15

## 2024-11-15 RX ADMIN — ALBUMIN (HUMAN) 50 G: 0.25 INJECTION, SOLUTION INTRAVENOUS at 13:05

## 2024-11-15 RX ADMIN — LIDOCAINE HYDROCHLORIDE 5 ML: 10 INJECTION, SOLUTION INFILTRATION; PERINEURAL at 13:06

## 2024-11-15 ASSESSMENT — PAIN - FUNCTIONAL ASSESSMENT: PAIN_FUNCTIONAL_ASSESSMENT: NONE - DENIES PAIN

## 2024-11-15 NOTE — OR NURSING
Pt transported via wheelchair to IR room for paracentesis. Pt is alert and oriented, denies any pain prior to procedure.  Ultrasound images taken prior to procedure. Procedure is explained to patient, including possible risks. Pt verbalizes understanding and consent form signed. Procedure done under sterile technique and guidance of ultrasound imaging. 1% Lidocaine is used during procedure for comfort measures. A total of 3800 ml's of clear yellow colored ascitic fluid drained during procedure. Catheter removed and op-site dressing applied to site with no bleeding noted. Specimen collected and sent to lab for ordered testing. Albumin infusion given during procedure. Pt tolerated procedure well and denies any pain after. Pt given discharge instructions and pt verbalizes understanding of post procedure care/follow up. Pt transported via wheelchair out of department with no difficulties.

## 2024-11-17 LAB
MICROORGANISM SPEC CULT: NORMAL
MICROORGANISM/AGENT SPEC: NORMAL
SPECIMEN DESCRIPTION: NORMAL

## 2024-11-29 ENCOUNTER — HOSPITAL ENCOUNTER (OUTPATIENT)
Dept: ULTRASOUND IMAGING | Age: 44
Discharge: HOME | End: 2024-12-01
Payer: COMMERCIAL

## 2024-11-29 VITALS
DIASTOLIC BLOOD PRESSURE: 71 MMHG | OXYGEN SATURATION: 100 % | HEART RATE: 68 BPM | SYSTOLIC BLOOD PRESSURE: 134 MMHG | RESPIRATION RATE: 18 BRPM

## 2024-11-29 DIAGNOSIS — R18.8 OTHER ASCITES: ICD-10-CM

## 2024-11-29 LAB
ALBUMIN FLD-MCNC: 1.4 G/DL
AMYLASE FLD-CCNC: 34 U/L
APPEARANCE FLD: CLEAR
BODY FLD TYPE: NORMAL
CLOT CHECK: NORMAL
COLOR FLD: NORMAL
MONOCYTES NFR FLD: 87 %
NEUTROPHILS NFR FLD: 13 %
PROT FLD-MCNC: 2.4 G/DL
RBC # FLD: <2000 CELLS/UL
SPECIMEN TYPE: NORMAL
WBC # FLD: 286 CELLS/UL

## 2024-11-29 PROCEDURE — 84157 ASSAY OF PROTEIN OTHER: CPT

## 2024-11-29 PROCEDURE — 87070 CULTURE OTHR SPECIMN AEROBIC: CPT

## 2024-11-29 PROCEDURE — 88112 CYTOPATH CELL ENHANCE TECH: CPT

## 2024-11-29 PROCEDURE — 49083 ABD PARACENTESIS W/IMAGING: CPT

## 2024-11-29 PROCEDURE — 89051 BODY FLUID CELL COUNT: CPT

## 2024-11-29 PROCEDURE — 88305 TISSUE EXAM BY PATHOLOGIST: CPT

## 2024-11-29 PROCEDURE — 87205 SMEAR GRAM STAIN: CPT

## 2024-11-29 PROCEDURE — 6360000002 HC RX W HCPCS: Performed by: FAMILY MEDICINE

## 2024-11-29 PROCEDURE — 6360000002 HC RX W HCPCS: Performed by: PHYSICIAN ASSISTANT

## 2024-11-29 PROCEDURE — 82042 OTHER SOURCE ALBUMIN QUAN EA: CPT

## 2024-11-29 PROCEDURE — P9047 ALBUMIN (HUMAN), 25%, 50ML: HCPCS | Performed by: FAMILY MEDICINE

## 2024-11-29 PROCEDURE — 82150 ASSAY OF AMYLASE: CPT

## 2024-11-29 RX ORDER — LIDOCAINE HYDROCHLORIDE 10 MG/ML
INJECTION, SOLUTION INFILTRATION; PERINEURAL PRN
Status: COMPLETED | OUTPATIENT
Start: 2024-11-29 | End: 2024-11-29

## 2024-11-29 RX ORDER — ALBUMIN (HUMAN) 12.5 G/50ML
SOLUTION INTRAVENOUS CONTINUOUS PRN
Status: COMPLETED | OUTPATIENT
Start: 2024-11-29 | End: 2024-11-29

## 2024-11-29 RX ADMIN — LIDOCAINE HYDROCHLORIDE 10 ML: 10 INJECTION, SOLUTION INFILTRATION; PERINEURAL at 12:45

## 2024-11-29 RX ADMIN — ALBUMIN (HUMAN) 50 G: 0.25 INJECTION, SOLUTION INTRAVENOUS at 13:12

## 2024-11-29 NOTE — OR NURSING
Patient brought into room, discussed procedure. Haleigh Stevens PA-C answered all questions and concerns related to the procedure. Treatment consent signed. Patient positioned and sterile prepped for the procedure. 1% Lidocaine administered by Haleigh Stevens PA-C.  A total of 5210 mL clear yellow ascitic fluid was taken. Patient tolerated procedure well. Site cleaned and dressed with a bandage. Vitals monitored and remained stable throughout. Discharge papers declined. Patient escorted out of department with all belongings and without distress.

## 2024-11-29 NOTE — PROCEDURES
Procedure: Ultrasound Guided Paracentesis    Diagnosis: Ascites    Findings: Large volume ascites, successful catheter placement with clear yellow ascitic fluid return    Specimen: Approximately 20cc obtained and sent for analysis (orders from referring physician).  Total amount of fluid removed to be reported in PACS.    Anesthesia: Local infiltration of 10cc 1% Lidocaine without epi    EBL: Minimal.    Complication: None immediately post procedure.    Plan: Discharge to home following paracentesis.      Comments:    See radiology dictation in PACs for image review and additional procedural information.

## 2024-12-01 LAB
MICROORGANISM SPEC CULT: NORMAL
MICROORGANISM/AGENT SPEC: NORMAL
SPECIMEN DESCRIPTION: NORMAL

## 2024-12-02 LAB
MICROORGANISM SPEC CULT: NORMAL
MICROORGANISM/AGENT SPEC: NORMAL
SPECIMEN DESCRIPTION: NORMAL

## 2024-12-03 LAB — NON-GYN CYTOLOGY REPORT: NORMAL

## 2024-12-13 ENCOUNTER — HOSPITAL ENCOUNTER (OUTPATIENT)
Dept: ULTRASOUND IMAGING | Age: 44
Discharge: HOME | End: 2024-12-15
Payer: COMMERCIAL

## 2024-12-13 VITALS
HEART RATE: 70 BPM | OXYGEN SATURATION: 100 % | DIASTOLIC BLOOD PRESSURE: 78 MMHG | RESPIRATION RATE: 18 BRPM | SYSTOLIC BLOOD PRESSURE: 149 MMHG

## 2024-12-13 DIAGNOSIS — R18.8 OTHER ASCITES: ICD-10-CM

## 2024-12-13 LAB
ALBUMIN FLD-MCNC: 1.4 G/DL
AMYLASE FLD-CCNC: 38 U/L
APPEARANCE FLD: CLEAR
BODY FLD TYPE: NORMAL
CLOT CHECK: NORMAL
COLOR FLD: YELLOW
MONOCYTES NFR FLD: 89 %
NEUTROPHILS NFR FLD: 11 %
PROT FLD-MCNC: 2.4 G/DL
RBC # FLD: <2000 CELLS/UL
SPECIMEN TYPE: NORMAL
WBC # FLD: 389 CELLS/UL

## 2024-12-13 PROCEDURE — 88305 TISSUE EXAM BY PATHOLOGIST: CPT

## 2024-12-13 PROCEDURE — P9047 ALBUMIN (HUMAN), 25%, 50ML: HCPCS | Performed by: SPECIALIST

## 2024-12-13 PROCEDURE — 82042 OTHER SOURCE ALBUMIN QUAN EA: CPT

## 2024-12-13 PROCEDURE — 84157 ASSAY OF PROTEIN OTHER: CPT

## 2024-12-13 PROCEDURE — 6360000002 HC RX W HCPCS: Performed by: PHYSICIAN ASSISTANT

## 2024-12-13 PROCEDURE — 89051 BODY FLUID CELL COUNT: CPT

## 2024-12-13 PROCEDURE — 82150 ASSAY OF AMYLASE: CPT

## 2024-12-13 PROCEDURE — 87205 SMEAR GRAM STAIN: CPT

## 2024-12-13 PROCEDURE — 87070 CULTURE OTHR SPECIMN AEROBIC: CPT

## 2024-12-13 PROCEDURE — 88112 CYTOPATH CELL ENHANCE TECH: CPT

## 2024-12-13 PROCEDURE — 6360000002 HC RX W HCPCS: Performed by: SPECIALIST

## 2024-12-13 PROCEDURE — 49083 ABD PARACENTESIS W/IMAGING: CPT

## 2024-12-13 RX ORDER — ALBUMIN (HUMAN) 12.5 G/50ML
SOLUTION INTRAVENOUS CONTINUOUS PRN
Status: COMPLETED | OUTPATIENT
Start: 2024-12-13 | End: 2024-12-13

## 2024-12-13 RX ORDER — LIDOCAINE HYDROCHLORIDE 10 MG/ML
INJECTION, SOLUTION INFILTRATION; PERINEURAL PRN
Status: COMPLETED | OUTPATIENT
Start: 2024-12-13 | End: 2024-12-13

## 2024-12-13 RX ADMIN — ALBUMIN (HUMAN) 50 G: 0.25 INJECTION, SOLUTION INTRAVENOUS at 13:00

## 2024-12-13 RX ADMIN — LIDOCAINE HYDROCHLORIDE 5 ML: 10 INJECTION, SOLUTION INFILTRATION; PERINEURAL at 12:55

## 2024-12-13 NOTE — OR NURSING
Pt transported to IR room for paracentesis. Pt is alert and oriented, denies any pain prior to procedure. Ultrasound images taken prior to procedure. Procedure is explained to patient, including possible risks. Pt verbalizes understanding and consent from signed. Procedure done under sterile technique and guidance of ultrasound imaging. 1% Lidocaine is used during procedure for comfort measures. A total of 3450 ml's of clear yellow colored ascitic fluid drained during procedure. Catheter removed and op-site dressing applied to site with no bleeding noted. Specimen collected and sent to lab for ordered testing. Albumin infusion given during procedure. Pt tolerated procedure well and denies any pain after. Pt given discharge instructions and pt verbalizes understanding of post procedure care/follow up. Pt transported out of department with no difficulties.

## 2024-12-15 LAB
MICROORGANISM SPEC CULT: NORMAL
MICROORGANISM/AGENT SPEC: NORMAL
SPECIMEN DESCRIPTION: NORMAL

## 2024-12-26 ENCOUNTER — TRANSCRIBE ORDERS (OUTPATIENT)
Dept: ADMINISTRATIVE | Age: 44
End: 2024-12-26

## 2024-12-26 DIAGNOSIS — R74.01 ELEVATION OF LEVELS OF LIVER TRANSAMINASE LEVELS: ICD-10-CM

## 2024-12-26 DIAGNOSIS — K74.60 HEPATIC CIRRHOSIS, UNSPECIFIED HEPATIC CIRRHOSIS TYPE, UNSPECIFIED WHETHER ASCITES PRESENT (HCC): Primary | ICD-10-CM

## 2024-12-27 ENCOUNTER — HOSPITAL ENCOUNTER (OUTPATIENT)
Dept: ULTRASOUND IMAGING | Age: 44
Discharge: HOME | End: 2024-12-29
Payer: COMMERCIAL

## 2024-12-27 ENCOUNTER — HOSPITAL ENCOUNTER (OUTPATIENT)
Age: 44
Discharge: HOME | End: 2024-12-27
Payer: COMMERCIAL

## 2024-12-27 VITALS
OXYGEN SATURATION: 100 % | RESPIRATION RATE: 20 BRPM | DIASTOLIC BLOOD PRESSURE: 81 MMHG | SYSTOLIC BLOOD PRESSURE: 146 MMHG | HEART RATE: 67 BPM

## 2024-12-27 DIAGNOSIS — R18.8 OTHER ASCITES: ICD-10-CM

## 2024-12-27 LAB
ALBUMIN FLD-MCNC: 1.5 G/DL
AMYLASE FLD-CCNC: 29 U/L
APPEARANCE FLD: CLEAR
BODY FLD TYPE: NORMAL
CLOT CHECK: NORMAL
COLOR FLD: YELLOW
INR PPP: 1.3
MONOCYTES NFR FLD: 83 %
NEUTROPHILS NFR FLD: 17 %
PLATELET # BLD AUTO: 105 K/UL (ref 130–450)
PROT FLD-MCNC: 2.5 G/DL
PROTHROMBIN TIME: 13.3 SEC (ref 9.3–12.4)
RBC # FLD: <2000 CELLS/UL
SPECIMEN TYPE: NORMAL
WBC # FLD: 440 CELLS/UL

## 2024-12-27 PROCEDURE — 89051 BODY FLUID CELL COUNT: CPT

## 2024-12-27 PROCEDURE — 87205 SMEAR GRAM STAIN: CPT

## 2024-12-27 PROCEDURE — 82042 OTHER SOURCE ALBUMIN QUAN EA: CPT

## 2024-12-27 PROCEDURE — 84157 ASSAY OF PROTEIN OTHER: CPT

## 2024-12-27 PROCEDURE — 49083 ABD PARACENTESIS W/IMAGING: CPT

## 2024-12-27 PROCEDURE — 82150 ASSAY OF AMYLASE: CPT

## 2024-12-27 PROCEDURE — 6360000002 HC RX W HCPCS: Performed by: NURSE PRACTITIONER

## 2024-12-27 PROCEDURE — 87070 CULTURE OTHR SPECIMN AEROBIC: CPT

## 2024-12-27 PROCEDURE — 6360000002 HC RX W HCPCS: Performed by: PHYSICIAN ASSISTANT

## 2024-12-27 PROCEDURE — 85049 AUTOMATED PLATELET COUNT: CPT

## 2024-12-27 PROCEDURE — 36415 COLL VENOUS BLD VENIPUNCTURE: CPT

## 2024-12-27 PROCEDURE — P9047 ALBUMIN (HUMAN), 25%, 50ML: HCPCS | Performed by: NURSE PRACTITIONER

## 2024-12-27 PROCEDURE — 85610 PROTHROMBIN TIME: CPT

## 2024-12-27 RX ORDER — ALBUMIN (HUMAN) 12.5 G/50ML
SOLUTION INTRAVENOUS CONTINUOUS PRN
Status: COMPLETED | OUTPATIENT
Start: 2024-12-27 | End: 2024-12-27

## 2024-12-27 RX ORDER — LIDOCAINE HYDROCHLORIDE 10 MG/ML
INJECTION, SOLUTION INFILTRATION; PERINEURAL PRN
Status: COMPLETED | OUTPATIENT
Start: 2024-12-27 | End: 2024-12-27

## 2024-12-27 RX ADMIN — LIDOCAINE HYDROCHLORIDE 10 ML: 10 INJECTION, SOLUTION INFILTRATION; PERINEURAL at 12:33

## 2024-12-27 RX ADMIN — ALBUMIN (HUMAN) 50 G: 0.25 INJECTION, SOLUTION INTRAVENOUS at 12:13

## 2024-12-27 ASSESSMENT — PAIN SCALES - GENERAL: PAINLEVEL_OUTOF10: 0

## 2024-12-27 NOTE — OR NURSING
Patient arrival from home to IR for paracentesis. Vitals taken, Iv started, and consent signed. Haleigh Stevens pA-C  in to speak with the patient about the procedure, all questions answered. Abdomen scanned, prepped and 5 Korean 10 cm centesis catheter inserted (LLQ) with ultrasound guidance by Dr Cormier. Patient tolerated well. 3350 ml drained of clear yellow colored ascitic fluid. Centesis catheter removed @ 12:58. Puncture site cleansed and dry dressing applied. No bleeding, swelling or complications noted. Discharge instructions reviewed and understood by patient. Patient discharged home.

## 2025-01-10 ENCOUNTER — HOSPITAL ENCOUNTER (OUTPATIENT)
Dept: ULTRASOUND IMAGING | Age: 45
Discharge: HOME | End: 2025-01-12
Attending: NURSE PRACTITIONER
Payer: COMMERCIAL

## 2025-01-10 VITALS
HEART RATE: 72 BPM | SYSTOLIC BLOOD PRESSURE: 146 MMHG | RESPIRATION RATE: 18 BRPM | DIASTOLIC BLOOD PRESSURE: 78 MMHG | OXYGEN SATURATION: 97 %

## 2025-01-10 DIAGNOSIS — R18.8 OTHER ASCITES: ICD-10-CM

## 2025-01-10 PROCEDURE — 76705 ECHO EXAM OF ABDOMEN: CPT

## 2025-01-10 ASSESSMENT — PAIN - FUNCTIONAL ASSESSMENT: PAIN_FUNCTIONAL_ASSESSMENT: NONE - DENIES PAIN

## 2025-01-10 NOTE — OR NURSING
Pt ambulated to IR room for paracentesis. Pt is alert and oriented, denies any pain prior to procedure. Ultrasound images taken prior to procedure. Procedure is explained to patient, including possible risks. Pt verbalizes understanding and consent from signed. Ultrasound images taken prior to procedure. Procedure is explained to patient, including possible risks. Pt verbalizes understanding and consent form signed. Procedure done under sterile technique and guidance of ultrasound imaging. 1% Lidocaine is used during procedure for comfort measures. A total of () ml's of () colored ascitic fluid drained during procedure. Catheter removed and op-site dressing applied to site with no bleeding noted. Specimen collected and sent to lab for ordered testing. Albumin infusion given during procedure. Pt tolerated procedure well and denies any pain after. Pt given discharge instructions and pt verbalizes understanding of post procedure care/follow up. Pt ambulated out of department with no difficulties.

## 2025-01-10 NOTE — PROGRESS NOTES
Patient arrived to radiology department for paracentesis. Ultrasound performed, not enough fluid to perform paracentesis. Patient discharged.

## 2025-01-10 NOTE — PROGRESS NOTES
Small volume ascites, will hold off on paracentesis until next regularly scheduled appointment in 2 weeks. Patient instructed to call IR department if anything changes or she needs to be seen before her next appointment.

## 2025-01-24 ENCOUNTER — HOSPITAL ENCOUNTER (OUTPATIENT)
Dept: ULTRASOUND IMAGING | Age: 45
Discharge: HOME | End: 2025-01-26
Payer: COMMERCIAL

## 2025-01-24 VITALS
SYSTOLIC BLOOD PRESSURE: 163 MMHG | OXYGEN SATURATION: 100 % | RESPIRATION RATE: 18 BRPM | DIASTOLIC BLOOD PRESSURE: 80 MMHG | HEART RATE: 63 BPM

## 2025-01-24 DIAGNOSIS — R18.8 OTHER ASCITES: ICD-10-CM

## 2025-01-24 LAB
ALBUMIN FLD-MCNC: 1.5 G/DL
AMYLASE FLD-CCNC: 34 U/L
APPEARANCE FLD: CLEAR
BODY FLD TYPE: NORMAL
CLOT CHECK: NORMAL
COLOR FLD: YELLOW
MONOCYTES NFR FLD: 90 %
NEUTROPHILS NFR FLD: 10 %
PROT FLD-MCNC: 2.8 G/DL
RBC # FLD: <2000 CELLS/UL
SPECIMEN TYPE: NORMAL
WBC # FLD: 546 CELLS/UL

## 2025-01-24 PROCEDURE — 49083 ABD PARACENTESIS W/IMAGING: CPT

## 2025-01-24 PROCEDURE — 82042 OTHER SOURCE ALBUMIN QUAN EA: CPT

## 2025-01-24 PROCEDURE — 89051 BODY FLUID CELL COUNT: CPT

## 2025-01-24 PROCEDURE — 87205 SMEAR GRAM STAIN: CPT

## 2025-01-24 PROCEDURE — 88112 CYTOPATH CELL ENHANCE TECH: CPT

## 2025-01-24 PROCEDURE — 87070 CULTURE OTHR SPECIMN AEROBIC: CPT

## 2025-01-24 PROCEDURE — 88305 TISSUE EXAM BY PATHOLOGIST: CPT

## 2025-01-24 PROCEDURE — 82150 ASSAY OF AMYLASE: CPT

## 2025-01-24 PROCEDURE — 84157 ASSAY OF PROTEIN OTHER: CPT

## 2025-01-24 PROCEDURE — 6360000002 HC RX W HCPCS: Performed by: PHYSICIAN ASSISTANT

## 2025-01-24 RX ORDER — LIDOCAINE HYDROCHLORIDE 10 MG/ML
INJECTION, SOLUTION INFILTRATION; PERINEURAL PRN
Status: COMPLETED | OUTPATIENT
Start: 2025-01-24 | End: 2025-01-24

## 2025-01-24 RX ADMIN — LIDOCAINE HYDROCHLORIDE 10 ML: 10 INJECTION, SOLUTION INFILTRATION; PERINEURAL at 12:20

## 2025-01-24 NOTE — OR NURSING
Patient brought into room, discussed procedure. Haleigh Stevens PA-C answered all questions and concerns related to the procedure. Treatment consent signed. Patient positioned and sterile prepped for the procedure. 1% Lidocaine administered by Haleigh Stevens PA-C.  A total of 2830 mL clear yellow ascitic fluid wastaken. Patient tolerated procedure well. Site cleaned and dressed with a bandage. Vitals monitored and remained stable throughout. Discharge papers declined. Patient escorted out of department with all belongings and without distress.

## 2025-01-24 NOTE — PROCEDURES
Procedure: Ultrasound Guided Paracentesis    Diagnosis: Ascites    Findings: Large volume ascites, successful catheter placement with clear yellow ascitic fluid return    Specimen: Approximately 30cc obtained and sent for analysis (orders from referring physician).  Total amount of fluid removed 2830 ml.     Anesthesia: Local infiltration of 10cc 1% Lidocaine without epi    EBL: Minimal.    Complication: None immediately post procedure.    Plan: Discharge to home following paracentesis.     Comments:    See radiology dictation in PACs for image review and additional procedural information.

## 2025-01-26 LAB
MICROORGANISM SPEC CULT: NO GROWTH
MICROORGANISM/AGENT SPEC: NORMAL
SPECIMEN DESCRIPTION: NORMAL

## 2025-02-07 ENCOUNTER — HOSPITAL ENCOUNTER (OUTPATIENT)
Age: 45
Discharge: HOME | End: 2025-02-07
Payer: COMMERCIAL

## 2025-02-07 ENCOUNTER — HOSPITAL ENCOUNTER (OUTPATIENT)
Dept: ULTRASOUND IMAGING | Age: 45
Discharge: HOME | End: 2025-02-09
Payer: COMMERCIAL

## 2025-02-07 VITALS
DIASTOLIC BLOOD PRESSURE: 66 MMHG | HEART RATE: 82 BPM | RESPIRATION RATE: 18 BRPM | OXYGEN SATURATION: 96 % | SYSTOLIC BLOOD PRESSURE: 130 MMHG

## 2025-02-07 DIAGNOSIS — R18.8 OTHER ASCITES: ICD-10-CM

## 2025-02-07 LAB
INR PPP: 1.3
PLATELET CONFIRMATION: ABNORMAL
PLATELET, FLUORESCENCE: 91 K/UL (ref 130–450)
PROTHROMBIN TIME: 14 SEC (ref 9.3–12.4)

## 2025-02-07 PROCEDURE — 85049 AUTOMATED PLATELET COUNT: CPT

## 2025-02-07 PROCEDURE — 6360000002 HC RX W HCPCS: Performed by: INTERNAL MEDICINE

## 2025-02-07 PROCEDURE — 36415 COLL VENOUS BLD VENIPUNCTURE: CPT

## 2025-02-07 PROCEDURE — 6360000002 HC RX W HCPCS: Performed by: RADIOLOGY

## 2025-02-07 PROCEDURE — P9047 ALBUMIN (HUMAN), 25%, 50ML: HCPCS | Performed by: INTERNAL MEDICINE

## 2025-02-07 PROCEDURE — 49083 ABD PARACENTESIS W/IMAGING: CPT

## 2025-02-07 PROCEDURE — 85610 PROTHROMBIN TIME: CPT

## 2025-02-07 RX ORDER — LIDOCAINE HYDROCHLORIDE 10 MG/ML
INJECTION, SOLUTION INFILTRATION; PERINEURAL PRN
Status: COMPLETED | OUTPATIENT
Start: 2025-02-07 | End: 2025-02-07

## 2025-02-07 RX ORDER — ALBUMIN (HUMAN) 12.5 G/50ML
SOLUTION INTRAVENOUS CONTINUOUS PRN
Status: COMPLETED | OUTPATIENT
Start: 2025-02-07 | End: 2025-02-07

## 2025-02-07 RX ADMIN — LIDOCAINE HYDROCHLORIDE 10 ML: 10 INJECTION, SOLUTION INFILTRATION; PERINEURAL at 13:26

## 2025-02-07 RX ADMIN — ALBUMIN (HUMAN) 50 G: 0.25 INJECTION, SOLUTION INTRAVENOUS at 13:34

## 2025-02-07 NOTE — OR NURSING
Patient brought into room, discussed procedure. Dr. Flores answered all questions and concerns related to the procedure. Treatment consent signed. Patient positioned and sterile prepped for the procedure. 1% Lidocaine administered by Dr. Flores.  A total of 1270 mL clear yellow ascitic fluid was taken. Patient tolerated procedure well. Site cleaned and dressed with a bandage. Vitals monitored and remained stable throughout. Discharge papers declined. Patient escorted out of department with all belongings and without distress.

## 2025-02-19 ENCOUNTER — HOSPITAL ENCOUNTER (OUTPATIENT)
Dept: ULTRASOUND IMAGING | Age: 45
Discharge: HOME | End: 2025-02-21
Payer: COMMERCIAL

## 2025-02-19 DIAGNOSIS — R74.01 ELEVATION OF LEVELS OF LIVER TRANSAMINASE LEVELS: ICD-10-CM

## 2025-02-19 DIAGNOSIS — K74.60 HEPATIC CIRRHOSIS, UNSPECIFIED HEPATIC CIRRHOSIS TYPE, UNSPECIFIED WHETHER ASCITES PRESENT (HCC): ICD-10-CM

## 2025-02-19 PROCEDURE — 76705 ECHO EXAM OF ABDOMEN: CPT

## 2025-03-07 ENCOUNTER — HOSPITAL ENCOUNTER (OUTPATIENT)
Dept: ULTRASOUND IMAGING | Age: 45
Discharge: HOME | End: 2025-03-09
Payer: COMMERCIAL

## 2025-03-07 DIAGNOSIS — Z87.898 HX OF ASCITES: ICD-10-CM

## 2025-03-07 PROCEDURE — 76705 ECHO EXAM OF ABDOMEN: CPT

## 2025-03-07 NOTE — PROGRESS NOTES
Arrived to IR for paracentesis, patient states has lost weight and feels wonderful. Scan completed per ultrasound for ascites survey.  Patient states will wait for future paracentesis and left department.

## 2025-03-26 NOTE — PROGRESS NOTES
Acute Illness Visit note    Impression:   Bronchiolitis    Plan:   Nasal suctioning, observation  Recheck tomorrow unless significantly improved    Follow-up:   Return in about 1 day (around 3/27/2025), or if symptoms worsen or fail to improve, for kiana bronchiolitis.    Order Details:  No orders of the defined types were placed in this encounter.      Associated diagnoses for this encounter:  1. Bronchiolitis       _____________________________________________________________    Chief Complaint   Patient presents with    Cough   .    History provided by:  Father    HPI: Parul Rivera is a 8 month old female who presents for evaluation with 2-3 day history of cough and wheezing with congestion.  Patient seems to be breathing fast and working harder to breathe.  She vomited after feeds.  She has had disturbed sleep.  Also decreased urine output.  A sibling has a cough, and other family members have a \"scratchy throat.\"      The problem list was reviewed and updated as follows:  There are no problems to display for this patient.      ALLERGIES:  No Known Allergies  Medications were reviewed at the time of the encounter.    Physical Exam  Vitals:Visit Vitals  Pulse 136   Temp 99.2 °F (37.3 °C) (Temporal)   Resp (!) 64   Wt 9.64 kg (21 lb 4 oz)   SpO2 96%     Gen:   The patient is tachypneic and somewhat crabby.  Acceptable oxygen saturations.  Hydration: The patient appears well-hydrated  Eyes:   Conjunctivae clear bilaterally  Ears:   TMs Normal bilaterally  Ear canals:  Normal bilaterally  Oropharynx:  Normal  Neck:   Supple  Lungs:   Course upper airway noise, good air movement   Respiratory effort mildly increased  Heart:   Regular rate and rhythm without murmur     Call placed to Dr Small's office to notify of 1 of 4 bottles gm negative rods

## 2025-03-28 ENCOUNTER — TRANSCRIBE ORDERS (OUTPATIENT)
Dept: ADMINISTRATIVE | Age: 45
End: 2025-03-28

## 2025-03-28 DIAGNOSIS — K74.60 HEPATIC CIRRHOSIS, UNSPECIFIED HEPATIC CIRRHOSIS TYPE, UNSPECIFIED WHETHER ASCITES PRESENT (HCC): Primary | ICD-10-CM

## 2025-05-01 ENCOUNTER — HOSPITAL ENCOUNTER (OUTPATIENT)
Age: 45
Discharge: HOME | End: 2025-05-03
Payer: COMMERCIAL

## 2025-05-01 ENCOUNTER — HOSPITAL ENCOUNTER (OUTPATIENT)
Dept: ULTRASOUND IMAGING | Age: 45
Discharge: HOME | End: 2025-05-03
Payer: COMMERCIAL

## 2025-05-01 DIAGNOSIS — K74.60 HEPATIC CIRRHOSIS, UNSPECIFIED HEPATIC CIRRHOSIS TYPE, UNSPECIFIED WHETHER ASCITES PRESENT (HCC): ICD-10-CM

## 2025-05-01 PROCEDURE — 76705 ECHO EXAM OF ABDOMEN: CPT

## 2025-05-23 ENCOUNTER — HOSPITAL ENCOUNTER (INPATIENT)
Age: 45
LOS: 5 days | Discharge: HOME | DRG: 283 | End: 2025-05-29
Attending: STUDENT IN AN ORGANIZED HEALTH CARE EDUCATION/TRAINING PROGRAM | Admitting: INTERNAL MEDICINE
Payer: COMMERCIAL

## 2025-05-23 ENCOUNTER — APPOINTMENT (OUTPATIENT)
Dept: GENERAL RADIOLOGY | Age: 45
DRG: 283 | End: 2025-05-23
Payer: COMMERCIAL

## 2025-05-23 ENCOUNTER — APPOINTMENT (OUTPATIENT)
Dept: CT IMAGING | Age: 45
DRG: 283 | End: 2025-05-23
Attending: STUDENT IN AN ORGANIZED HEALTH CARE EDUCATION/TRAINING PROGRAM
Payer: COMMERCIAL

## 2025-05-23 DIAGNOSIS — K74.60 HEPATIC CIRRHOSIS, UNSPECIFIED HEPATIC CIRRHOSIS TYPE, UNSPECIFIED WHETHER ASCITES PRESENT (HCC): ICD-10-CM

## 2025-05-23 DIAGNOSIS — E80.6 HYPERBILIRUBINEMIA: ICD-10-CM

## 2025-05-23 DIAGNOSIS — E87.1 HYPONATREMIA: ICD-10-CM

## 2025-05-23 DIAGNOSIS — N17.9 AKI (ACUTE KIDNEY INJURY): Primary | ICD-10-CM

## 2025-05-23 DIAGNOSIS — K92.1 MELENA: ICD-10-CM

## 2025-05-23 LAB
ABO + RH BLD: NORMAL
ALBUMIN SERPL-MCNC: 3.3 G/DL (ref 3.5–5.2)
ALP SERPL-CCNC: 170 U/L (ref 35–104)
ALT SERPL-CCNC: 18 U/L (ref 0–35)
AMORPH SED URNS QL MICRO: PRESENT
ANION GAP SERPL CALCULATED.3IONS-SCNC: 13 MMOL/L (ref 7–16)
ARM BAND NUMBER: NORMAL
AST SERPL-CCNC: 35 U/L (ref 0–35)
B PARAP IS1001 DNA NPH QL NAA+NON-PROBE: NOT DETECTED
B PERT DNA SPEC QL NAA+PROBE: NOT DETECTED
BACTERIA URNS QL MICRO: ABNORMAL
BASOPHILS # BLD: 0.02 K/UL (ref 0–0.2)
BASOPHILS NFR BLD: 0 % (ref 0–2)
BILIRUB SERPL-MCNC: 2.2 MG/DL (ref 0–1.2)
BILIRUB UR QL STRIP: ABNORMAL
BLOOD BANK SAMPLE EXPIRATION: NORMAL
BLOOD GROUP ANTIBODIES SERPL: NEGATIVE
BUN SERPL-MCNC: 39 MG/DL (ref 6–20)
C PNEUM DNA NPH QL NAA+NON-PROBE: NOT DETECTED
CALCIUM SERPL-MCNC: 8 MG/DL (ref 8.6–10)
CHLORIDE SERPL-SCNC: 97 MMOL/L (ref 98–107)
CLARITY UR: ABNORMAL
CO2 SERPL-SCNC: 18 MMOL/L (ref 22–29)
COLOR UR: YELLOW
CREAT SERPL-MCNC: 1.7 MG/DL (ref 0.5–1)
EOSINOPHIL # BLD: 0.03 K/UL (ref 0.05–0.5)
EOSINOPHILS RELATIVE PERCENT: 0 % (ref 0–6)
EPI CELLS #/AREA URNS HPF: ABNORMAL /HPF
ERYTHROCYTE [DISTWIDTH] IN BLOOD BY AUTOMATED COUNT: 14.8 % (ref 11.5–15)
FLUAV RNA NPH QL NAA+NON-PROBE: NOT DETECTED
FLUBV RNA NPH QL NAA+NON-PROBE: NOT DETECTED
GFR, ESTIMATED: 38 ML/MIN/1.73M2
GLUCOSE SERPL-MCNC: 121 MG/DL (ref 74–99)
GLUCOSE UR STRIP-MCNC: NEGATIVE MG/DL
HADV DNA NPH QL NAA+NON-PROBE: NOT DETECTED
HCG, URINE, POC: NEGATIVE
HCOV 229E RNA NPH QL NAA+NON-PROBE: NOT DETECTED
HCOV HKU1 RNA NPH QL NAA+NON-PROBE: NOT DETECTED
HCOV NL63 RNA NPH QL NAA+NON-PROBE: NOT DETECTED
HCOV OC43 RNA NPH QL NAA+NON-PROBE: NOT DETECTED
HCT VFR BLD AUTO: 37.2 % (ref 34–48)
HGB BLD-MCNC: 12.8 G/DL (ref 11.5–15.5)
HGB UR QL STRIP.AUTO: ABNORMAL
HMPV RNA NPH QL NAA+NON-PROBE: NOT DETECTED
HPIV1 RNA NPH QL NAA+NON-PROBE: NOT DETECTED
HPIV2 RNA NPH QL NAA+NON-PROBE: NOT DETECTED
HPIV3 RNA NPH QL NAA+NON-PROBE: NOT DETECTED
HPIV4 RNA NPH QL NAA+NON-PROBE: NOT DETECTED
IMM GRANULOCYTES # BLD AUTO: 0.08 K/UL (ref 0–0.58)
IMM GRANULOCYTES NFR BLD: 1 % (ref 0–5)
INR PPP: 1.4
KETONES UR STRIP-MCNC: NEGATIVE MG/DL
LACTATE BLDV-SCNC: 1.5 MMOL/L (ref 0.5–1.9)
LEUKOCYTE ESTERASE UR QL STRIP: ABNORMAL
LYMPHOCYTES NFR BLD: 1.04 K/UL (ref 1.5–4)
LYMPHOCYTES RELATIVE PERCENT: 11 % (ref 20–42)
Lab: NORMAL
M PNEUMO DNA NPH QL NAA+NON-PROBE: NOT DETECTED
MCH RBC QN AUTO: 29.3 PG (ref 26–35)
MCHC RBC AUTO-ENTMCNC: 34.4 G/DL (ref 32–34.5)
MCV RBC AUTO: 85.1 FL (ref 80–99.9)
MONOCYTES NFR BLD: 1.03 K/UL (ref 0.1–0.95)
MONOCYTES NFR BLD: 11 % (ref 2–12)
NEGATIVE QC PASS/FAIL: NORMAL
NEUTROPHILS NFR BLD: 78 % (ref 43–80)
NEUTS SEG NFR BLD: 7.58 K/UL (ref 1.8–7.3)
NITRITE UR QL STRIP: NEGATIVE
PH UR STRIP: 5.5 [PH] (ref 5–8)
PLATELET CONFIRMATION: NORMAL
PLATELET, FLUORESCENCE: 49 K/UL (ref 130–450)
PMV BLD AUTO: 12.4 FL (ref 7–12)
POSITIVE QC PASS/FAIL: NORMAL
POTASSIUM SERPL-SCNC: 3.6 MMOL/L (ref 3.5–5.1)
PROT SERPL-MCNC: 7 G/DL (ref 6.4–8.3)
PROT UR STRIP-MCNC: 100 MG/DL
PROTHROMBIN TIME: 15 SEC (ref 9.3–12.4)
RBC # BLD AUTO: 4.37 M/UL (ref 3.5–5.5)
RBC #/AREA URNS HPF: ABNORMAL /HPF
RSV RNA NPH QL NAA+NON-PROBE: NOT DETECTED
RV+EV RNA NPH QL NAA+NON-PROBE: NOT DETECTED
SARS-COV-2 RNA NPH QL NAA+NON-PROBE: NOT DETECTED
SODIUM SERPL-SCNC: 128 MMOL/L (ref 136–145)
SP GR UR STRIP: 1.01 (ref 1–1.03)
SPECIMEN DESCRIPTION: NORMAL
TROPONIN I SERPL HS-MCNC: 15 NG/L (ref 0–14)
UROBILINOGEN UR STRIP-ACNC: 1 EU/DL (ref 0–1)
WBC #/AREA URNS HPF: ABNORMAL /HPF
WBC OTHER # BLD: 9.8 K/UL (ref 4.5–11.5)

## 2025-05-23 PROCEDURE — 87150 DNA/RNA AMPLIFIED PROBE: CPT

## 2025-05-23 PROCEDURE — 0202U NFCT DS 22 TRGT SARS-COV-2: CPT

## 2025-05-23 PROCEDURE — 85610 PROTHROMBIN TIME: CPT

## 2025-05-23 PROCEDURE — 85025 COMPLETE CBC W/AUTO DIFF WBC: CPT

## 2025-05-23 PROCEDURE — 84484 ASSAY OF TROPONIN QUANT: CPT

## 2025-05-23 PROCEDURE — 93005 ELECTROCARDIOGRAM TRACING: CPT | Performed by: STUDENT IN AN ORGANIZED HEALTH CARE EDUCATION/TRAINING PROGRAM

## 2025-05-23 PROCEDURE — 81001 URINALYSIS AUTO W/SCOPE: CPT

## 2025-05-23 PROCEDURE — 86901 BLOOD TYPING SEROLOGIC RH(D): CPT

## 2025-05-23 PROCEDURE — 96374 THER/PROPH/DIAG INJ IV PUSH: CPT

## 2025-05-23 PROCEDURE — 74177 CT ABD & PELVIS W/CONTRAST: CPT

## 2025-05-23 PROCEDURE — 86900 BLOOD TYPING SEROLOGIC ABO: CPT

## 2025-05-23 PROCEDURE — 87086 URINE CULTURE/COLONY COUNT: CPT

## 2025-05-23 PROCEDURE — 6360000004 HC RX CONTRAST MEDICATION: Performed by: RADIOLOGY

## 2025-05-23 PROCEDURE — 71046 X-RAY EXAM CHEST 2 VIEWS: CPT

## 2025-05-23 PROCEDURE — 80053 COMPREHEN METABOLIC PANEL: CPT

## 2025-05-23 PROCEDURE — 2580000003 HC RX 258: Performed by: STUDENT IN AN ORGANIZED HEALTH CARE EDUCATION/TRAINING PROGRAM

## 2025-05-23 PROCEDURE — 6360000002 HC RX W HCPCS: Performed by: STUDENT IN AN ORGANIZED HEALTH CARE EDUCATION/TRAINING PROGRAM

## 2025-05-23 PROCEDURE — 86850 RBC ANTIBODY SCREEN: CPT

## 2025-05-23 PROCEDURE — 99285 EMERGENCY DEPT VISIT HI MDM: CPT

## 2025-05-23 PROCEDURE — 87040 BLOOD CULTURE FOR BACTERIA: CPT

## 2025-05-23 PROCEDURE — 6370000000 HC RX 637 (ALT 250 FOR IP): Performed by: STUDENT IN AN ORGANIZED HEALTH CARE EDUCATION/TRAINING PROGRAM

## 2025-05-23 PROCEDURE — 83605 ASSAY OF LACTIC ACID: CPT

## 2025-05-23 RX ORDER — IOPAMIDOL 755 MG/ML
75 INJECTION, SOLUTION INTRAVASCULAR
Status: COMPLETED | OUTPATIENT
Start: 2025-05-23 | End: 2025-05-23

## 2025-05-23 RX ORDER — 0.9 % SODIUM CHLORIDE 0.9 %
500 INTRAVENOUS SOLUTION INTRAVENOUS ONCE
Status: COMPLETED | OUTPATIENT
Start: 2025-05-23 | End: 2025-05-24

## 2025-05-23 RX ORDER — ACETAMINOPHEN 500 MG
500 TABLET ORAL ONCE
Status: COMPLETED | OUTPATIENT
Start: 2025-05-23 | End: 2025-05-23

## 2025-05-23 RX ORDER — FENTANYL CITRATE 50 UG/ML
50 INJECTION, SOLUTION INTRAMUSCULAR; INTRAVENOUS ONCE
Status: COMPLETED | OUTPATIENT
Start: 2025-05-24 | End: 2025-05-24

## 2025-05-23 RX ADMIN — PANTOPRAZOLE SODIUM 80 MG: 40 INJECTION, POWDER, FOR SOLUTION INTRAVENOUS at 17:54

## 2025-05-23 RX ADMIN — IOPAMIDOL 75 ML: 755 INJECTION, SOLUTION INTRAVENOUS at 19:08

## 2025-05-23 RX ADMIN — ACETAMINOPHEN 500 MG: 500 TABLET ORAL at 17:54

## 2025-05-23 RX ADMIN — SODIUM CHLORIDE 500 ML: 0.9 INJECTION, SOLUTION INTRAVENOUS at 21:57

## 2025-05-23 ASSESSMENT — PAIN DESCRIPTION - ORIENTATION: ORIENTATION: MID

## 2025-05-23 ASSESSMENT — PAIN DESCRIPTION - DESCRIPTORS: DESCRIPTORS: CRAMPING;ACHING;PRESSURE

## 2025-05-23 ASSESSMENT — PAIN DESCRIPTION - LOCATION: LOCATION: ABDOMEN

## 2025-05-23 ASSESSMENT — PAIN DESCRIPTION - PAIN TYPE: TYPE: ACUTE PAIN

## 2025-05-23 ASSESSMENT — LIFESTYLE VARIABLES: HOW OFTEN DO YOU HAVE A DRINK CONTAINING ALCOHOL: NEVER

## 2025-05-23 ASSESSMENT — PAIN SCALES - GENERAL: PAINLEVEL_OUTOF10: 8

## 2025-05-23 ASSESSMENT — PAIN - FUNCTIONAL ASSESSMENT: PAIN_FUNCTIONAL_ASSESSMENT: PREVENTS OR INTERFERES SOME ACTIVE ACTIVITIES AND ADLS

## 2025-05-23 NOTE — ED PROVIDER NOTES
Implemented All Universal Safety Interventions:  [place] to call system. Call bell, personal items and telephone within reach. Instruct patient to call for assistance. Room bathroom lighting operational. Non-slip footwear when patient is off stretcher. Physically safe environment: no spills, clutter or unnecessary equipment. Stretcher in lowest position, wheels locked, appropriate side rails in place. High Sensitivity 15 (H) 0 - 14 ng/L   Platelet Confirmation   Result Value Ref Range    Platelet Confirmation CONFIRMED    Urinalysis with Microscopic   Result Value Ref Range    Color, UA Yellow Yellow    Turbidity UA Cloudy (A) Clear    Glucose, Ur NEGATIVE NEGATIVE mg/dL    Bilirubin, Urine SMALL (A) NEGATIVE    Ketones, Urine NEGATIVE NEGATIVE mg/dL    Specific Gravity, UA 1.010 1.005 - 1.030    Urine Hgb LARGE (A) NEGATIVE    pH, Urine 5.5 5.0 - 8.0    Protein,  (A) NEGATIVE mg/dL    Urobilinogen, Urine 1.0 0.0 - 1.0 EU/dL    Nitrite, Urine NEGATIVE NEGATIVE    Leukocyte Esterase, Urine TRACE (A) NEGATIVE    WBC, UA 0 TO 5 0 TO 5 /HPF    RBC, UA GREATER THAN 100 (A) 0 TO 2 /HPF    Epithelial Cells, UA 3 to 5 /HPF    Bacteria, UA 4+ (A) None    Amorphous, UA PRESENT    POC Pregnancy Urine Qual   Result Value Ref Range    HCG, Urine, POC Negative Negative    Lot Number [id number]     Positive QC Pass/Fail Acceptable     Negative QC Pass/Fail Acceptable    EKG 12 Lead   Result Value Ref Range    Ventricular Rate 98 BPM    Atrial Rate 98 BPM    P-R Interval 124 ms    QRS Duration 76 ms    Q-T Interval 332 ms    QTc Calculation (Bazett) 423 ms    P Axis 38 degrees    R Axis 44 degrees    T Axis 47 degrees   TYPE AND SCREEN   Result Value Ref Range    Blood Bank Sample Expiration 05/26/2025,2359     Arm Band Number [id number]     ABO/Rh O POSITIVE     Antibody Screen NEGATIVE        RADIOLOGY:  CT ABDOMEN PELVIS W IV CONTRAST Additional Contrast? None   Final Result   1. No acute intra-abdominal abnormality.   2. Stigmata of portal hypertension interval improvement of now mild volume   ascites.         XR CHEST (2 VW)   Final Result   No evidence of acute cardiopulmonary process.                 ------------------------- NURSING NOTES AND VITALS REVIEWED ---------------------------  Date / Time Roomed:  5/23/2025  4:47 PM  ED Bed Assignment:  ESCAMILLA A/MONAHAN    The nursing notes within the ED encounter and vital signs as

## 2025-05-24 PROBLEM — N17.9 AKI (ACUTE KIDNEY INJURY): Status: ACTIVE | Noted: 2025-05-24

## 2025-05-24 LAB
ALBUMIN SERPL-MCNC: 2.6 G/DL (ref 3.5–5.2)
ALP SERPL-CCNC: 139 U/L (ref 35–104)
ALT SERPL-CCNC: 13 U/L (ref 0–35)
AMPHET UR QL SCN: NEGATIVE
ANION GAP SERPL CALCULATED.3IONS-SCNC: 12 MMOL/L (ref 7–16)
AST SERPL-CCNC: 30 U/L (ref 0–35)
BARBITURATES UR QL SCN: NEGATIVE
BENZODIAZ UR QL: NEGATIVE
BILIRUB SERPL-MCNC: 1.9 MG/DL (ref 0–1.2)
BUN SERPL-MCNC: 40 MG/DL (ref 6–20)
BUPRENORPHINE UR QL: NEGATIVE
CALCIUM SERPL-MCNC: 7.6 MG/DL (ref 8.6–10)
CANNABINOIDS UR QL SCN: POSITIVE
CHLORIDE SERPL-SCNC: 102 MMOL/L (ref 98–107)
CO2 SERPL-SCNC: 15 MMOL/L (ref 22–29)
COCAINE UR QL SCN: NEGATIVE
CREAT SERPL-MCNC: 1.8 MG/DL (ref 0.5–1)
CREAT UR-MCNC: 57.9 MG/DL (ref 29–226)
CREAT UR-MCNC: 58.6 MG/DL (ref 29–226)
EKG ATRIAL RATE: 98 BPM
EKG P AXIS: 38 DEGREES
EKG P-R INTERVAL: 124 MS
EKG Q-T INTERVAL: 332 MS
EKG QRS DURATION: 76 MS
EKG QTC CALCULATION (BAZETT): 423 MS
EKG R AXIS: 44 DEGREES
EKG T AXIS: 47 DEGREES
EKG VENTRICULAR RATE: 98 BPM
FENTANYL UR QL: POSITIVE
GFR, ESTIMATED: 35 ML/MIN/1.73M2
GLUCOSE SERPL-MCNC: 103 MG/DL (ref 74–99)
METHADONE UR QL: NEGATIVE
OPIATES UR QL SCN: NEGATIVE
OSMOLALITY SERPL: 292 MOSM/KG (ref 285–310)
OXYCODONE UR QL SCN: NEGATIVE
PCP UR QL SCN: NEGATIVE
POTASSIUM SERPL-SCNC: 3.6 MMOL/L (ref 3.5–5.1)
PROT SERPL-MCNC: 5.8 G/DL (ref 6.4–8.3)
SODIUM SERPL-SCNC: 128 MMOL/L (ref 136–145)
SODIUM UR-SCNC: <20 MMOL/L
TEST INFORMATION: ABNORMAL
TOTAL PROTEIN, URINE: 39 MG/DL (ref 0–12)
URINE TOTAL PROTEIN CREATININE RATIO: 0.67 (ref 0–0.2)
UUN UR-MCNC: 426 MG/DL (ref 800–1666)

## 2025-05-24 PROCEDURE — 84540 ASSAY OF URINE/UREA-N: CPT

## 2025-05-24 PROCEDURE — 2580000003 HC RX 258: Performed by: STUDENT IN AN ORGANIZED HEALTH CARE EDUCATION/TRAINING PROGRAM

## 2025-05-24 PROCEDURE — 83935 ASSAY OF URINE OSMOLALITY: CPT

## 2025-05-24 PROCEDURE — 84300 ASSAY OF URINE SODIUM: CPT

## 2025-05-24 PROCEDURE — 2500000003 HC RX 250 WO HCPCS: Performed by: NURSE PRACTITIONER

## 2025-05-24 PROCEDURE — 82570 ASSAY OF URINE CREATININE: CPT

## 2025-05-24 PROCEDURE — 6360000002 HC RX W HCPCS: Performed by: NURSE PRACTITIONER

## 2025-05-24 PROCEDURE — 36415 COLL VENOUS BLD VENIPUNCTURE: CPT

## 2025-05-24 PROCEDURE — 6370000000 HC RX 637 (ALT 250 FOR IP): Performed by: STUDENT IN AN ORGANIZED HEALTH CARE EDUCATION/TRAINING PROGRAM

## 2025-05-24 PROCEDURE — 6360000002 HC RX W HCPCS: Performed by: STUDENT IN AN ORGANIZED HEALTH CARE EDUCATION/TRAINING PROGRAM

## 2025-05-24 PROCEDURE — 84156 ASSAY OF PROTEIN URINE: CPT

## 2025-05-24 PROCEDURE — 2580000003 HC RX 258: Performed by: NURSE PRACTITIONER

## 2025-05-24 PROCEDURE — 80053 COMPREHEN METABOLIC PANEL: CPT

## 2025-05-24 PROCEDURE — G0480 DRUG TEST DEF 1-7 CLASSES: HCPCS

## 2025-05-24 PROCEDURE — 93010 ELECTROCARDIOGRAM REPORT: CPT | Performed by: INTERNAL MEDICINE

## 2025-05-24 PROCEDURE — 80307 DRUG TEST PRSMV CHEM ANLYZR: CPT

## 2025-05-24 PROCEDURE — 96375 TX/PRO/DX INJ NEW DRUG ADDON: CPT

## 2025-05-24 PROCEDURE — 6370000000 HC RX 637 (ALT 250 FOR IP): Performed by: NURSE PRACTITIONER

## 2025-05-24 PROCEDURE — 2140000000 HC CCU INTERMEDIATE R&B

## 2025-05-24 PROCEDURE — 83930 ASSAY OF BLOOD OSMOLALITY: CPT

## 2025-05-24 RX ORDER — SODIUM CHLORIDE 9 MG/ML
INJECTION, SOLUTION INTRAVENOUS PRN
Status: DISCONTINUED | OUTPATIENT
Start: 2025-05-24 | End: 2025-05-29 | Stop reason: HOSPADM

## 2025-05-24 RX ORDER — ONDANSETRON 4 MG/1
4 TABLET, ORALLY DISINTEGRATING ORAL EVERY 8 HOURS PRN
Status: DISCONTINUED | OUTPATIENT
Start: 2025-05-24 | End: 2025-05-29 | Stop reason: HOSPADM

## 2025-05-24 RX ORDER — SODIUM CHLORIDE 0.9 % (FLUSH) 0.9 %
5-40 SYRINGE (ML) INJECTION EVERY 12 HOURS SCHEDULED
Status: DISCONTINUED | OUTPATIENT
Start: 2025-05-24 | End: 2025-05-29 | Stop reason: HOSPADM

## 2025-05-24 RX ORDER — ACETAMINOPHEN 500 MG
500 TABLET ORAL EVERY 6 HOURS PRN
Status: DISCONTINUED | OUTPATIENT
Start: 2025-05-24 | End: 2025-05-29 | Stop reason: HOSPADM

## 2025-05-24 RX ORDER — SODIUM CHLORIDE 0.9 % (FLUSH) 0.9 %
5-40 SYRINGE (ML) INJECTION PRN
Status: DISCONTINUED | OUTPATIENT
Start: 2025-05-24 | End: 2025-05-29 | Stop reason: HOSPADM

## 2025-05-24 RX ORDER — LORAZEPAM 2 MG/ML
1 INJECTION INTRAMUSCULAR
Status: DISCONTINUED | OUTPATIENT
Start: 2025-05-24 | End: 2025-05-29 | Stop reason: HOSPADM

## 2025-05-24 RX ORDER — FOLIC ACID 5 MG/ML
1 INJECTION, SOLUTION INTRAMUSCULAR; INTRAVENOUS; SUBCUTANEOUS DAILY
Status: DISCONTINUED | OUTPATIENT
Start: 2025-05-24 | End: 2025-05-29 | Stop reason: CLARIF

## 2025-05-24 RX ORDER — SPIRONOLACTONE 25 MG/1
25 TABLET ORAL DAILY
Status: DISCONTINUED | OUTPATIENT
Start: 2025-05-24 | End: 2025-05-29 | Stop reason: HOSPADM

## 2025-05-24 RX ORDER — LORAZEPAM 2 MG/ML
3 INJECTION INTRAMUSCULAR
Status: DISCONTINUED | OUTPATIENT
Start: 2025-05-24 | End: 2025-05-29 | Stop reason: HOSPADM

## 2025-05-24 RX ORDER — LORAZEPAM 1 MG/1
1 TABLET ORAL
Status: DISCONTINUED | OUTPATIENT
Start: 2025-05-24 | End: 2025-05-29 | Stop reason: HOSPADM

## 2025-05-24 RX ORDER — LACTULOSE 10 G/15ML
10 SOLUTION ORAL 2 TIMES DAILY
Status: DISCONTINUED | OUTPATIENT
Start: 2025-05-24 | End: 2025-05-29 | Stop reason: HOSPADM

## 2025-05-24 RX ORDER — OXYMETAZOLINE HYDROCHLORIDE 0.05 G/100ML
2 SPRAY NASAL 2 TIMES DAILY PRN
Status: DISPENSED | OUTPATIENT
Start: 2025-05-24 | End: 2025-05-27

## 2025-05-24 RX ORDER — LORAZEPAM 1 MG/1
4 TABLET ORAL
Status: DISCONTINUED | OUTPATIENT
Start: 2025-05-24 | End: 2025-05-29 | Stop reason: HOSPADM

## 2025-05-24 RX ORDER — LORAZEPAM 2 MG/ML
4 INJECTION INTRAMUSCULAR
Status: DISCONTINUED | OUTPATIENT
Start: 2025-05-24 | End: 2025-05-29 | Stop reason: HOSPADM

## 2025-05-24 RX ORDER — SENNOSIDES 8.6 MG/1
1 TABLET ORAL DAILY PRN
Status: DISCONTINUED | OUTPATIENT
Start: 2025-05-24 | End: 2025-05-29 | Stop reason: HOSPADM

## 2025-05-24 RX ORDER — MAGNESIUM SULFATE IN WATER 40 MG/ML
2000 INJECTION, SOLUTION INTRAVENOUS PRN
Status: DISCONTINUED | OUTPATIENT
Start: 2025-05-24 | End: 2025-05-29 | Stop reason: HOSPADM

## 2025-05-24 RX ORDER — SODIUM CHLORIDE 0.9 % (FLUSH) 0.9 %
10 SYRINGE (ML) INJECTION EVERY 12 HOURS SCHEDULED
Status: DISCONTINUED | OUTPATIENT
Start: 2025-05-24 | End: 2025-05-29 | Stop reason: HOSPADM

## 2025-05-24 RX ORDER — SODIUM CHLORIDE 0.9 % (FLUSH) 0.9 %
10 SYRINGE (ML) INJECTION PRN
Status: DISCONTINUED | OUTPATIENT
Start: 2025-05-24 | End: 2025-05-29 | Stop reason: HOSPADM

## 2025-05-24 RX ORDER — ACETAMINOPHEN 650 MG/1
325 SUPPOSITORY RECTAL EVERY 6 HOURS PRN
Status: DISCONTINUED | OUTPATIENT
Start: 2025-05-24 | End: 2025-05-29 | Stop reason: HOSPADM

## 2025-05-24 RX ORDER — POTASSIUM CHLORIDE 1500 MG/1
40 TABLET, EXTENDED RELEASE ORAL PRN
Status: DISCONTINUED | OUTPATIENT
Start: 2025-05-24 | End: 2025-05-29 | Stop reason: HOSPADM

## 2025-05-24 RX ORDER — ONDANSETRON 2 MG/ML
4 INJECTION INTRAMUSCULAR; INTRAVENOUS EVERY 6 HOURS PRN
Status: DISCONTINUED | OUTPATIENT
Start: 2025-05-24 | End: 2025-05-29 | Stop reason: HOSPADM

## 2025-05-24 RX ORDER — CHOLESTYRAMINE LIGHT 4 G/5.7G
4 POWDER, FOR SUSPENSION ORAL DAILY
Status: DISCONTINUED | OUTPATIENT
Start: 2025-05-24 | End: 2025-05-29 | Stop reason: HOSPADM

## 2025-05-24 RX ORDER — SODIUM CHLORIDE, SODIUM LACTATE, POTASSIUM CHLORIDE, CALCIUM CHLORIDE 600; 310; 30; 20 MG/100ML; MG/100ML; MG/100ML; MG/100ML
INJECTION, SOLUTION INTRAVENOUS CONTINUOUS
Status: DISCONTINUED | OUTPATIENT
Start: 2025-05-24 | End: 2025-05-25

## 2025-05-24 RX ORDER — LORAZEPAM 1 MG/1
2 TABLET ORAL
Status: DISCONTINUED | OUTPATIENT
Start: 2025-05-24 | End: 2025-05-29 | Stop reason: HOSPADM

## 2025-05-24 RX ORDER — SODIUM CHLORIDE 9 MG/ML
INJECTION, SOLUTION INTRAVENOUS CONTINUOUS
Status: DISCONTINUED | OUTPATIENT
Start: 2025-05-24 | End: 2025-05-24

## 2025-05-24 RX ORDER — FUROSEMIDE 20 MG/1
20 TABLET ORAL DAILY
Status: DISCONTINUED | OUTPATIENT
Start: 2025-05-24 | End: 2025-05-29 | Stop reason: HOSPADM

## 2025-05-24 RX ORDER — POTASSIUM CHLORIDE 7.45 MG/ML
10 INJECTION INTRAVENOUS PRN
Status: DISCONTINUED | OUTPATIENT
Start: 2025-05-24 | End: 2025-05-29 | Stop reason: HOSPADM

## 2025-05-24 RX ORDER — LORAZEPAM 1 MG/1
3 TABLET ORAL
Status: DISCONTINUED | OUTPATIENT
Start: 2025-05-24 | End: 2025-05-29 | Stop reason: HOSPADM

## 2025-05-24 RX ORDER — THIAMINE HYDROCHLORIDE 100 MG/ML
100 INJECTION, SOLUTION INTRAMUSCULAR; INTRAVENOUS DAILY
Status: DISCONTINUED | OUTPATIENT
Start: 2025-05-24 | End: 2025-05-29 | Stop reason: CLARIF

## 2025-05-24 RX ORDER — LORAZEPAM 2 MG/ML
2 INJECTION INTRAMUSCULAR
Status: DISCONTINUED | OUTPATIENT
Start: 2025-05-24 | End: 2025-05-29 | Stop reason: HOSPADM

## 2025-05-24 RX ADMIN — FOLIC ACID 1 MG: 5 INJECTION, SOLUTION INTRAMUSCULAR; INTRAVENOUS; SUBCUTANEOUS at 11:17

## 2025-05-24 RX ADMIN — SODIUM CHLORIDE, PRESERVATIVE FREE 10 ML: 5 INJECTION INTRAVENOUS at 21:01

## 2025-05-24 RX ADMIN — SODIUM CHLORIDE, PRESERVATIVE FREE 10 ML: 5 INJECTION INTRAVENOUS at 09:12

## 2025-05-24 RX ADMIN — FENTANYL CITRATE 50 MCG: 50 INJECTION INTRAMUSCULAR; INTRAVENOUS at 00:09

## 2025-05-24 RX ADMIN — SODIUM CHLORIDE, PRESERVATIVE FREE 40 MG: 5 INJECTION INTRAVENOUS at 14:32

## 2025-05-24 RX ADMIN — SODIUM CHLORIDE: 0.9 INJECTION, SOLUTION INTRAVENOUS at 02:14

## 2025-05-24 RX ADMIN — SODIUM CHLORIDE, SODIUM LACTATE, POTASSIUM CHLORIDE, AND CALCIUM CHLORIDE: .6; .31; .03; .02 INJECTION, SOLUTION INTRAVENOUS at 09:20

## 2025-05-24 RX ADMIN — ACETAMINOPHEN 500 MG: 500 TABLET ORAL at 18:45

## 2025-05-24 RX ADMIN — THIAMINE HYDROCHLORIDE 100 MG: 100 INJECTION, SOLUTION INTRAMUSCULAR; INTRAVENOUS at 09:12

## 2025-05-24 RX ADMIN — ACETAMINOPHEN 500 MG: 500 TABLET ORAL at 09:15

## 2025-05-24 RX ADMIN — SODIUM CHLORIDE, SODIUM LACTATE, POTASSIUM CHLORIDE, AND CALCIUM CHLORIDE: .6; .31; .03; .02 INJECTION, SOLUTION INTRAVENOUS at 19:27

## 2025-05-24 ASSESSMENT — PAIN SCALES - GENERAL
PAINLEVEL_OUTOF10: 6
PAINLEVEL_OUTOF10: 5
PAINLEVEL_OUTOF10: 8
PAINLEVEL_OUTOF10: 4
PAINLEVEL_OUTOF10: 5
PAINLEVEL_OUTOF10: 5
PAINLEVEL_OUTOF10: 6
PAINLEVEL_OUTOF10: 7
PAINLEVEL_OUTOF10: 8

## 2025-05-24 ASSESSMENT — PAIN DESCRIPTION - DESCRIPTORS
DESCRIPTORS: ACHING;DISCOMFORT;SORE
DESCRIPTORS: ACHING;DISCOMFORT;SORE
DESCRIPTORS: DISCOMFORT;CRAMPING;NAGGING
DESCRIPTORS: CRAMPING
DESCRIPTORS: CRAMPING;SPASM;DISCOMFORT
DESCRIPTORS: CRAMPING;DISCOMFORT;NAGGING

## 2025-05-24 ASSESSMENT — PAIN DESCRIPTION - LOCATION
LOCATION: ABDOMEN
LOCATION: ABDOMEN;NECK
LOCATION: ABDOMEN

## 2025-05-24 ASSESSMENT — PAIN DESCRIPTION - PAIN TYPE
TYPE: ACUTE PAIN

## 2025-05-24 ASSESSMENT — PAIN DESCRIPTION - FREQUENCY
FREQUENCY: CONTINUOUS

## 2025-05-24 ASSESSMENT — PAIN - FUNCTIONAL ASSESSMENT
PAIN_FUNCTIONAL_ASSESSMENT: ACTIVITIES ARE NOT PREVENTED

## 2025-05-24 ASSESSMENT — PAIN DESCRIPTION - ORIENTATION
ORIENTATION: RIGHT;LEFT;UPPER
ORIENTATION: UPPER;MID
ORIENTATION: UPPER
ORIENTATION: MID;RIGHT;LEFT
ORIENTATION: UPPER
ORIENTATION: UPPER;MID

## 2025-05-24 ASSESSMENT — PAIN DESCRIPTION - ONSET
ONSET: ON-GOING
ONSET: ON-GOING

## 2025-05-24 NOTE — H&P
Internal Medicine History & Physical     Name: Jaylene Kapoor  : 1980  Chief Complaint: Abdominal Pain, Ear Fullness, and Pharyngitis (Abdominal pain, ear pain, sore throat X 3 days. )  Primary Care Physician: Logan Lee III, DO  Admission date: 2025  Date of service: 2025     History of Present Illness  Jaylene is a 45 y.o. year old female who presented with a chief complaint of abdominal pain and nose bleeding     Patient is coming in for abdominal pain throat pain and nose bleeds. She has a history of etoh cirrhosis she tells me but has not drank since February.     She is awake alert and oriented and able answer questions in NAD.       Past Medical History:   Diagnosis Date    Heart burn     History of alcohol abuse     Hypertension     Liver cirrhosis (HCC)        Past Surgical History:   Procedure Laterality Date    FOOT DEBRIDEMENT Left 2024    LEFT FOOT AND ANKLE INCISION AND DRAINAGE performed by Florentin Grover DPM at [facility] OR    FOOT DEBRIDEMENT Left 2024    LEFT FOOT INCISION AND DRAINAGE performed by Florentin Grover DPM at [facility] OR    PICC INSERTION VASCULAR ACCESS TEAM  2024    UPPER GASTROINTESTINAL ENDOSCOPY N/A 2024    EGD ESOPHAGOGASTRODUODENOSCOPY WITH DILATION performed by Dwayne Quinones DO at [facility] ENDOSCOPY       Family History   Problem Relation Age of Onset    Diabetes Mother     Heart Disease Mother     Diabetes Father          Social History  Illicit drugs: Denies   TOBACCO:   reports that she has been smoking. She does not have any smokeless tobacco history on file.  ETOH:   reports no history of alcohol use.    Home Medications  Prior to Admission medications    Medication Sig Start Date End Date Taking? Authorizing Provider   acetaminophen (TYLENOL) 325 MG tablet Take 2 tablets by mouth every 6 hours as needed for Pain   Yes Provider, MD Daljit   lactulose (CHRONULAC) 10 GM/15ML solution Take 15 mLs by mouth 2 times daily   Yes Provider

## 2025-05-24 NOTE — PATIENT CARE CONFERENCE
P Quality Flow/Interdisciplinary Rounds Progress Note        Quality Flow Rounds held on May 24, 2025    Disciplines Attending:  Bedside Nurse, , , and Nursing Unit Leadership    Jaylene Kapoor was admitted on 5/23/2025  4:47 PM    Anticipated Discharge Date:       Disposition:    Julio Score:  Julio Scale Score: 21    BS RISK OF UNPLANNED READMISSION 2.0             9.7 Total Score        Discussed patient goal for the day, patient clinical progression, and barriers to discharge.  The following Goal(s) of the Day/Commitment(s) have been identified:  report labs/ diagnostics       Mateo Jaquez RN  May 24, 2025         HISTORY AND PHYSICAL EXAM      Admit Date:  2025   Attending:  Declan Blackmon DO    HISTORY OF PRESENT ILLNESS:  Roberta Keene is a 25 year old  female at 38w1d with MONTY: 2025 by BSUS growth on  who presents with contractions. Her pregnancy is complicated by no prenatal Fetal Presentation:  Cephalic by exam    PRENATAL Blackmon on 24.     ASSESSMENT   Roberta Keene is a 25 year old  female at 38w1d with MONTY: 2025 by BSUS growth on  who presents with contractions. Her pregnancy is  complicated by no prenatal 1/18    Rh negative  Rhogam given 1/18    Postpartum Planning  Expecting male infant  Declan Blackmon DO, FACOG  OBGYN Hospitalist

## 2025-05-24 NOTE — ED NOTES
Nurse to nurse report given to SABA Llamas.  All questions answered at this time.  Patient and family updated.

## 2025-05-24 NOTE — CONSULTS
OTOLARYNGOLOGY  CONSULT NOTE  5/24/2025    Physician Consulted: Dr. Haji  Reason for Consult: Epistaxis with thrombocytopenia      LEONIDES Kapoor is a 45 y.o. female who ENT was consulted for evaluation of epistaxis with thrombocytopenia.  Patient's past medical history includes history of alcohol abuse, liver cirrhosis, hypertension, and others.  Patient presented with abdominal pain with acute kidney injury, throat pain and concern for possible GI bleed.  Patient reports epistaxis off-and-on for the last 2 weeks bilaterally.  Patient describes epistaxis as blood on tissue however denies draining of blood on the front of her nose or coughing up bright red blood, however in the emergency department patient did report an episode of throwing up blood prior to arrival.  She denies history of epistaxis prior to these episodes.  She denies history of surgery to the head and neck.  Admits to nausea.  Denies vomiting, lightheadedness, shortness of breath, fevers, environmental allergies, and other.  Patient does vitals are currently stable on room air, Hgb within normal limits (12.8), platelet (49), receiving IV fluids.     Review of Systems   Constitutional:  Negative for fever.   HENT:  Positive for nosebleeds.    Respiratory:  Negative for shortness of breath and stridor.    Gastrointestinal:  Positive for nausea. Negative for vomiting.   Skin:  Negative for color change.   Allergic/Immunologic: Negative for environmental allergies.   Neurological:  Negative for light-headedness.   Psychiatric/Behavioral:  Negative for behavioral problems.    All other systems reviewed and are negative.      Past Medical History:   Diagnosis Date    Heart burn     History of alcohol abuse     Hypertension     Liver cirrhosis (HCC)        Past Surgical History:   Procedure Laterality Date    FOOT DEBRIDEMENT Left 4/11/2024    LEFT FOOT AND ANKLE INCISION AND DRAINAGE performed by Florentin Grover DPM at [facility] OR    [facility]

## 2025-05-25 PROBLEM — R04.0 EPISTAXIS: Status: ACTIVE | Noted: 2025-05-25

## 2025-05-25 LAB
ALBUMIN SERPL-MCNC: 2.6 G/DL (ref 3.5–5.2)
ALP SERPL-CCNC: 150 U/L (ref 35–104)
ALT SERPL-CCNC: 12 U/L (ref 0–35)
ANION GAP SERPL CALCULATED.3IONS-SCNC: 10 MMOL/L (ref 7–16)
AST SERPL-CCNC: 28 U/L (ref 0–35)
BASOPHILS # BLD: 0.03 K/UL (ref 0–0.2)
BASOPHILS NFR BLD: 0 % (ref 0–2)
BILIRUB SERPL-MCNC: 1.9 MG/DL (ref 0–1.2)
BUN SERPL-MCNC: 32 MG/DL (ref 6–20)
CALCIUM SERPL-MCNC: 7.7 MG/DL (ref 8.6–10)
CHLORIDE SERPL-SCNC: 106 MMOL/L (ref 98–107)
CO2 SERPL-SCNC: 15 MMOL/L (ref 22–29)
CREAT SERPL-MCNC: 1.7 MG/DL (ref 0.5–1)
EOSINOPHIL # BLD: 0.11 K/UL (ref 0.05–0.5)
EOSINOPHILS RELATIVE PERCENT: 1 % (ref 0–6)
ERYTHROCYTE [DISTWIDTH] IN BLOOD BY AUTOMATED COUNT: 15 % (ref 11.5–15)
GFR, ESTIMATED: 38 ML/MIN/1.73M2
GLUCOSE SERPL-MCNC: 119 MG/DL (ref 74–99)
HCT VFR BLD AUTO: 29.6 % (ref 34–48)
HGB BLD-MCNC: 10.3 G/DL (ref 11.5–15.5)
IMM GRANULOCYTES # BLD AUTO: 0.12 K/UL (ref 0–0.58)
IMM GRANULOCYTES NFR BLD: 1 % (ref 0–5)
LYMPHOCYTES NFR BLD: 0.88 K/UL (ref 1.5–4)
LYMPHOCYTES RELATIVE PERCENT: 11 % (ref 20–42)
MCH RBC QN AUTO: 29.8 PG (ref 26–35)
MCHC RBC AUTO-ENTMCNC: 34.8 G/DL (ref 32–34.5)
MCV RBC AUTO: 85.5 FL (ref 80–99.9)
MONOCYTES NFR BLD: 1.1 K/UL (ref 0.1–0.95)
MONOCYTES NFR BLD: 13 % (ref 2–12)
NEUTROPHILS NFR BLD: 73 % (ref 43–80)
NEUTS SEG NFR BLD: 6.15 K/UL (ref 1.8–7.3)
OSMOLALITY UR: 273 MOSM/KG (ref 300–900)
PLATELET CONFIRMATION: NORMAL
PLATELET, FLUORESCENCE: 43 K/UL (ref 130–450)
PMV BLD AUTO: 12.6 FL (ref 7–12)
POTASSIUM SERPL-SCNC: 3.6 MMOL/L (ref 3.5–5.1)
PROT SERPL-MCNC: 5.7 G/DL (ref 6.4–8.3)
RBC # BLD AUTO: 3.46 M/UL (ref 3.5–5.5)
SODIUM SERPL-SCNC: 131 MMOL/L (ref 136–145)
WBC OTHER # BLD: 8.4 K/UL (ref 4.5–11.5)

## 2025-05-25 PROCEDURE — 2500000003 HC RX 250 WO HCPCS: Performed by: INTERNAL MEDICINE

## 2025-05-25 PROCEDURE — 6370000000 HC RX 637 (ALT 250 FOR IP): Performed by: STUDENT IN AN ORGANIZED HEALTH CARE EDUCATION/TRAINING PROGRAM

## 2025-05-25 PROCEDURE — 6360000002 HC RX W HCPCS: Performed by: INTERNAL MEDICINE

## 2025-05-25 PROCEDURE — 2500000003 HC RX 250 WO HCPCS: Performed by: STUDENT IN AN ORGANIZED HEALTH CARE EDUCATION/TRAINING PROGRAM

## 2025-05-25 PROCEDURE — 2580000003 HC RX 258: Performed by: INTERNAL MEDICINE

## 2025-05-25 PROCEDURE — 6370000000 HC RX 637 (ALT 250 FOR IP): Performed by: INTERNAL MEDICINE

## 2025-05-25 PROCEDURE — 6360000002 HC RX W HCPCS: Performed by: NURSE PRACTITIONER

## 2025-05-25 PROCEDURE — 2500000003 HC RX 250 WO HCPCS: Performed by: NURSE PRACTITIONER

## 2025-05-25 PROCEDURE — 2140000000 HC CCU INTERMEDIATE R&B

## 2025-05-25 PROCEDURE — 2580000003 HC RX 258: Performed by: NURSE PRACTITIONER

## 2025-05-25 PROCEDURE — 6370000000 HC RX 637 (ALT 250 FOR IP): Performed by: NURSE PRACTITIONER

## 2025-05-25 PROCEDURE — G0480 DRUG TEST DEF 1-7 CLASSES: HCPCS

## 2025-05-25 PROCEDURE — 2580000003 HC RX 258: Performed by: STUDENT IN AN ORGANIZED HEALTH CARE EDUCATION/TRAINING PROGRAM

## 2025-05-25 PROCEDURE — P9047 ALBUMIN (HUMAN), 25%, 50ML: HCPCS | Performed by: INTERNAL MEDICINE

## 2025-05-25 PROCEDURE — 36415 COLL VENOUS BLD VENIPUNCTURE: CPT

## 2025-05-25 PROCEDURE — 80053 COMPREHEN METABOLIC PANEL: CPT

## 2025-05-25 PROCEDURE — 85025 COMPLETE CBC W/AUTO DIFF WBC: CPT

## 2025-05-25 PROCEDURE — 6360000002 HC RX W HCPCS: Performed by: STUDENT IN AN ORGANIZED HEALTH CARE EDUCATION/TRAINING PROGRAM

## 2025-05-25 RX ORDER — SODIUM BICARBONATE 650 MG/1
1300 TABLET ORAL 2 TIMES DAILY
Status: DISCONTINUED | OUTPATIENT
Start: 2025-05-25 | End: 2025-05-29 | Stop reason: HOSPADM

## 2025-05-25 RX ORDER — SODIUM CHLORIDE 9 MG/ML
INJECTION, SOLUTION INTRAVENOUS CONTINUOUS
Status: DISCONTINUED | OUTPATIENT
Start: 2025-05-25 | End: 2025-05-27

## 2025-05-25 RX ORDER — ALBUMIN (HUMAN) 12.5 G/50ML
25 SOLUTION INTRAVENOUS EVERY 6 HOURS
Status: COMPLETED | OUTPATIENT
Start: 2025-05-25 | End: 2025-05-26

## 2025-05-25 RX ADMIN — CHOLESTYRAMINE 4 G: 4 POWDER, FOR SUSPENSION ORAL at 07:59

## 2025-05-25 RX ADMIN — SODIUM CHLORIDE, PRESERVATIVE FREE 10 ML: 5 INJECTION INTRAVENOUS at 22:52

## 2025-05-25 RX ADMIN — HYDROMORPHONE HYDROCHLORIDE 0.5 MG: 1 INJECTION, SOLUTION INTRAMUSCULAR; INTRAVENOUS; SUBCUTANEOUS at 13:00

## 2025-05-25 RX ADMIN — SODIUM CHLORIDE, PRESERVATIVE FREE 10 ML: 5 INJECTION INTRAVENOUS at 19:37

## 2025-05-25 RX ADMIN — SODIUM CHLORIDE: 0.9 INJECTION, SOLUTION INTRAVENOUS at 18:49

## 2025-05-25 RX ADMIN — SODIUM CHLORIDE, SODIUM LACTATE, POTASSIUM CHLORIDE, AND CALCIUM CHLORIDE: .6; .31; .03; .02 INJECTION, SOLUTION INTRAVENOUS at 06:28

## 2025-05-25 RX ADMIN — ACETAMINOPHEN 500 MG: 500 TABLET ORAL at 01:36

## 2025-05-25 RX ADMIN — SODIUM CHLORIDE, PRESERVATIVE FREE 10 ML: 5 INJECTION INTRAVENOUS at 07:59

## 2025-05-25 RX ADMIN — HYDROMORPHONE HYDROCHLORIDE 0.5 MG: 1 INJECTION, SOLUTION INTRAMUSCULAR; INTRAVENOUS; SUBCUTANEOUS at 19:36

## 2025-05-25 RX ADMIN — SODIUM CHLORIDE, PRESERVATIVE FREE 40 MG: 5 INJECTION INTRAVENOUS at 15:48

## 2025-05-25 RX ADMIN — SODIUM CHLORIDE, PRESERVATIVE FREE 40 MG: 5 INJECTION INTRAVENOUS at 01:36

## 2025-05-25 RX ADMIN — SODIUM BICARBONATE 1300 MG: 650 TABLET ORAL at 20:25

## 2025-05-25 RX ADMIN — WATER 1000 MG: 1 INJECTION INTRAMUSCULAR; INTRAVENOUS; SUBCUTANEOUS at 09:26

## 2025-05-25 RX ADMIN — ALBUMIN (HUMAN) 25 G: 0.25 INJECTION, SOLUTION INTRAVENOUS at 22:52

## 2025-05-25 RX ADMIN — LACTULOSE 10 G: 20 SOLUTION ORAL at 07:58

## 2025-05-25 RX ADMIN — THIAMINE HYDROCHLORIDE 100 MG: 100 INJECTION, SOLUTION INTRAMUSCULAR; INTRAVENOUS at 07:59

## 2025-05-25 RX ADMIN — FOLIC ACID 1 MG: 5 INJECTION, SOLUTION INTRAMUSCULAR; INTRAVENOUS; SUBCUTANEOUS at 08:04

## 2025-05-25 RX ADMIN — LACTULOSE 10 G: 20 SOLUTION ORAL at 20:25

## 2025-05-25 RX ADMIN — ALBUMIN (HUMAN) 25 G: 0.25 INJECTION, SOLUTION INTRAVENOUS at 16:59

## 2025-05-25 ASSESSMENT — PAIN SCALES - GENERAL
PAINLEVEL_OUTOF10: 4
PAINLEVEL_OUTOF10: 6
PAINLEVEL_OUTOF10: 0
PAINLEVEL_OUTOF10: 4
PAINLEVEL_OUTOF10: 8
PAINLEVEL_OUTOF10: 4
PAINLEVEL_OUTOF10: 8
PAINLEVEL_OUTOF10: 5
PAINLEVEL_OUTOF10: 4

## 2025-05-25 ASSESSMENT — PAIN DESCRIPTION - ONSET
ONSET: ON-GOING
ONSET: ON-GOING

## 2025-05-25 ASSESSMENT — PAIN DESCRIPTION - FREQUENCY
FREQUENCY: CONTINUOUS
FREQUENCY: CONTINUOUS

## 2025-05-25 ASSESSMENT — PAIN DESCRIPTION - DESCRIPTORS
DESCRIPTORS: ACHING;DISCOMFORT;CRAMPING
DESCRIPTORS: ACHING;DISCOMFORT;SORE
DESCRIPTORS: CRAMPING;DISCOMFORT;NAGGING

## 2025-05-25 ASSESSMENT — PAIN DESCRIPTION - PAIN TYPE
TYPE: ACUTE PAIN
TYPE: ACUTE PAIN

## 2025-05-25 ASSESSMENT — PAIN - FUNCTIONAL ASSESSMENT
PAIN_FUNCTIONAL_ASSESSMENT: ACTIVITIES ARE NOT PREVENTED

## 2025-05-25 ASSESSMENT — PAIN DESCRIPTION - ORIENTATION
ORIENTATION: UPPER
ORIENTATION: MID;UPPER
ORIENTATION: MID;UPPER

## 2025-05-25 ASSESSMENT — PAIN DESCRIPTION - LOCATION
LOCATION: ABDOMEN
LOCATION: ABDOMEN
LOCATION: HEAD;ABDOMEN

## 2025-05-25 NOTE — PATIENT CARE CONFERENCE
P Quality Flow/Interdisciplinary Rounds Progress Note        Quality Flow Rounds held on May 25, 2025    Disciplines Attending:  Bedside Nurse, , , and Nursing Unit Leadership    Jaylene Kapoor was admitted on 5/23/2025  4:47 PM    Anticipated Discharge Date:       Disposition:    Julio Score:  Julio Scale Score: 21    BS RISK OF UNPLANNED READMISSION 2.0             11.3 Total Score        Discussed patient goal for the day, patient clinical progression, and barriers to discharge.  The following Goal(s) of the Day/Commitment(s) have been identified:  Monitor labs/ diagnostics       Mateo Jaquez RN  May 25, 2025

## 2025-05-25 NOTE — CONSULTS
[facility]  MD Franki Swan MD Ali Hassan, MD Lisa Kniska, CONCEPCION Portillo, CHERYL Vincent, CNP  Consultation    Patient's Name: Jaylene Kapoor  4:25 PM  5/25/2025    Nephrologist: Franki Meadows MD    Reason for Consult: Acute kidney injury, question of hepatorenal syndrome    Requesting Physician:  Logan Lee III, DO    Chief Complaint: Abdominal pain    History Obtained From: Patient, chart    History of Present Ilness:    Ms. Kapoor is a 45-year-old woman with known history of alcohol induced cirrhosis who has not had any alcoholic beverages now for 15 months.  She presents with multiple episodes of epistaxis over the past week she notes this is never happened before.  Notes also abdominal pain, diffuse achy/crampy in nature with at times some mild heartburn.  She has some mild abdominal distention, sense of bloating.  Denies chest pain or palpitations.  No shortness of breath or dyspnea exertion.  No peripheral swelling.  No dysuria or hematuria.  No constipation.  Stools are now loose on lactulose.  Denies lightheadedness or dizziness.  No NSAIDs.  No new medications of late.  No recent course of antibiotics.    BUN and creatinine on presentation 5/23 were 39 and 1.7, respectively, compares with 6 and 0.6 in 11/2024.  BUN/creatinine this morning are stable at 32 and 1.7. U Na < 20, U osm 273, U prot:cr ratio 0.67 on urine studies at noon yesterday 5/24.    Past Medical History:   Diagnosis Date    Heart burn     History of alcohol abuse     Hypertension     Liver cirrhosis (HCC)        Past Surgical History:   Procedure Laterality Date    FOOT DEBRIDEMENT Left 4/11/2024    LEFT FOOT AND ANKLE INCISION AND DRAINAGE performed by Florentin Grover DPM at [facility] OR    FOOT DEBRIDEMENT Left 4/17/2024    LEFT FOOT INCISION AND DRAINAGE performed by Florentin Grover DPM at [facility] OR    PICC INSERTION VASCULAR ACCESS TEAM  4/14/2024    UPPER

## 2025-05-25 NOTE — CONSULTS
Gastroenterology, Hepatology, &  Advanced Endoscopy    Consult Note    Dr. Perez covering for Dr. Quinones until 5/30/25    Reason for Consult: Hematemesis, tarry stools     HPI:   Jaylene Kapoor is a 45 y.o. female w/ PMH of  has a past medical history of Heart burn, History of alcohol abuse, Hypertension, and Liver cirrhosis (HCC). who presents to the ED abdominal pain and ear fullness. Patient states that she has a history of alcohol abuse but has not been treated for a long time. Patient states that she has been feeling ill for the past 3 days. Patient is denying sore throat.  Pt follows withDr. Quinones,  Dr. Armenta and [facility].  History of Ascites due to alcoholic hepatitis, Alcohol use disorder, and Alcoholic hepatitis. Previous was in rehab at [place].    EGD 2/16/24: Copious solid food in the stomach - unable to wash and suction, it did limit views of mucosa, however no fresh or old blood noted on exam.  No appreciable GEV. PHG in fundus and cardia      PMH:       Diagnosis Date    Heart burn     History of alcohol abuse     Hypertension     Liver cirrhosis (HCC)         PSH:       Procedure Laterality Date    FOOT DEBRIDEMENT Left 4/11/2024    LEFT FOOT AND ANKLE INCISION AND DRAINAGE performed by Florentin Grover DPM at [facility] OR    FOOT DEBRIDEMENT Left 4/17/2024    LEFT FOOT INCISION AND DRAINAGE performed by Florentin Grover DPM at [facility] OR    PICC INSERTION VASCULAR ACCESS TEAM  4/14/2024    UPPER GASTROINTESTINAL ENDOSCOPY N/A 2/16/2024    EGD ESOPHAGOGASTRODUODENOSCOPY WITH DILATION performed by Dwayne Quinones DO at [facility] ENDOSCOPY        Family History:       Problem Relation Age of Onset    Diabetes Mother     Heart Disease Mother     Diabetes Father         Social History:   Social History     Tobacco Use    Smoking status: Every Day     Current packs/day: 0.50     Types: Cigarettes   Substance Use Topics    Alcohol use: No    Drug use: No        ALLERGIES: No Known Allergies    Home

## 2025-05-25 NOTE — CONSULTS
Department of Internal Medicine  Infectious Diseases   Consult Note      Reason for Consult:   E coli  bacteremia       Requesting Physician:  Dr Martínez         HISTORY OF PRESENT ILLNESS:                The patient is a 45 y.o. female with hx of alcoholic liver cirrhosis , HTN presented to the ER with fever, chills, abdomen pain . She denied nausea or vomiting, reported loose stool due to \" lactulose \"  She reported intermittent nasal bleeding  Denies dysuria   WBC was 9.8 K   Blood cx - E coli   Started on rocephin     Past Medical History:      Past Medical History:   Diagnosis Date    Heart burn     History of alcohol abuse     Hypertension     Liver cirrhosis (HCC)          Past Surgical History:    Past Surgical History:   Procedure Laterality Date    FOOT DEBRIDEMENT Left 4/11/2024    LEFT FOOT AND ANKLE INCISION AND DRAINAGE performed by Florentin Grover DPM at [facility] OR    FOOT DEBRIDEMENT Left 4/17/2024    LEFT FOOT INCISION AND DRAINAGE performed by Florentin Grover DPM at [facility] OR    PICC INSERTION VASCULAR ACCESS TEAM  4/14/2024    UPPER GASTROINTESTINAL ENDOSCOPY N/A 2/16/2024    EGD ESOPHAGOGASTRODUODENOSCOPY WITH DILATION performed by Dwayne Quinones DO at [facility] ENDOSCOPY         Current Medications:      Current Facility-Administered Medications   Medication Dose Route Frequency Provider Last Rate Last Admin    cefTRIAXone (ROCEPHIN) 1,000 mg in sterile water 10 mL IV syringe  1,000 mg IntraVENous Q24H Severo Duncan MD   1,000 mg at 05/25/25 0926    HYDROmorphone (DILAUDID) injection 0.5 mg  0.5 mg IntraVENous Q4H PRN Bean Martínez MD   0.5 mg at 05/25/25 1300    cholestyramine light packet 4 g  4 g Oral Daily Jena George APRN - CNP   4 g at 05/25/25 0759    lactulose (CHRONULAC) 10 GM/15ML solution 10 g  10 g Oral BID Jena George APRN - CNP   10 g at 05/25/25 0758    [Held by provider] spironolactone (ALDACTONE) tablet 25 mg  25 mg Oral Daily Jena George APRN - CNP        [Held by

## 2025-05-26 LAB
ACB COMPLEX DNA BLD POS QL NAA+NON-PROBE: NOT DETECTED
ALBUMIN SERPL-MCNC: 3.3 G/DL (ref 3.5–5.2)
ALP SERPL-CCNC: 139 U/L (ref 35–104)
ALT SERPL-CCNC: 10 U/L (ref 0–35)
ANION GAP SERPL CALCULATED.3IONS-SCNC: 10 MMOL/L (ref 7–16)
AST SERPL-CCNC: 22 U/L (ref 0–35)
B FRAGILIS DNA BLD POS QL NAA+NON-PROBE: NOT DETECTED
BASOPHILS # BLD: 0.03 K/UL (ref 0–0.2)
BASOPHILS NFR BLD: 1 % (ref 0–2)
BILIRUB SERPL-MCNC: 1.5 MG/DL (ref 0–1.2)
BIOFIRE TEST COMMENT: ABNORMAL
BLACTX-M ISLT/SPM QL: NOT DETECTED
BLAIMP ISLT/SPM QL: NOT DETECTED
BLAKPC ISLT/SPM QL: NOT DETECTED
BLAOXA-48-LIKE ISLT/SPM QL: NOT DETECTED
BLAVIM ISLT/SPM QL: NOT DETECTED
BUN SERPL-MCNC: 20 MG/DL (ref 6–20)
C ALBICANS DNA BLD POS QL NAA+NON-PROBE: NOT DETECTED
C AURIS DNA BLD POS QL NAA+NON-PROBE: NOT DETECTED
C GATTII+NEOFOR DNA BLD POS QL NAA+N-PRB: NOT DETECTED
C GLABRATA DNA BLD POS QL NAA+NON-PROBE: NOT DETECTED
C KRUSEI DNA BLD POS QL NAA+NON-PROBE: NOT DETECTED
C PARAP DNA BLD POS QL NAA+NON-PROBE: NOT DETECTED
C TROPICLS DNA BLD POS QL NAA+NON-PROBE: NOT DETECTED
CALCIUM SERPL-MCNC: 7.9 MG/DL (ref 8.6–10)
CHLORIDE SERPL-SCNC: 109 MMOL/L (ref 98–107)
CHLORIDE UR-SCNC: 24 MMOL/L
CO2 SERPL-SCNC: 17 MMOL/L (ref 22–29)
COLISTIN RES MCR-1 ISLT/SPM QL: NOT DETECTED
CREAT SERPL-MCNC: 1.4 MG/DL (ref 0.5–1)
CREAT UR-MCNC: 57.9 MG/DL (ref 29–226)
E CLOAC COMP DNA BLD POS NAA+NON-PROBE: NOT DETECTED
E COLI DNA BLD POS QL NAA+NON-PROBE: DETECTED
E FAECALIS DNA BLD POS QL NAA+NON-PROBE: NOT DETECTED
E FAECIUM DNA BLD POS QL NAA+NON-PROBE: NOT DETECTED
ENTEROBACTERALES DNA BLD POS NAA+N-PRB: DETECTED
EOSINOPHIL # BLD: 0.21 K/UL (ref 0.05–0.5)
EOSINOPHILS RELATIVE PERCENT: 3 % (ref 0–6)
ERYTHROCYTE [DISTWIDTH] IN BLOOD BY AUTOMATED COUNT: 15.2 % (ref 11.5–15)
GFR, ESTIMATED: 46 ML/MIN/1.73M2
GLUCOSE SERPL-MCNC: 99 MG/DL (ref 74–99)
GP B STREP DNA BLD POS QL NAA+NON-PROBE: NOT DETECTED
HAEM INFLU DNA BLD POS QL NAA+NON-PROBE: NOT DETECTED
HCT VFR BLD AUTO: 29.9 % (ref 34–48)
HGB BLD-MCNC: 10.2 G/DL (ref 11.5–15.5)
IMM GRANULOCYTES # BLD AUTO: 0.06 K/UL (ref 0–0.58)
IMM GRANULOCYTES NFR BLD: 1 % (ref 0–5)
K OXYTOCA DNA BLD POS QL NAA+NON-PROBE: NOT DETECTED
KLEBSIELLA SP DNA BLD POS QL NAA+NON-PRB: NOT DETECTED
KLEBSIELLA SP DNA BLD POS QL NAA+NON-PRB: NOT DETECTED
L MONOCYTOG DNA BLD POS QL NAA+NON-PROBE: NOT DETECTED
LYMPHOCYTES NFR BLD: 1.24 K/UL (ref 1.5–4)
LYMPHOCYTES RELATIVE PERCENT: 19 % (ref 20–42)
MAGNESIUM SERPL-MCNC: 2 MG/DL (ref 1.6–2.6)
MCH RBC QN AUTO: 29.4 PG (ref 26–35)
MCHC RBC AUTO-ENTMCNC: 34.1 G/DL (ref 32–34.5)
MCV RBC AUTO: 86.2 FL (ref 80–99.9)
MICROORGANISM SPEC CULT: ABNORMAL
MICROORGANISM/AGENT SPEC: ABNORMAL
MONOCYTES NFR BLD: 0.81 K/UL (ref 0.1–0.95)
MONOCYTES NFR BLD: 12 % (ref 2–12)
N MEN DNA BLD POS QL NAA+NON-PROBE: NOT DETECTED
NEUTROPHILS NFR BLD: 64 % (ref 43–80)
NEUTS SEG NFR BLD: 4.22 K/UL (ref 1.8–7.3)
OSMOLALITY UR: 257 MOSM/KG (ref 300–900)
P AERUGINOSA DNA BLD POS NAA+NON-PROBE: NOT DETECTED
PHOSPHATE SERPL-MCNC: 2.7 MG/DL (ref 2.5–4.5)
PLATELET # BLD AUTO: 65 K/UL (ref 130–450)
PLATELET CONFIRMATION: NORMAL
PMV BLD AUTO: 12.6 FL (ref 7–12)
POTASSIUM SERPL-SCNC: 3.5 MMOL/L (ref 3.5–5.1)
PROT SERPL-MCNC: 6.1 G/DL (ref 6.4–8.3)
PROTEUS SP DNA BLD POS QL NAA+NON-PROBE: NOT DETECTED
RBC # BLD AUTO: 3.47 M/UL (ref 3.5–5.5)
RESISTANT GENE NDM BY PCR: NOT DETECTED
S AUREUS DNA BLD POS QL NAA+NON-PROBE: NOT DETECTED
S AUREUS+CONS DNA BLD POS NAA+NON-PROBE: NOT DETECTED
S EPIDERMIDIS DNA BLD POS QL NAA+NON-PRB: NOT DETECTED
S LUGDUNENSIS DNA BLD POS QL NAA+NON-PRB: NOT DETECTED
S MALTOPHILIA DNA BLD POS QL NAA+NON-PRB: NOT DETECTED
S MARCESCENS DNA BLD POS NAA+NON-PROBE: NOT DETECTED
S PNEUM DNA BLD POS QL NAA+NON-PROBE: NOT DETECTED
S PYO DNA BLD POS QL NAA+NON-PROBE: NOT DETECTED
SALMONELLA DNA BLD POS QL NAA+NON-PROBE: NOT DETECTED
SERVICE CMNT-IMP: ABNORMAL
SERVICE CMNT-IMP: ABNORMAL
SODIUM SERPL-SCNC: 135 MMOL/L (ref 136–145)
SODIUM UR-SCNC: 39 MMOL/L
SPECIMEN DESCRIPTION: ABNORMAL
SPECIMEN DESCRIPTION: ABNORMAL
STREPTOCOCCUS DNA BLD POS NAA+NON-PROBE: NOT DETECTED
TOTAL PROTEIN, URINE: 27 MG/DL (ref 0–12)
URINE TOTAL PROTEIN CREATININE RATIO: 0.47 (ref 0–0.2)
WBC OTHER # BLD: 6.6 K/UL (ref 4.5–11.5)

## 2025-05-26 PROCEDURE — P9047 ALBUMIN (HUMAN), 25%, 50ML: HCPCS | Performed by: INTERNAL MEDICINE

## 2025-05-26 PROCEDURE — 80053 COMPREHEN METABOLIC PANEL: CPT

## 2025-05-26 PROCEDURE — 2140000000 HC CCU INTERMEDIATE R&B

## 2025-05-26 PROCEDURE — 2580000003 HC RX 258: Performed by: INTERNAL MEDICINE

## 2025-05-26 PROCEDURE — 6370000000 HC RX 637 (ALT 250 FOR IP): Performed by: NURSE PRACTITIONER

## 2025-05-26 PROCEDURE — 84156 ASSAY OF PROTEIN URINE: CPT

## 2025-05-26 PROCEDURE — 6360000002 HC RX W HCPCS: Performed by: STUDENT IN AN ORGANIZED HEALTH CARE EDUCATION/TRAINING PROGRAM

## 2025-05-26 PROCEDURE — 2500000003 HC RX 250 WO HCPCS: Performed by: INTERNAL MEDICINE

## 2025-05-26 PROCEDURE — 83735 ASSAY OF MAGNESIUM: CPT

## 2025-05-26 PROCEDURE — 83935 ASSAY OF URINE OSMOLALITY: CPT

## 2025-05-26 PROCEDURE — 82436 ASSAY OF URINE CHLORIDE: CPT

## 2025-05-26 PROCEDURE — 6360000002 HC RX W HCPCS: Performed by: INTERNAL MEDICINE

## 2025-05-26 PROCEDURE — 2500000003 HC RX 250 WO HCPCS: Performed by: NURSE PRACTITIONER

## 2025-05-26 PROCEDURE — 2500000003 HC RX 250 WO HCPCS: Performed by: STUDENT IN AN ORGANIZED HEALTH CARE EDUCATION/TRAINING PROGRAM

## 2025-05-26 PROCEDURE — 85025 COMPLETE CBC W/AUTO DIFF WBC: CPT

## 2025-05-26 PROCEDURE — 6360000002 HC RX W HCPCS: Performed by: NURSE PRACTITIONER

## 2025-05-26 PROCEDURE — 82570 ASSAY OF URINE CREATININE: CPT

## 2025-05-26 PROCEDURE — 6370000000 HC RX 637 (ALT 250 FOR IP): Performed by: INTERNAL MEDICINE

## 2025-05-26 PROCEDURE — 2580000003 HC RX 258: Performed by: NURSE PRACTITIONER

## 2025-05-26 PROCEDURE — 84100 ASSAY OF PHOSPHORUS: CPT

## 2025-05-26 PROCEDURE — 36415 COLL VENOUS BLD VENIPUNCTURE: CPT

## 2025-05-26 PROCEDURE — 6370000000 HC RX 637 (ALT 250 FOR IP): Performed by: STUDENT IN AN ORGANIZED HEALTH CARE EDUCATION/TRAINING PROGRAM

## 2025-05-26 PROCEDURE — 84300 ASSAY OF URINE SODIUM: CPT

## 2025-05-26 RX ADMIN — SODIUM BICARBONATE 1300 MG: 650 TABLET ORAL at 21:31

## 2025-05-26 RX ADMIN — SODIUM CHLORIDE, PRESERVATIVE FREE 40 MG: 5 INJECTION INTRAVENOUS at 14:28

## 2025-05-26 RX ADMIN — CHOLESTYRAMINE 4 G: 4 POWDER, FOR SUSPENSION ORAL at 08:58

## 2025-05-26 RX ADMIN — SODIUM CHLORIDE: 0.9 INJECTION, SOLUTION INTRAVENOUS at 06:04

## 2025-05-26 RX ADMIN — ALBUMIN (HUMAN) 25 G: 0.25 INJECTION, SOLUTION INTRAVENOUS at 11:27

## 2025-05-26 RX ADMIN — ALBUMIN (HUMAN) 25 G: 0.25 INJECTION, SOLUTION INTRAVENOUS at 06:05

## 2025-05-26 RX ADMIN — HYDROMORPHONE HYDROCHLORIDE 0.5 MG: 1 INJECTION, SOLUTION INTRAMUSCULAR; INTRAVENOUS; SUBCUTANEOUS at 11:28

## 2025-05-26 RX ADMIN — LACTULOSE 10 G: 20 SOLUTION ORAL at 08:57

## 2025-05-26 RX ADMIN — HYDROMORPHONE HYDROCHLORIDE 0.5 MG: 1 INJECTION, SOLUTION INTRAMUSCULAR; INTRAVENOUS; SUBCUTANEOUS at 21:31

## 2025-05-26 RX ADMIN — SODIUM BICARBONATE 1300 MG: 650 TABLET ORAL at 08:58

## 2025-05-26 RX ADMIN — SODIUM CHLORIDE, PRESERVATIVE FREE 10 ML: 5 INJECTION INTRAVENOUS at 21:32

## 2025-05-26 RX ADMIN — THIAMINE HYDROCHLORIDE 100 MG: 100 INJECTION, SOLUTION INTRAMUSCULAR; INTRAVENOUS at 08:58

## 2025-05-26 RX ADMIN — SODIUM CHLORIDE, PRESERVATIVE FREE 10 ML: 5 INJECTION INTRAVENOUS at 21:33

## 2025-05-26 RX ADMIN — HYDROMORPHONE HYDROCHLORIDE 0.5 MG: 1 INJECTION, SOLUTION INTRAMUSCULAR; INTRAVENOUS; SUBCUTANEOUS at 00:40

## 2025-05-26 RX ADMIN — ACETAMINOPHEN 500 MG: 500 TABLET ORAL at 09:03

## 2025-05-26 RX ADMIN — SODIUM CHLORIDE: 0.9 INJECTION, SOLUTION INTRAVENOUS at 18:23

## 2025-05-26 RX ADMIN — FOLIC ACID 1 MG: 5 INJECTION, SOLUTION INTRAMUSCULAR; INTRAVENOUS; SUBCUTANEOUS at 09:04

## 2025-05-26 RX ADMIN — WATER 1000 MG: 1 INJECTION INTRAMUSCULAR; INTRAVENOUS; SUBCUTANEOUS at 08:57

## 2025-05-26 RX ADMIN — SODIUM CHLORIDE, PRESERVATIVE FREE 10 ML: 5 INJECTION INTRAVENOUS at 09:01

## 2025-05-26 RX ADMIN — SODIUM CHLORIDE, PRESERVATIVE FREE 10 ML: 5 INJECTION INTRAVENOUS at 09:00

## 2025-05-26 RX ADMIN — SODIUM CHLORIDE, PRESERVATIVE FREE 40 MG: 5 INJECTION INTRAVENOUS at 03:40

## 2025-05-26 ASSESSMENT — PAIN SCALES - GENERAL
PAINLEVEL_OUTOF10: 4
PAINLEVEL_OUTOF10: 5
PAINLEVEL_OUTOF10: 5
PAINLEVEL_OUTOF10: 7
PAINLEVEL_OUTOF10: 7
PAINLEVEL_OUTOF10: 4
PAINLEVEL_OUTOF10: 7
PAINLEVEL_OUTOF10: 5
PAINLEVEL_OUTOF10: 6

## 2025-05-26 ASSESSMENT — PAIN DESCRIPTION - LOCATION
LOCATION: ABDOMEN

## 2025-05-26 ASSESSMENT — PAIN DESCRIPTION - DESCRIPTORS
DESCRIPTORS: DISCOMFORT;PRESSURE;SORE
DESCRIPTORS: DISCOMFORT;CRAMPING;NAGGING
DESCRIPTORS: DISCOMFORT;SORE;PRESSURE
DESCRIPTORS: DISCOMFORT;PRESSURE;SORE

## 2025-05-26 ASSESSMENT — PAIN - FUNCTIONAL ASSESSMENT: PAIN_FUNCTIONAL_ASSESSMENT: ACTIVITIES ARE NOT PREVENTED

## 2025-05-26 ASSESSMENT — PAIN DESCRIPTION - ORIENTATION
ORIENTATION: MID
ORIENTATION: MID
ORIENTATION: RIGHT
ORIENTATION: MID;UPPER

## 2025-05-26 ASSESSMENT — PAIN DESCRIPTION - PAIN TYPE: TYPE: ACUTE PAIN

## 2025-05-26 ASSESSMENT — PAIN DESCRIPTION - FREQUENCY: FREQUENCY: CONTINUOUS

## 2025-05-26 ASSESSMENT — PAIN DESCRIPTION - ONSET: ONSET: ON-GOING

## 2025-05-26 NOTE — PLAN OF CARE
Problem: Discharge Planning  Goal: Discharge to home or other facility with appropriate resources  5/26/2025 0919 by Lorraine Negrete, RN  Outcome: Progressing     Problem: Pain  Goal: Verbalizes/displays adequate comfort level or baseline comfort level  5/26/2025 0919 by Lorraine Negrete, RN  Outcome: Progressing     Problem: ABCDS Injury Assessment  Goal: Absence of physical injury  5/26/2025 0919 by Lorraine Negrete RN  Outcome: Progressing     Problem: Safety - Adult  Goal: Free from fall injury  5/26/2025 0919 by Lorraine Negrete, RN  Outcome: Progressing

## 2025-05-27 ENCOUNTER — ANESTHESIA EVENT (OUTPATIENT)
Dept: ENDOSCOPY | Age: 45
DRG: 283 | End: 2025-05-27
Payer: COMMERCIAL

## 2025-05-27 ENCOUNTER — ANESTHESIA (OUTPATIENT)
Dept: ENDOSCOPY | Age: 45
DRG: 283 | End: 2025-05-27
Payer: COMMERCIAL

## 2025-05-27 PROBLEM — K92.1 MELENA: Status: ACTIVE | Noted: 2025-05-23

## 2025-05-27 LAB
ALBUMIN SERPL-MCNC: 3.3 G/DL (ref 3.5–5.2)
ALP SERPL-CCNC: 120 U/L (ref 35–104)
ALT SERPL-CCNC: 8 U/L (ref 0–35)
ANION GAP SERPL CALCULATED.3IONS-SCNC: 11 MMOL/L (ref 7–16)
AST SERPL-CCNC: 20 U/L (ref 0–35)
BASOPHILS # BLD: 0.03 K/UL (ref 0–0.2)
BASOPHILS NFR BLD: 1 % (ref 0–2)
BILIRUB SERPL-MCNC: 1.2 MG/DL (ref 0–1.2)
BUN SERPL-MCNC: 14 MG/DL (ref 6–20)
CALCIUM SERPL-MCNC: 7.9 MG/DL (ref 8.6–10)
CHLORIDE SERPL-SCNC: 113 MMOL/L (ref 98–107)
CO2 SERPL-SCNC: 16 MMOL/L (ref 22–29)
CREAT SERPL-MCNC: 1.3 MG/DL (ref 0.5–1)
EOSINOPHIL # BLD: 0.22 K/UL (ref 0.05–0.5)
EOSINOPHILS RELATIVE PERCENT: 4 % (ref 0–6)
ERYTHROCYTE [DISTWIDTH] IN BLOOD BY AUTOMATED COUNT: 15.6 % (ref 11.5–15)
GFR, ESTIMATED: 52 ML/MIN/1.73M2
GLUCOSE SERPL-MCNC: 92 MG/DL (ref 74–99)
HCT VFR BLD AUTO: 30.2 % (ref 34–48)
HGB BLD-MCNC: 10.1 G/DL (ref 11.5–15.5)
IMM GRANULOCYTES # BLD AUTO: 0.03 K/UL (ref 0–0.58)
IMM GRANULOCYTES NFR BLD: 1 % (ref 0–5)
LABORATORY REPORT: NORMAL
LYMPHOCYTES NFR BLD: 1.33 K/UL (ref 1.5–4)
LYMPHOCYTES RELATIVE PERCENT: 24 % (ref 20–42)
MAGNESIUM SERPL-MCNC: 1.9 MG/DL (ref 1.6–2.6)
MCH RBC QN AUTO: 29 PG (ref 26–35)
MCHC RBC AUTO-ENTMCNC: 33.4 G/DL (ref 32–34.5)
MCV RBC AUTO: 86.8 FL (ref 80–99.9)
MONOCYTES NFR BLD: 0.58 K/UL (ref 0.1–0.95)
MONOCYTES NFR BLD: 11 % (ref 2–12)
NEUTROPHILS NFR BLD: 60 % (ref 43–80)
NEUTS SEG NFR BLD: 3.3 K/UL (ref 1.8–7.3)
PETH INTERPRETATION: NORMAL
PLATELET # BLD AUTO: 85 K/UL (ref 130–450)
PLATELET CONFIRMATION: NORMAL
PLPETH BLD-MCNC: <10 NG/ML
PMV BLD AUTO: 11.5 FL (ref 7–12)
POPETH BLD-MCNC: <10 NG/ML
POTASSIUM SERPL-SCNC: 3.5 MMOL/L (ref 3.5–5.1)
PROT SERPL-MCNC: 5.7 G/DL (ref 6.4–8.3)
RBC # BLD AUTO: 3.48 M/UL (ref 3.5–5.5)
SODIUM SERPL-SCNC: 140 MMOL/L (ref 136–145)
WBC OTHER # BLD: 5.5 K/UL (ref 4.5–11.5)

## 2025-05-27 PROCEDURE — 3609012400 HC EGD TRANSORAL BIOPSY SINGLE/MULTIPLE: Performed by: STUDENT IN AN ORGANIZED HEALTH CARE EDUCATION/TRAINING PROGRAM

## 2025-05-27 PROCEDURE — 6370000000 HC RX 637 (ALT 250 FOR IP): Performed by: INTERNAL MEDICINE

## 2025-05-27 PROCEDURE — 36415 COLL VENOUS BLD VENIPUNCTURE: CPT

## 2025-05-27 PROCEDURE — 3700000000 HC ANESTHESIA ATTENDED CARE: Performed by: STUDENT IN AN ORGANIZED HEALTH CARE EDUCATION/TRAINING PROGRAM

## 2025-05-27 PROCEDURE — 2709999900 HC NON-CHARGEABLE SUPPLY: Performed by: STUDENT IN AN ORGANIZED HEALTH CARE EDUCATION/TRAINING PROGRAM

## 2025-05-27 PROCEDURE — 6370000000 HC RX 637 (ALT 250 FOR IP): Performed by: NURSE PRACTITIONER

## 2025-05-27 PROCEDURE — 83735 ASSAY OF MAGNESIUM: CPT

## 2025-05-27 PROCEDURE — 2580000003 HC RX 258

## 2025-05-27 PROCEDURE — 80053 COMPREHEN METABOLIC PANEL: CPT

## 2025-05-27 PROCEDURE — 3700000001 HC ADD 15 MINUTES (ANESTHESIA): Performed by: STUDENT IN AN ORGANIZED HEALTH CARE EDUCATION/TRAINING PROGRAM

## 2025-05-27 PROCEDURE — 0DB68ZX EXCISION OF STOMACH, VIA NATURAL OR ARTIFICIAL OPENING ENDOSCOPIC, DIAGNOSTIC: ICD-10-PCS | Performed by: STUDENT IN AN ORGANIZED HEALTH CARE EDUCATION/TRAINING PROGRAM

## 2025-05-27 PROCEDURE — 2580000003 HC RX 258: Performed by: NURSE PRACTITIONER

## 2025-05-27 PROCEDURE — 7100000000 HC PACU RECOVERY - FIRST 15 MIN: Performed by: STUDENT IN AN ORGANIZED HEALTH CARE EDUCATION/TRAINING PROGRAM

## 2025-05-27 PROCEDURE — 2140000000 HC CCU INTERMEDIATE R&B

## 2025-05-27 PROCEDURE — 6360000002 HC RX W HCPCS: Performed by: STUDENT IN AN ORGANIZED HEALTH CARE EDUCATION/TRAINING PROGRAM

## 2025-05-27 PROCEDURE — 2500000003 HC RX 250 WO HCPCS: Performed by: NURSE PRACTITIONER

## 2025-05-27 PROCEDURE — 6360000002 HC RX W HCPCS: Performed by: INTERNAL MEDICINE

## 2025-05-27 PROCEDURE — 2500000003 HC RX 250 WO HCPCS: Performed by: INTERNAL MEDICINE

## 2025-05-27 PROCEDURE — 6360000002 HC RX W HCPCS

## 2025-05-27 PROCEDURE — 2580000003 HC RX 258: Performed by: INTERNAL MEDICINE

## 2025-05-27 PROCEDURE — 6360000002 HC RX W HCPCS: Performed by: NURSE PRACTITIONER

## 2025-05-27 PROCEDURE — 7100000001 HC PACU RECOVERY - ADDTL 15 MIN: Performed by: STUDENT IN AN ORGANIZED HEALTH CARE EDUCATION/TRAINING PROGRAM

## 2025-05-27 PROCEDURE — 85025 COMPLETE CBC W/AUTO DIFF WBC: CPT

## 2025-05-27 PROCEDURE — 2500000003 HC RX 250 WO HCPCS: Performed by: STUDENT IN AN ORGANIZED HEALTH CARE EDUCATION/TRAINING PROGRAM

## 2025-05-27 RX ORDER — HYDROMORPHONE HYDROCHLORIDE 1 MG/ML
0.5 INJECTION, SOLUTION INTRAMUSCULAR; INTRAVENOUS; SUBCUTANEOUS EVERY 5 MIN PRN
Refills: 0 | Status: CANCELLED | OUTPATIENT
Start: 2025-05-27

## 2025-05-27 RX ORDER — NALOXONE HYDROCHLORIDE 0.4 MG/ML
INJECTION, SOLUTION INTRAMUSCULAR; INTRAVENOUS; SUBCUTANEOUS PRN
Status: CANCELLED | OUTPATIENT
Start: 2025-05-27

## 2025-05-27 RX ORDER — SODIUM CHLORIDE 9 MG/ML
INJECTION, SOLUTION INTRAVENOUS PRN
Status: CANCELLED | OUTPATIENT
Start: 2025-05-27

## 2025-05-27 RX ORDER — LABETALOL HYDROCHLORIDE 5 MG/ML
5 INJECTION, SOLUTION INTRAVENOUS
Status: CANCELLED | OUTPATIENT
Start: 2025-05-27

## 2025-05-27 RX ORDER — HYDROMORPHONE HYDROCHLORIDE 1 MG/ML
0.25 INJECTION, SOLUTION INTRAMUSCULAR; INTRAVENOUS; SUBCUTANEOUS EVERY 5 MIN PRN
Refills: 0 | Status: CANCELLED | OUTPATIENT
Start: 2025-05-27

## 2025-05-27 RX ORDER — PROPOFOL 10 MG/ML
INJECTION, EMULSION INTRAVENOUS
Status: DISCONTINUED | OUTPATIENT
Start: 2025-05-27 | End: 2025-05-27 | Stop reason: SDUPTHER

## 2025-05-27 RX ORDER — SODIUM CHLORIDE, SODIUM LACTATE, POTASSIUM CHLORIDE, CALCIUM CHLORIDE 600; 310; 30; 20 MG/100ML; MG/100ML; MG/100ML; MG/100ML
INJECTION, SOLUTION INTRAVENOUS CONTINUOUS
Status: DISCONTINUED | OUTPATIENT
Start: 2025-05-27 | End: 2025-05-28

## 2025-05-27 RX ORDER — IPRATROPIUM BROMIDE AND ALBUTEROL SULFATE 2.5; .5 MG/3ML; MG/3ML
1 SOLUTION RESPIRATORY (INHALATION)
Status: CANCELLED | OUTPATIENT
Start: 2025-05-27

## 2025-05-27 RX ORDER — PROCHLORPERAZINE EDISYLATE 5 MG/ML
5 INJECTION INTRAMUSCULAR; INTRAVENOUS
Status: CANCELLED | OUTPATIENT
Start: 2025-05-27

## 2025-05-27 RX ORDER — ONDANSETRON 2 MG/ML
4 INJECTION INTRAMUSCULAR; INTRAVENOUS
Status: CANCELLED | OUTPATIENT
Start: 2025-05-27

## 2025-05-27 RX ORDER — LIDOCAINE HYDROCHLORIDE 20 MG/ML
INJECTION, SOLUTION INTRAVENOUS
Status: DISCONTINUED | OUTPATIENT
Start: 2025-05-27 | End: 2025-05-27 | Stop reason: SDUPTHER

## 2025-05-27 RX ORDER — DIPHENHYDRAMINE HYDROCHLORIDE 50 MG/ML
12.5 INJECTION, SOLUTION INTRAMUSCULAR; INTRAVENOUS
Status: CANCELLED | OUTPATIENT
Start: 2025-05-27

## 2025-05-27 RX ORDER — SODIUM CHLORIDE 0.9 % (FLUSH) 0.9 %
5-40 SYRINGE (ML) INJECTION PRN
Status: CANCELLED | OUTPATIENT
Start: 2025-05-27

## 2025-05-27 RX ORDER — SODIUM CHLORIDE 0.9 % (FLUSH) 0.9 %
5-40 SYRINGE (ML) INJECTION EVERY 12 HOURS SCHEDULED
Status: CANCELLED | OUTPATIENT
Start: 2025-05-27

## 2025-05-27 RX ORDER — SODIUM CHLORIDE 9 MG/ML
INJECTION, SOLUTION INTRAVENOUS
Status: DISCONTINUED | OUTPATIENT
Start: 2025-05-27 | End: 2025-05-27 | Stop reason: SDUPTHER

## 2025-05-27 RX ADMIN — POTASSIUM CHLORIDE 40 MEQ: 1500 TABLET, EXTENDED RELEASE ORAL at 09:57

## 2025-05-27 RX ADMIN — SODIUM CHLORIDE, PRESERVATIVE FREE 10 ML: 5 INJECTION INTRAVENOUS at 09:40

## 2025-05-27 RX ADMIN — SODIUM CHLORIDE: 9 INJECTION, SOLUTION INTRAVENOUS at 14:28

## 2025-05-27 RX ADMIN — FOLIC ACID 1 MG: 5 INJECTION, SOLUTION INTRAMUSCULAR; INTRAVENOUS; SUBCUTANEOUS at 12:21

## 2025-05-27 RX ADMIN — SODIUM CHLORIDE, PRESERVATIVE FREE 10 ML: 5 INJECTION INTRAVENOUS at 09:42

## 2025-05-27 RX ADMIN — THIAMINE HYDROCHLORIDE 100 MG: 100 INJECTION, SOLUTION INTRAMUSCULAR; INTRAVENOUS at 09:41

## 2025-05-27 RX ADMIN — CHOLESTYRAMINE 4 G: 4 POWDER, FOR SUSPENSION ORAL at 09:42

## 2025-05-27 RX ADMIN — HYDROMORPHONE HYDROCHLORIDE 0.5 MG: 1 INJECTION, SOLUTION INTRAMUSCULAR; INTRAVENOUS; SUBCUTANEOUS at 18:25

## 2025-05-27 RX ADMIN — WATER 1000 MG: 1 INJECTION INTRAMUSCULAR; INTRAVENOUS; SUBCUTANEOUS at 09:40

## 2025-05-27 RX ADMIN — SODIUM CHLORIDE: 0.9 INJECTION, SOLUTION INTRAVENOUS at 03:42

## 2025-05-27 RX ADMIN — HYDROMORPHONE HYDROCHLORIDE 0.5 MG: 1 INJECTION, SOLUTION INTRAMUSCULAR; INTRAVENOUS; SUBCUTANEOUS at 09:50

## 2025-05-27 RX ADMIN — SODIUM CHLORIDE, PRESERVATIVE FREE 10 ML: 5 INJECTION INTRAVENOUS at 22:12

## 2025-05-27 RX ADMIN — PROPOFOL 70 MG: 10 INJECTION, EMULSION INTRAVENOUS at 14:36

## 2025-05-27 RX ADMIN — SODIUM BICARBONATE 1300 MG: 650 TABLET ORAL at 09:41

## 2025-05-27 RX ADMIN — SODIUM CHLORIDE, PRESERVATIVE FREE 40 MG: 5 INJECTION INTRAVENOUS at 16:58

## 2025-05-27 RX ADMIN — SODIUM CHLORIDE, PRESERVATIVE FREE 40 MG: 5 INJECTION INTRAVENOUS at 04:41

## 2025-05-27 RX ADMIN — SODIUM CHLORIDE, SODIUM LACTATE, POTASSIUM CHLORIDE, AND CALCIUM CHLORIDE: .6; .31; .03; .02 INJECTION, SOLUTION INTRAVENOUS at 18:21

## 2025-05-27 RX ADMIN — LIDOCAINE HYDROCHLORIDE 50 MG: 20 INJECTION, SOLUTION INTRAVENOUS at 14:36

## 2025-05-27 RX ADMIN — SODIUM BICARBONATE 1300 MG: 650 TABLET ORAL at 22:12

## 2025-05-27 ASSESSMENT — PAIN - FUNCTIONAL ASSESSMENT: PAIN_FUNCTIONAL_ASSESSMENT: NONE - DENIES PAIN

## 2025-05-27 ASSESSMENT — LIFESTYLE VARIABLES: SMOKING_STATUS: 1

## 2025-05-27 ASSESSMENT — PAIN DESCRIPTION - LOCATION
LOCATION: ABDOMEN
LOCATION: ABDOMEN

## 2025-05-27 ASSESSMENT — PAIN DESCRIPTION - ORIENTATION
ORIENTATION: RIGHT
ORIENTATION: RIGHT

## 2025-05-27 ASSESSMENT — PAIN DESCRIPTION - DESCRIPTORS
DESCRIPTORS: TENDER;DISCOMFORT;ACHING
DESCRIPTORS: CRAMPING;ACHING;SORE

## 2025-05-27 ASSESSMENT — PAIN SCALES - GENERAL
PAINLEVEL_OUTOF10: 6
PAINLEVEL_OUTOF10: 0
PAINLEVEL_OUTOF10: 5
PAINLEVEL_OUTOF10: 7
PAINLEVEL_OUTOF10: 0
PAINLEVEL_OUTOF10: 0
PAINLEVEL_OUTOF10: 5

## 2025-05-27 ASSESSMENT — PAIN DESCRIPTION - PAIN TYPE: TYPE: ACUTE PAIN

## 2025-05-27 ASSESSMENT — PAIN DESCRIPTION - FREQUENCY: FREQUENCY: CONTINUOUS

## 2025-05-27 NOTE — PLAN OF CARE
Problem: Discharge Planning  Goal: Discharge to home or other facility with appropriate resources  5/26/2025 2119 by Lorraine Negrete, RN  Outcome: Progressing     Problem: Pain  Goal: Verbalizes/displays adequate comfort level or baseline comfort level  5/26/2025 2119 by Lorraine Negrete, RN  Outcome: Progressing     Problem: ABCDS Injury Assessment  Goal: Absence of physical injury  5/26/2025 2119 by Lorraine Negrete, RN  Outcome: Progressing     Problem: Safety - Adult  Goal: Free from fall injury  5/26/2025 2119 by Lorraine Negrete, RN  Outcome: Progressing

## 2025-05-27 NOTE — BRIEF OP NOTE
Brief Postoperative Note      Patient: Jaylene Kapoor  YOB: 1980  MRN: [id number]    Date of Procedure: 5/27/2025    Pre-Op Diagnosis Codes:      * Melena [K92.1]    Post-Op Diagnosis: Portal Hypertensive Gastropathy        Procedure(s):  ESOPHAGOGASTRODUODENOSCOPY BIOPSY    Surgeon(s):  Isai Perez MD    Assistant:  * No surgical staff found *    Anesthesia: Monitor Anesthesia Care    Estimated Blood Loss (mL): less than 50     Complications: None    Specimens:   ID Type Source Tests Collected by Time Destination   A : EGD - Antral BX - R/O H Pylori Gastric Gastric SURGICAL PATHOLOGY Isai Perez MD 5/27/2025 1442        Implants:  * No implants in log *      Drains:   Negative Pressure Wound Therapy Foot Left (Active)       Findings:    Possible Grade I varices. No high risk features.  Normal Z line.  Moderate/Severe portal hypertensive gastropathy.  Normal antrum. H pylori biopsies obtained.  Normal duodenum.     Electronically signed by Isai Perez MD on 5/27/2025 at 2:47 PM

## 2025-05-27 NOTE — ANESTHESIA POSTPROCEDURE EVALUATION
Department of Anesthesiology  Postprocedure Note    Patient: Jaylene Kapoor  MRN: [id number]  YOB: 1980  Date of evaluation: 5/27/2025    Procedure Summary       Date: 05/27/25 Room / Location: [place] [address] / [facility]    Anesthesia Start: 1428 Anesthesia Stop: 1449    Procedure: ESOPHAGOGASTRODUODENOSCOPY BIOPSY Diagnosis:       Melena      (Melena [K92.1])    Surgeons: Isai Perez MD Responsible Provider: Abel Abdul DO    Anesthesia Type: MAC ASA Status: 3            Anesthesia Type: MAC    Miguel Phase I: Miguel Score: 10    Miguel Phase II:      Anesthesia Post Evaluation    Patient location during evaluation: PACU  Patient participation: complete - patient participated  Level of consciousness: awake  Cardiovascular status: blood pressure returned to baseline  Respiratory status: acceptable  Hydration status: euvolemic  Multimodal analgesia pain management approach  Pain management: adequate        No notable events documented.

## 2025-05-27 NOTE — PLAN OF CARE
Patient waiting to go to scope to find out cause of pain  Problem: Discharge Planning  Goal: Discharge to home or other facility with appropriate resources  Outcome: Not Progressing

## 2025-05-27 NOTE — ANESTHESIA PRE PROCEDURE
Department of Anesthesiology  Preprocedure Note       Name:  Jaylene Kapoor   Age:  45 y.o.  :  1980                                          MRN:  [id number]         Date:  2025      Surgeon: Surgeon(s):  Isai Perez MD    Procedure: Procedure(s):  ESOPHAGOGASTRODUODENOSCOPY    Medications prior to admission:   Prior to Admission medications    Medication Sig Start Date End Date Taking? Authorizing Provider   acetaminophen (TYLENOL) 325 MG tablet Take 2 tablets by mouth every 6 hours as needed for Pain   Yes Provider, MD Daljit   lactulose (CHRONULAC) 10 GM/15ML solution Take 15 mLs by mouth 2 times daily   Yes Provider, MD Daljit   ibuprofen (ADVIL;MOTRIN) 200 MG tablet Take 1 tablet by mouth every 6 hours as needed for Pain   Yes Provider, MD Daljit   furosemide (LASIX) 20 MG tablet Take 1 tablet by mouth daily 24  Yes HrSantos tapia, DO   spironolactone (ALDACTONE) 25 MG tablet Take 1 tablet by mouth daily 24  Yes HrSantos tapia,    colestipol (COLESTID) 1 g tablet Take 1 tablet by mouth 2 times daily 3/20/24  Yes ProviderDaljit MD   pantoprazole (PROTONIX) 40 MG tablet Take 1 tablet by mouth 2 times daily (before meals) 24  Yes Keena Nuñez MD       Current medications:    Current Facility-Administered Medications   Medication Dose Route Frequency Provider Last Rate Last Admin    cefTRIAXone (ROCEPHIN) 1,000 mg in sterile water 10 mL IV syringe  1,000 mg IntraVENous Q24H Severo Duncan MD   1,000 mg at 25 0940    HYDROmorphone (DILAUDID) injection 0.5 mg  0.5 mg IntraVENous Q4H PRN Bean Martínez MD   0.5 mg at 25 0950    0.9 % sodium chloride infusion   IntraVENous Continuous Franki Meadows  mL/hr at 25 0342 New Bag at 25 0342    sodium bicarbonate tablet 1,300 mg  1,300 mg Oral BID Franki Meadows MD   1,300 mg at 25 0941    cholestyramine light packet 4 g  4 g Oral Daily Jena George APRN - CNP   4 g at

## 2025-05-27 NOTE — PATIENT CARE CONFERENCE
P Quality Flow/Interdisciplinary Rounds Progress Note        Quality Flow Rounds held on May 27, 2025    Disciplines Attending:  Bedside Nurse, , , and Nursing Unit Leadership    Jaylene Kapoor was admitted on 5/23/2025  4:47 PM    Anticipated Discharge Date:       Disposition:    Julio Score:  Julio Scale Score: 22    BS RISK OF UNPLANNED READMISSION 2.0             10.4 Total Score        Discussed patient goal for the day, patient clinical progression, and barriers to discharge.  The following Goal(s) of the Day/Commitment(s) have been identified:  Labs - Report Results      Candida Patterson RN  May 27, 2025

## 2025-05-28 ENCOUNTER — APPOINTMENT (OUTPATIENT)
Dept: ULTRASOUND IMAGING | Age: 45
DRG: 283 | End: 2025-05-28
Payer: COMMERCIAL

## 2025-05-28 LAB
ALBUMIN SERPL-MCNC: 3 G/DL (ref 3.5–5.2)
ALP SERPL-CCNC: 122 U/L (ref 35–104)
ALT SERPL-CCNC: 8 U/L (ref 0–35)
ANION GAP SERPL CALCULATED.3IONS-SCNC: 11 MMOL/L (ref 7–16)
AST SERPL-CCNC: 21 U/L (ref 0–35)
BASOPHILS # BLD: 0.04 K/UL (ref 0–0.2)
BASOPHILS NFR BLD: 1 % (ref 0–2)
BILIRUB SERPL-MCNC: 1 MG/DL (ref 0–1.2)
BUN SERPL-MCNC: 10 MG/DL (ref 6–20)
CALCIUM SERPL-MCNC: 8.1 MG/DL (ref 8.6–10)
CHLORIDE SERPL-SCNC: 115 MMOL/L (ref 98–107)
CO2 SERPL-SCNC: 16 MMOL/L (ref 22–29)
CREAT SERPL-MCNC: 1.3 MG/DL (ref 0.5–1)
EOSINOPHIL # BLD: 0.21 K/UL (ref 0.05–0.5)
EOSINOPHILS RELATIVE PERCENT: 4 % (ref 0–6)
ERYTHROCYTE [DISTWIDTH] IN BLOOD BY AUTOMATED COUNT: 15.8 % (ref 11.5–15)
GFR, ESTIMATED: 51 ML/MIN/1.73M2
GLUCOSE SERPL-MCNC: 87 MG/DL (ref 74–99)
HCT VFR BLD AUTO: 28.8 % (ref 34–48)
HGB BLD-MCNC: 9.6 G/DL (ref 11.5–15.5)
IMM GRANULOCYTES # BLD AUTO: 0.03 K/UL (ref 0–0.58)
IMM GRANULOCYTES NFR BLD: 1 % (ref 0–5)
LYMPHOCYTES NFR BLD: 1.34 K/UL (ref 1.5–4)
LYMPHOCYTES RELATIVE PERCENT: 26 % (ref 20–42)
MCH RBC QN AUTO: 29.1 PG (ref 26–35)
MCHC RBC AUTO-ENTMCNC: 33.3 G/DL (ref 32–34.5)
MCV RBC AUTO: 87.3 FL (ref 80–99.9)
MICROORGANISM SPEC CULT: NORMAL
MONOCYTES NFR BLD: 0.53 K/UL (ref 0.1–0.95)
MONOCYTES NFR BLD: 10 % (ref 2–12)
NEUTROPHILS NFR BLD: 58 % (ref 43–80)
NEUTS SEG NFR BLD: 3.03 K/UL (ref 1.8–7.3)
PLATELET # BLD AUTO: 104 K/UL (ref 130–450)
PMV BLD AUTO: 11 FL (ref 7–12)
POTASSIUM SERPL-SCNC: 4 MMOL/L (ref 3.5–5.1)
PROT SERPL-MCNC: 5.5 G/DL (ref 6.4–8.3)
RBC # BLD AUTO: 3.3 M/UL (ref 3.5–5.5)
SERVICE CMNT-IMP: NORMAL
SODIUM SERPL-SCNC: 141 MMOL/L (ref 136–145)
SPECIMEN DESCRIPTION: NORMAL
WBC OTHER # BLD: 5.2 K/UL (ref 4.5–11.5)

## 2025-05-28 PROCEDURE — 2580000003 HC RX 258: Performed by: NURSE PRACTITIONER

## 2025-05-28 PROCEDURE — 2500000003 HC RX 250 WO HCPCS: Performed by: NURSE PRACTITIONER

## 2025-05-28 PROCEDURE — 6370000000 HC RX 637 (ALT 250 FOR IP): Performed by: INTERNAL MEDICINE

## 2025-05-28 PROCEDURE — 2140000000 HC CCU INTERMEDIATE R&B

## 2025-05-28 PROCEDURE — 6360000002 HC RX W HCPCS: Performed by: STUDENT IN AN ORGANIZED HEALTH CARE EDUCATION/TRAINING PROGRAM

## 2025-05-28 PROCEDURE — 6370000000 HC RX 637 (ALT 250 FOR IP): Performed by: NURSE PRACTITIONER

## 2025-05-28 PROCEDURE — 36415 COLL VENOUS BLD VENIPUNCTURE: CPT

## 2025-05-28 PROCEDURE — 97161 PT EVAL LOW COMPLEX 20 MIN: CPT

## 2025-05-28 PROCEDURE — 2580000003 HC RX 258

## 2025-05-28 PROCEDURE — 85025 COMPLETE CBC W/AUTO DIFF WBC: CPT

## 2025-05-28 PROCEDURE — 2500000003 HC RX 250 WO HCPCS: Performed by: INTERNAL MEDICINE

## 2025-05-28 PROCEDURE — 97530 THERAPEUTIC ACTIVITIES: CPT

## 2025-05-28 PROCEDURE — 76705 ECHO EXAM OF ABDOMEN: CPT

## 2025-05-28 PROCEDURE — 80053 COMPREHEN METABOLIC PANEL: CPT

## 2025-05-28 PROCEDURE — 6360000002 HC RX W HCPCS: Performed by: NURSE PRACTITIONER

## 2025-05-28 PROCEDURE — 2500000003 HC RX 250 WO HCPCS: Performed by: STUDENT IN AN ORGANIZED HEALTH CARE EDUCATION/TRAINING PROGRAM

## 2025-05-28 PROCEDURE — 97165 OT EVAL LOW COMPLEX 30 MIN: CPT

## 2025-05-28 PROCEDURE — 6360000002 HC RX W HCPCS: Performed by: INTERNAL MEDICINE

## 2025-05-28 RX ORDER — LEVOFLOXACIN 750 MG/1
750 TABLET, FILM COATED ORAL
Status: DISCONTINUED | OUTPATIENT
Start: 2025-05-28 | End: 2025-05-29 | Stop reason: HOSPADM

## 2025-05-28 RX ORDER — CARVEDILOL 6.25 MG/1
6.25 TABLET ORAL 2 TIMES DAILY WITH MEALS
Status: DISCONTINUED | OUTPATIENT
Start: 2025-05-28 | End: 2025-05-29 | Stop reason: HOSPADM

## 2025-05-28 RX ADMIN — LEVOFLOXACIN 750 MG: 750 TABLET, FILM COATED ORAL at 17:55

## 2025-05-28 RX ADMIN — ACETAMINOPHEN 500 MG: 500 TABLET ORAL at 11:13

## 2025-05-28 RX ADMIN — LACTULOSE 10 G: 20 SOLUTION ORAL at 19:33

## 2025-05-28 RX ADMIN — ACETAMINOPHEN 500 MG: 500 TABLET ORAL at 03:55

## 2025-05-28 RX ADMIN — SODIUM CHLORIDE, PRESERVATIVE FREE 10 ML: 5 INJECTION INTRAVENOUS at 09:17

## 2025-05-28 RX ADMIN — SODIUM CHLORIDE, PRESERVATIVE FREE 40 MG: 5 INJECTION INTRAVENOUS at 02:22

## 2025-05-28 RX ADMIN — SODIUM CHLORIDE, SODIUM LACTATE, POTASSIUM CHLORIDE, AND CALCIUM CHLORIDE: .6; .31; .03; .02 INJECTION, SOLUTION INTRAVENOUS at 12:50

## 2025-05-28 RX ADMIN — ACETAMINOPHEN 500 MG: 500 TABLET ORAL at 17:55

## 2025-05-28 RX ADMIN — SODIUM CHLORIDE, PRESERVATIVE FREE 10 ML: 5 INJECTION INTRAVENOUS at 19:33

## 2025-05-28 RX ADMIN — CARVEDILOL 6.25 MG: 6.25 TABLET, FILM COATED ORAL at 17:55

## 2025-05-28 RX ADMIN — SODIUM CHLORIDE, PRESERVATIVE FREE 10 ML: 5 INJECTION INTRAVENOUS at 11:14

## 2025-05-28 RX ADMIN — FOLIC ACID 1 MG: 5 INJECTION, SOLUTION INTRAMUSCULAR; INTRAVENOUS; SUBCUTANEOUS at 12:51

## 2025-05-28 RX ADMIN — SODIUM BICARBONATE 1300 MG: 650 TABLET ORAL at 09:18

## 2025-05-28 RX ADMIN — SODIUM CHLORIDE, SODIUM LACTATE, POTASSIUM CHLORIDE, AND CALCIUM CHLORIDE: .6; .31; .03; .02 INJECTION, SOLUTION INTRAVENOUS at 02:24

## 2025-05-28 RX ADMIN — SODIUM BICARBONATE 1300 MG: 650 TABLET ORAL at 19:32

## 2025-05-28 RX ADMIN — WATER 1000 MG: 1 INJECTION INTRAMUSCULAR; INTRAVENOUS; SUBCUTANEOUS at 09:17

## 2025-05-28 RX ADMIN — THIAMINE HYDROCHLORIDE 100 MG: 100 INJECTION, SOLUTION INTRAMUSCULAR; INTRAVENOUS at 09:17

## 2025-05-28 RX ADMIN — SODIUM CHLORIDE, PRESERVATIVE FREE 40 MG: 5 INJECTION INTRAVENOUS at 17:56

## 2025-05-28 ASSESSMENT — PAIN SCALES - GENERAL
PAINLEVEL_OUTOF10: 5
PAINLEVEL_OUTOF10: 0
PAINLEVEL_OUTOF10: 5
PAINLEVEL_OUTOF10: 6

## 2025-05-28 ASSESSMENT — PAIN DESCRIPTION - ORIENTATION
ORIENTATION: LOWER
ORIENTATION: RIGHT
ORIENTATION: LOWER

## 2025-05-28 ASSESSMENT — PAIN DESCRIPTION - DESCRIPTORS
DESCRIPTORS: ACHING;DULL;SORE
DESCRIPTORS: ACHING;CRAMPING;SORE
DESCRIPTORS: ACHING;TENDER;SORE

## 2025-05-28 ASSESSMENT — PAIN - FUNCTIONAL ASSESSMENT
PAIN_FUNCTIONAL_ASSESSMENT: ACTIVITIES ARE NOT PREVENTED

## 2025-05-28 ASSESSMENT — PAIN DESCRIPTION - LOCATION
LOCATION: ABDOMEN

## 2025-05-28 NOTE — PLAN OF CARE
Problem: Discharge Planning  Goal: Discharge to home or other facility with appropriate resources  Outcome: Progressing  Flowsheets (Taken 5/28/2025 2438)  Discharge to home or other facility with appropriate resources: Identify barriers to discharge with patient and caregiver     Problem: Pain  Goal: Verbalizes/displays adequate comfort level or baseline comfort level  Outcome: Progressing  Flowsheets (Taken 5/28/2025 4579)  Verbalizes/displays adequate comfort level or baseline comfort level: Encourage patient to monitor pain and request assistance     Problem: ABCDS Injury Assessment  Goal: Absence of physical injury  Outcome: Progressing     Problem: Safety - Adult  Goal: Free from fall injury  Outcome: Progressing

## 2025-05-28 NOTE — PATIENT CARE CONFERENCE
P Quality Flow/Interdisciplinary Rounds Progress Note        Quality Flow Rounds held on May 28, 2025    Disciplines Attending:  Bedside Nurse, , , and Nursing Unit Leadership    Jaylene Kapoor was admitted on 5/23/2025  4:47 PM    Anticipated Discharge Date:       Disposition:    Julio Score:  Julio Scale Score: 22    BS RISK OF UNPLANNED READMISSION 2.0             10.1 Total Score        Discussed patient goal for the day, patient clinical progression, and barriers to discharge.  The following Goal(s) of the Day/Commitment(s) have been identified:  report labs/diagnostics      Phylicia Kim RN  May 28, 2025

## 2025-05-28 NOTE — PLAN OF CARE
Problem: Discharge Planning  Goal: Discharge to home or other facility with appropriate resources  5/27/2025 2027 by Nazanin Garcia, RN  Outcome: Progressing     Problem: Pain  Goal: Verbalizes/displays adequate comfort level or baseline comfort level  5/27/2025 2027 by Nazanin Garcia, RN  Outcome: Progressing     Problem: Discharge Planning  Goal: Discharge to home or other facility with appropriate resources  5/27/2025 2027 by Nazanin Garcia, RN  Outcome: Progressing  5/27/2025 1330 by Candida Thakkar, RN  Outcome: Not Progressing

## 2025-05-29 ENCOUNTER — APPOINTMENT (OUTPATIENT)
Dept: INTERVENTIONAL RADIOLOGY/VASCULAR | Age: 45
DRG: 283 | End: 2025-05-29
Payer: COMMERCIAL

## 2025-05-29 VITALS
HEART RATE: 60 BPM | OXYGEN SATURATION: 98 % | BODY MASS INDEX: 28.92 KG/M2 | TEMPERATURE: 97.9 F | SYSTOLIC BLOOD PRESSURE: 139 MMHG | WEIGHT: 147.3 LBS | RESPIRATION RATE: 18 BRPM | HEIGHT: 60 IN | DIASTOLIC BLOOD PRESSURE: 74 MMHG

## 2025-05-29 LAB
ALBUMIN FLD-MCNC: 1.8 G/DL
ALBUMIN SERPL-MCNC: 3 G/DL (ref 3.5–5.2)
ALP SERPL-CCNC: 143 U/L (ref 35–104)
ALT SERPL-CCNC: 7 U/L (ref 0–35)
ANION GAP SERPL CALCULATED.3IONS-SCNC: 10 MMOL/L (ref 7–16)
APPEARANCE FLD: CLEAR
AST SERPL-CCNC: 20 U/L (ref 0–35)
BASOPHILS # BLD: 0.02 K/UL (ref 0–0.2)
BASOPHILS NFR BLD: 0 % (ref 0–2)
BILIRUB SERPL-MCNC: 0.8 MG/DL (ref 0–1.2)
BODY FLD TYPE: NORMAL
BUN SERPL-MCNC: 10 MG/DL (ref 6–20)
CALCIUM SERPL-MCNC: 7.7 MG/DL (ref 8.6–10)
CHLORIDE SERPL-SCNC: 114 MMOL/L (ref 98–107)
CLOT CHECK: NORMAL
CO2 SERPL-SCNC: 17 MMOL/L (ref 22–29)
COLOR FLD: YELLOW
CREAT SERPL-MCNC: 1.6 MG/DL (ref 0.5–1)
EOSINOPHIL # BLD: 0.19 K/UL (ref 0.05–0.5)
EOSINOPHILS RELATIVE PERCENT: 4 % (ref 0–6)
ERYTHROCYTE [DISTWIDTH] IN BLOOD BY AUTOMATED COUNT: 15.5 % (ref 11.5–15)
GFR, ESTIMATED: 39 ML/MIN/1.73M2
GLUCOSE SERPL-MCNC: 128 MG/DL (ref 74–99)
HCT VFR BLD AUTO: 29.2 % (ref 34–48)
HGB BLD-MCNC: 9.7 G/DL (ref 11.5–15.5)
IMM GRANULOCYTES # BLD AUTO: 0.03 K/UL (ref 0–0.58)
IMM GRANULOCYTES NFR BLD: 1 % (ref 0–5)
INR PPP: 1.5
LABORATORY REPORT: NORMAL
LYMPHOCYTES NFR BLD: 1.1 K/UL (ref 1.5–4)
LYMPHOCYTES RELATIVE PERCENT: 22 % (ref 20–42)
MCH RBC QN AUTO: 29 PG (ref 26–35)
MCHC RBC AUTO-ENTMCNC: 33.2 G/DL (ref 32–34.5)
MCV RBC AUTO: 87.4 FL (ref 80–99.9)
MONOCYTES NFR BLD: 0.48 K/UL (ref 0.1–0.95)
MONOCYTES NFR BLD: 10 % (ref 2–12)
MONOCYTES NFR FLD: 91 %
NEUTROPHILS NFR BLD: 64 % (ref 43–80)
NEUTROPHILS NFR FLD: 9 %
NEUTS SEG NFR BLD: 3.23 K/UL (ref 1.8–7.3)
PETH INTERPRETATION: NORMAL
PLATELET # BLD AUTO: 122 K/UL (ref 130–450)
PLPETH BLD-MCNC: <10 NG/ML
PMV BLD AUTO: 10.4 FL (ref 7–12)
POPETH BLD-MCNC: <10 NG/ML
POTASSIUM SERPL-SCNC: 3.7 MMOL/L (ref 3.5–5.1)
PROT FLD-MCNC: 2.9 G/DL
PROT SERPL-MCNC: 5.4 G/DL (ref 6.4–8.3)
PROTHROMBIN TIME: 15.9 SEC (ref 9.3–12.4)
RBC # BLD AUTO: 3.34 M/UL (ref 3.5–5.5)
RBC # FLD: 4000 CELLS/UL
SODIUM SERPL-SCNC: 141 MMOL/L (ref 136–145)
SPECIMEN TYPE: NORMAL
SPECIMEN TYPE: NORMAL
WBC # FLD: 302 CELLS/UL
WBC OTHER # BLD: 5.1 K/UL (ref 4.5–11.5)

## 2025-05-29 PROCEDURE — 85025 COMPLETE CBC W/AUTO DIFF WBC: CPT

## 2025-05-29 PROCEDURE — 0W9G3ZZ DRAINAGE OF PERITONEAL CAVITY, PERCUTANEOUS APPROACH: ICD-10-PCS | Performed by: STUDENT IN AN ORGANIZED HEALTH CARE EDUCATION/TRAINING PROGRAM

## 2025-05-29 PROCEDURE — 6360000002 HC RX W HCPCS: Performed by: STUDENT IN AN ORGANIZED HEALTH CARE EDUCATION/TRAINING PROGRAM

## 2025-05-29 PROCEDURE — 6360000002 HC RX W HCPCS: Performed by: PHYSICIAN ASSISTANT

## 2025-05-29 PROCEDURE — 36415 COLL VENOUS BLD VENIPUNCTURE: CPT

## 2025-05-29 PROCEDURE — 6370000000 HC RX 637 (ALT 250 FOR IP): Performed by: INTERNAL MEDICINE

## 2025-05-29 PROCEDURE — 2500000003 HC RX 250 WO HCPCS: Performed by: NURSE PRACTITIONER

## 2025-05-29 PROCEDURE — C1729 CATH, DRAINAGE: HCPCS

## 2025-05-29 PROCEDURE — 49083 ABD PARACENTESIS W/IMAGING: CPT

## 2025-05-29 PROCEDURE — 84157 ASSAY OF PROTEIN OTHER: CPT

## 2025-05-29 PROCEDURE — 6360000002 HC RX W HCPCS: Performed by: NURSE PRACTITIONER

## 2025-05-29 PROCEDURE — 6370000000 HC RX 637 (ALT 250 FOR IP): Performed by: NURSE PRACTITIONER

## 2025-05-29 PROCEDURE — 89051 BODY FLUID CELL COUNT: CPT

## 2025-05-29 PROCEDURE — 85610 PROTHROMBIN TIME: CPT

## 2025-05-29 PROCEDURE — 2500000003 HC RX 250 WO HCPCS: Performed by: STUDENT IN AN ORGANIZED HEALTH CARE EDUCATION/TRAINING PROGRAM

## 2025-05-29 PROCEDURE — 80053 COMPREHEN METABOLIC PANEL: CPT

## 2025-05-29 PROCEDURE — 6370000000 HC RX 637 (ALT 250 FOR IP): Performed by: STUDENT IN AN ORGANIZED HEALTH CARE EDUCATION/TRAINING PROGRAM

## 2025-05-29 PROCEDURE — 82042 OTHER SOURCE ALBUMIN QUAN EA: CPT

## 2025-05-29 RX ORDER — LEVOFLOXACIN 750 MG/1
750 TABLET, FILM COATED ORAL
Qty: 5 TABLET | Refills: 0 | Status: SHIPPED | OUTPATIENT
Start: 2025-05-30 | End: 2025-06-09

## 2025-05-29 RX ORDER — PANTOPRAZOLE SODIUM 40 MG/1
40 TABLET, DELAYED RELEASE ORAL
Status: DISCONTINUED | OUTPATIENT
Start: 2025-05-29 | End: 2025-05-29 | Stop reason: HOSPADM

## 2025-05-29 RX ORDER — LANOLIN ALCOHOL/MO/W.PET/CERES
100 CREAM (GRAM) TOPICAL DAILY
Status: DISCONTINUED | OUTPATIENT
Start: 2025-05-30 | End: 2025-05-29 | Stop reason: HOSPADM

## 2025-05-29 RX ORDER — FOLIC ACID 1 MG/1
1 TABLET ORAL DAILY
Qty: 30 TABLET | Refills: 3 | Status: SHIPPED | OUTPATIENT
Start: 2025-05-30

## 2025-05-29 RX ORDER — LANOLIN ALCOHOL/MO/W.PET/CERES
100 CREAM (GRAM) TOPICAL DAILY
Qty: 30 TABLET | Refills: 3 | Status: SHIPPED | OUTPATIENT
Start: 2025-05-30

## 2025-05-29 RX ORDER — SODIUM BICARBONATE 650 MG/1
1300 TABLET ORAL 2 TIMES DAILY
Qty: 60 TABLET | Refills: 0 | Status: SHIPPED | OUTPATIENT
Start: 2025-05-29

## 2025-05-29 RX ORDER — FOLIC ACID 1 MG/1
1 TABLET ORAL DAILY
Status: DISCONTINUED | OUTPATIENT
Start: 2025-05-30 | End: 2025-05-29 | Stop reason: HOSPADM

## 2025-05-29 RX ORDER — LIDOCAINE HYDROCHLORIDE 20 MG/ML
INJECTION, SOLUTION INFILTRATION; PERINEURAL PRN
Status: COMPLETED | OUTPATIENT
Start: 2025-05-29 | End: 2025-05-29

## 2025-05-29 RX ORDER — CARVEDILOL 6.25 MG/1
6.25 TABLET ORAL 2 TIMES DAILY WITH MEALS
Qty: 60 TABLET | Refills: 3 | Status: SHIPPED | OUTPATIENT
Start: 2025-05-30

## 2025-05-29 RX ADMIN — SODIUM CHLORIDE, PRESERVATIVE FREE 40 MG: 5 INJECTION INTRAVENOUS at 02:36

## 2025-05-29 RX ADMIN — LIDOCAINE HYDROCHLORIDE 10 ML: 20 INJECTION, SOLUTION INFILTRATION; PERINEURAL at 16:35

## 2025-05-29 RX ADMIN — CARVEDILOL 6.25 MG: 6.25 TABLET, FILM COATED ORAL at 17:31

## 2025-05-29 RX ADMIN — ACETAMINOPHEN 500 MG: 500 TABLET ORAL at 12:22

## 2025-05-29 RX ADMIN — SODIUM CHLORIDE, PRESERVATIVE FREE 10 ML: 5 INJECTION INTRAVENOUS at 08:58

## 2025-05-29 RX ADMIN — FOLIC ACID 1 MG: 5 INJECTION, SOLUTION INTRAMUSCULAR; INTRAVENOUS; SUBCUTANEOUS at 08:59

## 2025-05-29 RX ADMIN — ACETAMINOPHEN 500 MG: 500 TABLET ORAL at 02:38

## 2025-05-29 RX ADMIN — CARVEDILOL 6.25 MG: 6.25 TABLET, FILM COATED ORAL at 11:22

## 2025-05-29 RX ADMIN — CHOLESTYRAMINE 4 G: 4 POWDER, FOR SUSPENSION ORAL at 08:59

## 2025-05-29 RX ADMIN — SODIUM BICARBONATE 1300 MG: 650 TABLET ORAL at 08:59

## 2025-05-29 RX ADMIN — SODIUM CHLORIDE, PRESERVATIVE FREE 10 ML: 5 INJECTION INTRAVENOUS at 10:00

## 2025-05-29 RX ADMIN — THIAMINE HYDROCHLORIDE 100 MG: 100 INJECTION, SOLUTION INTRAMUSCULAR; INTRAVENOUS at 08:59

## 2025-05-29 ASSESSMENT — PAIN DESCRIPTION - LOCATION
LOCATION: ABDOMEN

## 2025-05-29 ASSESSMENT — PAIN DESCRIPTION - ORIENTATION
ORIENTATION: RIGHT
ORIENTATION: MID

## 2025-05-29 ASSESSMENT — PAIN SCALES - GENERAL
PAINLEVEL_OUTOF10: 3
PAINLEVEL_OUTOF10: 3
PAINLEVEL_OUTOF10: 0
PAINLEVEL_OUTOF10: 5
PAINLEVEL_OUTOF10: 2
PAINLEVEL_OUTOF10: 3

## 2025-05-29 ASSESSMENT — PAIN DESCRIPTION - DESCRIPTORS
DESCRIPTORS: DISCOMFORT;ACHING;TENDER
DESCRIPTORS: ACHING;DISCOMFORT;SORE

## 2025-05-29 NOTE — PROCEDURES
Procedure Note  ______________________________________________________________      IR U/S GUIDED PARACENTESIS  [facility] 6SE PCCU 1    Patient Name: Jaylene Kapoor   YOB: 1980  Medical Record Number: [id number]  Date of Procedure: 5/29/25  Room/Bed: [address] [place], [place] [address]    Pre-operative Diagnosis: Ascites    Post-operative Diagnosis: Ascites    Consent: Informed consent was obtained from the patient prior to the procedure. The details of the procedure, as well is its risks, benefits, and alternatives, were explained.      Anesthesia: Local    Performed by: LILIAN Barton under on-site supervision by Barrera Eugene MD.    Estimated blood loss: Minimal    Complications: None    Specimens Obtained: Ascites Fluid    Procedure: Routine scanning of all four abdominal quadrants was performed using real-time ultrasound and revealed sufficient amount of ascites fluid present.  Decision was made to proceed with procedure.  After obtaining consent, a \"Time-Out\" was called to verify the correct patient, procedure/location, allergies, relevant medications held for procedure and that all equipment is functioning and available. The patient was then placed in the supine position with the head of the bed slightly elevated and the appropriate landmarks were identified. The skin over the puncture site in the right upper quadrant region was prepped with betadine and draped in a sterile fashion. Local anesthesia was obtained by infiltration using 2% Lidocaine without epinephrine administered subcutaneously. A 5 Albanian needle sheath catheter was then advanced into the abdominal cavity. Fluid return was clear straw colored. The catheter was then withdrawn and a sterile dressing was placed over the site. The patient tolerated the procedure well. See radiology dictation in PACs for image review and additional procedural information. See radiology dictation in PACs for image review and additional procedural information.     A total volume of

## 2025-05-29 NOTE — PLAN OF CARE
Patient NPO for possible paracentesis no order or time yet but was mentioned to patient.    Patient states pain has less from when she was first admitted.

## 2025-05-29 NOTE — DISCHARGE SUMMARY
provided for review. Automated exposure control, iterative reconstruction, and/or weight based adjustment of the mA/kV was utilized to reduce the radiation dose to as low as reasonably achievable. COMPARISON: February 26, 2024. HISTORY: ORDERING SYSTEM PROVIDED HISTORY: abdominal pain TECHNOLOGIST PROVIDED HISTORY: Reason for exam:->abdominal pain Additional Contrast?->None Decision Support Exception - unselect if not a suspected or confirmed emergency medical condition->Emergency Medical Condition (MA) What reading provider will be dictating this exam?->CRC FINDINGS: Lower Chest: No consolidation. Organs: Status post cholecystectomy.  Stigmata of portal hypertension with cirrhotic appearance of the liver.  The kidneys, adrenal glands, and pancreas are unremarkable. GI/Bowel: Scattered diverticula without inflammatory changes.  No focal consolidation abnormal dilation or focal thickening. Pelvis: Status post bilateral tubal ligation. Peritoneum/Retroperitoneum: Mild volume simple appearing ascites, improved from February 2024. Bones/Soft Tissues: No acute osseous abnormality.  Interval improvement of diffuse anasarca.     1. No acute intra-abdominal abnormality. 2. Stigmata of portal hypertension interval improvement of now mild volume ascites.     XR CHEST (2 VW)  Result Date: 5/23/2025  EXAMINATION: TWO XRAY VIEWS OF THE CHEST 5/23/2025 6:46 pm COMPARISON: April 20, 2024. HISTORY: ORDERING SYSTEM PROVIDED HISTORY: Shortness of Breath TECHNOLOGIST PROVIDED HISTORY: Reason for exam:->Shortness of Breath FINDINGS: No pulmonary consolidation.There is no pleural effusion or pneumothorax.The cardiomediastinal silhouette is normal.No acute osseous abnormality.     No evidence of acute cardiopulmonary process.     US ABDOMEN LIMITED  Result Date: 5/1/2025  EXAMINATION: RIGHT UPPER QUADRANT ULTRASOUND 5/1/2025 3:12 pm COMPARISON: None. HISTORY: ORDERING SYSTEM PROVIDED HISTORY: Hepatic cirrhosis, unspecified hepatic

## 2025-05-29 NOTE — PLAN OF CARE
Problem: Discharge Planning  Goal: Discharge to home or other facility with appropriate resources  5/28/2025 2205 by Elvira Prescott, RN  Outcome: Progressing     Problem: Pain  Goal: Verbalizes/displays adequate comfort level or baseline comfort level  5/28/2025 2205 by Elvira Prescott, RN  Outcome: Progressing     Problem: ABCDS Injury Assessment  Goal: Absence of physical injury  5/28/2025 2205 by Elvira Prescott, RN  Outcome: Progressing     Problem: Safety - Adult  Goal: Free from fall injury  5/28/2025 2205 by Elvira Prescott, RN  Outcome: Progressing

## 2025-05-29 NOTE — CARE COORDINATION
Internal Medicine On-call Care Coordination Note    I was called by the ED physician because they recommended admission for this patient and we cover their PCP.  The history as I understand it after discussion with the ED physician is as follows:    Presents with abd pain, ear pain, sore throat  Hematemesis, and tarry stools  Hx alcohol abuse, hasn't drank in a while  MELINDA  Hyponatremia    I placed admission orders.  Including:    General admission orders  Reconciled home meds  IVF  IV protonix  Held anticoagulants  Occult stool  GI consult  Possible nephrology consult if no improvement in labs     Dr. Arreguin, or our coverage will see the patient tomorrow for H&P.    Electronically signed by ROBERTO CARLOS Sung CNP on 5/24/2025 at 2:08 AM       Addendum: reviewed     I agree with the above documentation and treatment plan.     Electronically signed by Franki Arreguin MD on 5/24/2025 at 8:49 AM    I can be reached through [employer].     
Transition of care update: Levaquin po 750mg q 48 hrs. Cr 1.6 and other labs noted. IR US guided paracentesis ordered. Regular GI bland diet. Met with pt in room. Plan remains to home when medically ready. Denies any needs for home at present. Family will transport. We discussed alcohol history. Pt said her last drink was 16 months ago. She did inpatient treatment at [facility]. Offered outpatient alcohol rehab resources and to talk with [facility] and pt declined both. Cm/sw will follow.   
Transition of care update: Levaquin po 750mg q 48 hrs. Regular, GI bland diet. Cr 1.3 and other labs noted from today. US abd- Moderate to severe ascites in the right lower abdominal quadrant. Plan is to return home with her family when medically ready. PT eval am-pac 20/24. OT eval am-pac 22/24. Attempted to meet with pt in room and she was asleep on bed. Cm/sw will follow.   
Transition of care: Admitted with MELINDA. Nephrology, ID and otolaryngology following. Cr 1.3 and other labs noted. IVFs at 100ml/hr, iv rocephin 1000mg q 24 hrs. CIWA.     1320  Met with pt in room. Pt's nurse and transporter (for EGD) was present. Pt said she lives with her , Pierre, and 4 children in a 1 story home. Independent with ADLs. Family provides transportation. No DME. Hx of c with [facility]. Pt does her own left foot wound dressing. Plan is to return home when medically ready. No needs were voiced at present. PCP is Dr ANAYA Lee and pharmacy is [facility] on [address]. Will attempt to discuss alcohol history when pt returns to her room and offer alcohol rehab resources. Cm/sw will follow.     Case Management Assessment  Initial Evaluation    Date/Time of Evaluation: 5/27/2025 2:17 PM  Assessment Completed by: Keyona Abdi RN    If patient is discharged prior to next notation, then this note serves as note for discharge by case management.    Patient Name: Jaylene Kapoor                   YOB: 1980  Diagnosis: Hyperbilirubinemia [E80.6]  Hyponatremia [E87.1]  MELINDA (acute kidney injury) [N17.9]  Hepatic cirrhosis, unspecified hepatic cirrhosis type, unspecified whether ascites present (HCC) [K74.60]                   Date / Time: 5/23/2025  4:47 PM    Patient Admission Status: Inpatient   Readmission Risk (Low < 19, Mod (19-27), High > 27): Readmission Risk Score: 10.4    Current PCP: Logan Lee III, DO  PCP verified by CM? Yes    Chart Reviewed: Yes      History Provided by: Patient  Patient Orientation: Alert and Oriented    Patient Cognition: Alert    Hospitalization in the last 30 days (Readmission):  No    If yes, Readmission Assessment in  Navigator will be completed.    Advance Directives:      Code Status: Full Code     Discharge Planning:    Patient lives with:   family Type of Home:  1 story   Primary Care Giver: Self  Patient Support Systems include:   family 
none

## 2025-05-30 NOTE — PROGRESS NOTES
[facility] Quality Flow/Interdisciplinary Rounds Progress Note        Quality Flow Rounds held on May 29, 2025    Disciplines Attending:  Bedside Nurse, , , and Nursing Unit Leadership    Jaylene Kapoor was admitted on 5/23/2025  4:47 PM    Anticipated Discharge Date:       Disposition:    Julio Score:  Julio Scale Score: 22    BS RISK OF UNPLANNED READMISSION 2.0             11.1 Total Score        Discussed patient goal for the day, patient clinical progression, and barriers to discharge.  The following Goal(s) of the Day/Commitment(s) have been identified:  Diagnostics - Report Results and Labs - Report Results      Paty Brennan RN  May 29, 2025        
  Gastroenterology, Hepatology, &  Advanced Endoscopy    Progress Note    Dr. Perez covering for Dr. Quinones until 5/30/25    Reason for Consult: Hematemesis, tarry stools     HPI:   Jaylene Kapoor is a 45 y.o. female w/ PMH of  has a past medical history of Heart burn, History of alcohol abuse, Hypertension, and Liver cirrhosis (HCC). who presents to the ED abdominal pain and ear fullness. Patient states that she has a history of alcohol abuse but has not been treated for a long time. Patient states that she has been feeling ill for the past 3 days. Patient is denying sore throat.  Pt follows with Dr. Quinones,  Dr. Aremnta and [facility].  History of Ascites due to alcoholic hepatitis, Alcohol use disorder, and Alcoholic hepatitis. Previous was in rehab at [place].    EGD 2/16/24: Copious solid food in the stomach - unable to wash and suction, it did limit views of mucosa, however no fresh or old blood noted on exam.  No appreciable GEV. PHG in fundus and cardia      PMH:       Diagnosis Date    Heart burn     History of alcohol abuse     Hypertension     Liver cirrhosis (HCC)         PSH:       Procedure Laterality Date    FOOT DEBRIDEMENT Left 4/11/2024    LEFT FOOT AND ANKLE INCISION AND DRAINAGE performed by Florentin Grover DPM at [facility] OR    FOOT DEBRIDEMENT Left 4/17/2024    LEFT FOOT INCISION AND DRAINAGE performed by Florentin Grover DPM at [facility] OR    PICC INSERTION VASCULAR ACCESS TEAM  4/14/2024    UPPER GASTROINTESTINAL ENDOSCOPY N/A 2/16/2024    EGD ESOPHAGOGASTRODUODENOSCOPY WITH DILATION performed by Dwayne Quinones DO at [facility] ENDOSCOPY    UPPER GASTROINTESTINAL ENDOSCOPY N/A 5/27/2025    ESOPHAGOGASTRODUODENOSCOPY BIOPSY performed by Isai Perez MD at [facility] ENDOSCOPY        Family History:       Problem Relation Age of Onset    Diabetes Mother     Heart Disease Mother     Diabetes Father         Social History:   Social History     Tobacco Use    Smoking status: Every Day     Current packs/day: 0.50     
  Gastroenterology, Hepatology, &  Advanced Endoscopy    Progress Note    Dr. Perez covering for Dr. Quinones until 5/30/25    Reason for Consult: Hematemesis, tarry stools     HPI:   Jaylene Kapoor is a 45 y.o. female w/ PMH of  has a past medical history of Heart burn, History of alcohol abuse, Hypertension, and Liver cirrhosis (HCC). who presents to the ED abdominal pain and ear fullness. Patient states that she has a history of alcohol abuse but has not been treated for a long time. Patient states that she has been feeling ill for the past 3 days. Patient is denying sore throat.  Pt follows with Dr. Quinones,  Dr. Armenta and [facility].  History of Ascites due to alcoholic hepatitis, Alcohol use disorder, and Alcoholic hepatitis. Previous was in rehab at [place].    EGD 2/16/24: Copious solid food in the stomach - unable to wash and suction, it did limit views of mucosa, however no fresh or old blood noted on exam.  No appreciable GEV. PHG in fundus and cardia      PMH:       Diagnosis Date    Heart burn     History of alcohol abuse     Hypertension     Liver cirrhosis (HCC)         PSH:       Procedure Laterality Date    FOOT DEBRIDEMENT Left 4/11/2024    LEFT FOOT AND ANKLE INCISION AND DRAINAGE performed by Florentin Grover DPM at [facility] OR    FOOT DEBRIDEMENT Left 4/17/2024    LEFT FOOT INCISION AND DRAINAGE performed by Florentin Grover DPM at [facility] OR    PICC INSERTION VASCULAR ACCESS TEAM  4/14/2024    UPPER GASTROINTESTINAL ENDOSCOPY N/A 2/16/2024    EGD ESOPHAGOGASTRODUODENOSCOPY WITH DILATION performed by Dwayne Quinones DO at [facility] ENDOSCOPY    UPPER GASTROINTESTINAL ENDOSCOPY N/A 5/27/2025    ESOPHAGOGASTRODUODENOSCOPY BIOPSY performed by Isai Perez MD at [facility] [place] [place] ENDOSCOPY        Family History:       Problem Relation Age of Onset    Diabetes Mother     Heart Disease Mother     Diabetes Father         Social History:   Social History     Tobacco Use    Smoking status: Every Day     Current packs/day: 0.50     
05/30/2025 left message with patient regarding if we should fill meds that were sent to us in error. Pt was discharged.  
4 Eyes Skin Assessment     NAME:  Jaylene Kapoor  YOB: 1980  MEDICAL RECORD NUMBER:  [id number]    The patient is being assessed for  Admission    I agree that at least one RN has performed a thorough Head to Toe Skin Assessment on the patient. ALL assessment sites listed below have been assessed.      Areas assessed by both nurses:    Head, Face, Ears, Shoulders, Back, Chest, Arms, Elbows, Hands, Sacrum. Buttock, Coccyx, Ischium, Legs. Feet and Heels, and Under Medical Devices         Does the Patient have a Wound? Yes wound(s) were present on assessment. LDA wound assessment was Initiated and completed by RN       Julio Prevention initiated by RN: Yes  Wound Care Orders initiated by RN: Yes    Pressure Injury (Stage 3,4, Unstageable, DTI, NWPT, and Complex wounds) if present, place Wound referral order by RN under : Yes    New Ostomies, if present place, Ostomy referral order under : No     Nurse 1 eSignature: Electronically signed by Farhad Varela RN on 5/24/25 at 2:18 AM EDT    **SHARE this note so that the co-signing nurse can place an eSignature**    Nurse 2 eSignature: Electronically signed by Smitha Hodge RN on 5/24/25 at 3:04 AM EDT   
[facility]  MD Franki Swan MD Ali Hassan, MD Lisa Kniska, CNP   Lisa Portillo, CHERYL Vincent, CNP  Progress note    Patient's Name: Jaylene Kapoor  11:54 AM  5/26/2025      Comfortable on RA   Euvolemic       History of Present Ilness:    Ms. Kapoor is a 45-year-old woman with known history of alcohol induced cirrhosis who has not had any alcoholic beverages now for 15 months.  She presents with multiple episodes of epistaxis over the past week she notes this is never happened before.  Notes also abdominal pain, diffuse achy/crampy in nature with at times some mild heartburn.  She has some mild abdominal distention, sense of bloating.  Denies chest pain or palpitations.  No shortness of breath or dyspnea exertion.  No peripheral swelling.  No dysuria or hematuria.  No constipation.  Stools are now loose on lactulose.  Denies lightheadedness or dizziness.  No NSAIDs.  No new medications of late.  No recent course of antibiotics.    BUN and creatinine on presentation 5/23 were 39 and 1.7, respectively, compares with 6 and 0.6 in 11/2024.  BUN/creatinine this morning are stable at 32 and 1.7. U Na < 20, U osm 273, U prot:cr ratio 0.67 on urine studies at noon yesterday 5/24.    Past Medical History:   Diagnosis Date    Heart burn     History of alcohol abuse     Hypertension     Liver cirrhosis (HCC)        Past Surgical History:   Procedure Laterality Date    FOOT DEBRIDEMENT Left 4/11/2024    LEFT FOOT AND ANKLE INCISION AND DRAINAGE [facility]    FOOT DEBRIDEMENT Left 4/17/2024    LEFT FOOT INCISION AND DRAINAGE performed by Florentin Grover DPM at [facility] OR    PICC INSERTION VASCULAR ACCESS TEAM  4/14/2024    UPPER GASTROINTESTINAL ENDOSCOPY N/A 2/16/2024    EGD ESOPHAGOGASTRODUODENOSCOPY WITH DILATION performed by Dwayne Quinones DO at [facility] ENDOSCOPY       Family History   Problem Relation Age of Onset    Diabetes Mother  
[facility]  MD Franki Swan MD Ali Hassan, MD Lisa Kniska, CONCEPCION Portillo, CHERYL Vincent, CNP  Progress note    Patient's Name: Jaylene Kapoor  2:39 PM  5/29/2025      Comfortable on RA   Euvolemic   Abdomen more distended    5/29: Sitting up in bed, eating lunch. No acute distress. PO intake has been poor as she has been NPO for procedures. Paracentesis has been ordered. She denies dyspnea. Vital sings are stable.     History of Present Ilness:    Ms. Kapoor is a 45-year-old woman with known history of alcohol induced cirrhosis who has not had any alcoholic beverages now for 15 months.  She presents with multiple episodes of epistaxis over the past week she notes this is never happened before.  Notes also abdominal pain, diffuse achy/crampy in nature with at times some mild heartburn.  She has some mild abdominal distention, sense of bloating.  Denies chest pain or palpitations.  No shortness of breath or dyspnea exertion.  No peripheral swelling.  No dysuria or hematuria.  No constipation.  Stools are now loose on lactulose.  Denies lightheadedness or dizziness.  No NSAIDs.  No new medications of late.  No recent course of antibiotics.    BUN and creatinine on presentation 5/23 were 39 and 1.7, respectively, compares with 6 and 0.6 in 11/2024.  BUN/creatinine this morning are stable at 32 and 1.7. U Na < 20, U osm 273, U prot:cr ratio 0.67 on urine studies at noon yesterday 5/24.    Past Medical History:   Diagnosis Date    Heart burn     History of alcohol abuse     Hypertension     Liver cirrhosis (HCC)        Past Surgical History:   Procedure Laterality Date    FOOT DEBRIDEMENT Left 4/11/2024    LEFT FOOT AND ANKLE INCISION AND DRAINAGE performed by Florentin Grover DPM at [facility] OR    FOOT DEBRIDEMENT Left 4/17/2024    LEFT FOOT INCISION AND DRAINAGE performed by Florentin Grover DPM at [facility] OR    PICC INSERTION VASCULAR ACCESS TEAM  4/14/2024    
Attending notified of positive blood cultures   
Consult received. Dr. Quinones OOT until 5/30/25, Dr. Perez covering. Consult changed to Dr. Perez. Thank  you for consult.       ROBERTO CARLOS De Jesus - CNP 5/24/2025 7:58 AM for Dr. Quinones  
Department of Internal Medicine  Infectious Diseases  Progress Note      C/C :   E coli  bacteremia     Denies fever or chills  Denies abdomen pain   Denies dysuria   Afebrile       Current Facility-Administered Medications   Medication Dose Route Frequency Provider Last Rate Last Admin    [START ON 5/30/2025] folic acid (FOLVITE) tablet 1 mg  1 mg Oral Daily Franki Arreguin MD        [START ON 5/30/2025] thiamine tablet 100 mg  100 mg Oral Daily Franki Arreguin MD        pantoprazole (PROTONIX) tablet 40 mg  40 mg Oral BID AC Jena George APRN - CNP        levoFLOXacin (LEVAQUIN) tablet 750 mg  750 mg Oral Q48H Severo Duncan MD   750 mg at 05/28/25 1755    carvedilol (COREG) tablet 6.25 mg  6.25 mg Oral BID  Bean Martínez MD   6.25 mg at 05/29/25 1122    HYDROmorphone (DILAUDID) injection 0.5 mg  0.5 mg IntraVENous Q4H PRN Bean Martínez MD   0.5 mg at 05/27/25 1825    sodium bicarbonate tablet 1,300 mg  1,300 mg Oral BID Franki Meadows MD   1,300 mg at 05/29/25 0859    cholestyramine light packet 4 g  4 g Oral Daily Jena George APRN - CNP   4 g at 05/29/25 0859    lactulose (CHRONULAC) 10 GM/15ML solution 10 g  10 g Oral BID Jena George APRN - CNP   10 g at 05/28/25 1933    [Held by provider] spironolactone (ALDACTONE) tablet 25 mg  25 mg Oral Daily Jena George APRN - CNP        [Held by provider] furosemide (LASIX) tablet 20 mg  20 mg Oral Daily Jena George APRN - CNP        sodium chloride flush 0.9 % injection 10 mL  10 mL IntraVENous 2 times per day Jena George APRN - CNP   10 mL at 05/29/25 1000    sodium chloride flush 0.9 % injection 10 mL  10 mL IntraVENous PRN Jena George APRN - CNP        0.9 % sodium chloride infusion   IntraVENous PRN Jena George APRN - CNP        potassium chloride (KLOR-CON M) extended release tablet 40 mEq  40 mEq Oral PRN Jena George APRN - CNP   40 mEq at 05/27/25 0957    Or    potassium bicarb-citric acid (EFFER-K) 
ENT consult sent via "[employer]   
Heart monitor and IV removed. Discharge papers read to patient and all questions answered.   
Inquiring if GI plans for paracentesis in light of Abd US completed today per Dr. Winter.  Awaiting response from Dr. Ana Templeton RN    
Message sent to Dr. Martínez through [employer] to see if patient going home tonight, awaiting for return call.  
Message sent to Dr. Perez to see if scheduling paracentesis today patient remains NPO.  
Nephrology consult sent via [employer].   
New consult for GI. Sent to Dr. Perez.         Mateo Jaquez RN    
Occupational Therapy  OCCUPATIONAL THERAPY INITIAL EVALUATION    [facility]  [address] [place], [place]      Date:2025                                                Patient Name: Jaylene Kapoor  MRN: [id number]  : 1980  Room: [address] [place] [address]    Evaluating OT: Franki Velasco OTR/L #[id number]     Referring Provider: Bean Martínez MD   Specific Provider Orders/Date: OT eval and treat 25    Diagnosis: Hyperbilirubinemia [E80.6]  Hyponatremia [E87.1]  MELINDA (acute kidney injury) [N17.9]  Hepatic cirrhosis, unspecified hepatic cirrhosis type, unspecified whether ascites present (HCC) [K74.60]   Pt admitted to hospital with nausea, abdominal pain , fever and chills.      Pertinent Medical History:  has a past medical history of Heart burn, History of alcohol abuse, Hypertension, and Liver cirrhosis (HCC).       Precautions:  Fall Risk    Assessment of current deficits    [x] Functional mobility  [x]ADLs  [x] Strength               []Cognition    [x] Functional transfers   [x] IADLs         [x] Safety Awareness   [x]Endurance    [] Fine Coordination              [x] Balance      [] Vision/perception   []Sensation     []Gross Motor Coordination  [] ROM  [] Delirium                   [] Motor Control     OT PLAN OF CARE   OT POC based on physician orders, patient diagnosis and results of clinical assessment    Frequency/Duration 1-5 days/wk for 2 weeks PRN   Specific OT Treatment Interventions to include:   * Instruction/training on adapted ADL techniques and AE recommendations to increase functional independence within precautions       * Training on energy conservation strategies, correct breathing pattern and techniques to improve independence/tolerance for self-care routine  * Functional transfer/mobility training/DME recommendations for increased independence, safety, and fall prevention  * Patient/Family education to increase follow through with 
Paracentesis complete, catheter removed at this time. 1750mL yellow fluid removed. Skin glue applied, covered with dry and transparent dressings.    Handoff called to bedside RN.  
Patient's diet advanced to GI bland from full liquid as being tolerated.  Ok to advance per Dr. Ana Templeton RN    
Physical Therapy Initial Evaluation    Name: Jaylene Kapoor  : 1980  MRN: [id number]      Date of Service: 2025    Evaluating PT:  Jr Bella, PT [id number]    Referring provider/PT Order:  PT Eval and Treat   25 0900  PT eval and treat  Start:  25 09,   End:  25 09,   ONE TIME,   Standing Count:  1 Occurrences,   R         Bean Martínez MD     Room #:  6518/6518-A  Diagnosis:  Hyperbilirubinemia [E80.6]  Hyponatremia [E87.1]  MELINDA (acute kidney injury) [N17.9]  Hepatic cirrhosis, unspecified hepatic cirrhosis type, unspecified whether ascites present (HCC) [K74.60]  PMHx/PSHx:     has a past medical history of Heart burn, History of alcohol abuse, Hypertension, and Liver cirrhosis (HCC).    has a past surgical history that includes Upper gastrointestinal endoscopy (N/A, 2024); Foot Debridement (Left, 2024); picc insertion vascular access team (2024); Foot Debridement (Left, 2024); and Upper gastrointestinal endoscopy (N/A, 2025).     Procedure/Surgery:  none  Precautions:  Falls, FWB (full weight bearing) Bilateral LE   Equipment Needs: None at this time,    SUBJECTIVE:    Patient lives with Family   in a ranch home  with  1+1  to enter. Bed is on 1 floor and bath is on 1 floor.  Patient ambulated independently  PTA. Equipment owned: None,      OBJECTIVE:   Initial Evaluation  Date: 25 Treatment Short Term/ Long Term   Goals   AM-PAC 6 Clicks       Was pt agreeable to Eval/treatment? Yes      Does pt have pain? no     Bed Mobility  Rolling: Ind  Supine to sit:   Ind   Sit to supine: Ind   Scooting: Ind   Rolling: Ind  Supine to sit: Ind  Sit to supine: Ind  Scooting: Ind   Transfers Sit to stand: Sup   Stand to sit: Sup  Stand pivot: Sup   Sit to stand: Ind   Stand to sit: Ind   Stand pivot: Ind    Ambulation    200 feet    SBA no LOB  200+ feet with Ind   Stair negotiation: ascended and descended  NT  1+1 steps with Ind      Strength/ROM:   See 
Pts bp trending up throughout today. Most recent 179/68  55. Dr. Martínez notified.  Mary Jane Templeton RN    
RN messaged Dr. Quinones regarding GI consult. Awaiting response.     Dr. Quinones responded stating he received the consult and will see the patient today.   
RN messaged wound care RN regarding consult. Awaiting response.   
RN sent ID consult to Dr. Duncan.         Mateo Jaquez RN    
Scheduled for patient to come to department at 12 today. Transport attempted to get patient at 1:40pm and per transport, patient going to endo. Will try to send for again when schedule permits.   
       potassium chloride (KLOR-CON M) extended release tablet 40 mEq  40 mEq Oral PRN LacivitaJena APRN - CNP        Or    potassium bicarb-citric acid (EFFER-K) effervescent tablet 40 mEq  40 mEq Oral PRN Lacivita, Jena, APRN - CNP        Or    potassium chloride 10 mEq/100 mL IVPB (Peripheral Line)  10 mEq IntraVENous PRN Lacivita, MAT BellaN - CNP        magnesium sulfate 2000 mg in 50 mL IVPB premix  2,000 mg IntraVENous PRN LacivitaJena APRN - CONCEPCION        ondansetron (ZOFRAN-ODT) disintegrating tablet 4 mg  4 mg Oral Q8H PRN Laclilianita, MAT BellaN - CNP        Or    ondansetron (ZOFRAN) injection 4 mg  4 mg IntraVENous Q6H PRN Lacivcruz, Jena, APRN - CNP        senna (SENOKOT) tablet 8.6 mg  1 tablet Oral Daily PRN Jena George APRN - CONCEPCION        acetaminophen (TYLENOL) tablet 500 mg  500 mg Oral Q6H PRN Jena George APRN - CNP   500 mg at 05/25/25 0136    Or    acetaminophen (TYLENOL) suppository 325 mg  325 mg Rectal Q6H PRN Jena George APRN - CNP        lactated ringers infusion   IntraVENous Continuous Franki Arreguin  mL/hr at 05/25/25 0628 New Bag at 05/25/25 0628    thiamine (B-1) injection 100 mg  100 mg IntraVENous Daily Franki Arreguin MD   100 mg at 05/25/25 0759    folic acid injection 1 mg  1 mg IntraVENous Daily Franki Arreguin MD   1 mg at 05/25/25 0804    sodium chloride flush 0.9 % injection 5-40 mL  5-40 mL IntraVENous 2 times per day Franki Arreguin MD        sodium chloride flush 0.9 % injection 5-40 mL  5-40 mL IntraVENous PRN Franki Arreguin MD        0.9 % sodium chloride infusion   IntraVENous PRN Franki Arreguin MD        LORazepam (ATIVAN) tablet 1 mg  1 mg Oral Q1H PRN Franki Arreguin MD        Or    LORazepam (ATIVAN) injection 1 mg  1 mg IntraVENous Q1H PRN Franki Arreguin MD        Or    LORazepam (ATIVAN) tablet 2 mg  2 mg Oral Q1H PRN Franki Arreguin MD        Or    LORazepam (ATIVAN) injection 2 mg  2 mg IntraVENous Q1H PRN Franki Arreguin MD        Or 
Please notify ENT resident of any profuse bleeding anteriorly or coughing up bright red blood clots.   Nursing can spray afrin liberally in both nares and hold pressure for any persistent bleeding.   Start nasal saline spray TID to both nares  Maintain HOB elevated  No nose blowing or straining  Sneeze with mouth open  Tight blood pressure control  If oxygen is used humidification should be added  Discussed continued nasal saline TID and obtained humidifier for bedroom at home  Recommend patient follow-up in outpatient ENT clinic with Dr. Haji  Please contact on-call ENT resident with any question     Patient seen and examined by resident and discussed with attending on-call       Electronically signed by Nalini Justice DO on 5/27/2025 at 9:54 AM    
ROBERTO CARLOS Bella CNP        Or    potassium chloride 10 mEq/100 mL IVPB (Peripheral Line)  10 mEq IntraVENous PRN Jena George APRN - CNP        magnesium sulfate 2000 mg in 50 mL IVPB premix  2,000 mg IntraVENous PRN Jena Georeg APRN - CNP        ondansetron (ZOFRAN-ODT) disintegrating tablet 4 mg  4 mg Oral Q8H PRN Jena George APRN - CNP        Or    ondansetron (ZOFRAN) injection 4 mg  4 mg IntraVENous Q6H PRN Jena George APRN - CNP        senna (SENOKOT) tablet 8.6 mg  1 tablet Oral Daily PRN Jena George APRN - CNP        acetaminophen (TYLENOL) tablet 500 mg  500 mg Oral Q6H PRN Jena George APRN - CNP   500 mg at 05/28/25 1113    Or    acetaminophen (TYLENOL) suppository 325 mg  325 mg Rectal Q6H PRN Jena George APRN - CNP        thiamine (B-1) injection 100 mg  100 mg IntraVENous Daily Franki Arreguin MD   100 mg at 05/28/25 0917    folic acid injection 1 mg  1 mg IntraVENous Daily Franki Arreguin MD   1 mg at 05/28/25 1251    sodium chloride flush 0.9 % injection 5-40 mL  5-40 mL IntraVENous 2 times per day Franki Arreguin MD   10 mL at 05/28/25 1114    sodium chloride flush 0.9 % injection 5-40 mL  5-40 mL IntraVENous PRN Franki Arreguin MD        0.9 % sodium chloride infusion   IntraVENous PRN Franki Arreguin MD        LORazepam (ATIVAN) tablet 1 mg  1 mg Oral Q1H PRN Franki Arreguin MD        Or    LORazepam (ATIVAN) injection 1 mg  1 mg IntraVENous Q1H PRN Franki Arreguin MD        Or    LORazepam (ATIVAN) tablet 2 mg  2 mg Oral Q1H PRN Franki Arreguin MD        Or    LORazepam (ATIVAN) injection 2 mg  2 mg IntraVENous Q1H PRN Franki Arreguin MD        Or    LORazepam (ATIVAN) tablet 3 mg  3 mg Oral Q1H PRN Franki Arreguin MD        Or    LORazepam (ATIVAN) injection 3 mg  3 mg IntraVENous Q1H PRN Franki Arreguin MD        Or    LORazepam (ATIVAN) tablet 4 mg  4 mg Oral Q1H PRN Franki Arreguin MD        Or    LORazepam (ATIVAN) injection 4 mg  4 mg IntraVENous Q1H PRN Rodríguez, 
29.4 05/26/2025 05:58 AM    MCHC 34.1 05/26/2025 05:58 AM    RDW 15.2 05/26/2025 05:58 AM    NRBC 1 02/28/2024 05:10 AM    METASPCT 1 02/26/2024 07:03 PM    LYMPHOPCT 19 05/26/2025 05:58 AM    PROMYELOPCT 2 02/15/2024 06:33 AM    MONOPCT 12 05/26/2025 05:58 AM    MYELOPCT 1 02/28/2024 05:10 AM    EOSPCT 3 05/26/2025 05:58 AM    BASOPCT 1 05/26/2025 05:58 AM    MONOSABS 0.81 05/26/2025 05:58 AM    LYMPHSABS 1.24 05/26/2025 05:58 AM    EOSABS 0.21 05/26/2025 05:58 AM    BASOSABS 0.03 05/26/2025 05:58 AM       CMP     Lab Results   Component Value Date/Time     05/26/2025 05:58 AM    K 3.5 05/26/2025 05:58 AM     05/26/2025 05:58 AM    CO2 17 05/26/2025 05:58 AM    BUN 20 05/26/2025 05:58 AM    CREATININE 1.4 05/26/2025 05:58 AM    GFRAA >60 02/25/2020 01:43 PM    LABGLOM 46 05/26/2025 05:58 AM    LABGLOM 79 04/29/2024 10:30 AM    GLUCOSE 99 05/26/2025 05:58 AM    CALCIUM 7.9 05/26/2025 05:58 AM    BILITOT 1.5 05/26/2025 05:58 AM    ALKPHOS 139 05/26/2025 05:58 AM    AST 22 05/26/2025 05:58 AM    ALT 10 05/26/2025 05:58 AM         Hepatic Function Panel:    Lab Results   Component Value Date/Time    ALKPHOS 139 05/26/2025 05:58 AM    ALT 10 05/26/2025 05:58 AM    AST 22 05/26/2025 05:58 AM    BILITOT 1.5 05/26/2025 05:58 AM    BILIDIR 4.3 04/09/2024 02:45 PM    IBILI 2.5 04/09/2024 02:45 PM       PT/INR:    Lab Results   Component Value Date/Time    PROTIME 15.0 05/23/2025 05:37 PM    INR 1.4 05/23/2025 05:37 PM       TSH:    Lab Results   Component Value Date/Time    TSH 3.60 02/13/2024 07:20 AM       U/A:    Lab Results   Component Value Date/Time    COLORU Yellow 05/23/2025 09:53 PM    PHUR 5.5 05/23/2025 09:53 PM    PHUR 5.5 04/20/2024 03:39 AM    WBCUA 0 TO 5 05/23/2025 09:53 PM    RBCUA GREATER THAN 100 05/23/2025 09:53 PM    BACTERIA 4+ 05/23/2025 09:53 PM    CLARITYU Clear 04/11/2019 05:45 PM    LEUKOCYTESUR TRACE 05/23/2025 09:53 PM    UROBILINOGEN 1.0 05/23/2025 09:53 PM    BILIRUBINUR SMALL 
AM    ALT 8 05/27/2025 06:09 AM     Ionized Calcium:  No components found for: \"IONCA\"  Magnesium:    Lab Results   Component Value Date/Time    MG 1.9 05/27/2025 06:09 AM     Phosphorus:    Lab Results   Component Value Date/Time    PHOS 2.7 05/26/2025 05:58 AM     U/A:    Lab Results   Component Value Date/Time    COLORU Yellow 05/23/2025 09:53 PM    PHUR 5.5 05/23/2025 09:53 PM    PHUR 5.5 04/20/2024 03:39 AM    WBCUA 0 TO 5 05/23/2025 09:53 PM    RBCUA GREATER THAN 100 05/23/2025 09:53 PM    BACTERIA 4+ 05/23/2025 09:53 PM    CLARITYU Clear 04/11/2019 05:45 PM    LEUKOCYTESUR TRACE 05/23/2025 09:53 PM    UROBILINOGEN 1.0 05/23/2025 09:53 PM    BILIRUBINUR SMALL 05/23/2025 09:53 PM    BLOODU LARGE 04/11/2019 05:45 PM    GLUCOSEU NEGATIVE 05/23/2025 09:53 PM    AMORPHOUS PRESENT 05/23/2025 09:53 PM     Microalbumen/Creatinine ratio:  No components found for: \"RUCREAT\"  Iron Saturation:  No components found for: \"PERCENTFE\"  TIBC:    Lab Results   Component Value Date/Time    TIBC 177 07/23/2024 02:38 PM     FERRITIN:    Lab Results   Component Value Date/Time    FERRITIN 498 07/23/2024 02:38 PM        Imaging:  CT ABDOMEN PELVIS W IV CONTRAST Additional Contrast? None   Final Result   1. No acute intra-abdominal abnormality.   2. Stigmata of portal hypertension interval improvement of now mild volume   ascites.         XR CHEST (2 VW)   Final Result   No evidence of acute cardiopulmonary process.         US ABDOMEN LIMITED    (Results Pending)       Assessment  MELINDA, given her known cirrhosis question of hepatorenal syndrome has been raised, though frankly the degree of her hepatic insufficiency seems mild.  Blood pressures actually mildly elevated on presentation and has not been low since her arrival.  No history to suggest hypotensive episode.  No new medications or NSAIDs of late.  That said, 6 months ago creatinine was 0.6 per interim laboratory studies are not at this time known.  Proteinuric, though not 
normal  Extremities: atraumatic, no edema  Neurologic: cranial nerves 2-12 grossly intact, no slurred speech    Most Recent Labs  Lab Results   Component Value Date    WBC 5.5 05/27/2025    HGB 10.1 (L) 05/27/2025    HCT 30.2 (L) 05/27/2025    PLT 85 (L) 05/27/2025     05/27/2025    K 3.5 05/27/2025     (H) 05/27/2025    CREATININE 1.3 (H) 05/27/2025    BUN 14 05/27/2025    CO2 16 (L) 05/27/2025    GLUCOSE 92 05/27/2025    ALT 8 05/27/2025    AST 20 05/27/2025    INR 1.4 05/23/2025    APTT 44.8 (H) 10/04/2024    TSH 3.60 02/13/2024    LABA1C 4.4 02/13/2024       CT ABDOMEN PELVIS W IV CONTRAST Additional Contrast? None   Final Result   1. No acute intra-abdominal abnormality.   2. Stigmata of portal hypertension interval improvement of now mild volume   ascites.         XR CHEST (2 VW)   Final Result   No evidence of acute cardiopulmonary process.         US ABDOMEN LIMITED    (Results Pending)       Echocardiogram      Assessment   Active Hospital Problems    Diagnosis     Epistaxis [R04.0]     MELINDA (acute kidney injury) [N17.9]     Melena [K92.1]     Anemia, chronic disease [D63.8]     SIADH (syndrome of inappropriate ADH production) [E22.2]     Primary hypertension [I10]     Ascites due to alcoholic hepatitis (HCC) [K70.11]     Alcohol use disorder [F10.90]     Cirrhosis of liver (HCC) [K74.60]          CONSULTS:  IP CONSULT TO INTERNAL MEDICINE  IP CONSULT TO GI  IP CONSULT TO SOCIAL WORK  IP CONSULT TO OTOLARYNGOLOGY  IP CONSULT TO INFECTIOUS DISEASES  IP CONSULT TO NEPHROLOGY  Plan  Hematemesis, melena   GI consultation noted   MELD 24   S/p EGD 5/7:   Hypertensive Gastropathy   For ABD us 5/28--r/o asites? Paracentesis needed  Transfuse to keep Hgb>7.0, Hgb 10.3  -Monitor for meghann bleeding, melena, hematochezia  -Consideration for repeat EGD 5/27.   - Made NPO after midnight for possible EGD 5/27.  -PeTH  - Monitor daily lab work  - Lactulose dosing to be titrated to 2-3 BM daily. Once he has 2 BM 
normal, no rashes or lesions  Lungs: CTAB, no retractions/use of accessory muscles, no vocal fremitus, no rhonchi, no crackle, no rales  Heart: regular rate, regular rhythm, no murmur  Abdomen: soft, NT, bowel sounds normal  Extremities: atraumatic, no edema  Neurologic: cranial nerves 2-12 grossly intact, no slurred speech    Most Recent Labs  Lab Results   Component Value Date    WBC 6.6 05/26/2025    HGB 10.2 (L) 05/26/2025    HCT 29.9 (L) 05/26/2025    PLT 65 (L) 05/26/2025     (L) 05/26/2025    K 3.5 05/26/2025     (H) 05/26/2025    CREATININE 1.4 (H) 05/26/2025    BUN 20 05/26/2025    CO2 17 (L) 05/26/2025    GLUCOSE 99 05/26/2025    ALT 10 05/26/2025    AST 22 05/26/2025    INR 1.4 05/23/2025    APTT 44.8 (H) 10/04/2024    TSH 3.60 02/13/2024    LABA1C 4.4 02/13/2024       CT ABDOMEN PELVIS W IV CONTRAST Additional Contrast? None   Final Result   1. No acute intra-abdominal abnormality.   2. Stigmata of portal hypertension interval improvement of now mild volume   ascites.         XR CHEST (2 VW)   Final Result   No evidence of acute cardiopulmonary process.             Echocardiogram      Assessment   Active Hospital Problems    Diagnosis     Epistaxis [R04.0]     MELINDA (acute kidney injury) [N17.9]     Anemia, chronic disease [D63.8]     SIADH (syndrome of inappropriate ADH production) [E22.2]     Primary hypertension [I10]     Ascites due to alcoholic hepatitis (HCC) [K70.11]     Alcohol use disorder [F10.90]     Cirrhosis of liver (HCC) [K74.60]          CONSULTS:  IP CONSULT TO INTERNAL MEDICINE  IP CONSULT TO GI  IP CONSULT TO SOCIAL WORK  IP CONSULT TO OTOLARYNGOLOGY  IP CONSULT TO INFECTIOUS DISEASES  IP CONSULT TO NEPHROLOGY  Plan  Hematemesis, melena   GI consultation noted   MELD 24   Transfuse to keep Hgb>7.0, Hgb 10.3  -Monitor for meghann bleeding, melena, hematochezia  -Consideration for repeat EGD 5/27.   - Made NPO after midnight for possible EGD 5/27.  -PeTH  - Monitor daily lab 
and mental status is at baseline, can hold additional doses for the day and start again the following day.    -Protonix 40 mg q 12 hours  - Diuretics currently held, resumes once renal function allows.  Hx alcohol abuse  PETH   UDS   CIWA   Thiamine   Folic acid   Peer recovery      Alcoholic cirrhosis   Per GI- MELD=24(5/25)     MELINDA  Baseline Cr 0.7  Presenting Cr 1.7--1.4(5/26)  Urine Lytes   Neph consult-?hepato-gerald synd?  Appreciate Dr Meadows input- albumin, 0.9NS  Sodium bicarb  Hyponatremia  Serum and urine OSM   NS ivf per neph  Thrombocytopenia    likely from cirrhosis  Epistaxis   Stop manipulating nose   Nasal saline   No nose blowing   ENT consultation      ECOLI bacteremia  ID consult noted- Rocephin  R/o SBP  Tobacco abuse   Nicotine patch declined          PT AM-PAC--   OT AM- PAC--     Follow labs   DVT prophylaxis  Please see orders for further management and care.  Discharge plan: TBD- once ID and neph ok dc  Needs OP f/u c Dr Joseph/ hepatology      Electronically signed by Bean Martínez MD on 5/27/2025 at 7:08 AM    I can be reached through [employer].   
creatinine was 0.6 per interim laboratory studies are not at this time known.  Proteinuric, though not severely.  CT scan shows unremarkable kidneys  Hyponatremia, secondary to cirrhosis, presumptively.  Prerenal azotemia suggest hypovolemic hyponatremia  Metabolic acidosis, non-anion gap, secondary to lactulose and loose stools      Azotemia improving with volume replacement   Cr continues to improve-- 1.3 mg/dL       Plan  Stop iv fluids   Sodium bicarbonate tablets 1,300 mg BID   Notify GI re US findings  Otherwise continue current management  Follow labs, UO      Thank you for the opportunity to participate in the care of your pleasant patient.  We look forward to following along with you.        Electronically signed by Lashonda Winter MD on 5/28/2025 at 2:10 PM    NOTE: This report was transcribed using voice recognition software. Every effort was made to ensure accuracy; however, inadvertent transcription errors may be present.  
start again the following day.    -Protonix 40 mg q 12 hours  - Diuretics currently held, resumes once renal function allows.  Hx alcohol abuse  PETH   UDS   CIWA   Thiamine   Folic acid   Peer recovery      Alcoholic cirrhosis   Per GI- MELD=24(5/25)     MELINDA  Baseline Cr 0.7  Presenting Cr 1.7--1.4(5/26)  Urine Lytes   Neph consult-?hepato-gerald synd?  Appreciate Dr Meadows input- albumin, 0.9NS  Sodium bicarb  Hyponatremia  Serum and urine OSM- prob 2nd to cirrhosis   ivf off per neph  Thrombocytopenia    likely from cirrhosis  Epistaxis   Stop manipulating nose   Nasal saline   No nose blowing   ENT consultation      ECOLI bacteremia  ID consult noted- po levaquin  Tobacco abuse   Nicotine patch declined          PT AM-PAC--   OT AM- PAC--     Follow labs   DVT prophylaxis  Please see orders for further management and care.  Discharge plan: TBD- once ID , GI and neph ok dc  Needs OP f/u c Dr Joseph/ hepatology      Electronically signed by Bean Martínez MD on 5/29/2025 at 7:52 AM    I can be reached through [employer].

## 2025-06-03 LAB — SURGICAL PATHOLOGY REPORT: NORMAL

## (undated) DEVICE — SOLUTION IRRIG 1000ML 0.9% SOD CHL USP POUR PLAS BTL

## (undated) DEVICE — TOWEL,OR,DSP,ST,BLUE,STD,6/PK,12PK/CS: Brand: MEDLINE

## (undated) DEVICE — ENDOSCOPIC KIT 1.1+ OP4 NO CP DE

## (undated) DEVICE — DEFENDO AIR WATER SUCTION AND BIOPSY VALVE KIT FOR  OLYMPUS: Brand: DEFENDO AIR/WATER/SUCTION AND BIOPSY VALVE

## (undated) DEVICE — BITEBLOCK 54FR W/ DENT RIM BLOX

## (undated) DEVICE — NEEDLE JAMSH BNE MAR 13G X 2

## (undated) DEVICE — BLOCK BITE 60FR RUBBER ADLT DENTAL

## (undated) DEVICE — GRADUATE TRIANG MEASURE 1000ML BLK PRNT

## (undated) DEVICE — PACK PROCEDURE SURG GEN CUST

## (undated) DEVICE — GAUZE,SPONGE,4"X4",8PLY,STRL,LF,10/TRAY: Brand: MEDLINE

## (undated) DEVICE — FORCEP BX LG CAP 2.4 MMX120 CM W/ NDL YEL RADIAL JAW 4 DISP

## (undated) DEVICE — SPONGE,LAP,4"X18",XR,ST,5/PK,40PK/CS: Brand: MEDLINE INDUSTRIES, INC.

## (undated) DEVICE — PACK,HEAVY DRAINAGE,STERILE: Brand: MEDLINE INDUSTRIES, INC.

## (undated) DEVICE — NEEDLE BNE MAR ASPIR 11GA L4IN TWO PC HNDL W/ PRB JAMSH

## (undated) DEVICE — SPONGE GZ W4XL4IN RAYON POLY CVR W/NONWOVEN FAB STRL 2/PK

## (undated) DEVICE — 6 X 9  1.75MIL 4-WALL LABGUARD: Brand: MINIGRIP COMMERCIAL LLC

## (undated) DEVICE — AIR/WATER CLEANING ADAPTER FOR OLYMPUS® GI ENDOSCOPE: Brand: BULLDOG®

## (undated) DEVICE — 4-PORT MANIFOLD: Brand: NEPTUNE 2

## (undated) DEVICE — DRAPE,EXTREMITY,89X128,STERILE: Brand: MEDLINE

## (undated) DEVICE — HANDPIECE SET WITH COAXIAL HIGH FLOW TIP AND SUCTION TUBE: Brand: INTERPULSE

## (undated) DEVICE — DOUBLE BASIN SET: Brand: MEDLINE INDUSTRIES, INC.

## (undated) DEVICE — GLOVE ORTHO 7   MSG9470

## (undated) DEVICE — ELECTRODE PT RET AD L9FT HI MOIST COND ADH HYDRGEL CORDED

## (undated) DEVICE — APPLICATOR MEDICATED 26 CC SOLUTION HI LT ORNG CHLORAPREP

## (undated) DEVICE — NEEDLE HYPO 25GA L1.5IN BLU POLYPR HUB S STL REG BVL STR

## (undated) DEVICE — SWAB SPEC COLL SHFT L5.25IN POLYUR FOAM TIP SFT DBL MEDIA

## (undated) DEVICE — PAD,ABDOMINAL,5"X9",ST,LF,25/BX: Brand: MEDLINE INDUSTRIES, INC.

## (undated) DEVICE — TRAP,MUCUS SPECIMEN, 80CC: Brand: MEDLINE

## (undated) DEVICE — CONTAINER SPEC 60ML PH 7NEUTRAL BUFF FRMLN RDY TO USE

## (undated) DEVICE — BNDG,ELSTC,MATRIX,STRL,4"X5YD,LF,HOOK&LP: Brand: MEDLINE

## (undated) DEVICE — CLEANING STYLET

## (undated) DEVICE — GAUZE,PACKING STRIP,PLAIN,1/2"X5YD,STRL: Brand: CURAD

## (undated) DEVICE — DRESSING PETRO W3XL8IN OIL EMUL N ADH GZ KNIT IMPREG CELOS

## (undated) DEVICE — BANDAGE,GAUZE,BULKEE II,4.5"X4.1YD,STRL: Brand: MEDLINE

## (undated) DEVICE — BLADE,STAINLESS-STEEL,10,STRL,DISPOSABLE: Brand: MEDLINE

## (undated) DEVICE — COVER HNDL LT DISP

## (undated) DEVICE — SOLUTION IV 1000ML 0.9% SOD CHL PH 5 INJ USP VIAFLX PLAS